# Patient Record
Sex: FEMALE | Race: WHITE | NOT HISPANIC OR LATINO | Employment: FULL TIME | URBAN - METROPOLITAN AREA
[De-identification: names, ages, dates, MRNs, and addresses within clinical notes are randomized per-mention and may not be internally consistent; named-entity substitution may affect disease eponyms.]

---

## 2017-01-12 ENCOUNTER — PATIENT MESSAGE (OUTPATIENT)
Dept: FAMILY MEDICINE | Facility: CLINIC | Age: 42
End: 2017-01-12

## 2017-01-12 ENCOUNTER — OFFICE VISIT (OUTPATIENT)
Dept: FAMILY MEDICINE | Facility: CLINIC | Age: 42
End: 2017-01-12
Payer: COMMERCIAL

## 2017-01-12 VITALS
HEIGHT: 66 IN | SYSTOLIC BLOOD PRESSURE: 108 MMHG | HEART RATE: 96 BPM | TEMPERATURE: 100 F | OXYGEN SATURATION: 97 % | DIASTOLIC BLOOD PRESSURE: 76 MMHG | BODY MASS INDEX: 34.33 KG/M2 | WEIGHT: 213.63 LBS

## 2017-01-12 DIAGNOSIS — J18.9 CAP (COMMUNITY ACQUIRED PNEUMONIA): Primary | ICD-10-CM

## 2017-01-12 PROCEDURE — 1159F MED LIST DOCD IN RCRD: CPT | Mod: S$GLB,,, | Performed by: FAMILY MEDICINE

## 2017-01-12 PROCEDURE — 99214 OFFICE O/P EST MOD 30 MIN: CPT | Mod: S$GLB,,, | Performed by: FAMILY MEDICINE

## 2017-01-12 PROCEDURE — 99999 PR PBB SHADOW E&M-EST. PATIENT-LVL III: CPT | Mod: PBBFAC,,, | Performed by: FAMILY MEDICINE

## 2017-01-12 PROCEDURE — 3074F SYST BP LT 130 MM HG: CPT | Mod: S$GLB,,, | Performed by: FAMILY MEDICINE

## 2017-01-12 PROCEDURE — 3078F DIAST BP <80 MM HG: CPT | Mod: S$GLB,,, | Performed by: FAMILY MEDICINE

## 2017-01-12 RX ORDER — TIZANIDINE 4 MG/1
4 TABLET ORAL 3 TIMES DAILY PRN
Refills: 6 | COMMUNITY
Start: 2016-12-13 | End: 2019-11-27

## 2017-01-12 RX ORDER — AMOXICILLIN AND CLAVULANATE POTASSIUM 875; 125 MG/1; MG/1
1 TABLET, FILM COATED ORAL 2 TIMES DAILY
Qty: 20 TABLET | Refills: 0 | Status: SHIPPED | OUTPATIENT
Start: 2017-01-12 | End: 2017-01-22

## 2017-01-12 NOTE — MR AVS SNAPSHOT
[facility]  [address]  [place] [address]  Phone: [phone]                  Klei Gilbert   2017 9:45 AM   Office Visit    Description:  Female : 1975   Provider:  Andriy Mroan MD   Department:  [facility]           Reason for Visit     Dizziness     Fatigue     Generalized Body Aches           Diagnoses this Visit        Comments    CAP (community acquired pneumonia)    -  Primary            To Do List           Future Appointments        Provider Department Dept Phone    2/3/2017 3:00 PM MD Keegan Duarte [facility] - Internal Medicine [phone]    2017 8:15 AM LAB, [place] [phone]    3/15/2017 3:30 PM MD [facility] Rheumatology [phone]      Goals (5 Years of Data)     None      Follow-Up and Disposition     Return if symptoms worsen or fail to improve.       These Medications        Disp Refills Start End    amoxicillin-clavulanate 875-125mg (AUGMENTIN) 875-125 mg per tablet 20 tablet 0 2017    Take 1 tablet by mouth 2 (two) times daily. - Oral    Pharmacy: [facility] #[id number] - [place] - [address] Ph #: [phone]         [facility] On Call     [facility] On Call Nurse Care Line -  Assistance  Registered nurses in the [facility] provide clinical advisement, health education, appointment booking, and other advisory services.  Call for this free service at [phone].             Medications           Message regarding Medications     Verify the changes and/or additions to your medication regime listed below are the same as discussed with your clinician today.  If any of these changes or additions are incorrect, please notify your healthcare provider.        START taking these NEW medications        Refills    amoxicillin-clavulanate 875-125mg (AUGMENTIN) 875-125 mg per tablet 0    Sig: Take 1 tablet by mouth  "2 (two) times daily.    Class: Normal    Route: Oral      STOP taking these medications     clobetasol (CLOBEX) 0.05 % shampoo Apply topically Once a week. AAA scalp qweek-leave on 15 minutes prior to rinsing.    NUVAIL 16 % NFSo AAA nails qd - remove 1x/week and restart           Verify that the below list of medications is an accurate representation of the medications you are currently taking.  If none reported, the list may be blank. If incorrect, please contact your healthcare provider. Carry this list with you in case of emergency.           Current Medications     BD LUER-NARCISA SYRINGE 3 mL 25 x 1" Syrg     buPROPion (WELLBUTRIN) 100 MG tablet TAKE 1 TABLET (100 MG TOTAL) BY MOUTH 2 (TWO) TIMES DAILY.    cholecalciferol, vitamin D3, 2,000 unit Tab Take 1 tablet by mouth Once a week.    COSENTYX PEN, 2 PENS, 150 mg/mL PnIj Inject 2 pens (300mg) q 4 weeks    ergocalciferol (ERGOCALCIFEROL) 50,000 unit Cap Take 1 capsule (50,000 Units total) by mouth every 14 (fourteen) days. For 8 weeks and then take twice a month    leflunomide (ARAVA) 20 MG Tab Take 1 tablet (20 mg total) by mouth once daily.    levocetirizine (XYZAL) 5 MG tablet TAKE 1 TABLET (5 MG TOTAL) BY MOUTH DAILY AS NEEDED FOR ALLERGIES.    liothyronine (CYTOMEL) 5 MCG Tab TAKE 2 TABLETS (10 MCG TOTAL) BY MOUTH ONCE DAILY.    lisinopril-hydrochlorothiazide (PRINZIDE,ZESTORETIC) 20-25 mg Tab Take 1 tablet by mouth once daily.    methadone (DOLOPHINE) 10 MG tablet Take 1 tablet by mouth Three times a day.    naproxen (NAPROSYN) 500 MG tablet Take 1 tablet (500 mg total) by mouth 3 (three) times daily with meals.    omeprazole (PRILOSEC) 40 MG capsule Take 1 capsule (40 mg total) by mouth once daily.    potassium chloride (MICRO-K) 10 MEQ CpSR Take 1 capsule (10 mEq total) by mouth once daily.    promethazine (PHENERGAN) 25 MG tablet Take 1 tablet by mouth Once daily as needed.    SYNTHROID 125 mcg tablet Take 1 tablet (125 mcg total) by mouth before " "breakfast.    tizanidine (ZANAFLEX) 4 MG tablet Take 4 mg by mouth 3 (three) times daily as needed.    trazodone (DESYREL) 50 MG tablet     amoxicillin-clavulanate 875-125mg (AUGMENTIN) 875-125 mg per tablet Take 1 tablet by mouth 2 (two) times daily.    clindamycin (CLEOCIN T) 1 % external solution     clobetasol (CLOBEX) 0.05 % topical spray Apply topically Twice daily. AAA bie. Disp 125 ml    mupirocin calcium 2% (BACTROBAN) 2 % cream APPLY TO AFFECTED AREA TWICE A DAY INTRANASALLY FOR 1ST WEEK OF THE MONTH    triamcinolone (KENALOG) 0.1 % cream Apply topically Twice daily. Dirs: disp: 1# jar AAA    urea (CARMOL) 40 % Crea AAA foot qd after soak           Clinical Reference Information           Vital Signs - Last Recorded  Most recent update: 1/12/2017  9:54 AM by Delfina Blue MA    BP Pulse Temp Ht Wt SpO2    108/76 (BP Location: Right arm, Patient Position: Sitting, BP Method: Manual) 96 99.5 °F (37.5 °C) (Oral) 5' 6" (1.676 m) 96.9 kg (213 lb 10 oz) 97%    BMI                34.48 kg/m2          Blood Pressure          Most Recent Value    BP  108/76      Allergies as of 1/12/2017     Doxycycline Hcl    Enbrel [Etanercept]      Immunizations Administered on Date of Encounter - 1/12/2017     None      "

## 2017-01-12 NOTE — PROGRESS NOTES
"Routine Office Visit    Patient Name: Keli Gilbert    : 1975  MRN: [id number]    Subjective:  Keli is a 41 y.o. female who presents today for:    1. "feeling bad"  Patient presenting today for feeling bad for several days now.  Patient is on Consentyx now for psoriatic arthritis and states that over this past weekend was with her mother in the hospital.  While there they found out the other person in the room had pneumonia.  Patient has been on biologics for psoriatic arhritis for many years now and states that her immune system is not great.  There has been body aches, fatigue, chills, sweats, but no documented fevers.         Past Medical History  Past Medical History   Diagnosis Date    Abnormal Pap smear of vagina     AR (allergic rhinitis)     Demyelinating disorder     Depression     Fever blister     Fibromyalgia      followed by pain management    Generalized osteoarthritis of multiple sites     History of abnormal Pap smear     Hypertension     Hypothyroidism     Insulin resistance     Mixed anxiety and depressive disorder     Morbid obesity     Myelitis, optic neuritis in      treated with high-dose steroids and resolved; positive MRI and spinal fluid for a mass, but on embrel for psoriatic arthritis - [facility]; MRI normalized off embrel    Obesity     Pre-diabetes      hx diabetes on stereoids /    Psoriasis     Psoriatic arthritis     Vitamin D deficiency disease        Past Surgical History  Past Surgical History   Procedure Laterality Date    Sinus surgery         Family History  Family History   Problem Relation Age of Onset    Psoriasis Father     Cancer Father      throat    Thyroid disease Mother     Heart disease Mother     Hypertension Mother     Hyperlipidemia Mother     Coronary artery disease Mother      s/p CABG    Heart attack Mother     Heart disease Sister      MVP    Diabetes Paternal Uncle     Diabetes " "Maternal Grandfather     Heart disease Maternal Grandfather     Thyroid disease Maternal Grandfather     ADD / ADHD Son     Melanoma Neg Hx     Lupus Neg Hx     Eczema Neg Hx     Acne Neg Hx     Breast cancer Neg Hx     Colon cancer Neg Hx     Ovarian cancer Neg Hx        Social History  Social History     Social History    Marital status: Single     Spouse name: N/A    Number of children: 1    Years of education: N/A     Occupational History    [employer]     Social History Main Topics    Smoking status: Never Smoker    Smokeless tobacco: Not on file    Alcohol use No    Drug use: No      Comment: 7/7/15 one weed brownie    Sexual activity: Yes     Partners: Male     Birth control/ protection: IUD     Other Topics Concern    Are You Pregnant Or Think You May Be? No    Breast-Feeding No     Social History Narrative       Current Medications  Current Outpatient Prescriptions on File Prior to Visit   Medication Sig Dispense Refill    BD LUER-NARCISA SYRINGE 3 mL 25 x 1" Syrg   3    buPROPion (WELLBUTRIN) 100 MG tablet TAKE 1 TABLET (100 MG TOTAL) BY MOUTH 2 (TWO) TIMES DAILY. 180 tablet 0    cholecalciferol, vitamin D3, 2,000 unit Tab Take 1 tablet by mouth Once a week.      COSENTYX PEN, 2 PENS, 150 mg/mL PnIj Inject 2 pens (300mg) q 4 weeks 2 mL 4    ergocalciferol (ERGOCALCIFEROL) 50,000 unit Cap Take 1 capsule (50,000 Units total) by mouth every 14 (fourteen) days. For 8 weeks and then take twice a month 6 capsule 3    leflunomide (ARAVA) 20 MG Tab Take 1 tablet (20 mg total) by mouth once daily. 30 tablet 1    levocetirizine (XYZAL) 5 MG tablet TAKE 1 TABLET (5 MG TOTAL) BY MOUTH DAILY AS NEEDED FOR ALLERGIES. 90 tablet 3    liothyronine (CYTOMEL) 5 MCG Tab TAKE 2 TABLETS (10 MCG TOTAL) BY MOUTH ONCE DAILY. 180 tablet 3    lisinopril-hydrochlorothiazide (PRINZIDE,ZESTORETIC) 20-25 mg Tab Take 1 tablet by mouth once daily. 90 tablet 3    methadone (DOLOPHINE) 10 MG " tablet Take 1 tablet by mouth Three times a day.      naproxen (NAPROSYN) 500 MG tablet Take 1 tablet (500 mg total) by mouth 3 (three) times daily with meals. 270 tablet 2    omeprazole (PRILOSEC) 40 MG capsule Take 1 capsule (40 mg total) by mouth once daily. 90 capsule 3    potassium chloride (MICRO-K) 10 MEQ CpSR Take 1 capsule (10 mEq total) by mouth once daily. 90 capsule 3    promethazine (PHENERGAN) 25 MG tablet Take 1 tablet by mouth Once daily as needed.      SYNTHROID 125 mcg tablet Take 1 tablet (125 mcg total) by mouth before breakfast. 90 tablet 3    trazodone (DESYREL) 50 MG tablet       clindamycin (CLEOCIN T) 1 % external solution       clobetasol (CLOBEX) 0.05 % topical spray Apply topically Twice daily. AAA bie. Disp 125 ml      mupirocin calcium 2% (BACTROBAN) 2 % cream APPLY TO AFFECTED AREA TWICE A DAY INTRANASALLY FOR 1ST WEEK OF THE MONTH 30 g 3    triamcinolone (KENALOG) 0.1 % cream Apply topically Twice daily. Dirs: disp: 1# jar AAA      urea (CARMOL) 40 % Crea AAA foot qd after soak 198.6 g 3    [DISCONTINUED] clobetasol (CLOBEX) 0.05 % shampoo Apply topically Once a week. AAA scalp qweek-leave on 15 minutes prior to rinsing.      [DISCONTINUED] NUVAIL 16 % NFSo AAA nails qd - remove 1x/week and restart 1 Bottle 5     No current facility-administered medications on file prior to visit.        Allergies   Review of patient's allergies indicates:   Allergen Reactions    Doxycycline hcl Nausea And Vomiting    Enbrel [etanercept] Other (See Comments)     Optic neurtits       Review of Systems (Pertinent positives)  Review of Systems   Constitutional: Positive for chills and malaise/fatigue.   HENT: Positive for congestion.    Eyes: Negative.    Respiratory: Positive for cough. Negative for sputum production and wheezing.    Cardiovascular: Negative.    Gastrointestinal: Negative.    Genitourinary: Negative.    Skin: Negative.          Visit Vitals    /76 (BP Location:  "Right arm, Patient Position: Sitting, BP Method: Manual)    Pulse 96    Temp 99.5 °F (37.5 °C) (Oral)    Ht 5' 6" (1.676 m)    Wt 96.9 kg (213 lb 10 oz)    SpO2 97%    BMI 34.48 kg/m2       GENERAL APPEARANCE: in no apparent distress and well developed and well nourished  HEENT: PERRL, EOMI, Sclera clear, anicteric, Oropharynx clear, no lesions, Neck supple with midline trachea  NECK: normal, supple, no adenopathy, thyroid normal in size  RESPIRATORY: appears well, vitals normal, no respiratory distress, acyanotic, normal RR, chest clear, no wheezing, crepitations, rhonchi, normal symmetric air entry  HEART: regular rate and rhythm, S1, S2 normal, no murmur, click, rub or gallop.    ABDOMEN: abdomen is soft without tenderness, no masses, no hernias, no organomegaly, no rebound, no guarding. Suprapubic tenderness absent. No CVA tenderness.  SKIN: no rashes, no wounds, no other lesions  PSYCH: Alert, oriented x 3, thought content appropriate, speech normal, pleasant and cooperative, good eye contact, well groomed    Assessment/Plan:  Keli Gilbert is a 41 y.o. female who presents today for :    Keli was seen today for dizziness, fatigue and generalized body aches.    Diagnoses and all orders for this visit:    CAP (community acquired pneumonia)  -     amoxicillin-clavulanate 875-125mg (AUGMENTIN) 875-125 mg per tablet; Take 1 tablet by mouth 2 (two) times daily.    1.  With low immune system and recent exposure, will treat with abx  2.  Patient to follow up as needed  3.  She is to call should symptoms worsen or go to the ED   4.  Patient to follow up with PCP as scheduled    Andriy Moran MD    "

## 2017-01-17 ENCOUNTER — PATIENT MESSAGE (OUTPATIENT)
Dept: FAMILY MEDICINE | Facility: CLINIC | Age: 42
End: 2017-01-17

## 2017-01-24 ENCOUNTER — OFFICE VISIT (OUTPATIENT)
Dept: FAMILY MEDICINE | Facility: CLINIC | Age: 42
End: 2017-01-24
Payer: COMMERCIAL

## 2017-01-24 VITALS
HEART RATE: 94 BPM | OXYGEN SATURATION: 97 % | DIASTOLIC BLOOD PRESSURE: 80 MMHG | BODY MASS INDEX: 35.08 KG/M2 | TEMPERATURE: 98 F | WEIGHT: 218.25 LBS | HEIGHT: 66 IN | SYSTOLIC BLOOD PRESSURE: 132 MMHG

## 2017-01-24 DIAGNOSIS — L40.9 PSORIASIS: Primary | ICD-10-CM

## 2017-01-24 DIAGNOSIS — F34.1 DYSTHYMIA: ICD-10-CM

## 2017-01-24 PROCEDURE — 3075F SYST BP GE 130 - 139MM HG: CPT | Mod: S$GLB,,, | Performed by: FAMILY MEDICINE

## 2017-01-24 PROCEDURE — 99213 OFFICE O/P EST LOW 20 MIN: CPT | Mod: S$GLB,,, | Performed by: FAMILY MEDICINE

## 2017-01-24 PROCEDURE — 99999 PR PBB SHADOW E&M-EST. PATIENT-LVL III: CPT | Mod: PBBFAC,,, | Performed by: FAMILY MEDICINE

## 2017-01-24 PROCEDURE — 3079F DIAST BP 80-89 MM HG: CPT | Mod: S$GLB,,, | Performed by: FAMILY MEDICINE

## 2017-01-24 PROCEDURE — 1159F MED LIST DOCD IN RCRD: CPT | Mod: S$GLB,,, | Performed by: FAMILY MEDICINE

## 2017-01-24 NOTE — MR AVS SNAPSHOT
"    [facility]  [address] [place] [address]  Phone: [phone]                  Keli Gilbert   2017 3:15 PM   Office Visit    Description:  Female : 1975   Provider:  Andriy Moran MD   Department:  [facility]           Reason for Visit     paper work           Diagnoses this Visit        Comments    Psoriasis    -  Primary     Dysthymia                To Do List           Future Appointments        Provider Department Dept Phone    2017 8:15 AM LAB, [place] [facility] [phone]    3/15/2017 3:30 PM Ellie Richards MD [facility] Rheumatology [phone]      Goals (5 Years of Data)     None      Follow-Up and Disposition     Return if symptoms worsen or fail to improve.      [facility]     [facility] -  Assistance  Registered nurses in the [facility] provide clinical advisement, health education, appointment booking, and other advisory services.  Call for this free service at [phone].             Medications           Message regarding Medications     Verify the changes and/or additions to your medication regime listed below are the same as discussed with your clinician today.  If any of these changes or additions are incorrect, please notify your healthcare provider.             Verify that the below list of medications is an accurate representation of the medications you are currently taking.  If none reported, the list may be blank. If incorrect, please contact your healthcare provider. Carry this list with you in case of emergency.           Current Medications     BD LUER-NARCISA SYRINGE 3 mL 25 x 1" Syrg     buPROPion (WELLBUTRIN) 100 MG tablet TAKE 1 TABLET (100 MG TOTAL) BY MOUTH 2 (TWO) TIMES DAILY.    cholecalciferol, vitamin D3, 2,000 unit Tab Take 1 tablet by mouth Once a week.    COSENTYX PEN, 2 PENS, 150 mg/mL PnIj Inject 2 pens (300mg) q 4 weeks " "   ergocalciferol (ERGOCALCIFEROL) 50,000 unit Cap Take 1 capsule (50,000 Units total) by mouth every 14 (fourteen) days. For 8 weeks and then take twice a month    leflunomide (ARAVA) 20 MG Tab Take 1 tablet (20 mg total) by mouth once daily.    levocetirizine (XYZAL) 5 MG tablet TAKE 1 TABLET (5 MG TOTAL) BY MOUTH DAILY AS NEEDED FOR ALLERGIES.    liothyronine (CYTOMEL) 5 MCG Tab TAKE 2 TABLETS (10 MCG TOTAL) BY MOUTH ONCE DAILY.    lisinopril-hydrochlorothiazide (PRINZIDE,ZESTORETIC) 20-25 mg Tab Take 1 tablet by mouth once daily.    methadone (DOLOPHINE) 10 MG tablet Take 1 tablet by mouth Three times a day.    naproxen (NAPROSYN) 500 MG tablet Take 1 tablet (500 mg total) by mouth 3 (three) times daily with meals.    omeprazole (PRILOSEC) 40 MG capsule Take 1 capsule (40 mg total) by mouth once daily.    potassium chloride (MICRO-K) 10 MEQ CpSR Take 1 capsule (10 mEq total) by mouth once daily.    promethazine (PHENERGAN) 25 MG tablet Take 1 tablet by mouth Once daily as needed.    SYNTHROID 125 mcg tablet Take 1 tablet (125 mcg total) by mouth before breakfast.    trazodone (DESYREL) 50 MG tablet     clindamycin (CLEOCIN T) 1 % external solution     clobetasol (CLOBEX) 0.05 % topical spray Apply topically Twice daily. AAA bie. Disp 125 ml    mupirocin calcium 2% (BACTROBAN) 2 % cream APPLY TO AFFECTED AREA TWICE A DAY INTRANASALLY FOR 1ST WEEK OF THE MONTH    tizanidine (ZANAFLEX) 4 MG tablet Take 4 mg by mouth 3 (three) times daily as needed.    triamcinolone (KENALOG) 0.1 % cream Apply topically Twice daily. Dirs: disp: 1# jar AAA    urea (CARMOL) 40 % Crea AAA foot qd after soak           Clinical Reference Information           Vital Signs - Last Recorded  Most recent update: 1/24/2017  3:09 PM by Delfina Blue MA    BP Pulse Temp Ht Wt SpO2    132/80 (BP Location: Right arm, Patient Position: Sitting, BP Method: Manual) 94 98.1 °F (36.7 °C) (Oral) 5' 6" (1.676 m) 99 kg (218 lb 4.1 oz) 97%    BMI "                35.23 kg/m2          Blood Pressure          Most Recent Value    BP  132/80      Allergies as of 1/24/2017     Doxycycline Hcl    Enbrel [Etanercept]      Immunizations Administered on Date of Encounter - 1/24/2017     None

## 2017-01-24 NOTE — PROGRESS NOTES
Routine Office Visit    Patient Name: Keli Gilbert    : 1975  MRN: [id number]    Subjective:  Keli is a 41 y.o. female who presents today for:    1. Paperwork  Patient recently took a course to get a concealed carry license.  She states that she needs paperwork filled out as she does currently take Wellbutrin.  The patient has no SI/HI.  She states that she has been on the medication for a long time without any side effects.  She takes the medication due to her medical condition of psoriatic arthritis.  She has never been in a mental institution. She denies any history of suicide attempt or run ins with the law resulting from mental illness.  Patient currently works 2 jobs and is doing very well.      Past Medical History  Past Medical History   Diagnosis Date    Abnormal Pap smear of vagina     AR (allergic rhinitis)     Demyelinating disorder     Depression     Fever blister     Fibromyalgia      followed by pain management    Generalized osteoarthritis of multiple sites     History of abnormal Pap smear     Hypertension     Hypothyroidism     Insulin resistance     Mixed anxiety and depressive disorder     Morbid obesity     Myelitis, optic neuritis in      treated with high-dose steroids and resolved; positive MRI and spinal fluid for a mass, but on embrel for psoriatic arthritis - [facility]; [facility] normalized off embrel    Obesity     Pre-diabetes      hx diabetes on stereoids /    Psoriasis     Psoriatic arthritis     Vitamin D deficiency disease        Past Surgical History  Past Surgical History   Procedure Laterality Date    Sinus surgery         Family History  Family History   Problem Relation Age of Onset    Psoriasis Father     Cancer Father      throat    Thyroid disease Mother     Heart disease Mother     Hypertension Mother     Hyperlipidemia Mother     Coronary artery disease Mother      s/p CABG    Heart attack Mother      "Heart disease Sister      MVP    Diabetes Paternal Uncle     Diabetes Maternal Grandfather     Heart disease Maternal Grandfather     Thyroid disease Maternal Grandfather     ADD / ADHD Son     Melanoma Neg Hx     Lupus Neg Hx     Eczema Neg Hx     Acne Neg Hx     Breast cancer Neg Hx     Colon cancer Neg Hx     Ovarian cancer Neg Hx        Social History  Social History     Social History    Marital status: Single     Spouse name: N/A    Number of children: 1    Years of education: N/A     Occupational History    [employer] (Main [employer])     Social History Main Topics    Smoking status: Never Smoker    Smokeless tobacco: Not on file    Alcohol use No    Drug use: No      Comment: 7/7/15 one weed brownie    Sexual activity: Yes     Partners: Male     Birth control/ protection: IUD     Other Topics Concern    Are You Pregnant Or Think You May Be? No    Breast-Feeding No     Social History Narrative       Current Medications  Current Outpatient Prescriptions on File Prior to Visit   Medication Sig Dispense Refill    BD LUER-NARCISA SYRINGE 3 mL 25 x 1" Syrg   3    buPROPion (WELLBUTRIN) 100 MG tablet TAKE 1 TABLET (100 MG TOTAL) BY MOUTH 2 (TWO) TIMES DAILY. 180 tablet 0    cholecalciferol, vitamin D3, 2,000 unit Tab Take 1 tablet by mouth Once a week.      COSENTYX PEN, 2 PENS, 150 mg/mL PnIj Inject 2 pens (300mg) q 4 weeks 2 mL 4    ergocalciferol (ERGOCALCIFEROL) 50,000 unit Cap Take 1 capsule (50,000 Units total) by mouth every 14 (fourteen) days. For 8 weeks and then take twice a month 6 capsule 3    leflunomide (ARAVA) 20 MG Tab Take 1 tablet (20 mg total) by mouth once daily. 30 tablet 1    levocetirizine (XYZAL) 5 MG tablet TAKE 1 TABLET (5 MG TOTAL) BY MOUTH DAILY AS NEEDED FOR ALLERGIES. 90 tablet 3    liothyronine (CYTOMEL) 5 MCG Tab TAKE 2 TABLETS (10 MCG TOTAL) BY MOUTH ONCE DAILY. 180 tablet 3    lisinopril-hydrochlorothiazide (PRINZIDE,ZESTORETIC) 20-25 mg Tab Take 1 " "tablet by mouth once daily. 90 tablet 3    methadone (DOLOPHINE) 10 MG tablet Take 1 tablet by mouth Three times a day.      naproxen (NAPROSYN) 500 MG tablet Take 1 tablet (500 mg total) by mouth 3 (three) times daily with meals. 270 tablet 2    omeprazole (PRILOSEC) 40 MG capsule Take 1 capsule (40 mg total) by mouth once daily. 90 capsule 3    potassium chloride (MICRO-K) 10 MEQ CpSR Take 1 capsule (10 mEq total) by mouth once daily. 90 capsule 3    promethazine (PHENERGAN) 25 MG tablet Take 1 tablet by mouth Once daily as needed.      SYNTHROID 125 mcg tablet Take 1 tablet (125 mcg total) by mouth before breakfast. 90 tablet 3    trazodone (DESYREL) 50 MG tablet       clindamycin (CLEOCIN T) 1 % external solution       clobetasol (CLOBEX) 0.05 % topical spray Apply topically Twice daily. AAA bie. Disp 125 ml      mupirocin calcium 2% (BACTROBAN) 2 % cream APPLY TO AFFECTED AREA TWICE A DAY INTRANASALLY FOR 1ST WEEK OF THE MONTH 30 g 3    tizanidine (ZANAFLEX) 4 MG tablet Take 4 mg by mouth 3 (three) times daily as needed.  6    triamcinolone (KENALOG) 0.1 % cream Apply topically Twice daily. Dirs: disp: 1# jar AAA      urea (CARMOL) 40 % Crea AAA foot qd after soak 198.6 g 3     No current facility-administered medications on file prior to visit.        Allergies   Review of patient's allergies indicates:   Allergen Reactions    Doxycycline hcl Nausea And Vomiting    Enbrel [etanercept] Other (See Comments)     Optic neurtits       Review of Systems (Pertinent positives)  Review of Systems   Constitutional: Negative.    HENT: Negative.    Eyes: Negative.    Cardiovascular: Negative.    Gastrointestinal: Negative.    Genitourinary: Negative.    Musculoskeletal: Positive for joint pain and myalgias.   Skin: Negative.          Visit Vitals    /80 (BP Location: Right arm, Patient Position: Sitting, BP Method: Manual)    Pulse 94    Temp 98.1 °F (36.7 °C) (Oral)    Ht 5' 6" (1.676 m)    Wt " 99 kg (218 lb 4.1 oz)    SpO2 97%    BMI 35.23 kg/m2       GENERAL APPEARANCE: in no apparent distress and well developed and well nourished  HEENT: PERRL, EOMI, Sclera clear, anicteric, Oropharynx clear, no lesions, Neck supple with midline trachea  NECK: normal, supple, no adenopathy, thyroid normal in size  RESPIRATORY: appears well, vitals normal, no respiratory distress, acyanotic, normal RR, chest clear, no wheezing, crepitations, rhonchi, normal symmetric air entry  HEART: regular rate and rhythm, S1, S2 normal, no murmur, click, rub or gallop.    ABDOMEN: abdomen is soft without tenderness, no masses, no hernias, no organomegaly, no rebound, no guarding. Suprapubic tenderness absent. No CVA tenderness.  SKIN: no rashes, no wounds, no other lesions  PSYCH: Alert, oriented x 3, thought content appropriate, speech normal, pleasant and cooperative, good eye contact, well groomed    Assessment/Plan:  Keli Gilbert is a 41 y.o. female who presents today for :    Keli was seen today for paper work.    Diagnoses and all orders for this visit:    Psoriasis    Dysthymia    1.  Patient is doing well with her current medications  2.  She is to continue them all as prescribed  3.  Will need to follow up in 3 months or sooner if needed  4.  She is to call with any concerns  5.  Patient appears is good mental and physical health today  6.  Will fill out papers clearing her for her concealed carry license    Andriy Moran MD

## 2017-01-31 ENCOUNTER — PATIENT MESSAGE (OUTPATIENT)
Dept: DERMATOLOGY | Facility: CLINIC | Age: 42
End: 2017-01-31

## 2017-02-03 ENCOUNTER — PATIENT MESSAGE (OUTPATIENT)
Dept: FAMILY MEDICINE | Facility: CLINIC | Age: 42
End: 2017-02-03

## 2017-02-06 ENCOUNTER — PATIENT MESSAGE (OUTPATIENT)
Dept: DERMATOLOGY | Facility: CLINIC | Age: 42
End: 2017-02-06

## 2017-02-07 ENCOUNTER — TELEPHONE (OUTPATIENT)
Dept: DERMATOLOGY | Facility: CLINIC | Age: 42
End: 2017-02-07

## 2017-02-07 NOTE — TELEPHONE ENCOUNTER
----- Message from Sri Ortiz sent at 2/7/2017  1:18 PM CST -----  Contact: pt   JAM-pt- pt is returning Rebeca's call can you please call pt at [phone].    SHARRI

## 2017-02-13 ENCOUNTER — PATIENT MESSAGE (OUTPATIENT)
Dept: DERMATOLOGY | Facility: CLINIC | Age: 42
End: 2017-02-13

## 2017-02-13 DIAGNOSIS — L40.9 PSORIASIS: Primary | ICD-10-CM

## 2017-02-14 RX ORDER — SECUKINUMAB 150 MG/ML
INJECTION SUBCUTANEOUS
Qty: 6 SYRINGE | Refills: 1 | Status: SHIPPED | OUTPATIENT
Start: 2017-02-14 | End: 2017-03-15 | Stop reason: SDUPTHER

## 2017-02-20 ENCOUNTER — PATIENT MESSAGE (OUTPATIENT)
Dept: RHEUMATOLOGY | Facility: CLINIC | Age: 42
End: 2017-02-20

## 2017-02-20 ENCOUNTER — LAB VISIT (OUTPATIENT)
Dept: LAB | Facility: HOSPITAL | Age: 42
End: 2017-02-20
Attending: INTERNAL MEDICINE
Payer: COMMERCIAL

## 2017-02-20 DIAGNOSIS — L40.50 PSORIATIC ARTHRITIS: ICD-10-CM

## 2017-02-20 DIAGNOSIS — Z79.60 LONG-TERM USE OF IMMUNOSUPPRESSANT MEDICATION: ICD-10-CM

## 2017-02-20 LAB
ALBUMIN SERPL BCP-MCNC: 3.5 G/DL
ALP SERPL-CCNC: 70 U/L
ALT SERPL W/O P-5'-P-CCNC: 14 U/L
ANION GAP SERPL CALC-SCNC: 7 MMOL/L
AST SERPL-CCNC: 16 U/L
BASOPHILS # BLD AUTO: 0.03 K/UL
BASOPHILS NFR BLD: 0.3 %
BILIRUB SERPL-MCNC: 0.3 MG/DL
BUN SERPL-MCNC: 11 MG/DL
CALCIUM SERPL-MCNC: 9.6 MG/DL
CHLORIDE SERPL-SCNC: 104 MMOL/L
CO2 SERPL-SCNC: 28 MMOL/L
CREAT SERPL-MCNC: 0.7 MG/DL
CRP SERPL-MCNC: 8.1 MG/L
DIFFERENTIAL METHOD: ABNORMAL
EOSINOPHIL # BLD AUTO: 0.7 K/UL
EOSINOPHIL NFR BLD: 7.2 %
ERYTHROCYTE [DISTWIDTH] IN BLOOD BY AUTOMATED COUNT: 14.4 %
ERYTHROCYTE [SEDIMENTATION RATE] IN BLOOD BY WESTERGREN METHOD: 34 MM/HR
EST. GFR  (AFRICAN AMERICAN): >60 ML/MIN/1.73 M^2
EST. GFR  (NON AFRICAN AMERICAN): >60 ML/MIN/1.73 M^2
GLUCOSE SERPL-MCNC: 80 MG/DL
HCT VFR BLD AUTO: 35.6 %
HGB BLD-MCNC: 11.4 G/DL
LYMPHOCYTES # BLD AUTO: 1.8 K/UL
LYMPHOCYTES NFR BLD: 20 %
MCH RBC QN AUTO: 27.3 PG
MCHC RBC AUTO-ENTMCNC: 32 %
MCV RBC AUTO: 85 FL
MONOCYTES # BLD AUTO: 0.7 K/UL
MONOCYTES NFR BLD: 8.1 %
NEUTROPHILS # BLD AUTO: 5.8 K/UL
NEUTROPHILS NFR BLD: 64.2 %
PLATELET # BLD AUTO: 334 K/UL
PMV BLD AUTO: 8.7 FL
POTASSIUM SERPL-SCNC: 4.6 MMOL/L
PROT SERPL-MCNC: 7.1 G/DL
RBC # BLD AUTO: 4.18 M/UL
SODIUM SERPL-SCNC: 139 MMOL/L
WBC # BLD AUTO: 9.09 K/UL

## 2017-02-20 PROCEDURE — 80053 COMPREHEN METABOLIC PANEL: CPT

## 2017-02-20 PROCEDURE — 86140 C-REACTIVE PROTEIN: CPT

## 2017-02-20 PROCEDURE — 85025 COMPLETE CBC W/AUTO DIFF WBC: CPT

## 2017-02-20 PROCEDURE — 85651 RBC SED RATE NONAUTOMATED: CPT

## 2017-02-20 PROCEDURE — 36415 COLL VENOUS BLD VENIPUNCTURE: CPT | Mod: PO

## 2017-03-15 ENCOUNTER — OFFICE VISIT (OUTPATIENT)
Dept: RHEUMATOLOGY | Facility: CLINIC | Age: 42
End: 2017-03-15
Payer: COMMERCIAL

## 2017-03-15 ENCOUNTER — PATIENT MESSAGE (OUTPATIENT)
Dept: DERMATOLOGY | Facility: CLINIC | Age: 42
End: 2017-03-15

## 2017-03-15 VITALS
HEIGHT: 68 IN | SYSTOLIC BLOOD PRESSURE: 142 MMHG | WEIGHT: 221.88 LBS | BODY MASS INDEX: 33.63 KG/M2 | HEART RATE: 71 BPM | DIASTOLIC BLOOD PRESSURE: 84 MMHG

## 2017-03-15 DIAGNOSIS — L40.50 PSORIATIC ARTHRITIS: Primary | ICD-10-CM

## 2017-03-15 DIAGNOSIS — L40.9 PSORIASIS: ICD-10-CM

## 2017-03-15 DIAGNOSIS — Z79.899 LONG-TERM USE OF HIGH-RISK MEDICATION: ICD-10-CM

## 2017-03-15 DIAGNOSIS — Z79.60 LONG-TERM USE OF IMMUNOSUPPRESSANT MEDICATION: ICD-10-CM

## 2017-03-15 PROCEDURE — 3077F SYST BP >= 140 MM HG: CPT | Mod: S$GLB,,, | Performed by: INTERNAL MEDICINE

## 2017-03-15 PROCEDURE — 1160F RVW MEDS BY RX/DR IN RCRD: CPT | Mod: S$GLB,,, | Performed by: INTERNAL MEDICINE

## 2017-03-15 PROCEDURE — 99214 OFFICE O/P EST MOD 30 MIN: CPT | Mod: S$GLB,,, | Performed by: INTERNAL MEDICINE

## 2017-03-15 PROCEDURE — 3079F DIAST BP 80-89 MM HG: CPT | Mod: S$GLB,,, | Performed by: INTERNAL MEDICINE

## 2017-03-15 PROCEDURE — 99999 PR PBB SHADOW E&M-EST. PATIENT-LVL IV: CPT | Mod: PBBFAC,,, | Performed by: INTERNAL MEDICINE

## 2017-03-15 RX ORDER — SECUKINUMAB 150 MG/ML
INJECTION SUBCUTANEOUS
Qty: 2 ML | Refills: 4 | Status: SHIPPED | OUTPATIENT
Start: 2017-03-15 | End: 2017-03-21 | Stop reason: SDUPTHER

## 2017-03-15 ASSESSMENT — ROUTINE ASSESSMENT OF PATIENT INDEX DATA (RAPID3)
AM STIFFNESS SCORE: 1, YES
MDHAQ FUNCTION SCORE: .5
WHEN YOU AWAKENED IN THE MORNING OVER THE LAST WEEK, PLEASE INDICATE THE AMOUNT OF TIME IT TAKES UNTIL YOU ARE AS LIMBER AS YOU WILL BE FOR THE DAY: 30 MINUTES
PAIN SCORE: 8.5
FATIGUE SCORE: 8.5
PSYCHOLOGICAL DISTRESS SCORE: 3.3
TOTAL RAPID3 SCORE: 3.89
PATIENT GLOBAL ASSESSMENT SCORE: 1.5

## 2017-03-15 NOTE — MR AVS SNAPSHOT
"    [facility]  [address]  [facility] [address]  Phone: [phone]  Fax: [phone]                  Keli Gilbert   3/15/2017 3:30 PM   Office Visit    Description:  Female : 1975   Provider:  Ellie Richards MD   Department:  [facility]           Reason for Visit     Disease Management           Diagnoses this Visit        Comments    Psoriatic arthritis    -  Primary     Psoriasis         Long-term use of immunosuppressant medication         Long-term use of high-risk medication                To Do List           Goals (5 Years of Data)     None      Follow-Up and Disposition     Return in about 3 months (around 6/15/2017).       These Medications        Disp Refills Start End    COSENTYX PEN, 2 PENS, 150 mg/mL PnIj 2 mL 4 3/15/2017     Inject 2 pens (300mg) q 4 weeks    Pharmacy: [facility] [place] [address] [place] Ph #: [phone]         [facility]     [facility] Care Line -  Assistance  Registered nurses in the [facility] provide clinical advisement, health education, appointment booking, and other advisory services.  Call for this free service at [phone].             Medications           Message regarding Medications     Verify the changes and/or additions to your medication regime listed below are the same as discussed with your clinician today.  If any of these changes or additions are incorrect, please notify your healthcare provider.             Verify that the below list of medications is an accurate representation of the medications you are currently taking.  If none reported, the list may be blank. If incorrect, please contact your healthcare provider. Carry this list with you in case of emergency.           Current Medications     BD LUER-NARCISA SYRINGE 3 mL 25 x 1" Syrg     buPROPion (WELLBUTRIN) 100 MG tablet TAKE 1 TABLET (100 MG TOTAL) BY MOUTH 2 " "(TWO) TIMES DAILY.    cholecalciferol, vitamin D3, 2,000 unit Tab Take 1 tablet by mouth Once a week.    clindamycin (CLEOCIN T) 1 % external solution     clobetasol (CLOBEX) 0.05 % topical spray Apply topically Twice daily. AAA bie. Disp 125 ml    COSENTYX PEN, 2 PENS, 150 mg/mL PnIj Inject 2 pens (300mg) q 4 weeks    ergocalciferol (ERGOCALCIFEROL) 50,000 unit Cap Take 1 capsule (50,000 Units total) by mouth every 14 (fourteen) days. For 8 weeks and then take twice a month    leflunomide (ARAVA) 20 MG Tab Take 1 tablet (20 mg total) by mouth once daily.    levocetirizine (XYZAL) 5 MG tablet TAKE 1 TABLET (5 MG TOTAL) BY MOUTH DAILY AS NEEDED FOR ALLERGIES.    liothyronine (CYTOMEL) 5 MCG Tab TAKE 2 TABLETS (10 MCG TOTAL) BY MOUTH ONCE DAILY.    lisinopril-hydrochlorothiazide (PRINZIDE,ZESTORETIC) 20-25 mg Tab Take 1 tablet by mouth once daily.    methadone (DOLOPHINE) 10 MG tablet Take 1 tablet by mouth Three times a day.    mupirocin calcium 2% (BACTROBAN) 2 % cream APPLY TO AFFECTED AREA TWICE A DAY INTRANASALLY FOR 1ST WEEK OF THE MONTH    naproxen (NAPROSYN) 500 MG tablet Take 1 tablet (500 mg total) by mouth 3 (three) times daily with meals.    omeprazole (PRILOSEC) 40 MG capsule Take 1 capsule (40 mg total) by mouth once daily.    potassium chloride (MICRO-K) 10 MEQ CpSR Take 1 capsule (10 mEq total) by mouth once daily.    promethazine (PHENERGAN) 25 MG tablet Take 1 tablet by mouth Once daily as needed.    SYNTHROID 125 mcg tablet Take 1 tablet (125 mcg total) by mouth before breakfast.    tizanidine (ZANAFLEX) 4 MG tablet Take 4 mg by mouth 3 (three) times daily as needed.    trazodone (DESYREL) 50 MG tablet     triamcinolone (KENALOG) 0.1 % cream Apply topically Twice daily. Dirs: disp: 1# jar AAA    urea (CARMOL) 40 % Crea AAA foot qd after soak           Clinical Reference Information           Your Vitals Were     BP Pulse Height Weight BMI    142/84 71 5' 8" (1.727 m) 100.7 kg (221 lb 14.4 oz) 33.74 " kg/m2      Blood Pressure          Most Recent Value    BP  (!)  142/84      Allergies as of 3/15/2017     Doxycycline Hcl    Enbrel [Etanercept]      Immunizations Administered on Date of Encounter - 3/15/2017     None      Orders Placed During Today's Visit     Recurring Lab Work Interval Expires    C-reactive protein   3/15/2018    CBC auto differential   3/15/2018    Comprehensive metabolic panel   3/15/2018    Sedimentation rate, manual   3/15/2018      Language Assistance Services     ATTENTION: Language assistance services are available, free of charge. Please call [phone].      ATENCIÓN: Si habla español, tiene a key disposición servicios gratuitos de asistencia lingüística. Llame al [phone].     CHÚ Ý: N?u b?n nói Ti?ng Vi?t, có các d?ch v? h? tr? ngôn ng? mi?n phí dành cho b?n. G?i s? [phone].         [facility] complies with applicable Federal civil rights laws and does not discriminate on the basis of race, color, national origin, age, disability, or sex.

## 2017-03-15 NOTE — PROGRESS NOTES
"Subjective:       Patient ID: Keli Gilbert is a 41 y.o. female with psoriatic arthritis on Cosentyx & Leflunomide (& naproxen); demyelinating disorder secondary to Enbrel; chronic pain syndrome on savella & methadone         Chief Complaint: No chief complaint on file.    She returns for follow-up. Last seen on 12/7/16.  Was begun on Cosentyx in November, 2015. She is taking 300 mg/monthly. She is still taking leflunomide 20mg/d  & naproxen 500 mg twice daily as she had a new erosion (L 4th MCP) on imaging. No adverse medication related effects. No diarrhea.  She has seen a marked improvement on Cosentyx & her labs improved.  Currently has no complaints. AM stiffness still x 30 minutes. Her psoriasis is gone.  She is worried however, because her insurance has rejected Cosentyx now as put in by [facility]. They offered Talz instead. Talz is similar, however, Talz is not approved for PsA.  We will go through our pharmacy and try to get Cosentyx for her..        Associated symptoms include fatigue. Pertinent negatives include no fever or headaches.          Current Outpatient Prescriptions   Medication Sig Dispense Refill    BD LUER-NARCISA SYRINGE 3 mL 25 x 1" Syrg   3    buPROPion (WELLBUTRIN) 100 MG tablet TAKE 1 TABLET (100 MG TOTAL) BY MOUTH 2 (TWO) TIMES DAILY. 180 tablet 0    cholecalciferol, vitamin D3, 2,000 unit Tab Take 1 tablet by mouth Once a week.      clindamycin (CLEOCIN T) 1 % external solution       clobetasol (CLOBEX) 0.05 % topical spray Apply topically Twice daily. AAA bie. Disp 125 ml      COSENTYX PEN, 2 PENS, 150 mg/mL PnIj Inject 2 pens (300mg) q 4 weeks 2 mL 4    ergocalciferol (ERGOCALCIFEROL) 50,000 unit Cap Take 1 capsule (50,000 Units total) by mouth every 14 (fourteen) days. For 8 weeks and then take twice a month 6 capsule 3    leflunomide (ARAVA) 20 MG Tab Take 1 tablet (20 mg total) by mouth once daily. 30 tablet 1    levocetirizine (XYZAL) 5 MG tablet TAKE 1 TABLET (5 MG TOTAL) " BY MOUTH DAILY AS NEEDED FOR ALLERGIES. 90 tablet 3    liothyronine (CYTOMEL) 5 MCG Tab TAKE 2 TABLETS (10 MCG TOTAL) BY MOUTH ONCE DAILY. 180 tablet 3    lisinopril-hydrochlorothiazide (PRINZIDE,ZESTORETIC) 20-25 mg Tab Take 1 tablet by mouth once daily. 90 tablet 3    methadone (DOLOPHINE) 10 MG tablet Take 1 tablet by mouth Three times a day.      mupirocin calcium 2% (BACTROBAN) 2 % cream APPLY TO AFFECTED AREA TWICE A DAY INTRANASALLY FOR 1ST WEEK OF THE MONTH 30 g 3    naproxen (NAPROSYN) 500 MG tablet Take 1 tablet (500 mg total) by mouth 3 (three) times daily with meals. 270 tablet 2    omeprazole (PRILOSEC) 40 MG capsule Take 1 capsule (40 mg total) by mouth once daily. 90 capsule 3    potassium chloride (MICRO-K) 10 MEQ CpSR Take 1 capsule (10 mEq total) by mouth once daily. 90 capsule 3    promethazine (PHENERGAN) 25 MG tablet Take 1 tablet by mouth Once daily as needed.      SYNTHROID 125 mcg tablet Take 1 tablet (125 mcg total) by mouth before breakfast. 90 tablet 3    tizanidine (ZANAFLEX) 4 MG tablet Take 4 mg by mouth 3 (three) times daily as needed.  6    trazodone (DESYREL) 50 MG tablet       triamcinolone (KENALOG) 0.1 % cream Apply topically Twice daily. Dirs: disp: 1# jar AAA      urea (CARMOL) 40 % Crea AAA foot qd after soak 198.6 g 3     No current facility-administered medications for this visit.          Review of patient's allergies indicates:   Allergen Reactions    Doxycycline hcl Nausea And Vomiting    Enbrel [etanercept] Other (See Comments)     Optic neurtits     Past Medical History:   Diagnosis Date    Abnormal Pap smear of vagina     AR (allergic rhinitis)     Demyelinating disorder     Depression     Fever blister     Fibromyalgia     followed by pain management    Generalized osteoarthritis of multiple sites     History of abnormal Pap smear     Hypertension     Hypothyroidism     Insulin resistance     Mixed anxiety and depressive disorder     Morbid  obesity     Myelitis, optic neuritis in     treated with high-dose steroids and resolved; positive MRI and spinal fluid for a mass, but on embrel for psoriatic arthritis - she will see a MS specialist at [facility]; MRI normalized off embrel    Obesity     Pre-diabetes     hx diabetes on stereoids /    Psoriasis     Psoriatic arthritis     Vitamin D deficiency disease      Past Surgical History:   Procedure Laterality Date    SINUS SURGERY  1998       Review of Systems   Constitutional: Positive for fatigue. Negative for fever and unexpected weight change.   HENT: Negative for dental problem and mouth sores.         Dry mouth   Eyes: Negative.  Negative for redness, itching and visual disturbance.        Dry eyes   Respiratory: Negative.    Cardiovascular: Negative.  Negative for chest pain, palpitations and leg swelling.   Gastrointestinal: Negative.  Negative for abdominal pain, anal bleeding, blood in stool, constipation, diarrhea and nausea.        Pyrosis   Endocrine: Negative.    Genitourinary: Negative.  Negative for frequency, menstrual problem, pelvic pain and urgency.   Musculoskeletal: Negative.  Negative for arthralgias, back pain, gait problem and neck pain.   Skin: Negative.  Negative for color change and rash.   Allergic/Immunologic: Positive for immunocompromised state.   Neurological: Negative.  Negative for dizziness, seizures, weakness, numbness and headaches.   Hematological: Negative.  Negative for adenopathy. Does not bruise/bleed easily.   Psychiatric/Behavioral: Positive for dysphoric mood and sleep disturbance. Negative for decreased concentration and suicidal ideas. The patient is nervous/anxious.          Family History   Problem Relation Age of Onset    Psoriasis Father     Cancer Father      throat    Thyroid disease Mother     Heart disease Mother     Hypertension Mother     Hyperlipidemia Mother     Coronary artery disease Mother      s/p CABG    Heart attack Mother     Heart  disease Sister      MVP    Diabetes Paternal Uncle     Diabetes Maternal Grandfather     Heart disease Maternal Grandfather     Thyroid disease Maternal Grandfather     ADD / ADHD Son     Melanoma Neg Hx     Lupus Neg Hx     Eczema Neg Hx     Acne Neg Hx     Breast cancer Neg Hx     Colon cancer Neg Hx     Ovarian cancer Neg Hx        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   No alcohol; no cigarettes;   Objective:         There were no vitals taken for this visit.    Physical Exam   Vitals reviewed.  Constitutional: She is oriented to person, place, and time and well-developed, well-nourished, and in no distress. No distress.   HENT:   Head: Normocephalic and atraumatic.   Mouth/Throat: Oropharynx is clear and moist. No oropharyngeal exudate.   No facial rashes  Parotids not enlarged  No oral ulcers   Eyes: Conjunctivae and EOM are normal. Pupils are equal, round, and reactive to light. Right eye exhibits no discharge. Left eye exhibits no discharge. No scleral icterus.   Neck: Neck supple. No JVD present. No tracheal deviation present. No thyromegaly present.   Cardiovascular: Normal rate, regular rhythm, normal heart sounds and intact distal pulses.  Exam reveals no gallop and no friction rub.    No murmur heard.  Pulmonary/Chest: Effort normal and breath sounds normal. No respiratory distress. She has no wheezes. She has no rales. She exhibits no tenderness.   Abdominal: Soft. Bowel sounds are normal. She exhibits no distension and no mass. There is no splenomegaly or hepatomegaly. There is no tenderness. There is no rebound and no guarding.   Lymphadenopathy:     She has no cervical adenopathy.        Right: No inguinal adenopathy present.        Left: No inguinal adenopathy present.   Neurological: She is alert and oriented to person, place, and time. She has normal reflexes. No cranial nerve deficit. Gait normal.   Proximal and distal muscle strength 5/5.   Skin: Skin is warm and dry. No rash noted. She  is not diaphoretic.     Faded lesions elbows.   Psychiatric: Mood, memory, affect and judgment normal.   Drowsy.   Musculoskeletal: Normal range of motion. She exhibits no tenderness.   Cspine slight decrease in lateral flexion & rotation; no tenderness  Tspine FROM no tenderness  Lspine FROM no tenderness.  TMJ: unremarkable  Shoulders: FROM; no synovitis  Elbows: FROM; no synovitis; no tophi or nodules  Wrists: R with mild SHT; no tenderness; mild decrease in flexion/extension bilaterally.   MCPs: mild thickening of 2nd & MCPs bilaterally;  ok;possibly some thickening 4th L MCP; no tenderness anywhere.   PIPs: very mild dactylitis 2nd R ray.  DIPs: FROM; no synovitis  HIPS: FROM  Knees: FROM; no synovitis; no instability;  Ankles: FROM: no synovitis   Toes: ok; no metatarsalgia                      Labs:   2/20/17: ESR 34; CRP 8.1; CBC Hg 11.4; Ht 35.6; CMP ok;   11/19/16: ESR 20; CRP 4.8; CBC Hg 11.9; CMP K+3.3, otherwise ok;   8/27/16: ESR 22; CRP 7; Hg 11.8; Ht 36.9; CMP ok;   5/26/16: ESR 35; CRP 11.2Hg 11.8; Ht 36.9; CMP ok;   5/21/16: Vit d 37  10/8/15: ESR 45; CRP 14.2; Hg 11.2; Ht 35.8; CMP; ok  7/7/15: CBC plt 380; K+ 3.4; Glu 154  7/6/15: ESR 48; CRP 9.6  4/4/15: ESR 41; CRP 19; Hg 11.0; Ht 34.6; plt 377; CMP ok;  1/10/15: ESR 34; CRP 18; CBC ok; ; Alb. 3.6  10/4/14: ESR 43; CRP 18.5; Hg 11.8; Ht 36.5; plt 378; Alb. 3.4;   3/20/14: ESR 41; CRP 14.3; plt 356; CMP ok;  2/15/13: ESR 30; CRP 9.8; CBC ok; Alb. 3.3  11/2/13: ESR 28; CRP 11.6; plt 363; Alb. 3.3;   5/28/13: ESR 45; CRP 8.8; plt 366; CMP ok;       8/27/16: Bilateral feet: personally reviewed. Mild HV; mild OA shell 1st MTP dali; ? Erosion PIP left small toe;   5/26/16: Bilateral hands: personally reviewed: L 4th MCP (new) erosive lesion; R & L 2nd & 3rd metacarpal head cysts; R mild PIP erosion & STS 2nd.    Assessment:     Psoriatic arthritis-- with mild SHT 2 MCPs bilaterally (R >>L) & with sausage digit of R index finger--but still  some hand pain/swelling.   a) History of sausage digits of both toes    PIPs, DIPs, wrists, and knees., now w. only one sausage digit.  b) Psoriasis of the abdomen, elbow, and the shins--resolved.   c) Enbrel stopped 10/21/11 due to optic neuritis.    d) New erosion (4th L metacarpal head) on x-ray & possibly L 5th toe PIP    e) Persisting elevation of ESR and CRP   f) inadequate response to leflunomide and Stelara & apremilast (w. Intolerable nausea)  G) could not tolerate SSZ--nausea.  H) MTX x 2 even SC did not help sxs.  I) improved on Cosentyx (+leflunomide + naproxen)    Chronic pain/fibromyalgia syndrome with fatigue, tender points, withdrawals to palpation in the past, pain all over, responsive to Savella but with some nausea, [place] [facility] [place]    Left optic neuritis with demyelinating lesions secondary to Enbrel--now resolved.     Diabetes mellitus, diet controlled     Degenerative joint disease of the cervical spine, knees, and feet--very mild.     Depression on wellbutrin & savella--stable.    Hypertension.     Hypothyroidism.     Vitamin D deficiency with regular prescription vitamin D supplementation.     Obesity         Plan:   Labs around mid May.  Continue leflunomide 20 mg/day;  Naproxen 500 mg twice daily to tid pc with omeprazole.   Continue Cosentyx 300 mg/4 weeks however, we will order it for PsA and not for Psoriasis.   Called and asked [facility] to handle and they will.   Call for problems.  RTC 3 months

## 2017-03-16 ENCOUNTER — PATIENT MESSAGE (OUTPATIENT)
Dept: DERMATOLOGY | Facility: CLINIC | Age: 42
End: 2017-03-16

## 2017-03-16 ENCOUNTER — TELEPHONE (OUTPATIENT)
Dept: PHARMACY | Facility: CLINIC | Age: 42
End: 2017-03-16

## 2017-03-21 ENCOUNTER — TELEPHONE (OUTPATIENT)
Dept: PHARMACY | Facility: CLINIC | Age: 42
End: 2017-03-21

## 2017-03-21 ENCOUNTER — PATIENT MESSAGE (OUTPATIENT)
Dept: RHEUMATOLOGY | Facility: CLINIC | Age: 42
End: 2017-03-21

## 2017-03-21 DIAGNOSIS — L40.9 PSORIASIS: ICD-10-CM

## 2017-03-21 RX ORDER — SECUKINUMAB 150 MG/ML
INJECTION SUBCUTANEOUS
Qty: 2 ML | Refills: 4 | Status: SHIPPED | OUTPATIENT
Start: 2017-03-21 | End: 2017-04-05 | Stop reason: ALTCHOICE

## 2017-03-21 NOTE — TELEPHONE ENCOUNTER
Patient aware of denial that was submitted from previous provider she said that her new MD will want to appeal descision. i informed her that we are waiting for denial letter and the pharmacist will message MD on how to proceed_ 3-21-17

## 2017-03-22 ENCOUNTER — PATIENT MESSAGE (OUTPATIENT)
Dept: RHEUMATOLOGY | Facility: CLINIC | Age: 42
End: 2017-03-22

## 2017-03-22 NOTE — TELEPHONE ENCOUNTER
PA for Cosentyx was submitted by an outside provider with diagnosis of psoriasis. It should be for psoriatic arthritis. Insurance denied stating that Taltz is preferred - which is NOT indicated for psoriatic arthritis. Patient has tried Humira (TNFi) in the past and developed optic neuritis so all other TNFi should be avoided.     Taina PCA, will f/u with insurance today.

## 2017-04-03 ENCOUNTER — PATIENT MESSAGE (OUTPATIENT)
Dept: FAMILY MEDICINE | Facility: CLINIC | Age: 42
End: 2017-04-03

## 2017-04-03 RX ORDER — LISINOPRIL AND HYDROCHLOROTHIAZIDE 20; 25 MG/1; MG/1
1 TABLET ORAL DAILY
Qty: 90 TABLET | Refills: 3 | Status: SHIPPED | OUTPATIENT
Start: 2017-04-03 | End: 2018-04-30 | Stop reason: SDUPTHER

## 2017-04-05 ENCOUNTER — TELEPHONE (OUTPATIENT)
Dept: PHARMACY | Facility: CLINIC | Age: 42
End: 2017-04-05

## 2017-04-05 ENCOUNTER — PATIENT MESSAGE (OUTPATIENT)
Dept: ADMINISTRATIVE | Facility: OTHER | Age: 42
End: 2017-04-05

## 2017-04-05 DIAGNOSIS — L40.50 PSORIATIC ARTHRITIS: Primary | ICD-10-CM

## 2017-04-05 DIAGNOSIS — L40.9 PSORIASIS: ICD-10-CM

## 2017-04-05 RX ORDER — SECUKINUMAB 150 MG/ML
INJECTION SUBCUTANEOUS
Qty: 2 ML | Refills: 4 | OUTPATIENT
Start: 2017-04-05

## 2017-04-05 NOTE — TELEPHONE ENCOUNTER
DOCUMENTATION ONLY:  Cosentyx Approved 4/5/17  Approval Dates: 4/5/17-10/5/17  $0.00 copay (with copay card)    Sent to [facility]

## 2017-04-06 ENCOUNTER — PATIENT MESSAGE (OUTPATIENT)
Dept: DERMATOLOGY | Facility: CLINIC | Age: 42
End: 2017-04-06

## 2017-04-11 ENCOUNTER — PATIENT MESSAGE (OUTPATIENT)
Dept: FAMILY MEDICINE | Facility: CLINIC | Age: 42
End: 2017-04-11

## 2017-04-13 ENCOUNTER — OFFICE VISIT (OUTPATIENT)
Dept: FAMILY MEDICINE | Facility: CLINIC | Age: 42
End: 2017-04-13
Payer: COMMERCIAL

## 2017-04-13 VITALS
BODY MASS INDEX: 33.08 KG/M2 | HEIGHT: 68 IN | WEIGHT: 218.25 LBS | TEMPERATURE: 98 F | DIASTOLIC BLOOD PRESSURE: 72 MMHG | SYSTOLIC BLOOD PRESSURE: 112 MMHG | HEART RATE: 97 BPM | OXYGEN SATURATION: 97 %

## 2017-04-13 DIAGNOSIS — J32.1 CHRONIC FRONTAL SINUSITIS: Primary | ICD-10-CM

## 2017-04-13 PROCEDURE — 99999 PR PBB SHADOW E&M-EST. PATIENT-LVL III: CPT | Mod: PBBFAC,,, | Performed by: FAMILY MEDICINE

## 2017-04-13 PROCEDURE — 99214 OFFICE O/P EST MOD 30 MIN: CPT | Mod: S$GLB,,, | Performed by: FAMILY MEDICINE

## 2017-04-13 PROCEDURE — 3074F SYST BP LT 130 MM HG: CPT | Mod: S$GLB,,, | Performed by: FAMILY MEDICINE

## 2017-04-13 PROCEDURE — 3078F DIAST BP <80 MM HG: CPT | Mod: S$GLB,,, | Performed by: FAMILY MEDICINE

## 2017-04-13 PROCEDURE — 1160F RVW MEDS BY RX/DR IN RCRD: CPT | Mod: S$GLB,,, | Performed by: FAMILY MEDICINE

## 2017-04-13 RX ORDER — AMOXICILLIN 875 MG/1
875 TABLET, FILM COATED ORAL EVERY 12 HOURS
Qty: 20 TABLET | Refills: 0 | Status: SHIPPED | OUTPATIENT
Start: 2017-04-13 | End: 2017-06-06

## 2017-04-13 RX ORDER — AZELASTINE 1 MG/ML
1 SPRAY, METERED NASAL 2 TIMES DAILY
Qty: 30 ML | Refills: 0 | Status: SHIPPED | OUTPATIENT
Start: 2017-04-13 | End: 2022-01-19

## 2017-04-13 NOTE — PROGRESS NOTES
"Routine Office Visit    Patient Name: Keli Gilbert    : 1975  MRN: [id number]    Subjective:  Keli is a 41 y.o. female who presents today for:    1. Congestion  Patient presenting with 4-5 days of cough, congestion, and sore throat that occurred after she started having the airconditioner on in her office at work.  She states that she thought it would go away on its own, but has not really improved.  She is on immunosuppressive therapy for her psoriatic arthritis.  There has been no documented fevers.  The cough is non-productive and she got concerned when her nasal discharged "turned colors".  There has been no hemoptysis or cough induced emesis    Past Medical History  Past Medical History:   Diagnosis Date    Abnormal Pap smear of vagina     AR (allergic rhinitis)     Demyelinating disorder     Depression     Fever blister     Fibromyalgia     followed by pain management    Generalized osteoarthritis of multiple sites     History of abnormal Pap smear     Hypertension     Hypothyroidism     Insulin resistance     Mixed anxiety and depressive disorder     Morbid obesity     Myelitis, optic neuritis in     treated with high-dose steroids and resolved; positive MRI and spinal fluid for a mass, but on embrel for psoriatic arthritis - [facility]; MRI normalized off embrel    Obesity     Pre-diabetes     hx diabetes on stereoids /    Psoriasis     Psoriatic arthritis     Vitamin D deficiency disease        Past Surgical History  Past Surgical History:   Procedure Laterality Date    SINUS SURGERY         Family History  Family History   Problem Relation Age of Onset    Psoriasis Father     Cancer Father      throat    Thyroid disease Mother     Heart disease Mother     Hypertension Mother     Hyperlipidemia Mother     Coronary artery disease Mother      s/p CABG    Heart attack Mother     Heart disease Sister      MVP    Diabetes Paternal Uncle  "    Diabetes Maternal Grandfather     Heart disease Maternal Grandfather     Thyroid disease Maternal Grandfather     ADD / ADHD Son     Melanoma Neg Hx     Lupus Neg Hx     Eczema Neg Hx     Acne Neg Hx     Breast cancer Neg Hx     Colon cancer Neg Hx     Ovarian cancer Neg Hx        Social History  Social History     Social History    Marital status: Single     Spouse name: N/A    Number of children: 1    Years of education: N/A     Occupational History    [employer]     Social History Main Topics    Smoking status: Never Smoker    Smokeless tobacco: Not on file    Alcohol use No    Drug use: No      Comment: 7/7/15 one weed brownie    Sexual activity: Yes     Partners: Male     Birth control/ protection: IUD     Other Topics Concern    Are You Pregnant Or Think You May Be? No    Breast-Feeding No     Social History Narrative       Current Medications  Current Outpatient Prescriptions on File Prior to Visit   Medication Sig Dispense Refill    buPROPion (WELLBUTRIN) 100 MG tablet TAKE 1 TABLET (100 MG TOTAL) BY MOUTH 2 (TWO) TIMES DAILY. 180 tablet 0    cholecalciferol, vitamin D3, 2,000 unit Tab Take 1 tablet by mouth Once a week.      clindamycin (CLEOCIN T) 1 % external solution       clobetasol (CLOBEX) 0.05 % topical spray Apply topically Twice daily. AAA bie. Disp 125 ml      ergocalciferol (ERGOCALCIFEROL) 50,000 unit Cap Take 1 capsule (50,000 Units total) by mouth every 14 (fourteen) days. For 8 weeks and then take twice a month 6 capsule 3    leflunomide (ARAVA) 20 MG Tab Take 1 tablet (20 mg total) by mouth once daily. 30 tablet 1    levocetirizine (XYZAL) 5 MG tablet TAKE 1 TABLET (5 MG TOTAL) BY MOUTH DAILY AS NEEDED FOR ALLERGIES. 90 tablet 3    liothyronine (CYTOMEL) 5 MCG Tab TAKE 2 TABLETS (10 MCG TOTAL) BY MOUTH ONCE DAILY. 180 tablet 3    lisinopril-hydrochlorothiazide (PRINZIDE,ZESTORETIC) 20-25 mg Tab Take 1 tablet by mouth once daily. 90  "tablet 3    methadone (DOLOPHINE) 10 MG tablet Take 1 tablet by mouth Three times a day.      mupirocin calcium 2% (BACTROBAN) 2 % cream APPLY TO AFFECTED AREA TWICE A DAY INTRANASALLY FOR 1ST WEEK OF THE MONTH 30 g 3    naproxen (NAPROSYN) 500 MG tablet Take 1 tablet (500 mg total) by mouth 3 (three) times daily with meals. 270 tablet 2    omeprazole (PRILOSEC) 40 MG capsule Take 1 capsule (40 mg total) by mouth once daily. 90 capsule 3    potassium chloride (MICRO-K) 10 MEQ CpSR Take 1 capsule (10 mEq total) by mouth once daily. 90 capsule 3    promethazine (PHENERGAN) 25 MG tablet Take 1 tablet by mouth Once daily as needed.      secukinumab (COSENTYX PEN, 2 PENS,) 150 mg/mL PnIj Inject 300 mg into the skin every 28 days. 6 Syringe 2    SYNTHROID 125 mcg tablet Take 1 tablet (125 mcg total) by mouth before breakfast. 90 tablet 3    tizanidine (ZANAFLEX) 4 MG tablet Take 4 mg by mouth 3 (three) times daily as needed.  6    trazodone (DESYREL) 50 MG tablet       triamcinolone (KENALOG) 0.1 % cream Apply topically Twice daily. Dirs: disp: 1# jar AAA      urea (CARMOL) 40 % Crea AAA foot qd after soak 198.6 g 3    [DISCONTINUED] BD LUER-NARCISA SYRINGE 3 mL 25 x 1" Syrg   3     No current facility-administered medications on file prior to visit.        Allergies   Review of patient's allergies indicates:   Allergen Reactions    Doxycycline hcl Nausea And Vomiting    Enbrel [etanercept] Other (See Comments)     Optic neurtits       Review of Systems (Pertinent positives)  Review of Systems   Constitutional: Positive for malaise/fatigue.   HENT: Positive for congestion and sore throat.    Eyes: Negative.    Respiratory: Positive for cough. Negative for hemoptysis, sputum production and wheezing.    Cardiovascular: Negative.    Gastrointestinal: Negative.    Musculoskeletal: Positive for joint pain and myalgias.   Skin: Negative.          /72 (BP Location: Left arm, Patient Position: Sitting, BP Method: " "Manual)  Pulse 97  Temp 98.4 °F (36.9 °C) (Oral)   Ht 5' 8" (1.727 m)  Wt 99 kg (218 lb 4.1 oz)  LMP  (LMP Unknown)  SpO2 97%  BMI 33.19 kg/m2    GENERAL APPEARANCE: in no apparent distress and well developed and well nourished  HEENT: PERRL, EOMI, Sclera clear, anicteric, Oropharynx clear, no lesions, Neck supple with midline trachea  NECK: normal, supple, no adenopathy, thyroid normal in size  RESPIRATORY: appears well, vitals normal, no respiratory distress, acyanotic, normal RR, chest clear, no wheezing, crepitations, rhonchi, normal symmetric air entry  HEART: regular rate and rhythm, S1, S2 normal, no murmur, click, rub or gallop.    ABDOMEN: abdomen is soft without tenderness, no masses, no hernias, no organomegaly, no rebound, no guarding. Suprapubic tenderness absent. No CVA tenderness.  SKIN: no rashes, no wounds, no other lesions  PSYCH: Alert, oriented x 3, thought content appropriate, speech normal, pleasant and cooperative, good eye contact, well groomed    Assessment/Plan:  Keli Gilbert is a 41 y.o. female who presents today for :    Keli was seen today for sinusitis.    Diagnoses and all orders for this visit:    Chronic frontal sinusitis  -     amoxicillin (AMOXIL) 875 MG tablet; Take 1 tablet (875 mg total) by mouth every 12 (twelve) hours.  -     azelastine (ASTELIN) 137 mcg (0.1 %) nasal spray; 1 spray (137 mcg total) by Nasal route 2 (two) times daily.      1.  Will treat with astelin for rhinorrhea and post nasal drip  2.   Amoxil as she on cosentyx  3.  Follow up as needed  4  Patient to call with any concerns    Andriy Moran MD      "

## 2017-04-13 NOTE — MR AVS SNAPSHOT
[facility]  [address] [place] [address]  Phone: [phone]                  Keli Gilbert   2017 9:30 AM   Office Visit    Description:  Female : 1975   Provider:  Andriy Moran MD   Department:  [facility]           Reason for Visit     Sinusitis           Diagnoses this Visit        Comments    Chronic frontal sinusitis    -  Primary            To Do List           Future Appointments        Provider Department Dept Phone    2017 9:00 AM LAB, [place] [phone]    2017 7:15 AM LAB, [place] [facility] [phone]    2017 3:10 PM Marie Fam MD [facility] - Dermatology [phone]    6/15/2017 4:00 PM Ellie Richards MD [facility] - Rheumatology [phone]      Goals (5 Years of Data)     None      Follow-Up and Disposition     Return if symptoms worsen or fail to improve.       These Medications        Disp Refills Start End    amoxicillin (AMOXIL) 875 MG tablet 20 tablet 0 2017     Take 1 tablet (875 mg total) by mouth every 12 (twelve) hours. - Oral    Pharmacy: [facility] #[id number] - [place] - [address] [facility] Ph #: [phone]       azelastine (ASTELIN) 137 mcg (0.1 %) nasal spray 30 mL 0 2017    1 spray (137 mcg total) by Nasal route 2 (two) times daily. - Nasal    Pharmacy: [facility] #[id number] - [place] - [address] [facility] Ph #: [phone]         [facility] - 24 Assistance  Unless otherwise directed by your provider, please contact [facility], our nurse care line that is available for 24/ assistance.     Registered nurses in the [facility] provide: appointment scheduling, clinical advisement, health education, and other advisory services.  Call: [phone] (toll free)               Medications           Message regarding Medications     Verify  "the changes and/or additions to your medication regime listed below are the same as discussed with your clinician today.  If any of these changes or additions are incorrect, please notify your healthcare provider.        START taking these NEW medications        Refills    amoxicillin (AMOXIL) 875 MG tablet 0    Sig: Take 1 tablet (875 mg total) by mouth every 12 (twelve) hours.    Class: Print    Route: Oral    azelastine (ASTELIN) 137 mcg (0.1 %) nasal spray 0    Si spray (137 mcg total) by Nasal route 2 (two) times daily.    Class: Normal    Route: Nasal      STOP taking these medications     BD LUER-NARCISA SYRINGE 3 mL 25 x 1" Syrg            Verify that the below list of medications is an accurate representation of the medications you are currently taking.  If none reported, the list may be blank. If incorrect, please contact your healthcare provider. Carry this list with you in case of emergency.           Current Medications     buPROPion (WELLBUTRIN) 100 MG tablet TAKE 1 TABLET (100 MG TOTAL) BY MOUTH 2 (TWO) TIMES DAILY.    cholecalciferol, vitamin D3, 2,000 unit Tab Take 1 tablet by mouth Once a week.    clindamycin (CLEOCIN T) 1 % external solution     clobetasol (CLOBEX) 0.05 % topical spray Apply topically Twice daily. AAA bie. Disp 125 ml    ergocalciferol (ERGOCALCIFEROL) 50,000 unit Cap Take 1 capsule (50,000 Units total) by mouth every 14 (fourteen) days. For 8 weeks and then take twice a month    leflunomide (ARAVA) 20 MG Tab Take 1 tablet (20 mg total) by mouth once daily.    levocetirizine (XYZAL) 5 MG tablet TAKE 1 TABLET (5 MG TOTAL) BY MOUTH DAILY AS NEEDED FOR ALLERGIES.    liothyronine (CYTOMEL) 5 MCG Tab TAKE 2 TABLETS (10 MCG TOTAL) BY MOUTH ONCE DAILY.    lisinopril-hydrochlorothiazide (PRINZIDE,ZESTORETIC) 20-25 mg Tab Take 1 tablet by mouth once daily.    methadone (DOLOPHINE) 10 MG tablet Take 1 tablet by mouth Three times a day.    mupirocin calcium 2% (BACTROBAN) 2 % cream APPLY TO " "AFFECTED AREA TWICE A DAY INTRANASALLY FOR 1ST WEEK OF THE MONTH    naproxen (NAPROSYN) 500 MG tablet Take 1 tablet (500 mg total) by mouth 3 (three) times daily with meals.    omeprazole (PRILOSEC) 40 MG capsule Take 1 capsule (40 mg total) by mouth once daily.    potassium chloride (MICRO-K) 10 MEQ CpSR Take 1 capsule (10 mEq total) by mouth once daily.    promethazine (PHENERGAN) 25 MG tablet Take 1 tablet by mouth Once daily as needed.    secukinumab (COSENTYX PEN, 2 PENS,) 150 mg/mL PnIj Inject 300 mg into the skin every 28 days.    SYNTHROID 125 mcg tablet Take 1 tablet (125 mcg total) by mouth before breakfast.    tizanidine (ZANAFLEX) 4 MG tablet Take 4 mg by mouth 3 (three) times daily as needed.    trazodone (DESYREL) 50 MG tablet     triamcinolone (KENALOG) 0.1 % cream Apply topically Twice daily. Dirs: disp: 1# jar AAA    urea (CARMOL) 40 % Crea AAA foot qd after soak    amoxicillin (AMOXIL) 875 MG tablet Take 1 tablet (875 mg total) by mouth every 12 (twelve) hours.    azelastine (ASTELIN) 137 mcg (0.1 %) nasal spray 1 spray (137 mcg total) by Nasal route 2 (two) times daily.           Clinical Reference Information           Your Vitals Were     BP Pulse Temp Height Weight Last Period    112/72 (BP Location: Left arm, Patient Position: Sitting, BP Method: Manual) 97 98.4 °F (36.9 °C) (Oral) 5' 8" (1.727 m) 99 kg (218 lb 4.1 oz) (LMP Unknown)    SpO2 BMI             97% 33.19 kg/m2         Blood Pressure          Most Recent Value    BP  112/72      Allergies as of 4/13/2017     Doxycycline Hcl    Enbrel [Etanercept]      Immunizations Administered on Date of Encounter - 4/13/2017     None      Language Assistance Services     ATTENTION: Language assistance services are available, free of charge. Please call [phone].      ATENCIÓN: Si habla shellañol, tiene a key disposición servicios gratuitos de asistencia lingüística. Llame al [phone].     CHÚ Ý: N?u b?n nói Ti?ng Vi?t, có các d?ch v? h? " tr? catracho luu? mi?n phí dành cho b?n. G?i s? [phone].         [facility] complies with applicable Federal civil rights laws and does not discriminate on the basis of race, color, national origin, age, disability, or sex.

## 2017-04-28 ENCOUNTER — PATIENT MESSAGE (OUTPATIENT)
Dept: OBSTETRICS AND GYNECOLOGY | Facility: CLINIC | Age: 42
End: 2017-04-28

## 2017-05-05 ENCOUNTER — PATIENT MESSAGE (OUTPATIENT)
Dept: FAMILY MEDICINE | Facility: CLINIC | Age: 42
End: 2017-05-05

## 2017-05-15 ENCOUNTER — PATIENT MESSAGE (OUTPATIENT)
Dept: FAMILY MEDICINE | Facility: CLINIC | Age: 42
End: 2017-05-15

## 2017-05-15 ENCOUNTER — PATIENT MESSAGE (OUTPATIENT)
Dept: DERMATOLOGY | Facility: CLINIC | Age: 42
End: 2017-05-15

## 2017-05-16 ENCOUNTER — PATIENT MESSAGE (OUTPATIENT)
Dept: FAMILY MEDICINE | Facility: CLINIC | Age: 42
End: 2017-05-16

## 2017-05-16 DIAGNOSIS — K21.9 GASTROESOPHAGEAL REFLUX DISEASE, ESOPHAGITIS PRESENCE NOT SPECIFIED: Primary | ICD-10-CM

## 2017-05-20 ENCOUNTER — LAB VISIT (OUTPATIENT)
Dept: LAB | Facility: HOSPITAL | Age: 42
End: 2017-05-20
Attending: INTERNAL MEDICINE
Payer: COMMERCIAL

## 2017-05-20 DIAGNOSIS — Z79.899 LONG-TERM USE OF HIGH-RISK MEDICATION: ICD-10-CM

## 2017-05-20 DIAGNOSIS — Z79.60 LONG-TERM USE OF IMMUNOSUPPRESSANT MEDICATION: ICD-10-CM

## 2017-05-20 DIAGNOSIS — L40.50 PSORIATIC ARTHRITIS: ICD-10-CM

## 2017-05-20 LAB
ALBUMIN SERPL BCP-MCNC: 3.5 G/DL
ALP SERPL-CCNC: 74 U/L
ALT SERPL W/O P-5'-P-CCNC: 13 U/L
ANION GAP SERPL CALC-SCNC: 10 MMOL/L
AST SERPL-CCNC: 15 U/L
BASOPHILS # BLD AUTO: 0.04 K/UL
BASOPHILS NFR BLD: 0.4 %
BILIRUB SERPL-MCNC: 0.2 MG/DL
BUN SERPL-MCNC: 11 MG/DL
CALCIUM SERPL-MCNC: 9.8 MG/DL
CHLORIDE SERPL-SCNC: 102 MMOL/L
CO2 SERPL-SCNC: 28 MMOL/L
CREAT SERPL-MCNC: 0.7 MG/DL
CRP SERPL-MCNC: 8.1 MG/L
DIFFERENTIAL METHOD: ABNORMAL
EOSINOPHIL # BLD AUTO: 0.6 K/UL
EOSINOPHIL NFR BLD: 6 %
ERYTHROCYTE [DISTWIDTH] IN BLOOD BY AUTOMATED COUNT: 14.1 %
ERYTHROCYTE [SEDIMENTATION RATE] IN BLOOD BY WESTERGREN METHOD: 21 MM/HR
EST. GFR  (AFRICAN AMERICAN): >60 ML/MIN/1.73 M^2
EST. GFR  (NON AFRICAN AMERICAN): >60 ML/MIN/1.73 M^2
GLUCOSE SERPL-MCNC: 102 MG/DL
HCT VFR BLD AUTO: 38.1 %
HGB BLD-MCNC: 11.8 G/DL
LYMPHOCYTES # BLD AUTO: 1.9 K/UL
LYMPHOCYTES NFR BLD: 20.4 %
MCH RBC QN AUTO: 26.7 PG
MCHC RBC AUTO-ENTMCNC: 31 %
MCV RBC AUTO: 86 FL
MONOCYTES # BLD AUTO: 0.8 K/UL
MONOCYTES NFR BLD: 8.9 %
NEUTROPHILS # BLD AUTO: 5.9 K/UL
NEUTROPHILS NFR BLD: 64.1 %
PLATELET # BLD AUTO: 301 K/UL
PMV BLD AUTO: 8.9 FL
POTASSIUM SERPL-SCNC: 3.6 MMOL/L
PROT SERPL-MCNC: 7.1 G/DL
RBC # BLD AUTO: 4.42 M/UL
SODIUM SERPL-SCNC: 140 MMOL/L
WBC # BLD AUTO: 9.17 K/UL

## 2017-05-20 PROCEDURE — 36415 COLL VENOUS BLD VENIPUNCTURE: CPT | Mod: PO

## 2017-05-20 PROCEDURE — 85025 COMPLETE CBC W/AUTO DIFF WBC: CPT

## 2017-05-20 PROCEDURE — 85651 RBC SED RATE NONAUTOMATED: CPT

## 2017-05-20 PROCEDURE — 86140 C-REACTIVE PROTEIN: CPT

## 2017-05-20 PROCEDURE — 80053 COMPREHEN METABOLIC PANEL: CPT

## 2017-05-26 ENCOUNTER — PATIENT MESSAGE (OUTPATIENT)
Dept: OBSTETRICS AND GYNECOLOGY | Facility: CLINIC | Age: 42
End: 2017-05-26

## 2017-05-29 ENCOUNTER — LAB VISIT (OUTPATIENT)
Dept: LAB | Facility: HOSPITAL | Age: 42
End: 2017-05-29
Attending: DERMATOLOGY
Payer: COMMERCIAL

## 2017-05-29 DIAGNOSIS — Z79.899 ENCOUNTER FOR LONG-TERM (CURRENT) USE OF OTHER MEDICATIONS: ICD-10-CM

## 2017-05-29 PROCEDURE — 86480 TB TEST CELL IMMUN MEASURE: CPT

## 2017-05-29 PROCEDURE — 36415 COLL VENOUS BLD VENIPUNCTURE: CPT | Mod: PO

## 2017-05-31 ENCOUNTER — OFFICE VISIT (OUTPATIENT)
Dept: OBSTETRICS AND GYNECOLOGY | Facility: CLINIC | Age: 42
End: 2017-05-31
Payer: COMMERCIAL

## 2017-05-31 VITALS
BODY MASS INDEX: 32.58 KG/M2 | DIASTOLIC BLOOD PRESSURE: 80 MMHG | HEIGHT: 68 IN | SYSTOLIC BLOOD PRESSURE: 122 MMHG | WEIGHT: 214.94 LBS

## 2017-05-31 DIAGNOSIS — Z12.39 BREAST CANCER SCREENING: ICD-10-CM

## 2017-05-31 DIAGNOSIS — Z01.419 VISIT FOR GYNECOLOGIC EXAMINATION: ICD-10-CM

## 2017-05-31 DIAGNOSIS — Z30.433 ENCOUNTER FOR REMOVAL AND REINSERTION OF INTRAUTERINE CONTRACEPTIVE DEVICE (IUD): Primary | ICD-10-CM

## 2017-05-31 DIAGNOSIS — Z97.5 INTRAUTERINE DEVICE: ICD-10-CM

## 2017-05-31 LAB
B-HCG UR QL: NEGATIVE
CTP QC/QA: YES
MITOGEN NIL: >10 IU/ML
NIL: 0.02 IU/ML
TB ANTIGEN NIL: 0 IU/ML
TB ANTIGEN: 0.02 IU/ML
TB GOLD: NEGATIVE

## 2017-05-31 PROCEDURE — 58301 REMOVE INTRAUTERINE DEVICE: CPT | Mod: S$GLB,,, | Performed by: OBSTETRICS & GYNECOLOGY

## 2017-05-31 PROCEDURE — 99396 PREV VISIT EST AGE 40-64: CPT | Mod: 25,S$GLB,, | Performed by: OBSTETRICS & GYNECOLOGY

## 2017-05-31 PROCEDURE — 88300 SURGICAL PATH GROSS: CPT | Mod: 26,,,

## 2017-05-31 PROCEDURE — 99999 PR PBB SHADOW E&M-EST. PATIENT-LVL II: CPT | Mod: PBBFAC,,, | Performed by: OBSTETRICS & GYNECOLOGY

## 2017-05-31 PROCEDURE — 88300 SURGICAL PATH GROSS: CPT

## 2017-05-31 PROCEDURE — 81025 URINE PREGNANCY TEST: CPT | Mod: QW,S$GLB,, | Performed by: OBSTETRICS & GYNECOLOGY

## 2017-05-31 PROCEDURE — 58300 INSERT INTRAUTERINE DEVICE: CPT | Mod: 51,S$GLB,, | Performed by: OBSTETRICS & GYNECOLOGY

## 2017-05-31 RX ORDER — FLUCONAZOLE 150 MG/1
150 TABLET ORAL ONCE
Qty: 1 TABLET | Refills: 1 | Status: SHIPPED | OUTPATIENT
Start: 2017-05-31 | End: 2017-05-31

## 2017-05-31 NOTE — PROGRESS NOTES
HISTORY OF PRESENT ILLNESS:    Keli Gilbert is a 41 y.o. female, , No LMP recorded. Patient is not currently having periods (Reason: Birth Control).,  presents for a routine exam and has no complaints. DUE MIRENA REPLACEMENT TODAY.  PAP DUE 2019 REF MAMMO; SON NOW 23YO  ON ABX FOR SINUS INFECTION - DIFLUCAN PRN.  [place] LAST YEAR    LAST VISIT 2016:  DUE PAP SUBMITTED AND REF MAMMO.  IUD DUE REPLACEMENT .  MOTHER WITH RECENT MI  LAST VISIT 2014:  IUD IN PLACE SINCE 3/2012 CAN FEEL STRING AND MINIMAL MENSES  LAST VISIT 2013:  PAP SUBMITTED AND DISCUSSED 3YR SCREENING. SHORT, LIGHT PERIODS ON MIRENA. PATIENT IS HAPPY WITH THE PATTERN   OPTIC NEURITIS IS BETTER   IUD SURVEILLANCE    Past Medical History:   Diagnosis Date    Abnormal Pap smear of vagina     AR (allergic rhinitis)     Demyelinating disorder     Depression     Fever blister     Fibromyalgia     followed by pain management    Generalized osteoarthritis of multiple sites     History of abnormal Pap smear     Hypertension     Hypothyroidism     Insulin resistance     Mixed anxiety and depressive disorder     Morbid obesity     Myelitis, optic neuritis in     treated with high-dose steroids and resolved; positive MRI and spinal fluid for a mass, but on embrel for psoriatic arthritis - she will see a MS specialist at [facility]; MRI normalized off embrel    Obesity     Pre-diabetes     hx diabetes on stereoids /    Psoriasis     Psoriatic arthritis     Vitamin D deficiency disease        Past Surgical History:   Procedure Laterality Date    SINUS SURGERY         MEDICATIONS AND ALLERGIES:      Current Outpatient Prescriptions:     amoxicillin (AMOXIL) 875 MG tablet, Take 1 tablet (875 mg total) by mouth every 12 (twelve) hours., Disp: 20 tablet, Rfl: 0    azelastine (ASTELIN) 137 mcg (0.1 %) nasal spray, 1 spray (137 mcg total) by Nasal route 2 (two) times daily., Disp: 30 mL, Rfl: 0    buPROPion (WELLBUTRIN) 100 MG  tablet, TAKE 1 TABLET (100 MG TOTAL) BY MOUTH 2 (TWO) TIMES DAILY., Disp: 180 tablet, Rfl: 0    cholecalciferol, vitamin D3, 2,000 unit Tab, Take 1 tablet by mouth Once a week., Disp: , Rfl:     clindamycin (CLEOCIN T) 1 % external solution, , Disp: , Rfl:     clobetasol (CLOBEX) 0.05 % topical spray, Apply topically Twice daily. AAA bie. Disp 125 ml, Disp: , Rfl:     ergocalciferol (ERGOCALCIFEROL) 50,000 unit Cap, Take 1 capsule (50,000 Units total) by mouth every 14 (fourteen) days. For 8 weeks and then take twice a month, Disp: 6 capsule, Rfl: 3    fluconazole (DIFLUCAN) 150 MG Tab, Take 1 tablet (150 mg total) by mouth once. REFILL AND RE-DOSE IF SYMPTOMS RECUR, Disp: 1 tablet, Rfl: 1    leflunomide (ARAVA) 20 MG Tab, Take 1 tablet (20 mg total) by mouth once daily., Disp: 30 tablet, Rfl: 1    levocetirizine (XYZAL) 5 MG tablet, TAKE 1 TABLET (5 MG TOTAL) BY MOUTH DAILY AS NEEDED FOR ALLERGIES., Disp: 90 tablet, Rfl: 3    liothyronine (CYTOMEL) 5 MCG Tab, TAKE 2 TABLETS (10 MCG TOTAL) BY MOUTH ONCE DAILY., Disp: 180 tablet, Rfl: 3    lisinopril-hydrochlorothiazide (PRINZIDE,ZESTORETIC) 20-25 mg Tab, Take 1 tablet by mouth once daily., Disp: 90 tablet, Rfl: 3    methadone (DOLOPHINE) 10 MG tablet, Take 1 tablet by mouth Three times a day., Disp: , Rfl:     mupirocin calcium 2% (BACTROBAN) 2 % cream, APPLY TO AFFECTED AREA TWICE A DAY INTRANASALLY FOR 1ST WEEK OF THE MONTH, Disp: 30 g, Rfl: 3    naproxen (NAPROSYN) 500 MG tablet, Take 1 tablet (500 mg total) by mouth 3 (three) times daily with meals., Disp: 270 tablet, Rfl: 2    potassium chloride (MICRO-K) 10 MEQ CpSR, Take 1 capsule (10 mEq total) by mouth once daily., Disp: 90 capsule, Rfl: 3    promethazine (PHENERGAN) 25 MG tablet, Take 1 tablet by mouth Once daily as needed., Disp: , Rfl:     ranitidine (ZANTAC) 150 MG tablet, Take 1 tablet (150 mg total) by mouth 2 (two) times daily., Disp: 60 tablet, Rfl: 11    secukinumab (COSENTYX PEN, 2  PENS,) 150 mg/mL PnIj, Inject 300 mg into the skin every 28 days., Disp: 6 Syringe, Rfl: 2    SYNTHROID 125 mcg tablet, Take 1 tablet (125 mcg total) by mouth before breakfast., Disp: 90 tablet, Rfl: 3    tizanidine (ZANAFLEX) 4 MG tablet, Take 4 mg by mouth 3 (three) times daily as needed., Disp: , Rfl: 6    trazodone (DESYREL) 50 MG tablet, , Disp: , Rfl:     triamcinolone (KENALOG) 0.1 % cream, Apply topically Twice daily. Dirs: disp: 1# jar AAA, Disp: , Rfl:     urea (CARMOL) 40 % Crea, AAA foot qd after soak, Disp: 198.6 g, Rfl: 3  No current facility-administered medications for this visit.     Review of patient's allergies indicates:   Allergen Reactions    Doxycycline hcl Nausea And Vomiting    Enbrel [etanercept] Other (See Comments)     Optic neurtits       Family History   Problem Relation Age of Onset    Psoriasis Father     Cancer Father      throat    Thyroid disease Mother     Heart disease Mother     Hypertension Mother     Hyperlipidemia Mother     Coronary artery disease Mother      s/p CABG    Heart attack Mother     Heart disease Sister      MVP    Diabetes Paternal Uncle     Diabetes Maternal Grandfather     Heart disease Maternal Grandfather     Thyroid disease Maternal Grandfather     ADD / ADHD Son     Melanoma Neg Hx     Lupus Neg Hx     Eczema Neg Hx     Acne Neg Hx     Breast cancer Neg Hx     Colon cancer Neg Hx     Ovarian cancer Neg Hx        Social History     Social History    Marital status: Single     Spouse name: N/A    Number of children: 1    Years of education: N/A     Occupational History    [employer]ing [employer]     Social History Main Topics    Smoking status: Never Smoker    Smokeless tobacco: Not on file    Alcohol use No    Drug use: No      Comment: 7/7/15 one weed brownie    Sexual activity: Yes     Partners: Male     Birth control/ protection: IUD     Other Topics Concern    Are You Pregnant Or Think You May Be? No     "Breast-Feeding No     Social History Narrative    No narrative on file       COMPREHENSIVE GYN HISTORY:  PAP History: Denies abnormal Paps.  Infection History: Denies STDs. Denies PID.  Benign History: Denies uterine fibroids. Denies ovarian cysts. Denies endometriosis. Denies other conditions.  Cancer History: Denies cervical cancer. Denies uterine cancer or hyperplasia. Denies ovarian cancer. Denies vulvar cancer or pre-cancer. Denies vaginal cancer or pre-cancer. Denies breast cancer. Denies colon cancer.  Sexual Activity History: Reports currently being sexually active  Menstrual History: Monthly, mild-moderate.  Contraception:IUD    ROS:  GENERAL: No weight changes. No swelling. No fatigue. No fever.  CARDIOVASCULAR: No chest pain. No shortness of breath. No leg cramps.   NEUROLOGICAL: No headaches. No vision changes.  BREASTS: No pain. No lumps. No discharge.  ABDOMEN: No pain. No nausea. No vomiting. No diarrhea. No constipation.  REPRODUCTIVE: No abnormal bleeding.   VULVA: No pain. No lesions. No itching.  VAGINA: No relaxation. No itching. No odor. No discharge. No lesions.  URINARY: No incontinence. No nocturia. No frequency. No dysuria.    /80   Ht 5' 8" (1.727 m)   Wt 97.5 kg (214 lb 15.2 oz)   BMI 32.68 kg/m²     PE:  APPEARANCE: Well nourished, well developed, in no acute distress.  AFFECT: WNL, alert and oriented x 3.  SKIN: No acne or hirsutism.  NECK: Neck symmetric, without masses or thyromegaly.  NODES: No inguinal, cervical, axillary or femoral lymph node enlargement.  CHEST: Good respiratory effort.   ABDOMEN: Soft. No tenderness or masses. No hepatosplenomegaly. No hernias.  BREASTS: Symmetrical, no skin changes, visible lesions, palpable masses or nipple discharge bilaterally.  PELVIC: External female genitalia without lesions.  Female hair distribution. Adequate perineal body, Normal urethral meatus. Vagina moist and well rugated without lesions or discharge.  No significant " cystocele or rectocele present. Cervix pink without lesions, discharge or tenderness, STRING AT EXTERNAL OS, GRASPED WITH RING FORCEPS AND REMOVED INTACT WITHOUT DIFFICULTY. Uterus is normal size, regular, mobile and nontender. Adnexa without masses or tenderness.  EXTREMITIES: No edema    PROCEDURES:  IUD PLACEMENT:    Keli Gilbert is a 41 y.o. female , who presents for IUD placement.  No LMP recorded. Patient is not currently having periods (Reason: Birth Control)..  UPT is negative.      Pre IUD Placement Counseling:  Contraceptive options were reviewed with the patient , and she chooses Mirena.  Her history was reviewed, and there are no contraindications to an IUD.  The procedure was explained, including minimal risks of pain, bleeding, uterine perforation, infection associated with insertion, and spontaneous expulsion within the first two weeks.  The benefits of contraception without systemic side effects and amenorrhea or hypomenorrhea were discussed.  The patient's questions were answered, and she agrees to proceed.  Hospital and device consent (if provided by ) were signed.    A speculum was placed within the vagina and the cervix was visualized.  The cervix was cleaned with betadine swabs times three.  The anterior cervical lip was grasped with a single-tooth tenaculum, and the uterus was sounded using sterile technique to a depth of 8cm.  A Mirena IUD was loaded and placed high within the uterine funduswithout dificulty using a sterile technique.  The string was cut 2cm from the external cervical os.  The tenaculum and the speculum was removed.  The patient tolerated the procedure fairly well.    Assessment:  Contraception management with an IUD insertion    Post IUD Insertion counseling:  The patient was counseled to manage post-IUD placement cramping with NSAIDs, Tylenol, or prescription per the medication card.  She was counseled to report excess bleeding, cramping that does  not respond to the above medications, or fever greater than 101.0F.  The patient will maintain pelvic rest for the next week and return for a visit to evaluate her for signs of possible endometritis.  She was instructed to check for IUD presence by feeling for the strings.  She was counseled regarding the need to remove the IUD in 5 years (Mirena) or 10 years (Paragard).  Counseling lasted approximately 15 minutes, and the patient had no questions at the end of her counseling.       DIAGNOSIS:  1. Encounter for removal and reinsertion of intrauterine contraceptive device (IUD)  POCT Urine Pregnancy    Tissue Specimen To Pathology, Obstetrics/Gynecology   2. Visit for gynecologic examination     3. Breast cancer screening  Mammo Digital Screening Bilat with Tomosynthesis CAD   4. Intrauterine device         LABS AND TESTS ORDERED:    MEDICATIONS PRESCRIBED:    COUNSELING:   The patient was counseled today on ACS PAP guidelines, with recommendations for yearly pelvic exams unless their uterus, cervix, and ovaries were removed for benign reasons; in that case, examinations every 3-5 years are recommended.  The patient was also counseled regarding monthly breast self-examination, routine STD screening for at-risk populations, prophylactic immunizations for transmitted infections such as  HPV, Pertussis, or Influenza as appropriate, and yearly mammograms when indicated by ACS guidelines.  She was advised to see her primary care physician for all other health maintenance.    FOLLOW-UP with me annually.

## 2017-06-02 ENCOUNTER — PATIENT MESSAGE (OUTPATIENT)
Dept: FAMILY MEDICINE | Facility: CLINIC | Age: 42
End: 2017-06-02

## 2017-06-02 DIAGNOSIS — K21.9 GASTROESOPHAGEAL REFLUX DISEASE, ESOPHAGITIS PRESENCE NOT SPECIFIED: ICD-10-CM

## 2017-06-06 ENCOUNTER — OFFICE VISIT (OUTPATIENT)
Dept: DERMATOLOGY | Facility: CLINIC | Age: 42
End: 2017-06-06
Payer: COMMERCIAL

## 2017-06-06 DIAGNOSIS — L40.9 PSORIASIS: ICD-10-CM

## 2017-06-06 PROCEDURE — 99999 PR PBB SHADOW E&M-EST. PATIENT-LVL II: CPT | Mod: PBBFAC,,, | Performed by: DERMATOLOGY

## 2017-06-06 PROCEDURE — 99213 OFFICE O/P EST LOW 20 MIN: CPT | Mod: S$GLB,,, | Performed by: DERMATOLOGY

## 2017-06-06 NOTE — PROGRESS NOTES
Subjective:       Patient ID:  Keli Gilbert is a 41 y.o. female who presents for   Chief Complaint   Patient presents with    Psoriasis     follow up     Psoriasis  - Follow-up  Symptom course: stable  Currently using: cosentyx 300mg qmonth (approved via scopelitis)  Affected locations: currently clear  Signs / symptoms: asymptomatic        Review of Systems   Constitutional: Negative for fever, weight loss, fatigue and malaise.   HENT: Negative for sore throat and postnasal drip.    Eyes: Negative for visual change.   Respiratory: Negative for cough and shortness of breath.    Cardiovascular: Negative for palpitations.   Gastrointestinal: Negative for nausea, vomiting and diarrhea.   Musculoskeletal: Positive for joint swelling (fingers - chronic) and arthralgias (hands - sees scopelitis - had xray showing degenerative change to left 4th finger).   Skin: Negative for itching, rash and abscesses.   Neurological: Negative for focal weakness, seizures and numbness.   Allergic/Immunologic: Positive for environmental allergies (takes zyrtec d and nasal spray).        Objective:    Physical Exam   Constitutional: She appears well-developed and well-nourished. She is obese.  No distress.   Neurological: She is alert and oriented to person, place, and time. She is not disoriented.   Psychiatric: She has a normal mood and affect.   Skin:   Areas Examined (abnormalities noted in diagram):   Scalp / Hair Palpated and Inspected  Head / Face Inspection Performed  Neck Inspection Performed  Chest / Axilla Inspection Performed  Abdomen Inspection Performed  Genitals / Buttocks / Groin Inspection Performed  Back Inspection Performed  RUE Inspected  LUE Inspection Performed  RLE Inspected  LLE Inspection Performed  Nails and Digits Inspection Performed                      Diagram Legend     Erythematous scaling macule/papule c/w actinic keratosis       Vascular papule c/w angioma      Pigmented verrucoid papule/plaque  c/w seborrheic keratosis      Yellow umbilicated papule c/w sebaceous hyperplasia      Irregularly shaped tan macule c/w lentigo     1-2 mm smooth white papules consistent with Milia      Movable subcutaneous cyst with punctum c/w epidermal inclusion cyst      Subcutaneous movable cyst c/w pilar cyst      Firm pink to brown papule c/w dermatofibroma      Pedunculated fleshy papule(s) c/w skin tag(s)      Evenly pigmented macule c/w junctional nevus     Mildly variegated pigmented, slightly irregular-bordered macule c/w mildly atypical nevus      Flesh colored to evenly pigmented papule c/w intradermal nevus       Pink pearly papule/plaque c/w basal cell carcinoma      Erythematous hyperkeratotic cursted plaque c/w SCC      Surgical scar with no sign of skin cancer recurrence      Open and closed comedones      Inflammatory papules and pustules      Verrucoid papule consistent consistent with wart     Erythematous eczematous patches and plaques     Dystrophic onycholytic nail with subungual debris c/w onychomycosis     Umbilicated papule    Erythematous-base heme-crusted tan verrucoid plaque consistent with inflamed seborrheic keratosis     Erythematous Silvery Scaling Plaque c/w Psoriasis     See annotation    Lab Results   Component Value Date    WBC 9.17 05/20/2017    HGB 11.8 (L) 05/20/2017    HCT 38.1 05/20/2017    MCV 86 05/20/2017     05/20/2017     Lab Results   Component Value Date    ALT 13 05/20/2017    AST 15 05/20/2017    ALKPHOS 74 05/20/2017    BILITOT 0.2 05/20/2017       Assessment / Plan:        Psoriasis      Psoriasis  Today's Plan:      Cont cosentyx 300mg qmonth (per dr. Richards)    F/u 1 year      Return in about 1 year (around 6/6/2018).

## 2017-06-06 NOTE — LETTER
June 6, 2017      Andriy Moran MD  [address] [place] [address]           [facility]  [address]  [place] [address]  Phone: [phone]  Fax: [phone]          Patient: Keli Gilbert   MR Number: [id number]   YOB: 1975   Date of Visit: 6/6/2017       Dear Dr. Andriy RUSSO Page:    Thank you for referring Keli Gilbert to me for evaluation. Attached you will find relevant portions of my assessment and plan of care.    If you have questions, please do not hesitate to call me. I look forward to following Keli Gilbert along with you.    Sincerely,    Marie Fam MD    Enclosure  CC:  No Recipients    If you would like to receive this communication electronically, please contact [email or web address] or [phone] to request more information on [employer] access.    For providers and/or their staff who would like to refer a patient to [facility], please contact us through our one-stop-shop provider referral line, [facility], at [phone].    If you feel you have received this communication in error or would no longer like to receive these types of communications, please e-mail [email or web address]

## 2017-06-15 ENCOUNTER — OFFICE VISIT (OUTPATIENT)
Dept: RHEUMATOLOGY | Facility: CLINIC | Age: 42
End: 2017-06-15
Payer: COMMERCIAL

## 2017-06-15 VITALS
SYSTOLIC BLOOD PRESSURE: 111 MMHG | BODY MASS INDEX: 33.07 KG/M2 | DIASTOLIC BLOOD PRESSURE: 73 MMHG | HEART RATE: 78 BPM | HEIGHT: 68 IN | WEIGHT: 218.19 LBS

## 2017-06-15 DIAGNOSIS — L40.50 PSORIATIC ARTHRITIS: Primary | ICD-10-CM

## 2017-06-15 DIAGNOSIS — Z79.60 LONG-TERM USE OF IMMUNOSUPPRESSANT MEDICATION: ICD-10-CM

## 2017-06-15 DIAGNOSIS — Z79.1 LONG TERM CURRENT USE OF NON-STEROIDAL ANTI-INFLAMMATORIES (NSAID): ICD-10-CM

## 2017-06-15 DIAGNOSIS — M15.9 GENERALIZED OSTEOARTHRITIS OF MULTIPLE SITES: ICD-10-CM

## 2017-06-15 PROCEDURE — 99214 OFFICE O/P EST MOD 30 MIN: CPT | Mod: S$GLB,,, | Performed by: INTERNAL MEDICINE

## 2017-06-15 PROCEDURE — 99999 PR PBB SHADOW E&M-EST. PATIENT-LVL III: CPT | Mod: PBBFAC,,, | Performed by: INTERNAL MEDICINE

## 2017-06-15 ASSESSMENT — ROUTINE ASSESSMENT OF PATIENT INDEX DATA (RAPID3)
TOTAL RAPID3 SCORE: 4.72
PATIENT GLOBAL ASSESSMENT SCORE: 5.5
MDHAQ FUNCTION SCORE: .5
PSYCHOLOGICAL DISTRESS SCORE: 3.3
PAIN SCORE: 7
WHEN YOU AWAKENED IN THE MORNING OVER THE LAST WEEK, PLEASE INDICATE THE AMOUNT OF TIME IT TAKES UNTIL YOU ARE AS LIMBER AS YOU WILL BE FOR THE DAY: 30 MINUTES
FATIGUE SCORE: 8.5

## 2017-06-15 NOTE — PROGRESS NOTES
Subjective:       Patient ID: Keli Gilbert is a 41 y.o. 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 Last seen on 12/7/16.  Was begun on Cosentyx in November, 2015. She is taking 300 mg/monthly. She is taking naproxen 500 mg twice-3 x daily but ran out of leflunomide >3 weeks ago and has not seen a difference in her pain. She has no joint swelling. However, she still has MCP joint pain with activity. No other joints bother her. (She initially saw a marked improvement on Cosentyx & her labs improved.) She was noted to have a new erosion (L 4th MCP) on imaging from last year.     She is tolerating Cosentyx and naproxen well.  AM stiffness still x 30 minutes. Her psoriasis is still gone.  She is seeing her neurologist, Dr. Merlin Lam [facility] again on August [facility]. Will probably get a repeat MRI of brain to see what happened to the initial MRI abnormalities (several plaques undergoing active demyelination).    .        Associated symptoms include fatigue. Pertinent negatives include no fever or headaches.          Current Outpatient Prescriptions   Medication Sig Dispense Refill    azelastine (ASTELIN) 137 mcg (0.1 %) nasal spray 1 spray (137 mcg total) by Nasal route 2 (two) times daily. 30 mL 0    buPROPion (WELLBUTRIN) 100 MG tablet TAKE 1 TABLET (100 MG TOTAL) BY MOUTH 2 (TWO) TIMES DAILY. 180 tablet 0    cholecalciferol, vitamin D3, 2,000 unit Tab Take 1 tablet by mouth Once a week.      clindamycin (CLEOCIN T) 1 % external solution       clobetasol (CLOBEX) 0.05 % topical spray Apply topically Twice daily. AAA bie. Disp 125 ml      ergocalciferol (ERGOCALCIFEROL) 50,000 unit Cap Take 1 capsule (50,000 Units total) by mouth every 14 (fourteen) days. For 8 weeks and then take twice a month 6 capsule 3    leflunomide  (ARAVA) 20 MG Tab Take 1 tablet (20 mg total) by mouth once daily. 30 tablet 1    levocetirizine (XYZAL) 5 MG tablet TAKE 1 TABLET (5 MG TOTAL) BY MOUTH DAILY AS NEEDED FOR ALLERGIES. 90 tablet 3    liothyronine (CYTOMEL) 5 MCG Tab TAKE 2 TABLETS (10 MCG TOTAL) BY MOUTH ONCE DAILY. 180 tablet 3    lisinopril-hydrochlorothiazide (PRINZIDE,ZESTORETIC) 20-25 mg Tab Take 1 tablet by mouth once daily. 90 tablet 3    methadone (DOLOPHINE) 10 MG tablet Take 1 tablet by mouth Three times a day.      mupirocin calcium 2% (BACTROBAN) 2 % cream APPLY TO AFFECTED AREA TWICE A DAY INTRANASALLY FOR 1ST WEEK OF THE MONTH 30 g 3    naproxen (NAPROSYN) 500 MG tablet Take 1 tablet (500 mg total) by mouth 3 (three) times daily with meals. 270 tablet 2    potassium chloride (MICRO-K) 10 MEQ CpSR Take 1 capsule (10 mEq total) by mouth once daily. 90 capsule 3    promethazine (PHENERGAN) 25 MG tablet Take 1 tablet by mouth Once daily as needed.      ranitidine (ZANTAC) 150 MG tablet Take 1 tablet (150 mg total) by mouth 2 (two) times daily. 180 tablet 3    secukinumab (COSENTYX PEN, 2 PENS,) 150 mg/mL PnIj Inject 300 mg into the skin every 28 days. 6 Syringe 2    SYNTHROID 125 mcg tablet Take 1 tablet (125 mcg total) by mouth before breakfast. 90 tablet 3    tizanidine (ZANAFLEX) 4 MG tablet Take 4 mg by mouth 3 (three) times daily as needed.  6    trazodone (DESYREL) 50 MG tablet       triamcinolone (KENALOG) 0.1 % cream Apply topically Twice daily. Dirs: disp: 1# jar AAA      urea (CARMOL) 40 % Crea AAA foot qd after soak 198.6 g 3     No current facility-administered medications for this visit.          Review of patient's allergies indicates:   Allergen Reactions    Doxycycline hcl Nausea And Vomiting    Enbrel [etanercept] Other (See Comments)     Optic neurtits     Past Medical History:   Diagnosis Date    Abnormal Pap smear of vagina     AR (allergic rhinitis)     Demyelinating disorder     Depression      Fever blister     Fibromyalgia     followed by pain management    Generalized osteoarthritis of multiple sites     History of abnormal Pap smear     Hypertension     Hypothyroidism     Insulin resistance     Mixed anxiety and depressive disorder     Morbid obesity     Myelitis, optic neuritis in     treated with high-dose steroids and resolved; positive MRI and spinal fluid for a mass, but on embrel for psoriatic arthritis - she will see a MS specialist at [facility]; MRI normalized off embrel    Obesity     Pre-diabetes     hx diabetes on stereoids /    Psoriasis     Psoriatic arthritis     Vitamin D deficiency disease      Past Surgical History:   Procedure Laterality Date    SINUS SURGERY  1998       Review of Systems   Constitutional: Positive for fatigue. Negative for fever and unexpected weight change.   HENT: Negative for dental problem and mouth sores.    Eyes: Negative.  Negative for redness, itching and visual disturbance.        Dry eyes   Respiratory: Negative.    Cardiovascular: Negative.  Negative for chest pain, palpitations and leg swelling.   Gastrointestinal: Negative.  Negative for abdominal pain, anal bleeding, blood in stool, constipation, diarrhea and nausea.        Pyrosis   Endocrine: Negative.    Genitourinary: Negative.  Negative for frequency, menstrual problem, pelvic pain and urgency.   Musculoskeletal: Negative.  Negative for arthralgias, back pain, gait problem and neck pain.   Skin: Negative.  Negative for color change and rash.   Allergic/Immunologic: Positive for immunocompromised state.   Neurological: Negative.  Negative for dizziness, seizures, weakness, numbness and headaches.   Hematological: Negative.  Negative for adenopathy. Does not bruise/bleed easily.   Psychiatric/Behavioral: Positive for dysphoric mood and sleep disturbance. Negative for decreased concentration and suicidal ideas. The patient is nervous/anxious.          Family History   Problem Relation Age of  "Onset    Psoriasis Father     Cancer Father      throat    Thyroid disease Mother     Heart disease Mother     Hypertension Mother     Hyperlipidemia Mother     Coronary artery disease Mother      s/p CABG    Heart attack Mother     Heart disease Sister      MVP    Diabetes Paternal Uncle     Diabetes Maternal Grandfather     Heart disease Maternal Grandfather     Thyroid disease Maternal Grandfather     ADD / ADHD Son     Melanoma Neg Hx     Lupus Neg Hx     Eczema Neg Hx     Acne Neg Hx     Breast cancer Neg Hx     Colon cancer Neg Hx     Ovarian cancer Neg Hx        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   No alcohol; no cigarettes;   Objective:         /73   Pulse 78   Ht 5' 8" (1.727 m)   Wt 99 kg (218 lb 3.2 oz)   BMI 33.18 kg/m²     Physical Exam   Vitals reviewed.  Constitutional: She is oriented to person, place, and time and well-developed, well-nourished, and in no distress. No distress.   HENT:   Head: Normocephalic and atraumatic.   Mouth/Throat: Oropharynx is clear and moist. No oropharyngeal exudate.   No facial rashes  Parotids not enlarged  No oral ulcers   Eyes: Conjunctivae and EOM are normal. Pupils are equal, round, and reactive to light. Right eye exhibits no discharge. Left eye exhibits no discharge. No scleral icterus.   Neck: Neck supple. No JVD present. No tracheal deviation present. No thyromegaly present.   Cardiovascular: Normal rate, regular rhythm, normal heart sounds and intact distal pulses.  Exam reveals no gallop and no friction rub.    No murmur heard.  Pulmonary/Chest: Effort normal and breath sounds normal. No respiratory distress. She has no wheezes. She has no rales. She exhibits no tenderness.   Abdominal: Soft. Bowel sounds are normal. She exhibits no distension and no mass. There is no splenomegaly or hepatomegaly. There is no tenderness. There is no rebound and no guarding.   Lymphadenopathy:     She has no cervical adenopathy.        Right: No " inguinal adenopathy present.        Left: No inguinal adenopathy present.   Neurological: She is alert and oriented to person, place, and time. She has normal reflexes. No cranial nerve deficit. Gait normal.   Proximal and distal muscle strength 5/5.   Skin: Skin is warm and dry. No rash noted. She is not diaphoretic.     Faded lesions elbows.   Psychiatric: Mood, memory, affect and judgment normal.   Drowsy.   Musculoskeletal: Normal range of motion. She exhibits no tenderness.   Cspine slight decrease in lateral flexion & rotation; no tenderness  Tspine FROM no tenderness  Lspine FROM no tenderness.  TMJ: unremarkable  Shoulders: FROM; no synovitis  Elbows: FROM; no synovitis; no tophi or nodules  Wrists: no SHT; no tenderness; mild decrease in flexion/extension bilaterally.   MCPs: mild thickening of 2nd & MCPs bilaterally;  ok; p; 4th L MCP non tender or swollen; no tenderness anywhere.   PIPs: very mild dactylitis 2nd R ray gone  DIPs: FROM; no synovitis  HIPS: FROM  Knees: FROM; no synovitis; no instability;  Ankles: FROM: no synovitis   Toes: ok; no metatarsalgia                      Labs:   2/20/17: ESR 34; CRP 8.1; CBC Hg 11.4; Ht 35.6; CMP ok;   11/19/16: ESR 20; CRP 4.8; CBC Hg 11.9; CMP K+3.3, otherwise ok;   8/27/16: ESR 22; CRP 7; Hg 11.8; Ht 36.9; CMP ok;   5/26/16: ESR 35; CRP 11.2Hg 11.8; Ht 36.9; CMP ok;   5/21/16: Vit d 37  10/8/15: ESR 45; CRP 14.2; Hg 11.2; Ht 35.8; CMP; ok  7/7/15: CBC plt 380; K+ 3.4; Glu 154  7/6/15: ESR 48; CRP 9.6  4/4/15: ESR 41; CRP 19; Hg 11.0; Ht 34.6; plt 377; CMP ok;  1/10/15: ESR 34; CRP 18; CBC ok; ; Alb. 3.6  10/4/14: ESR 43; CRP 18.5; Hg 11.8; Ht 36.5; plt 378; Alb. 3.4;   3/20/14: ESR 41; CRP 14.3; plt 356; CMP ok;  2/15/13: ESR 30; CRP 9.8; CBC ok; Alb. 3.3  11/2/13: ESR 28; CRP 11.6; plt 363; Alb. 3.3;   5/28/13: ESR 45; CRP 8.8; plt 366; CMP ok;       8/27/16: Bilateral feet: personally reviewed. Mild HV; mild OA shell 1st MTP dali; ? Erosion PIP left  small toe;   5/26/16: Bilateral hands: personally reviewed: L 4th MCP (new) erosive lesion; R & L 2nd & 3rd metacarpal head cysts; R mild PIP erosion & STS 2nd.    Assessment:     Psoriatic arthritis-- with mild SHT 2 MCPs bilaterally (R >>L) & with sausage digit of R index finger--but still some hand pain/swelling.   a) History of sausage digits of both toes    PIPs, DIPs, wrists, and knees., now w. only one sausage digit.  b) Psoriasis of the abdomen, elbow, and the shins--resolved.   c) Enbrel stopped 10/21/11 due to optic neuritis.    d) New erosion (4th L metacarpal head) on x-ray & possibly L 5th toe PIP    e) Persisting elevation of ESR and CRP   f) inadequate response to leflunomide and Stelara & apremilast (w. Intolerable nausea)  G) could not tolerate SSZ--nausea.  H) MTX x 2 even SC did not help sxs.  I) improved on Cosentyx (+leflunomide + naproxen)   Off leflunomide no difference.    Chronic pain/fibromyalgia syndrome with fatigue, tender points, withdrawals to palpation in the past, pain all over, responsive to Savella but with some nausea (off it now), followed by Dr. Odom at the [facility],    On methadone    Left optic neuritis with demyelinating lesions secondary to Enbrel--now resolved.     Diabetes mellitus, diet controlled     Degenerative joint disease of the cervical spine, knees, and feet--very mild.     Depression on wellbutrin    Off savella    Hypertension.     Hypothyroidism.     Vitamin D deficiency with regular prescription vitamin D supplementation.     Obesity         Plan:   Labs around August.  Bilateral hand x-rays. Follow erosions;  Continue off leflunomide for time being.   Naproxen 500 mg twice daily to tid pc with omeprazole.   Continue Cosentyx 300 mg/4 weeks however,   Asked her to ask Dr. Lam to keep us in the loop re: MRI  Call for problems.  RTC 4 months

## 2017-06-20 ENCOUNTER — PATIENT MESSAGE (OUTPATIENT)
Dept: OBSTETRICS AND GYNECOLOGY | Facility: CLINIC | Age: 42
End: 2017-06-20

## 2017-06-21 ENCOUNTER — PATIENT MESSAGE (OUTPATIENT)
Dept: FAMILY MEDICINE | Facility: CLINIC | Age: 42
End: 2017-06-21

## 2017-06-26 ENCOUNTER — PATIENT MESSAGE (OUTPATIENT)
Dept: RHEUMATOLOGY | Facility: CLINIC | Age: 42
End: 2017-06-26

## 2017-06-26 DIAGNOSIS — K21.9 GASTROESOPHAGEAL REFLUX DISEASE, ESOPHAGITIS PRESENCE NOT SPECIFIED: Primary | ICD-10-CM

## 2017-06-26 DIAGNOSIS — L40.50 PSORIATIC ARTHRITIS: ICD-10-CM

## 2017-06-26 RX ORDER — LEFLUNOMIDE 20 MG/1
20 TABLET ORAL DAILY
Qty: 30 TABLET | Refills: 1
Start: 2017-06-26 | End: 2017-06-29 | Stop reason: SDUPTHER

## 2017-06-27 ENCOUNTER — PATIENT MESSAGE (OUTPATIENT)
Dept: RHEUMATOLOGY | Facility: CLINIC | Age: 42
End: 2017-06-27

## 2017-06-27 RX ORDER — OMEPRAZOLE 40 MG/1
40 CAPSULE, DELAYED RELEASE ORAL DAILY
Qty: 30 CAPSULE | Refills: 11 | Status: SHIPPED | OUTPATIENT
Start: 2017-06-27 | End: 2017-10-19 | Stop reason: SDUPTHER

## 2017-06-28 ENCOUNTER — PATIENT MESSAGE (OUTPATIENT)
Dept: RHEUMATOLOGY | Facility: CLINIC | Age: 42
End: 2017-06-28

## 2017-06-29 ENCOUNTER — PATIENT MESSAGE (OUTPATIENT)
Dept: RHEUMATOLOGY | Facility: CLINIC | Age: 42
End: 2017-06-29

## 2017-06-29 ENCOUNTER — OFFICE VISIT (OUTPATIENT)
Dept: FAMILY MEDICINE | Facility: CLINIC | Age: 42
End: 2017-06-29
Payer: COMMERCIAL

## 2017-06-29 VITALS
HEIGHT: 68 IN | DIASTOLIC BLOOD PRESSURE: 74 MMHG | WEIGHT: 219.56 LBS | HEART RATE: 85 BPM | TEMPERATURE: 98 F | BODY MASS INDEX: 33.28 KG/M2 | OXYGEN SATURATION: 97 % | SYSTOLIC BLOOD PRESSURE: 122 MMHG | RESPIRATION RATE: 16 BRPM

## 2017-06-29 DIAGNOSIS — Z00.00 WELL WOMAN EXAM WITHOUT GYNECOLOGICAL EXAM: Primary | ICD-10-CM

## 2017-06-29 DIAGNOSIS — L40.50 PSORIATIC ARTHRITIS: ICD-10-CM

## 2017-06-29 DIAGNOSIS — E03.9 HYPOTHYROIDISM, UNSPECIFIED TYPE: ICD-10-CM

## 2017-06-29 DIAGNOSIS — E87.6 HYPOKALEMIA: ICD-10-CM

## 2017-06-29 PROCEDURE — 99999 PR PBB SHADOW E&M-EST. PATIENT-LVL III: CPT | Mod: PBBFAC,,, | Performed by: FAMILY MEDICINE

## 2017-06-29 PROCEDURE — 99396 PREV VISIT EST AGE 40-64: CPT | Mod: S$GLB,,, | Performed by: FAMILY MEDICINE

## 2017-06-29 RX ORDER — POTASSIUM CHLORIDE 750 MG/1
10 CAPSULE, EXTENDED RELEASE ORAL DAILY
Qty: 90 CAPSULE | Refills: 3 | Status: SHIPPED | OUTPATIENT
Start: 2017-06-29 | End: 2018-10-18 | Stop reason: SDUPTHER

## 2017-06-29 RX ORDER — LEFLUNOMIDE 20 MG/1
20 TABLET ORAL DAILY
Qty: 30 TABLET | Refills: 1
Start: 2017-06-29 | End: 2017-06-29

## 2017-06-29 RX ORDER — LIOTHYRONINE SODIUM 5 UG/1
TABLET ORAL
Qty: 180 TABLET | Refills: 3 | Status: SHIPPED | OUTPATIENT
Start: 2017-06-29 | End: 2018-09-14 | Stop reason: SDUPTHER

## 2017-06-29 RX ORDER — LEVOTHYROXINE SODIUM 125 UG/1
125 TABLET ORAL
Qty: 90 TABLET | Refills: 3 | Status: SHIPPED | OUTPATIENT
Start: 2017-06-29 | End: 2019-01-10 | Stop reason: SDUPTHER

## 2017-06-29 RX ORDER — LEFLUNOMIDE 20 MG/1
20 TABLET ORAL DAILY
Qty: 90 TABLET | Refills: 3 | Status: SHIPPED | OUTPATIENT
Start: 2017-06-29 | End: 2017-10-19 | Stop reason: SDUPTHER

## 2017-06-29 RX ORDER — ASPIRIN 325 MG
50000 TABLET, DELAYED RELEASE (ENTERIC COATED) ORAL
Qty: 18 CAPSULE | Refills: 1 | Status: SHIPPED | OUTPATIENT
Start: 2017-06-29 | End: 2019-01-10 | Stop reason: SDUPTHER

## 2017-06-29 NOTE — PROGRESS NOTES
"Well Woman VISIT      CHIEF COMPLAINT  Chief Complaint   Patient presents with    Medication Refill       HPI  Keli Concha Gilbert is a 41 y.o. female who presents for physical.     Social Factors  Tobacco use: No  Ready to Quit: No  Alcohol: No  Intimate partner violence screening  "Do you feel safe in your current relationship?" currently single  "Have you ever been in a relationship in which your partner frightened you or hurt you?" No  Living Will/POA: No  Regular Exercise: No    Depression  Over the past two weeks, have you felt down, depressed, or hopeless? No  Over the past two weeks, have you felt little interest or pleasure in doing things? No    Reproductive Health  Followed by OBGYN    CHD, HTN, DM2  CHD Risk Factors: obesity (BMI >= 30 kg/m2)  Women 45 years and older should be screened for dyslipidemia if at increased risk of CHD  Women 20 to 45 years of age should be screened for dyslipidemia if at increased risk of CHD  Asymptomatic adults with sustained blood pressure greater than 135/80 mm Hg (treated or untreated) should be screened for type 2 diabetes mellitus    Estimated body mass index is 33.39 kg/m² as calculated from the following:    Height as of this encounter: 5' 8" (1.727 m).    Weight as of this encounter: 99.6 kg (219 lb 9.3 oz).      Screening  Mammogram needed: UTD per patient  Colonoscopy needed: n/a  Osteoporosis screen needed: n/a     Women 50 to 74 years of age should be screened for breast cancer with mammography biennially.  Women should be screened for cervical cancer with Pap tests beginning at 21 years of age. Low-risk women should receive Pap testing every three years. Co-testing for human papillomavirus is an option beginning at 30 years of age, and can extend the screening interval to five years. Cervical cancer screening should be discontinued at 65 years of age or after total hysterectomy if the woman has a benign gynecologic history  Adults 50 to 75 years of age " "should be screened for colorectal cancer with an FOBT annually, sigmoidoscopy every five years with an FOBT every three years, or colonoscopy every 10 years.  Women 65 years and older should be screened for osteoporosis. Women younger than 65 years should be screened if the risk of fracture is greater than or equal to that of a 65-year-old white woman without additional risk factors.      Immunizations  stated as up to date, no records available    ALLERGIES and MEDS were verified.   PMHx, PSHx, FHx, SOCIALHx were updated as pertinent.    REVIEW OF SYSTEMS  Review of Systems   HENT: Negative for hearing loss.    Eyes: Negative for discharge.   Respiratory: Negative for wheezing.    Cardiovascular: Negative for chest pain and palpitations.   Gastrointestinal: Negative for blood in stool, constipation, diarrhea and vomiting.   Genitourinary: Negative for dysuria and hematuria.   Musculoskeletal: Negative for neck pain.   Skin: Negative for itching and rash.   Neurological: Negative for weakness and headaches.   Endo/Heme/Allergies: Negative for polydipsia.         PHYSICAL EXAM  VITAL SIGNS: /74 (BP Location: Left arm, Patient Position: Sitting, BP Method: Manual)   Pulse 85   Temp 98.2 °F (36.8 °C) (Oral)   Resp 16   Ht 5' 8" (1.727 m)   Wt 99.6 kg (219 lb 9.3 oz)   SpO2 97%   BMI 33.39 kg/m²   GEN: Well developed, Well nourished, No acute distress.  HENT: Normocephalic, Atraumatic, Bilateral external ears normal, Nose normal, Oropharynx moist, No oral exudates.   Eyes: PERRL, EOMI, Conjunctiva normal, No discharge.   Neck: Supple, No tenderness.  Lymphatic: No cervical or supraclavicular lymphadenopathy noted.   Cardiovascular: Normal heart rate, Normal rhythm, No murmurs, No rubs, No gallops.   Thorax & Lungs: Normal breath sounds, No respiratory distress, No wheezing.  Abdomen: Soft, No tenderness, Bowel sounds normal.  Breast: deferred  Genital: deferred   Skin: Warm, Dry, No erythema, No rash. "   Extremities: No edema, No tenderness.       ASSESSMENT/PLAN    Keli was seen today for medication refill.    Diagnoses and all orders for this visit:    Well woman exam without gynecological exam    Psoriatic arthritis  -     leflunomide (ARAVA) 20 MG Tab; Take 1 tablet (20 mg total) by mouth once daily.  -     cholecalciferol, vitamin D3, 50,000 unit capsule; Take 1 capsule (50,000 Units total) by mouth every 14 (fourteen) days.    Hypothyroidism, unspecified type  -     liothyronine (CYTOMEL) 5 MCG Tab; TAKE 2 TABLETS (10 MCG TOTAL) BY MOUTH ONCE DAILY.  -     SYNTHROID 125 mcg tablet; Take 1 tablet (125 mcg total) by mouth before breakfast.    Hypokalemia  -     potassium chloride (MICRO-K) 10 MEQ CpSR; Take 1 capsule (10 mEq total) by mouth once daily.       Labs are up to date  Psoriatic arthritis managed by rheum  Hypothyroidism and hypokalemia managed by endocrine    FOLLOW UP: 1 year or sooner if needed      Andriy Moran MD

## 2017-07-20 DIAGNOSIS — Z79.899 ENCOUNTER FOR LONG-TERM (CURRENT) USE OF HIGH-RISK MEDICATION: Primary | ICD-10-CM

## 2017-07-31 ENCOUNTER — PATIENT MESSAGE (OUTPATIENT)
Dept: RHEUMATOLOGY | Facility: CLINIC | Age: 42
End: 2017-07-31

## 2017-08-07 ENCOUNTER — HOSPITAL ENCOUNTER (OUTPATIENT)
Dept: RADIOLOGY | Facility: HOSPITAL | Age: 42
Discharge: HOME | End: 2017-08-07
Attending: OBSTETRICS & GYNECOLOGY
Payer: COMMERCIAL

## 2017-08-07 ENCOUNTER — PATIENT MESSAGE (OUTPATIENT)
Dept: FAMILY MEDICINE | Facility: CLINIC | Age: 42
End: 2017-08-07

## 2017-08-07 VITALS — BODY MASS INDEX: 33.34 KG/M2 | WEIGHT: 220 LBS | HEIGHT: 68 IN

## 2017-08-07 DIAGNOSIS — Z12.39 BREAST CANCER SCREENING: ICD-10-CM

## 2017-08-07 PROCEDURE — 77067 SCR MAMMO BI INCL CAD: CPT | Mod: TC

## 2017-08-07 PROCEDURE — 77063 BREAST TOMOSYNTHESIS BI: CPT | Mod: 26,,, | Performed by: RADIOLOGY

## 2017-08-07 PROCEDURE — 77067 SCR MAMMO BI INCL CAD: CPT | Mod: 26,,, | Performed by: RADIOLOGY

## 2017-08-22 ENCOUNTER — PATIENT MESSAGE (OUTPATIENT)
Dept: FAMILY MEDICINE | Facility: CLINIC | Age: 42
End: 2017-08-22

## 2017-08-22 ENCOUNTER — PATIENT MESSAGE (OUTPATIENT)
Dept: RHEUMATOLOGY | Facility: CLINIC | Age: 42
End: 2017-08-22

## 2017-08-22 DIAGNOSIS — E03.9 HYPOTHYROIDISM, UNSPECIFIED TYPE: Primary | ICD-10-CM

## 2017-08-23 ENCOUNTER — PATIENT MESSAGE (OUTPATIENT)
Dept: FAMILY MEDICINE | Facility: CLINIC | Age: 42
End: 2017-08-23

## 2017-09-02 ENCOUNTER — PATIENT MESSAGE (OUTPATIENT)
Dept: RHEUMATOLOGY | Facility: CLINIC | Age: 42
End: 2017-09-02

## 2017-09-02 ENCOUNTER — PATIENT MESSAGE (OUTPATIENT)
Dept: FAMILY MEDICINE | Facility: CLINIC | Age: 42
End: 2017-09-02

## 2017-09-02 ENCOUNTER — HOSPITAL ENCOUNTER (OUTPATIENT)
Dept: RADIOLOGY | Facility: HOSPITAL | Age: 42
Discharge: HOME | End: 2017-09-02
Attending: INTERNAL MEDICINE
Payer: COMMERCIAL

## 2017-09-02 DIAGNOSIS — L40.50 PSORIATIC ARTHRITIS: ICD-10-CM

## 2017-09-02 PROCEDURE — 73130 X-RAY EXAM OF HAND: CPT | Mod: 50,TC,PO

## 2017-09-02 PROCEDURE — 73130 X-RAY EXAM OF HAND: CPT | Mod: 26,50,, | Performed by: RADIOLOGY

## 2017-09-18 ENCOUNTER — PATIENT MESSAGE (OUTPATIENT)
Dept: FAMILY MEDICINE | Facility: CLINIC | Age: 42
End: 2017-09-18

## 2017-09-20 NOTE — TELEPHONE ENCOUNTER
Please schedule patient to have labs I ordered done this Saturday and notify her of the time.    Thank you,  Dr. Moran

## 2017-09-23 ENCOUNTER — LAB VISIT (OUTPATIENT)
Dept: LAB | Facility: HOSPITAL | Age: 42
End: 2017-09-23
Attending: FAMILY MEDICINE
Payer: COMMERCIAL

## 2017-09-23 DIAGNOSIS — J30.9 ALLERGIC RHINITIS: ICD-10-CM

## 2017-09-23 DIAGNOSIS — E03.9 HYPOTHYROIDISM, UNSPECIFIED TYPE: ICD-10-CM

## 2017-09-23 LAB
T3FREE SERPL-MCNC: 2.7 PG/ML
T4 FREE SERPL-MCNC: 0.91 NG/DL
TSH SERPL DL<=0.005 MIU/L-ACNC: 2.65 UIU/ML

## 2017-09-23 PROCEDURE — 36415 COLL VENOUS BLD VENIPUNCTURE: CPT | Mod: PO

## 2017-09-23 PROCEDURE — 84443 ASSAY THYROID STIM HORMONE: CPT

## 2017-09-23 PROCEDURE — 84439 ASSAY OF FREE THYROXINE: CPT

## 2017-09-23 PROCEDURE — 84481 FREE ASSAY (FT-3): CPT

## 2017-09-26 RX ORDER — LEVOCETIRIZINE DIHYDROCHLORIDE 5 MG/1
TABLET, FILM COATED ORAL
Qty: 90 TABLET | Refills: 3 | OUTPATIENT
Start: 2017-09-26

## 2017-10-02 ENCOUNTER — PATIENT MESSAGE (OUTPATIENT)
Dept: FAMILY MEDICINE | Facility: CLINIC | Age: 42
End: 2017-10-02

## 2017-10-03 RX ORDER — BUPROPION HYDROCHLORIDE 100 MG/1
100 TABLET ORAL 2 TIMES DAILY
Qty: 180 TABLET | Refills: 0 | Status: SHIPPED | OUTPATIENT
Start: 2017-10-03 | End: 2018-01-17 | Stop reason: SDUPTHER

## 2017-10-19 ENCOUNTER — LAB VISIT (OUTPATIENT)
Dept: LAB | Facility: HOSPITAL | Age: 42
End: 2017-10-19
Attending: INTERNAL MEDICINE
Payer: COMMERCIAL

## 2017-10-19 ENCOUNTER — PATIENT MESSAGE (OUTPATIENT)
Dept: RHEUMATOLOGY | Facility: CLINIC | Age: 42
End: 2017-10-19

## 2017-10-19 ENCOUNTER — OFFICE VISIT (OUTPATIENT)
Dept: RHEUMATOLOGY | Facility: CLINIC | Age: 42
End: 2017-10-19
Payer: COMMERCIAL

## 2017-10-19 VITALS
BODY MASS INDEX: 34.45 KG/M2 | WEIGHT: 227.31 LBS | HEIGHT: 68 IN | HEART RATE: 92 BPM | SYSTOLIC BLOOD PRESSURE: 116 MMHG | DIASTOLIC BLOOD PRESSURE: 76 MMHG

## 2017-10-19 DIAGNOSIS — Z79.60 LONG-TERM USE OF IMMUNOSUPPRESSANT MEDICATION: ICD-10-CM

## 2017-10-19 DIAGNOSIS — Z79.1 LONG TERM (CURRENT) USE OF NON-STEROIDAL ANTI-INFLAMMATORIES (NSAID): ICD-10-CM

## 2017-10-19 DIAGNOSIS — Z55.9 EDUCATIONAL CIRCUMSTANCE: ICD-10-CM

## 2017-10-19 DIAGNOSIS — K21.9 GASTROESOPHAGEAL REFLUX DISEASE, ESOPHAGITIS PRESENCE NOT SPECIFIED: ICD-10-CM

## 2017-10-19 DIAGNOSIS — L40.50 PSORIATIC ARTHRITIS: Primary | ICD-10-CM

## 2017-10-19 DIAGNOSIS — L40.50 PSORIATIC ARTHRITIS: ICD-10-CM

## 2017-10-19 LAB
ALBUMIN SERPL BCP-MCNC: 3.6 G/DL
ALP SERPL-CCNC: 76 U/L
ALT SERPL W/O P-5'-P-CCNC: 15 U/L
ANION GAP SERPL CALC-SCNC: 10 MMOL/L
AST SERPL-CCNC: 19 U/L
BASOPHILS # BLD AUTO: 0.08 K/UL
BASOPHILS NFR BLD: 1 %
BILIRUB SERPL-MCNC: 0.2 MG/DL
BUN SERPL-MCNC: 10 MG/DL
CALCIUM SERPL-MCNC: 9.5 MG/DL
CHLORIDE SERPL-SCNC: 102 MMOL/L
CO2 SERPL-SCNC: 28 MMOL/L
CREAT SERPL-MCNC: 0.8 MG/DL
CRP SERPL-MCNC: 12.9 MG/L
DIFFERENTIAL METHOD: ABNORMAL
EOSINOPHIL # BLD AUTO: 0.7 K/UL
EOSINOPHIL NFR BLD: 8.2 %
ERYTHROCYTE [DISTWIDTH] IN BLOOD BY AUTOMATED COUNT: 13.7 %
ERYTHROCYTE [SEDIMENTATION RATE] IN BLOOD BY WESTERGREN METHOD: 20 MM/HR
EST. GFR  (AFRICAN AMERICAN): >60 ML/MIN/1.73 M^2
EST. GFR  (NON AFRICAN AMERICAN): >60 ML/MIN/1.73 M^2
GLUCOSE SERPL-MCNC: 95 MG/DL
HCT VFR BLD AUTO: 36.7 %
HGB BLD-MCNC: 11.6 G/DL
LYMPHOCYTES # BLD AUTO: 2 K/UL
LYMPHOCYTES NFR BLD: 25.6 %
MCH RBC QN AUTO: 26.6 PG
MCHC RBC AUTO-ENTMCNC: 31.6 G/DL
MCV RBC AUTO: 84 FL
MONOCYTES # BLD AUTO: 0.6 K/UL
MONOCYTES NFR BLD: 7.7 %
NEUTROPHILS # BLD AUTO: 4.6 K/UL
NEUTROPHILS NFR BLD: 57.2 %
NRBC BLD-RTO: 0 /100 WBC
PLATELET # BLD AUTO: 348 K/UL
PMV BLD AUTO: 9.1 FL
POTASSIUM SERPL-SCNC: 3.9 MMOL/L
PROT SERPL-MCNC: 7.6 G/DL
RBC # BLD AUTO: 4.36 M/UL
SODIUM SERPL-SCNC: 140 MMOL/L
WBC # BLD AUTO: 7.97 K/UL

## 2017-10-19 PROCEDURE — 80053 COMPREHEN METABOLIC PANEL: CPT

## 2017-10-19 PROCEDURE — 85651 RBC SED RATE NONAUTOMATED: CPT

## 2017-10-19 PROCEDURE — 86140 C-REACTIVE PROTEIN: CPT

## 2017-10-19 PROCEDURE — 99215 OFFICE O/P EST HI 40 MIN: CPT | Mod: S$GLB,,, | Performed by: INTERNAL MEDICINE

## 2017-10-19 PROCEDURE — 86706 HEP B SURFACE ANTIBODY: CPT

## 2017-10-19 PROCEDURE — 86803 HEPATITIS C AB TEST: CPT

## 2017-10-19 PROCEDURE — 85025 COMPLETE CBC W/AUTO DIFF WBC: CPT

## 2017-10-19 PROCEDURE — 86704 HEP B CORE ANTIBODY TOTAL: CPT

## 2017-10-19 PROCEDURE — 36415 COLL VENOUS BLD VENIPUNCTURE: CPT

## 2017-10-19 PROCEDURE — 87340 HEPATITIS B SURFACE AG IA: CPT

## 2017-10-19 PROCEDURE — 99999 PR PBB SHADOW E&M-EST. PATIENT-LVL III: CPT | Mod: PBBFAC,,, | Performed by: INTERNAL MEDICINE

## 2017-10-19 RX ORDER — GABAPENTIN 300 MG/1
300 CAPSULE ORAL 3 TIMES DAILY
COMMUNITY

## 2017-10-19 RX ORDER — NAPROXEN 500 MG/1
500 TABLET ORAL
Qty: 270 TABLET | Refills: 3 | Status: SHIPPED | OUTPATIENT
Start: 2017-10-19 | End: 2019-01-07 | Stop reason: SDUPTHER

## 2017-10-19 RX ORDER — OMEPRAZOLE 40 MG/1
40 CAPSULE, DELAYED RELEASE ORAL DAILY
Qty: 90 CAPSULE | Refills: 3 | Status: SHIPPED | OUTPATIENT
Start: 2017-10-19 | End: 2018-03-14

## 2017-10-19 RX ORDER — LEFLUNOMIDE 20 MG/1
20 TABLET ORAL DAILY
Qty: 90 TABLET | Refills: 3 | Status: SHIPPED | OUTPATIENT
Start: 2017-10-19 | End: 2019-01-07 | Stop reason: SDUPTHER

## 2017-10-19 SDOH — SOCIAL DETERMINANTS OF HEALTH (SDOH): PROBLEMS RELATED TO EDUCATION AND LITERACY, UNSPECIFIED: Z55.9

## 2017-10-19 ASSESSMENT — ROUTINE ASSESSMENT OF PATIENT INDEX DATA (RAPID3)
FATIGUE SCORE: 9
WHEN YOU AWAKENED IN THE MORNING OVER THE LAST WEEK, PLEASE INDICATE THE AMOUNT OF TIME IT TAKES UNTIL YOU ARE AS LIMBER AS YOU WILL BE FOR THE DAY: 45 MINUTES
PAIN SCORE: 9
AM STIFFNESS SCORE: 1, YES
MDHAQ FUNCTION SCORE: .6
PSYCHOLOGICAL DISTRESS SCORE: 3.3
PATIENT GLOBAL ASSESSMENT SCORE: 5.5
TOTAL RAPID3 SCORE: 5.5

## 2017-10-19 NOTE — PROGRESS NOTES
Subjective:       Patient ID: Keli Gilbert is a 41 y.o. 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 Last seen on 6/15/17.  Was begun on Cosentyx in November, 2015. She is taking 300 mg/monthly. She is taking naproxen 500 mg twice-3 x daily & leflunomide 20 mg/d but is having pain and swelling in her hands, especially MCPs. The regimen does not seem to be holding her joint wise, however, skin rashes have resolved. She has been prescribed gabapentin and has used a topical that belonged to a friend of hers and feels it gives her temporary relief. She would like a prescription. No other joints bother her, but she was noted to have a new erosion (L 4th MCP) on imaging last year. Her recent hand x-rays show no deterioration.  AM stiffness 45 minutes.  She missed seeing her neurologist, Dr. Merlin Lam at [facility] and still probably needs to get a repeat MRI of brain to see what happened to the initial MRI abnormalities (several plaques undergoing active demyelination). She will re-schedule.     .        Associated symptoms include fatigue. Pertinent negatives include no fever or headaches.            Current Outpatient Prescriptions   Medication Sig Dispense Refill    azelastine (ASTELIN) 137 mcg (0.1 %) nasal spray 1 spray (137 mcg total) by Nasal route 2 (two) times daily. 30 mL 0    buPROPion (WELLBUTRIN) 100 MG tablet Take 1 tablet (100 mg total) by mouth 2 (two) times daily. 180 tablet 0    cholecalciferol, vitamin D3, 2,000 unit Tab Take 1 tablet by mouth Once a week.      cholecalciferol, vitamin D3, 50,000 unit capsule Take 1 capsule (50,000 Units total) by mouth every 14 (fourteen) days. 18 capsule 1    clindamycin (CLEOCIN T) 1 % external solution       clobetasol (CLOBEX) 0.05 % topical spray Apply topically Twice  daily. AAA bie. Disp 125 ml      gabapentin (NEURONTIN) 300 MG capsule Take 300 mg by mouth once daily.      leflunomide (ARAVA) 20 MG Tab Take 1 tablet (20 mg total) by mouth once daily. 90 tablet 3    levocetirizine (XYZAL) 5 MG tablet TAKE 1 TABLET (5 MG TOTAL) BY MOUTH DAILY AS NEEDED FOR ALLERGIES. 90 tablet 3    liothyronine (CYTOMEL) 5 MCG Tab TAKE 2 TABLETS (10 MCG TOTAL) BY MOUTH ONCE DAILY. 180 tablet 3    lisinopril-hydrochlorothiazide (PRINZIDE,ZESTORETIC) 20-25 mg Tab Take 1 tablet by mouth once daily. 90 tablet 3    methadone (DOLOPHINE) 10 MG tablet Take 1 tablet by mouth Three times a day.      mupirocin calcium 2% (BACTROBAN) 2 % cream APPLY TO AFFECTED AREA TWICE A DAY INTRANASALLY FOR 1ST WEEK OF THE MONTH 30 g 3    naproxen (NAPROSYN) 500 MG tablet Take 1 tablet (500 mg total) by mouth 3 (three) times daily with meals. 270 tablet 2    omeprazole (PRILOSEC) 40 MG capsule Take 1 capsule (40 mg total) by mouth once daily. 30 capsule 11    potassium chloride (MICRO-K) 10 MEQ CpSR Take 1 capsule (10 mEq total) by mouth once daily. 90 capsule 3    promethazine (PHENERGAN) 25 MG tablet Take 1 tablet by mouth Once daily as needed.      ranitidine (ZANTAC) 150 MG tablet Take 1 tablet (150 mg total) by mouth 2 (two) times daily. 180 tablet 3    secukinumab (COSENTYX PEN, 2 PENS,) 150 mg/mL PnIj Inject 300 mg into the skin every 28 days. 6 Syringe 2    SYNTHROID 125 mcg tablet Take 1 tablet (125 mcg total) by mouth before breakfast. 90 tablet 3    tizanidine (ZANAFLEX) 4 MG tablet Take 4 mg by mouth 3 (three) times daily as needed.  6    trazodone (DESYREL) 50 MG tablet       triamcinolone (KENALOG) 0.1 % cream Apply topically Twice daily. Dirs: disp: 1# jar AAA      urea (CARMOL) 40 % Crea AAA foot qd after soak 198.6 g 3     No current facility-administered medications for this visit.          Review of patient's allergies indicates:   Allergen Reactions    Doxycycline hcl Nausea And  Vomiting    Enbrel [etanercept] Other (See Comments)     Optic neurtits     Past Medical History:   Diagnosis Date    Abnormal Pap smear of vagina     AR (allergic rhinitis)     Demyelinating disorder     Depression     Fever blister     Fibromyalgia     followed by pain management    Generalized osteoarthritis of multiple sites     History of abnormal Pap smear     Hypertension     Hypothyroidism     Insulin resistance     Mixed anxiety and depressive disorder     Morbid obesity     Myelitis, optic neuritis in     treated with high-dose steroids and resolved; positive MRI and spinal fluid for a mass, but on embrel for psoriatic arthritis - she will see a MS specialist at [facility]; MRI normalized off embrel    Obesity     Pre-diabetes     hx diabetes on stereoids /    Psoriasis     Psoriatic arthritis     Vitamin D deficiency disease      Past Surgical History:   Procedure Laterality Date    SINUS SURGERY  1998       Review of Systems   Constitutional: Positive for fatigue. Negative for fever and unexpected weight change.   HENT: Negative for dental problem and mouth sores.    Eyes: Negative.  Negative for redness, itching and visual disturbance.        Dry eyes   Respiratory: Negative.    Cardiovascular: Negative.  Negative for chest pain, palpitations and leg swelling.   Gastrointestinal: Negative.  Negative for abdominal pain, anal bleeding, blood in stool, constipation, diarrhea and nausea.        Pyrosis   Endocrine: Negative.    Genitourinary: Negative.  Negative for frequency, menstrual problem, pelvic pain and urgency.   Musculoskeletal: Negative.  Negative for arthralgias, back pain, gait problem and neck pain.   Skin: Negative.  Negative for color change and rash.   Allergic/Immunologic: Positive for immunocompromised state.   Neurological: Negative.  Negative for dizziness, seizures, weakness, numbness and headaches.   Hematological: Negative.  Negative for adenopathy. Does not  "bruise/bleed easily.   Psychiatric/Behavioral: Positive for dysphoric mood and sleep disturbance. Negative for decreased concentration and suicidal ideas. The patient is nervous/anxious.          Family History   Problem Relation Age of Onset    Psoriasis Father     Cancer Father      throat    Thyroid disease Mother     Heart disease Mother     Hypertension Mother     Hyperlipidemia Mother     Coronary artery disease Mother      s/p CABG    Heart attack Mother     Heart disease Sister      MVP    Diabetes Paternal Uncle     Diabetes Maternal Grandfather     Heart disease Maternal Grandfather     Thyroid disease Maternal Grandfather     ADD / ADHD Son     Melanoma Neg Hx     Lupus Neg Hx     Eczema Neg Hx     Acne Neg Hx     Breast cancer Neg Hx     Colon cancer Neg Hx     Ovarian cancer Neg Hx        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   No alcohol; no cigarettes;   Objective:       /76   Pulse 92   Ht 5' 8" (1.727 m)   Wt 103.1 kg (227 lb 4.8 oz)   BMI 34.56 kg/m²       Physical Exam   Vitals reviewed.  Constitutional: She is oriented to person, place, and time and well-developed, well-nourished, and in no distress. No distress.   HENT:   Head: Normocephalic and atraumatic.   Mouth/Throat: Oropharynx is clear and moist. No oropharyngeal exudate.   No facial rashes  Parotids not enlarged  No oral ulcers   Eyes: Conjunctivae and EOM are normal. Pupils are equal, round, and reactive to light. Right eye exhibits no discharge. Left eye exhibits no discharge. No scleral icterus.   Neck: Neck supple. No JVD present. No tracheal deviation present. No thyromegaly present.   Cardiovascular: Normal rate, regular rhythm, normal heart sounds and intact distal pulses.  Exam reveals no gallop and no friction rub.    No murmur heard.  Pulmonary/Chest: Effort normal and breath sounds normal. No respiratory distress. She has no wheezes. She has no rales. She exhibits no tenderness.   Abdominal: Soft. " Bowel sounds are normal. She exhibits no distension and no mass. There is no splenomegaly or hepatomegaly. There is no tenderness. There is no rebound and no guarding.   Lymphadenopathy:     She has no cervical adenopathy.        Right: No inguinal adenopathy present.        Left: No inguinal adenopathy present.   Neurological: She is alert and oriented to person, place, and time. She has normal reflexes. No cranial nerve deficit. Gait normal.   Proximal and distal muscle strength 5/5.   Skin: Skin is warm and dry. No rash noted. She is not diaphoretic.     Psychiatric: Mood, memory, affect and judgment normal.   Drowsy.   Musculoskeletal: Normal range of motion. She exhibits no tenderness.   Cspine slight decrease in lateral flexion & rotation; no tenderness  Tspine FROM no tenderness  Lspine FROM no tenderness.  TMJ: unremarkable  Shoulders: FROM; no synovitis  Elbows: FROM; no synovitis; no tophi or nodules  Wrists: no SHT; no tenderness; mild decrease in flexion/extension bilaterally.   MCPs: mild thickening of 2nd & MCPs bilaterally;  ok; p; 4th L MCP mildly tender; not swollen;   PIPs: very mild dactylitis 2nd R ray gone  DIPs: FROM; no synovitis  HIPS: FROM  Knees: FROM; no synovitis; no instability;  Ankles: FROM: no synovitis   Toes: ok; no metatarsalgia                      Labs:   9/2/17: ESR 16; CRp 5.9; Hg 11.6; Ht 36.6; CMP ok;   2/20/17: ESR 34; CRP 8.1; CBC Hg 11.4; Ht 35.6; CMP ok;   11/19/16: ESR 20; CRP 4.8; CBC Hg 11.9; CMP K+3.3, otherwise ok;   8/27/16: ESR 22; CRP 7; Hg 11.8; Ht 36.9; CMP ok;   5/26/16: ESR 35; CRP 11.2Hg 11.8; Ht 36.9; CMP ok;   5/21/16: Vit d 37  10/8/15: ESR 45; CRP 14.2; Hg 11.2; Ht 35.8; CMP; ok  7/7/15: CBC plt 380; K+ 3.4; Glu 154  7/6/15: ESR 48; CRP 9.6  4/4/15: ESR 41; CRP 19; Hg 11.0; Ht 34.6; plt 377; CMP ok;  1/10/15: ESR 34; CRP 18; CBC ok; ; Alb. 3.6  10/4/14: ESR 43; CRP 18.5; Hg 11.8; Ht 36.5; plt 378; Alb. 3.4;   3/20/14: ESR 41; CRP 14.3; plt 356;  CMP ok;  2/15/13: ESR 30; CRP 9.8; CBC ok; Alb. 3.3  11/2/13: ESR 28; CRP 11.6; plt 363; Alb. 3.3;   5/28/13: ESR 45; CRP 8.8; plt 366; CMP ok;     6/15/17: Bilateral hands: personally reviewed: mild osteoarthritis w stable periarticular erosion at the left fourth MCP joint; no change since last year.   8/27/16: Bilateral feet: personally reviewed. Mild HV; mild OA shell 1st MTP dali; ? Erosion PIP left small toe;   5/26/16: Bilateral hands: personally reviewed: L 4th MCP (new) erosive lesion; R & L 2nd & 3rd metacarpal head cysts; R mild PIP erosion & STS 2nd.    Assessment:     Psoriatic arthritis-- with mild SHT 2 MCPs bilaterally (R >>L) & with sausage digit of R index finger--but still some hand pain/swelling.   a) History of sausage digits of both toes    PIPs, DIPs, wrists, and knees., now w. only one sausage digit.  b) Psoriasis of the abdomen, elbow, and the shins--resolved.   c) Enbrel stopped 10/21/11 due to optic neuritis.    d) New erosion (4th L metacarpal head) on x-ray & possibly L 5th toe PIP    e) Persisting elevation of ESR and CRP   f) inadequate response to leflunomide and Stelara & apremilast (w. Intolerable nausea)  G) could not tolerate SSZ--nausea.  H) MTX x 2 even SC did not help sxs.  I) improved on Cosentyx (+leflunomide + naproxen) initially but hand joints still hurt despite normalization of ESR & CRP       Chronic pain/fibromyalgia syndrome with fatigue, tender points, withdrawals to palpation in the past, pain all over, responsive to Savella but with some nausea (off it now), followed [facility] Dr. Odom [facility],    On methadone    Left optic neuritis with demyelinating lesions secondary to Enbrel--now resolved.     Diabetes mellitus, diet controlled     Degenerative joint disease of the cervical spine, knees, and feet--very mild.     Depression on wellbutrin    Off savella    Hypertension.     Hypothyroidism.     Vitamin D deficiency with regular prescription vitamin D  supplementation.     Obesity         Plan:   Ok for topical pump: lidocaine2% + prilocaine 2% + lamotrigine 2.5% + meloxicam 0.09%  Apply up  To 3.2 grams (2 pumps) to hands up to 5 x/day. Rx written and given to patient.   Continue leflunomide 20 mg/d for time being.  Continue Cosentyx 300 mg/month for time being.  Discussed changing rx. Orencia 125 mg/wk is approved now for PsA.  Side effects and toxicities of Orencia reviewed.  Multiple sclerosis has been associated with Orencia but also is being studied for rx.  We discussed this with patient.   We will obtain more information before starting. Called reps for more info.   Do not start Orencia until 1 month off Cosentyx & we elucidate whether there is a true association with MS.   Ok for Naproxen 500 mg twice daily to tid pc preferably with PPI  Rx for omeprazole 40 mg/d sent.   Ok to continue gabapentin: contact us with dose prescribed.  Labs today including hepatitis B&C labs.  Labs in 3 months.   Call for problems.  RTC 3 months.

## 2017-10-20 ENCOUNTER — PATIENT MESSAGE (OUTPATIENT)
Dept: RHEUMATOLOGY | Facility: CLINIC | Age: 42
End: 2017-10-20

## 2017-10-20 ENCOUNTER — TELEPHONE (OUTPATIENT)
Dept: PHARMACY | Facility: CLINIC | Age: 42
End: 2017-10-20

## 2017-10-20 LAB
HBV CORE AB SERPL QL IA: NEGATIVE
HBV SURFACE AB SER-ACNC: NEGATIVE M[IU]/ML
HBV SURFACE AG SERPL QL IA: NEGATIVE
HCV AB SERPL QL IA: NEGATIVE

## 2017-10-20 NOTE — TELEPHONE ENCOUNTER
Informed patient [facility] received a prescription for Orencia and it will require a prior authorization with their insurance company. We will update patient of status as more information is received.

## 2017-11-01 ENCOUNTER — PATIENT MESSAGE (OUTPATIENT)
Dept: RHEUMATOLOGY | Facility: CLINIC | Age: 42
End: 2017-11-01

## 2017-11-01 ENCOUNTER — PATIENT MESSAGE (OUTPATIENT)
Dept: ADMINISTRATIVE | Facility: OTHER | Age: 42
End: 2017-11-01

## 2017-11-01 NOTE — TELEPHONE ENCOUNTER
DOCUMENTATION ONLY:  Prior authorization for Orencia approved from 11/01/2017 to 11/01/2018    Case Id: [id number]    Co-pay: OON    Patient Assistance IS required  Must fill at [facility].  MARTHA

## 2017-11-01 NOTE — TELEPHONE ENCOUNTER
FOR DOCUMENTATION ONLY:  Financial Assistance for Orencia approved  Source: Copay Card  BIN: [id number]  PCN: [employer]  ID: [id number]  Group: [id number]  $5.00 copay    Must use [facility]  Ph: [phone]  Fx: [phone]

## 2017-11-02 ENCOUNTER — PATIENT MESSAGE (OUTPATIENT)
Dept: RHEUMATOLOGY | Facility: CLINIC | Age: 42
End: 2017-11-02

## 2017-11-02 ENCOUNTER — TELEPHONE (OUTPATIENT)
Dept: RHEUMATOLOGY | Facility: CLINIC | Age: 42
End: 2017-11-02

## 2017-11-02 NOTE — TELEPHONE ENCOUNTER
----- Message from Giselle Nance sent at 11/2/2017 11:25 AM CDT -----  Contact: [email or web address]  Patient has questions about her new medication.

## 2017-11-06 ENCOUNTER — PATIENT MESSAGE (OUTPATIENT)
Dept: RHEUMATOLOGY | Facility: CLINIC | Age: 42
End: 2017-11-06

## 2017-11-06 DIAGNOSIS — L40.50 PSORIATIC ARTHRITIS: Primary | ICD-10-CM

## 2017-11-10 ENCOUNTER — OFFICE VISIT (OUTPATIENT)
Dept: FAMILY MEDICINE | Facility: CLINIC | Age: 42
End: 2017-11-10
Payer: COMMERCIAL

## 2017-11-10 VITALS
SYSTOLIC BLOOD PRESSURE: 136 MMHG | RESPIRATION RATE: 12 BRPM | OXYGEN SATURATION: 95 % | TEMPERATURE: 98 F | HEIGHT: 68 IN | WEIGHT: 228.81 LBS | DIASTOLIC BLOOD PRESSURE: 82 MMHG | BODY MASS INDEX: 34.68 KG/M2 | HEART RATE: 97 BPM

## 2017-11-10 DIAGNOSIS — J41.0 SIMPLE CHRONIC BRONCHITIS: Primary | ICD-10-CM

## 2017-11-10 PROCEDURE — 99999 PR PBB SHADOW E&M-EST. PATIENT-LVL III: CPT | Mod: PBBFAC,,, | Performed by: FAMILY MEDICINE

## 2017-11-10 PROCEDURE — 99214 OFFICE O/P EST MOD 30 MIN: CPT | Mod: S$GLB,,, | Performed by: FAMILY MEDICINE

## 2017-11-10 RX ORDER — AMOXICILLIN AND CLAVULANATE POTASSIUM 875; 125 MG/1; MG/1
1 TABLET, FILM COATED ORAL 2 TIMES DAILY
Qty: 20 TABLET | Refills: 0 | Status: SHIPPED | OUTPATIENT
Start: 2017-11-10 | End: 2017-11-20

## 2017-11-10 NOTE — PROGRESS NOTES
Routine Office Visit    Patient Name: Keli Gilbert    : 1975  MRN: [id number]    Subjective:  Keli is a 41 y.o. female who presents today for:    1. Congestion  Patient presenting today with congestion and sinus  Pain for several days.  She is currently on medication for psoriatic arthritis.  Medication just changed by rheum and will hopefully improve.  She has been having a productive cough and wheezing. No shortness of breath. She has also had subjective fever and has had chills.  No sweats.      Past Medical History  Past Medical History:   Diagnosis Date    Abnormal Pap smear of vagina     AR (allergic rhinitis)     Demyelinating disorder     Depression     Fever blister     Fibromyalgia     followed by pain management    Generalized osteoarthritis of multiple sites     History of abnormal Pap smear     Hypertension     Hypothyroidism     Insulin resistance     Mixed anxiety and depressive disorder     Morbid obesity     Myelitis, optic neuritis in     treated with high-dose steroids and resolved; positive MRI and spinal fluid for a mass, but on embrel for psoriatic arthritis - she will see a MS specialist at [facility]; MRI normalized off embrel    Obesity     Pre-diabetes     hx diabetes on stereoids /    Psoriasis     Psoriatic arthritis     Vitamin D deficiency disease        Past Surgical History  Past Surgical History:   Procedure Laterality Date    SINUS SURGERY         Family History  Family History   Problem Relation Age of Onset    Psoriasis Father     Cancer Father      throat    Thyroid disease Mother     Heart disease Mother     Hypertension Mother     Hyperlipidemia Mother     Coronary artery disease Mother      s/p CABG    Heart attack Mother     Heart disease Sister      MVP    Diabetes Paternal Uncle     Diabetes Maternal Grandfather     Heart disease Maternal Grandfather     Thyroid disease Maternal Grandfather     ADD / ADHD Son     Melanoma Neg  Hx     Lupus Neg Hx     Eczema Neg Hx     Acne Neg Hx     Breast cancer Neg Hx     Colon cancer Neg Hx     Ovarian cancer Neg Hx        Social History  Social History     Social History    Marital status: Single     Spouse name: N/A    Number of children: 1    Years of education: N/A     Occupational History    [employer] ([employer])     Social History Main Topics    Smoking status: Never Smoker    Smokeless tobacco: Never Used    Alcohol use No    Drug use: No      Comment: 7/7/15 one weed brownie    Sexual activity: Yes     Partners: Male     Birth control/ protection: IUD     Other Topics Concern    Are You Pregnant Or Think You May Be? No    Breast-Feeding No     Social History Narrative    No narrative on file       Current Medications  Current Outpatient Prescriptions on File Prior to Visit   Medication Sig Dispense Refill    azelastine (ASTELIN) 137 mcg (0.1 %) nasal spray 1 spray (137 mcg total) by Nasal route 2 (two) times daily. 30 mL 0    buPROPion (WELLBUTRIN) 100 MG tablet Take 1 tablet (100 mg total) by mouth 2 (two) times daily. 180 tablet 0    cholecalciferol, vitamin D3, 2,000 unit Tab Take 1 tablet by mouth Once a week.      cholecalciferol, vitamin D3, 50,000 unit capsule Take 1 capsule (50,000 Units total) by mouth every 14 (fourteen) days. 18 capsule 1    clindamycin (CLEOCIN T) 1 % external solution       clobetasol (CLOBEX) 0.05 % topical spray Apply topically Twice daily. AAA bie. Disp 125 ml      gabapentin (NEURONTIN) 300 MG capsule Take 300 mg by mouth once daily.      leflunomide (ARAVA) 20 MG Tab Take 1 tablet (20 mg total) by mouth once daily. 90 tablet 3    levocetirizine (XYZAL) 5 MG tablet TAKE 1 TABLET (5 MG TOTAL) BY MOUTH DAILY AS NEEDED FOR ALLERGIES. 90 tablet 3    liothyronine (CYTOMEL) 5 MCG Tab TAKE 2 TABLETS (10 MCG TOTAL) BY MOUTH ONCE DAILY. 180 tablet 3    lisinopril-hydrochlorothiazide (PRINZIDE,ZESTORETIC) 20-25 mg Tab Take 1  tablet by mouth once daily. 90 tablet 3    methadone (DOLOPHINE) 10 MG tablet Take 1 tablet by mouth Three times a day.      mupirocin calcium 2% (BACTROBAN) 2 % cream APPLY TO AFFECTED AREA TWICE A DAY INTRANASALLY FOR 1ST WEEK OF THE MONTH 30 g 3    naproxen (NAPROSYN) 500 MG tablet Take 1 tablet (500 mg total) by mouth 3 (three) times daily with meals. 270 tablet 3    omeprazole (PRILOSEC) 40 MG capsule Take 1 capsule (40 mg total) by mouth once daily. 90 capsule 3    potassium chloride (MICRO-K) 10 MEQ CpSR Take 1 capsule (10 mEq total) by mouth once daily. 90 capsule 3    promethazine (PHENERGAN) 25 MG tablet Take 1 tablet by mouth Once daily as needed.      SYNTHROID 125 mcg tablet Take 1 tablet (125 mcg total) by mouth before breakfast. 90 tablet 3    tizanidine (ZANAFLEX) 4 MG tablet Take 4 mg by mouth 3 (three) times daily as needed.  6    trazodone (DESYREL) 50 MG tablet       triamcinolone (KENALOG) 0.1 % cream Apply topically Twice daily. Dirs: disp: 1# jar AAA      urea (CARMOL) 40 % Crea AAA foot qd after soak 198.6 g 3    abatacept 125 mg/mL AtIn Inject 125 mg into the skin every 7 days. 12 Syringe 2    secukinumab (COSENTYX PEN, 2 PENS,) 150 mg/mL PnIj Inject 300 mg into the skin every 28 days. 6 Syringe 2     No current facility-administered medications on file prior to visit.        Allergies   Review of patient's allergies indicates:   Allergen Reactions    Doxycycline hcl Nausea And Vomiting    Enbrel [etanercept] Other (See Comments)     Optic neurtits       Review of Systems (Pertinent positives)  Review of Systems   Constitutional: Positive for chills and fever. Negative for weight loss.   HENT: Positive for congestion and sore throat. Negative for ear pain.    Respiratory: Positive for cough and sputum production. Negative for hemoptysis, shortness of breath and wheezing.    Cardiovascular: Negative for chest pain.   Gastrointestinal: Negative for heartburn.   Musculoskeletal:  "Negative for myalgias.   Skin: Negative for rash.   Neurological: Positive for headaches.   Endo/Heme/Allergies: Positive for environmental allergies.         /82 (BP Location: Right arm, Patient Position: Sitting, BP Method: Large (Manual))   Pulse 97   Temp 98.2 °F (36.8 °C) (Oral)   Resp 12   Ht 5' 8" (1.727 m)   Wt 103.8 kg (228 lb 13.4 oz)   SpO2 95%   BMI 34.79 kg/m²     GENERAL APPEARANCE: in no apparent distress and well developed and well nourished  HEENT: PERRL, EOMI, Sclera clear, anicteric, Oropharynx clear, no lesions, Neck supple with midline trachea  NECK: normal, supple, no adenopathy, thyroid normal in size  RESPIRATORY: appears well, vitals normal, no respiratory distress, acyanotic, normal RR, chest clear, no wheezing, crepitations, rhonchi, normal symmetric air entry  HEART: regular rate and rhythm, S1, S2 normal, no murmur, click, rub or gallop.    ABDOMEN: abdomen is soft without tenderness, no masses, no hernias, no organomegaly, no rebound, no guarding. Suprapubic tenderness absent. No CVA tenderness.  SKIN: no rashes, no wounds, no other lesions  PSYCH: Alert, oriented x 3, thought content appropriate, speech normal, pleasant and cooperative, good eye contact, well groomed    Assessment/Plan:  Keli Gilbert is a 41 y.o. female who presents today for :    Keli was seen today for cough.    Diagnoses and all orders for this visit:    Simple chronic bronchitis  -     amoxicillin-clavulanate 875-125mg (AUGMENTIN) 875-125 mg per tablet; Take 1 tablet by mouth 2 (two) times daily.      1.  With patient on immuno suppressant therapy, will treat with abx out of abundance of caution  2.  She is to call should symptoms worsen  3.  Follow up as needed  4.  Continue all medications as prescribed    Andriy Moran MD      "

## 2017-11-16 ENCOUNTER — PATIENT MESSAGE (OUTPATIENT)
Dept: FAMILY MEDICINE | Facility: CLINIC | Age: 42
End: 2017-11-16

## 2017-11-16 DIAGNOSIS — J41.0 SIMPLE CHRONIC BRONCHITIS: Primary | ICD-10-CM

## 2017-11-17 RX ORDER — PROMETHAZINE HYDROCHLORIDE AND DEXTROMETHORPHAN HYDROBROMIDE 6.25; 15 MG/5ML; MG/5ML
5 SYRUP ORAL 4 TIMES DAILY PRN
Qty: 240 ML | Refills: 0 | Status: SHIPPED | OUTPATIENT
Start: 2017-11-17 | End: 2017-11-24

## 2017-11-27 ENCOUNTER — PATIENT MESSAGE (OUTPATIENT)
Dept: RHEUMATOLOGY | Facility: CLINIC | Age: 42
End: 2017-11-27

## 2018-01-17 ENCOUNTER — OFFICE VISIT (OUTPATIENT)
Dept: FAMILY MEDICINE | Facility: CLINIC | Age: 43
End: 2018-01-17
Payer: COMMERCIAL

## 2018-01-17 VITALS
WEIGHT: 220.69 LBS | BODY MASS INDEX: 35.47 KG/M2 | HEART RATE: 92 BPM | SYSTOLIC BLOOD PRESSURE: 126 MMHG | TEMPERATURE: 99 F | DIASTOLIC BLOOD PRESSURE: 84 MMHG | HEIGHT: 66 IN | OXYGEN SATURATION: 97 % | RESPIRATION RATE: 17 BRPM

## 2018-01-17 DIAGNOSIS — F34.1 DYSTHYMIA: ICD-10-CM

## 2018-01-17 DIAGNOSIS — J45.909 EXTRINSIC ASTHMA, UNSPECIFIED ASTHMA SEVERITY, UNSPECIFIED WHETHER COMPLICATED, UNSPECIFIED WHETHER PERSISTENT: Primary | ICD-10-CM

## 2018-01-17 PROCEDURE — 99999 PR PBB SHADOW E&M-EST. PATIENT-LVL III: CPT | Mod: PBBFAC,,, | Performed by: FAMILY MEDICINE

## 2018-01-17 PROCEDURE — 99214 OFFICE O/P EST MOD 30 MIN: CPT | Mod: S$GLB,,, | Performed by: FAMILY MEDICINE

## 2018-01-17 RX ORDER — BUPROPION HYDROCHLORIDE 100 MG/1
100 TABLET ORAL 2 TIMES DAILY
Qty: 180 TABLET | Refills: 0 | Status: SHIPPED | OUTPATIENT
Start: 2018-01-17 | End: 2018-04-30 | Stop reason: SDUPTHER

## 2018-01-17 RX ORDER — MONTELUKAST SODIUM 10 MG/1
10 TABLET ORAL NIGHTLY
Qty: 30 TABLET | Refills: 0 | Status: SHIPPED | OUTPATIENT
Start: 2018-01-17 | End: 2018-02-16

## 2018-01-17 RX ORDER — ALBUTEROL SULFATE 90 UG/1
2 AEROSOL, METERED RESPIRATORY (INHALATION) EVERY 6 HOURS PRN
Qty: 18 G | Refills: 0 | Status: SHIPPED | OUTPATIENT
Start: 2018-01-17 | End: 2018-05-29 | Stop reason: SDUPTHER

## 2018-01-17 NOTE — PROGRESS NOTES
Routine Office Visit    Patient Name: Keli Gilbert    : 1975  MRN: [id number]    Subjective:  Keli is a 42 y.o. female who presents today for:    1. Seasonal allergies  Patient presenting today with allergies and wheezing.  She states that she gets this every time the weather changes.  She states that she borrowed her friends nebulizer and used it with albuterol. This relieved her wheezing and shortness of breath.  She did a second treatment today and states that it works very well for her.  No cyanosis or tripoding.  No retractions.       Past Medical History  Past Medical History:   Diagnosis Date    Abnormal Pap smear of vagina     AR (allergic rhinitis)     Demyelinating disorder     Depression     Fever blister     Fibromyalgia     followed by pain management    Generalized osteoarthritis of multiple sites     History of abnormal Pap smear     Hypertension     Hypothyroidism     Insulin resistance     Mixed anxiety and depressive disorder     Morbid obesity     Myelitis, optic neuritis in     treated with high-dose steroids and resolved; positive MRI and spinal fluid for a mass, but on embrel for psoriatic arthritis - [facility]; MRI normalized off embrel    Obesity     Pre-diabetes     hx diabetes on stereoids /    Psoriasis     Psoriatic arthritis     Vitamin D deficiency disease        Past Surgical History  Past Surgical History:   Procedure Laterality Date    SINUS SURGERY         Family History  Family History   Problem Relation Age of Onset    Psoriasis Father     Cancer Father      throat    Thyroid disease Mother     Heart disease Mother     Hypertension Mother     Hyperlipidemia Mother     Coronary artery disease Mother      s/p CABG    Heart attack Mother     Heart disease Sister      MVP    Diabetes Paternal Uncle     Diabetes Maternal Grandfather     Heart disease Maternal Grandfather     Thyroid disease Maternal  Grandfather     ADD / ADHD Son     Melanoma Neg Hx     Lupus Neg Hx     Eczema Neg Hx     Acne Neg Hx     Breast cancer Neg Hx     Colon cancer Neg Hx     Ovarian cancer Neg Hx        Social History  Social History     Social History    Marital status: Single     Spouse name: N/A    Number of children: 1    Years of education: N/A     Occupational History    [employer] ([employer]     Social History Main Topics    Smoking status: Never Smoker    Smokeless tobacco: Never Used    Alcohol use No    Drug use: No      Comment: 7/7/15 one weed brownie    Sexual activity: Yes     Partners: Male     Birth control/ protection: IUD     Other Topics Concern    Are You Pregnant Or Think You May Be? No    Breast-Feeding No     Social History Narrative    No narrative on file       Current Medications  Current Outpatient Prescriptions on File Prior to Visit   Medication Sig Dispense Refill    abatacept 125 mg/mL AtIn Inject 125 mg into the skin every 7 days. 12 Syringe 2    azelastine (ASTELIN) 137 mcg (0.1 %) nasal spray 1 spray (137 mcg total) by Nasal route 2 (two) times daily. 30 mL 0    cholecalciferol, vitamin D3, 2,000 unit Tab Take 1 tablet by mouth Once a week.      cholecalciferol, vitamin D3, 50,000 unit capsule Take 1 capsule (50,000 Units total) by mouth every 14 (fourteen) days. 18 capsule 1    clindamycin (CLEOCIN T) 1 % external solution       clobetasol (CLOBEX) 0.05 % topical spray Apply topically Twice daily. AAA bie. Disp 125 ml      gabapentin (NEURONTIN) 300 MG capsule Take 300 mg by mouth once daily.      leflunomide (ARAVA) 20 MG Tab Take 1 tablet (20 mg total) by mouth once daily. 90 tablet 3    levocetirizine (XYZAL) 5 MG tablet TAKE 1 TABLET (5 MG TOTAL) BY MOUTH DAILY AS NEEDED FOR ALLERGIES. 90 tablet 3    liothyronine (CYTOMEL) 5 MCG Tab TAKE 2 TABLETS (10 MCG TOTAL) BY MOUTH ONCE DAILY. 180 tablet 3    lisinopril-hydrochlorothiazide (PRINZIDE,ZESTORETIC)  20-25 mg Tab Take 1 tablet by mouth once daily. 90 tablet 3    methadone (DOLOPHINE) 10 MG tablet Take 1 tablet by mouth Three times a day.      mupirocin calcium 2% (BACTROBAN) 2 % cream APPLY TO AFFECTED AREA TWICE A DAY INTRANASALLY FOR 1ST WEEK OF THE MONTH 30 g 3    naproxen (NAPROSYN) 500 MG tablet Take 1 tablet (500 mg total) by mouth 3 (three) times daily with meals. 270 tablet 3    omeprazole (PRILOSEC) 40 MG capsule Take 1 capsule (40 mg total) by mouth once daily. 90 capsule 3    potassium chloride (MICRO-K) 10 MEQ CpSR Take 1 capsule (10 mEq total) by mouth once daily. 90 capsule 3    promethazine (PHENERGAN) 25 MG tablet Take 1 tablet by mouth Once daily as needed.      SYNTHROID 125 mcg tablet Take 1 tablet (125 mcg total) by mouth before breakfast. 90 tablet 3    tizanidine (ZANAFLEX) 4 MG tablet Take 4 mg by mouth 3 (three) times daily as needed.  6    trazodone (DESYREL) 50 MG tablet       triamcinolone (KENALOG) 0.1 % cream Apply topically Twice daily. Dirs: disp: 1# jar AAA      urea (CARMOL) 40 % Crea AAA foot qd after soak 198.6 g 3    [DISCONTINUED] buPROPion (WELLBUTRIN) 100 MG tablet Take 1 tablet (100 mg total) by mouth 2 (two) times daily. 180 tablet 0    [DISCONTINUED] secukinumab (COSENTYX PEN, 2 PENS,) 150 mg/mL PnIj Inject 300 mg into the skin every 28 days. 6 Syringe 2     No current facility-administered medications on file prior to visit.        Allergies   Review of patient's allergies indicates:   Allergen Reactions    Doxycycline hcl Nausea And Vomiting    Enbrel [etanercept] Other (See Comments)     Optic neurtits       Review of Systems (Pertinent positives)  Review of Systems   Constitutional: Negative.    HENT: Positive for congestion.    Eyes: Negative.    Respiratory: Positive for shortness of breath and wheezing.    Cardiovascular: Negative.    Gastrointestinal: Negative.    Skin: Negative.          /84 (BP Location: Left arm, Patient Position: Sitting,  "BP Method: Medium (Manual))   Pulse 92   Temp 98.5 °F (36.9 °C) (Oral)   Resp 17   Ht 5' 6" (1.676 m)   Wt 100.1 kg (220 lb 10.9 oz)   SpO2 97%   BMI 35.62 kg/m²     GENERAL APPEARANCE: in no apparent distress and well developed and well nourished  HEENT: PERRL, EOMI, Sclera clear, anicteric, Oropharynx clear, no lesions, Neck supple with midline trachea  NECK: normal, supple, no adenopathy, thyroid normal in size  RESPIRATORY: appears well, vitals normal, no respiratory distress, acyanotic, normal RR, diffuse wheezing throughout.  HEART: regular rate and rhythm, S1, S2 normal, no murmur, click, rub or gallop.    ABDOMEN: abdomen is soft without tenderness, no masses, no hernias, no organomegaly, no rebound, no guarding. Suprapubic tenderness absent. No CVA tenderness.  SKIN: no rashes, no wounds, no other lesions  PSYCH: Alert, oriented x 3, thought content appropriate, speech normal, pleasant and cooperative, good eye contact, well groomed    Assessment/Plan:  Keli Gilbert is a 42 y.o. female who presents today for :    Keli was seen today for cough.    Diagnoses and all orders for this visit:    Extrinsic asthma, unspecified asthma severity, unspecified whether complicated, unspecified whether persistent  -     albuterol 90 mcg/actuation inhaler; Inhale 2 puffs into the lungs every 6 (six) hours as needed for Wheezing. Rescue  -     montelukast (SINGULAIR) 10 mg tablet; Take 1 tablet (10 mg total) by mouth every evening.    Dysthymia  -     buPROPion (WELLBUTRIN) 100 MG tablet; Take 1 tablet (100 mg total) by mouth 2 (two) times daily.      1.  Medications to be taken as prescribed  2.  Follow up as needed  3.  She is to call with any concerns  4.  No steroids due to previous bad reactions  5. She is to keep inhaler with her at all times     Andriy Moran MD    "

## 2018-01-25 ENCOUNTER — PATIENT MESSAGE (OUTPATIENT)
Dept: FAMILY MEDICINE | Facility: CLINIC | Age: 43
End: 2018-01-25

## 2018-01-25 DIAGNOSIS — K12.0 APHTHOUS ULCER: Primary | ICD-10-CM

## 2018-02-07 ENCOUNTER — PATIENT MESSAGE (OUTPATIENT)
Dept: RHEUMATOLOGY | Facility: CLINIC | Age: 43
End: 2018-02-07

## 2018-02-10 ENCOUNTER — LAB VISIT (OUTPATIENT)
Dept: LAB | Facility: HOSPITAL | Age: 43
End: 2018-02-10
Attending: INTERNAL MEDICINE
Payer: COMMERCIAL

## 2018-02-10 DIAGNOSIS — L40.50 PSORIATIC ARTHRITIS: ICD-10-CM

## 2018-02-10 DIAGNOSIS — Z79.1 LONG TERM (CURRENT) USE OF NON-STEROIDAL ANTI-INFLAMMATORIES (NSAID): ICD-10-CM

## 2018-02-10 LAB
ALBUMIN SERPL BCP-MCNC: 3.5 G/DL
ALP SERPL-CCNC: 74 U/L
ALT SERPL W/O P-5'-P-CCNC: 13 U/L
ANION GAP SERPL CALC-SCNC: 7 MMOL/L
AST SERPL-CCNC: 16 U/L
BASOPHILS # BLD AUTO: 0.08 K/UL
BASOPHILS NFR BLD: 0.7 %
BILIRUB SERPL-MCNC: 0.2 MG/DL
BUN SERPL-MCNC: 12 MG/DL
CALCIUM SERPL-MCNC: 9.6 MG/DL
CHLORIDE SERPL-SCNC: 103 MMOL/L
CO2 SERPL-SCNC: 29 MMOL/L
CREAT SERPL-MCNC: 0.7 MG/DL
CRP SERPL-MCNC: 10.5 MG/L
DIFFERENTIAL METHOD: ABNORMAL
EOSINOPHIL # BLD AUTO: 0.6 K/UL
EOSINOPHIL NFR BLD: 5.1 %
ERYTHROCYTE [DISTWIDTH] IN BLOOD BY AUTOMATED COUNT: 14.7 %
ERYTHROCYTE [SEDIMENTATION RATE] IN BLOOD BY WESTERGREN METHOD: 30 MM/HR
EST. GFR  (AFRICAN AMERICAN): >60 ML/MIN/1.73 M^2
EST. GFR  (NON AFRICAN AMERICAN): >60 ML/MIN/1.73 M^2
GLUCOSE SERPL-MCNC: 88 MG/DL
HCT VFR BLD AUTO: 37.3 %
HGB BLD-MCNC: 11.3 G/DL
IMM GRANULOCYTES # BLD AUTO: 0.03 K/UL
IMM GRANULOCYTES NFR BLD AUTO: 0.3 %
LYMPHOCYTES # BLD AUTO: 2.6 K/UL
LYMPHOCYTES NFR BLD: 22.3 %
MCH RBC QN AUTO: 26.5 PG
MCHC RBC AUTO-ENTMCNC: 30.3 G/DL
MCV RBC AUTO: 87 FL
MONOCYTES # BLD AUTO: 1 K/UL
MONOCYTES NFR BLD: 8.4 %
NEUTROPHILS # BLD AUTO: 7.3 K/UL
NEUTROPHILS NFR BLD: 63.2 %
NRBC BLD-RTO: 0 /100 WBC
PLATELET # BLD AUTO: 366 K/UL
PMV BLD AUTO: 9 FL
POTASSIUM SERPL-SCNC: 3.8 MMOL/L
PROT SERPL-MCNC: 7.6 G/DL
RBC # BLD AUTO: 4.27 M/UL
SODIUM SERPL-SCNC: 139 MMOL/L
WBC # BLD AUTO: 11.51 K/UL

## 2018-02-10 PROCEDURE — 85651 RBC SED RATE NONAUTOMATED: CPT

## 2018-02-10 PROCEDURE — 85025 COMPLETE CBC W/AUTO DIFF WBC: CPT

## 2018-02-10 PROCEDURE — 86140 C-REACTIVE PROTEIN: CPT

## 2018-02-10 PROCEDURE — 36415 COLL VENOUS BLD VENIPUNCTURE: CPT | Mod: PO

## 2018-02-10 PROCEDURE — 80053 COMPREHEN METABOLIC PANEL: CPT

## 2018-02-14 NOTE — PROGRESS NOTES
Subjective:       Patient ID: Keli Gilbert is a 42 y.o. female with psoriatic arthritis on Orenicia & Leflunomide (& naproxen); demyelinating disorder secondary to Enbrel; incomplete effect on chronic pain syndrome on savella & methadone         Chief Complaint: No chief complaint on file.    She returns for follow-up. Last seen on 10/19/17.    Orencia was begun in November. She is taking naproxen 500 mg twice-3 x daily & leflunomide 20 mg/d and feels that Orencia is more effective for pain/swelling in her hands, especially MCPs than Cosentyx.  She still has mild dactylitis 2nd R digit. Labs have not improved. No other joints bother her, but she was noted to have a new erosion (L 4th MCP) on imaging last year. Her recent hand x-rays show no deterioration.  AM stiffness 30. Psoriasis is gone.     Had viral syndrome 2 weeks ago, possibly the flu but she did not stop Orencia. Doing better but has allergies & asthma & gets oral ulcers intermittently which appear to respond to magic mouth wash administered with cotton swab (as she cannot tolerate steroids).  .        Associated symptoms include fatigue. Pertinent negatives include no fever or headaches.            Current Outpatient Prescriptions   Medication Sig Dispense Refill    (Magic mouthwash) 1:1:1 Benadryl 12.5mg/5ml liq, aluminum & magnesium hydroxide-simehticone (Maalox), lidocaine viscous 2% Swish and spit 5 mLs every 4 (four) hours as needed. for mouth sores 300 mL 0    abatacept 125 mg/mL AtIn Inject 125 mg into the skin every 7 days. 12 Syringe 2    albuterol 90 mcg/actuation inhaler Inhale 2 puffs into the lungs every 6 (six) hours as needed for Wheezing. Rescue 18 g 0    azelastine (ASTELIN) 137 mcg (0.1 %) nasal spray 1 spray (137 mcg total) by Nasal route 2 (two) times daily. 30 mL 0    buPROPion (WELLBUTRIN) 100 MG tablet Take 1 tablet (100 mg total) by mouth 2 (two) times daily. 180 tablet 0    cholecalciferol, vitamin D3, 2,000 unit Tab  Take 1 tablet by mouth Once a week.      cholecalciferol, vitamin D3, 50,000 unit capsule Take 1 capsule (50,000 Units total) by mouth every 14 (fourteen) days. 18 capsule 1    clindamycin (CLEOCIN T) 1 % external solution       clobetasol (CLOBEX) 0.05 % topical spray Apply topically Twice daily. AAA bie. Disp 125 ml      gabapentin (NEURONTIN) 300 MG capsule Take 300 mg by mouth once daily.      leflunomide (ARAVA) 20 MG Tab Take 1 tablet (20 mg total) by mouth once daily. 90 tablet 3    levocetirizine (XYZAL) 5 MG tablet TAKE 1 TABLET (5 MG TOTAL) BY MOUTH DAILY AS NEEDED FOR ALLERGIES. 90 tablet 3    liothyronine (CYTOMEL) 5 MCG Tab TAKE 2 TABLETS (10 MCG TOTAL) BY MOUTH ONCE DAILY. 180 tablet 3    lisinopril-hydrochlorothiazide (PRINZIDE,ZESTORETIC) 20-25 mg Tab Take 1 tablet by mouth once daily. 90 tablet 3    methadone (DOLOPHINE) 10 MG tablet Take 1 tablet by mouth Three times a day.      montelukast (SINGULAIR) 10 mg tablet Take 1 tablet (10 mg total) by mouth every evening. 30 tablet 0    mupirocin calcium 2% (BACTROBAN) 2 % cream APPLY TO AFFECTED AREA TWICE A DAY INTRANASALLY FOR 1ST WEEK OF THE MONTH 30 g 3    naproxen (NAPROSYN) 500 MG tablet Take 1 tablet (500 mg total) by mouth 3 (three) times daily with meals. 270 tablet 3    omeprazole (PRILOSEC) 40 MG capsule Take 1 capsule (40 mg total) by mouth once daily. 90 capsule 3    potassium chloride (MICRO-K) 10 MEQ CpSR Take 1 capsule (10 mEq total) by mouth once daily. 90 capsule 3    promethazine (PHENERGAN) 25 MG tablet Take 1 tablet by mouth Once daily as needed.      SYNTHROID 125 mcg tablet Take 1 tablet (125 mcg total) by mouth before breakfast. 90 tablet 3    tizanidine (ZANAFLEX) 4 MG tablet Take 4 mg by mouth 3 (three) times daily as needed.  6    trazodone (DESYREL) 50 MG tablet       triamcinolone (KENALOG) 0.1 % cream Apply topically Twice daily. Dirs: disp: 1# jar AAA      urea (CARMOL) 40 % Crea AAA foot qd after soak  198.6 g 3     No current facility-administered medications for this visit.        Review of patient's allergies indicates:   Allergen Reactions    Doxycycline hcl Nausea And Vomiting    Enbrel [etanercept] Other (See Comments)     Optic neurtits     Past Medical History:   Diagnosis Date    Abnormal Pap smear of vagina     AR (allergic rhinitis)     Demyelinating disorder     Depression     Fever blister     Fibromyalgia     followed by pain management    Generalized osteoarthritis of multiple sites     History of abnormal Pap smear     Hypertension     Hypothyroidism     Insulin resistance     Mixed anxiety and depressive disorder     Morbid obesity     Myelitis, optic neuritis in     treated with high-dose steroids and resolved; positive MRI and spinal fluid for a mass, but on embrel for psoriatic arthritis - [facility]; MRI normalized off embrel    Obesity     Pre-diabetes     hx diabetes on stereoids /    Psoriasis     Psoriatic arthritis     Vitamin D deficiency disease      Past Surgical History:   Procedure Laterality Date    SINUS SURGERY  1998       Review of Systems   Constitutional: Positive for fatigue. Negative for fever and unexpected weight change.   HENT: Positive for mouth sores. Negative for dental problem.    Eyes: Negative.  Negative for redness, itching and visual disturbance.        Dry eyes   Respiratory: Negative.    Cardiovascular: Negative.  Negative for chest pain, palpitations and leg swelling.   Gastrointestinal: Negative.  Negative for abdominal pain, anal bleeding, blood in stool, constipation, diarrhea and nausea.        Heartburn.   Endocrine: Negative.    Genitourinary: Negative.  Negative for frequency, menstrual problem, pelvic pain and urgency.   Musculoskeletal: Negative.  Negative for arthralgias, back pain, gait problem and neck pain.   Skin: Negative.  Negative for color change and rash.   Allergic/Immunologic: Positive for  "immunocompromised state.   Neurological: Negative.  Negative for dizziness, seizures, weakness, numbness and headaches.   Hematological: Negative.  Negative for adenopathy. Does not bruise/bleed easily.   Psychiatric/Behavioral: Positive for dysphoric mood and sleep disturbance. Negative for decreased concentration and suicidal ideas. The patient is nervous/anxious.          Family History   Problem Relation Age of Onset    Psoriasis Father     Cancer Father      throat    Thyroid disease Mother     Heart disease Mother     Hypertension Mother     Hyperlipidemia Mother     Coronary artery disease Mother      s/p CABG    Heart attack Mother     Heart disease Sister      MVP    Diabetes Paternal Uncle     Diabetes Maternal Grandfather     Heart disease Maternal Grandfather     Thyroid disease Maternal Grandfather     ADD / ADHD Son     Melanoma Neg Hx     Lupus Neg Hx     Eczema Neg Hx     Acne Neg Hx     Breast cancer Neg Hx     Colon cancer Neg Hx     Ovarian cancer Neg Hx        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   No alcohol; no cigarettes;   Objective:       /77   Pulse 75   Ht 5' 8" (1.727 m)   Wt 101.4 kg (223 lb 8 oz)   BMI 33.98 kg/m²     Physical Exam   Vitals reviewed.  Constitutional: She is oriented to person, place, and time and well-developed, well-nourished, and in no distress. No distress.   HENT:   Head: Normocephalic and atraumatic.   Mouth/Throat: Oropharynx is clear and moist. No oropharyngeal exudate.   No facial rashes  Parotids not enlarged  No oral ulcers   Eyes: Conjunctivae and EOM are normal. Pupils are equal, round, and reactive to light. Right eye exhibits no discharge. Left eye exhibits no discharge. No scleral icterus.   Neck: Neck supple. No JVD present. No tracheal deviation present. No thyromegaly present.   Cardiovascular: Normal rate, regular rhythm, normal heart sounds and intact distal pulses.  Exam reveals no gallop and no friction rub.    No murmur " heard.  Pulmonary/Chest: Effort normal and breath sounds normal. No respiratory distress. She has no wheezes. She has no rales. She exhibits no tenderness.   Abdominal: Soft. Bowel sounds are normal. She exhibits no distension and no mass. There is no splenomegaly or hepatomegaly. There is no tenderness. There is no rebound and no guarding.   Lymphadenopathy:     She has no cervical adenopathy.        Right: No inguinal adenopathy present.        Left: No inguinal adenopathy present.   Neurological: She is alert and oriented to person, place, and time. She has normal reflexes. No cranial nerve deficit. Gait normal.   Proximal and distal muscle strength 5/5.   Skin: Skin is warm and dry. No rash noted. She is not diaphoretic.     Psychiatric: Mood, memory, affect and judgment normal.   Drowsy.   Musculoskeletal: Normal range of motion. She exhibits no tenderness.   Cspine slight decrease in lateral flexion & rotation; no tenderness  Tspine FROM no tenderness  Lspine FROM no tenderness.  TMJ: unremarkable  Shoulders: FROM; no synovitis  Elbows: FROM; no synovitis; no tophi or nodules  Wrists: no SHT; no tenderness; mild decrease in flexion/extension bilaterally.   MCPs: mild thickening of 2nd & MCPs bilaterally;  ok; p; 4th L MCP mildly tender; not swollen;   PIPs: very mild dactylitis 2nd R ray gone  DIPs: FROM; no synovitis  HIPS: FROM  Knees: FROM; no synovitis; no instability;  Ankles: FROM: no synovitis   Toes: ok; no metatarsalgia                      Labs:   2/10/18: ESR 30; CRP 10.5; Hg 11.3; plt 366; CMP ok;   10/19/17: HBV neg; HCV neg;   9/2/17: ESR 16; CRp 5.9; Hg 11.6; Ht 36.6; CMP ok;   5/29/17: QG neg  2/20/17: ESR 34; CRP 8.1; CBC Hg 11.4; Ht 35.6; CMP ok;   11/19/16: ESR 20; CRP 4.8; CBC Hg 11.9; CMP K+3.3, otherwise ok;   8/27/16: ESR 22; CRP 7; Hg 11.8; Ht 36.9; CMP ok;   5/26/16: ESR 35; CRP 11.2Hg 11.8; Ht 36.9; CMP ok;   5/21/16: Vit d 37  10/8/15: ESR 45; CRP 14.2; Hg 11.2; Ht 35.8; CMP;  ok  7/7/15: CBC plt 380; K+ 3.4; Glu 154  7/6/15: ESR 48; CRP 9.6  4/4/15: ESR 41; CRP 19; Hg 11.0; Ht 34.6; plt 377; CMP ok;  1/10/15: ESR 34; CRP 18; CBC ok; ; Alb. 3.6  10/4/14: ESR 43; CRP 18.5; Hg 11.8; Ht 36.5; plt 378; Alb. 3.4;   3/20/14: ESR 41; CRP 14.3; plt 356; CMP ok;  2/15/13: ESR 30; CRP 9.8; CBC ok; Alb. 3.3  11/2/13: ESR 28; CRP 11.6; plt 363; Alb. 3.3;   5/28/13: ESR 45; CRP 8.8; plt 366; CMP ok;     6/15/17: Bilateral hands: personally reviewed: mild osteoarthritis w stable periarticular erosion at the left fourth MCP joint; no change since last year.   8/27/16: Bilateral feet: personally reviewed. Mild HV; mild OA shell 1st MTP dali; ? Erosion PIP left small toe;   5/26/16: Bilateral hands: personally reviewed: L 4th MCP (new) erosive lesion; R & L 2nd & 3rd metacarpal head cysts; R mild PIP erosion & STS 2nd.    Assessment:     Psoriatic arthritis-- with mild SHT 2 MCPs bilaterally (R >>L) & with sausage digit of R index finger--but still some hand pain/swelling.   a) History of sausage digits of both toes    PIPs, DIPs, wrists, and knees., now w. only one sausage digit.  b) Psoriasis of the abdomen, elbow, and the shins--resolved.   c) Enbrel stopped 10/21/11 due to optic neuritis.    d) New erosion (4th L metacarpal head) on x-ray & possibly L 5th toe PIP    e) Persisting elevation of ESR and CRP   f) inadequate response to leflunomide and Stelara & apremilast (w. Intolerable nausea)  G) could not tolerate SSZ--nausea.  H) MTX x 2 even SC did not help sxs.  I) improved on Cosentyx (+leflunomide + naproxen) initially incompletely-- hand joints still hurt despite normalization of ESR & CRP  J) Orencia begun 11/17 better but incomplete response until now.       Chronic pain/fibromyalgia syndrome with fatigue, tender points, withdrawals to palpation in the past, pain all over, responsive to Savella but with some nausea (off it now), followed by Dr. [facility] at the [facility],    On  methadone    Left optic neuritis with demyelinating lesions secondary to Enbrel--now resolved.     Diabetes mellitus, diet controlled     Degenerative joint disease of the cervical spine, knees, and feet--very mild.     Depression on wellbutrin    Off savella    Hypertension.     Hypothyroidism.     Vitamin D deficiency with regular prescription vitamin D supplementation.     Obesity         Plan:   Continue Orencia 125 mg/wk  Continue leflunomide 20 mg/d  Ok for Naproxen 500 mg twice daily to tid pc preferably with PPI  Discussed where to go next if Orencia does not improve her appreciably.  Discussed xeljanz and annie.  Info provided on xeljanz to review if needed to switch.  Labs in 3 months.   Call for problems.  RTC 3 months.

## 2018-02-15 ENCOUNTER — OFFICE VISIT (OUTPATIENT)
Dept: RHEUMATOLOGY | Facility: CLINIC | Age: 43
End: 2018-02-15
Payer: COMMERCIAL

## 2018-02-15 VITALS
HEIGHT: 68 IN | HEART RATE: 75 BPM | DIASTOLIC BLOOD PRESSURE: 77 MMHG | BODY MASS INDEX: 33.87 KG/M2 | WEIGHT: 223.5 LBS | SYSTOLIC BLOOD PRESSURE: 125 MMHG

## 2018-02-15 DIAGNOSIS — Z79.899 LONG-TERM USE OF HIGH-RISK MEDICATION: ICD-10-CM

## 2018-02-15 DIAGNOSIS — Z79.60 LONG-TERM USE OF IMMUNOSUPPRESSANT MEDICATION: ICD-10-CM

## 2018-02-15 DIAGNOSIS — Z79.1 LONG TERM (CURRENT) USE OF NON-STEROIDAL ANTI-INFLAMMATORIES (NSAID): ICD-10-CM

## 2018-02-15 DIAGNOSIS — L40.50 PSORIATIC ARTHRITIS: Primary | ICD-10-CM

## 2018-02-15 PROCEDURE — 3008F BODY MASS INDEX DOCD: CPT | Mod: S$GLB,,, | Performed by: INTERNAL MEDICINE

## 2018-02-15 PROCEDURE — 99214 OFFICE O/P EST MOD 30 MIN: CPT | Mod: S$GLB,,, | Performed by: INTERNAL MEDICINE

## 2018-02-15 PROCEDURE — 99999 PR PBB SHADOW E&M-EST. PATIENT-LVL V: CPT | Mod: PBBFAC,,, | Performed by: INTERNAL MEDICINE

## 2018-02-15 ASSESSMENT — ROUTINE ASSESSMENT OF PATIENT INDEX DATA (RAPID3)
MDHAQ FUNCTION SCORE: .6
PAIN SCORE: 9
PSYCHOLOGICAL DISTRESS SCORE: 3.3
TOTAL RAPID3 SCORE: 5.67
FATIGUE SCORE: 9
PATIENT GLOBAL ASSESSMENT SCORE: 6
WHEN YOU AWAKENED IN THE MORNING OVER THE LAST WEEK, PLEASE INDICATE THE AMOUNT OF TIME IT TAKES UNTIL YOU ARE AS LIMBER AS YOU WILL BE FOR THE DAY: 30 MINUTES
AM STIFFNESS SCORE: 1, YES

## 2018-03-13 ENCOUNTER — PATIENT MESSAGE (OUTPATIENT)
Dept: FAMILY MEDICINE | Facility: CLINIC | Age: 43
End: 2018-03-13

## 2018-03-14 ENCOUNTER — OFFICE VISIT (OUTPATIENT)
Dept: FAMILY MEDICINE | Facility: CLINIC | Age: 43
End: 2018-03-14
Payer: COMMERCIAL

## 2018-03-14 VITALS
HEART RATE: 93 BPM | TEMPERATURE: 98 F | WEIGHT: 224.88 LBS | RESPIRATION RATE: 12 BRPM | HEIGHT: 68 IN | SYSTOLIC BLOOD PRESSURE: 110 MMHG | DIASTOLIC BLOOD PRESSURE: 70 MMHG | OXYGEN SATURATION: 96 % | BODY MASS INDEX: 34.08 KG/M2

## 2018-03-14 DIAGNOSIS — K12.0 APHTHOUS ULCER OF MOUTH: Primary | ICD-10-CM

## 2018-03-14 PROCEDURE — 99214 OFFICE O/P EST MOD 30 MIN: CPT | Mod: S$GLB,,, | Performed by: FAMILY MEDICINE

## 2018-03-14 PROCEDURE — 3078F DIAST BP <80 MM HG: CPT | Mod: CPTII,S$GLB,, | Performed by: FAMILY MEDICINE

## 2018-03-14 PROCEDURE — 99999 PR PBB SHADOW E&M-EST. PATIENT-LVL III: CPT | Mod: PBBFAC,,, | Performed by: FAMILY MEDICINE

## 2018-03-14 PROCEDURE — 3074F SYST BP LT 130 MM HG: CPT | Mod: CPTII,S$GLB,, | Performed by: FAMILY MEDICINE

## 2018-03-14 RX ORDER — LIDOCAINE HYDROCHLORIDE 20 MG/ML
SOLUTION ORAL; TOPICAL
COMMUNITY
Start: 2018-01-27 | End: 2018-03-14

## 2018-03-14 RX ORDER — TRIAMCINOLONE ACETONIDE 1 MG/G
PASTE DENTAL
COMMUNITY
Start: 2018-01-17 | End: 2019-11-27

## 2018-03-21 ENCOUNTER — PATIENT MESSAGE (OUTPATIENT)
Dept: RHEUMATOLOGY | Facility: CLINIC | Age: 43
End: 2018-03-21

## 2018-03-24 NOTE — PROGRESS NOTES
Subjective:       Patient ID: Keli Gilbert is a 42 y.o. female with psoriatic arthritis on Orenicia & Leflunomide (& naproxen); demyelinating disorder secondary to Enbrel; incomplete effect on chronic pain syndrome on savella & methadone         Chief Complaint: No chief complaint on file.    She returns urgently (last seen on 2/15/18) as her hands have been very painful and stiff and somewhat swollen and now has also had several small patches of psoriasis appear on her legs. She actually is aching all over and last labs were with an elevated ESR and CRP.  She has been on Orencia since 11/17. She is also taking naproxen 500 mg twice-3 x daily & leflunomide 20 mg/d.  AM stiffness x 30 minutes. Still getting oral ulcers which she's been getting intermittently x years. .        Associated symptoms include fatigue. Pertinent negatives include no fever or headaches.          Current Outpatient Prescriptions   Medication Sig Dispense Refill    (Magic mouthwash) 1:1 Benadryl 12.5mg/5ml liq, mylanta, lidocaine viscous 2%, nystatin 100,000u, prednisolone 15mg/5mL, distilled water Swish and swallow 10 mLs every 4 (four) hours as needed. 480 mL 0    (Magic mouthwash) 1:1:1 Benadryl 12.5mg/5ml liq, aluminum & magnesium hydroxide-simehticone (Maalox), lidocaine viscous 2% Swish and spit 5 mLs every 4 (four) hours as needed. for mouth sores 300 mL 0    abatacept 125 mg/mL AtIn Inject 125 mg into the skin every 7 days. 12 Syringe 2    albuterol 90 mcg/actuation inhaler Inhale 2 puffs into the lungs every 6 (six) hours as needed for Wheezing. Rescue 18 g 0    azelastine (ASTELIN) 137 mcg (0.1 %) nasal spray 1 spray (137 mcg total) by Nasal route 2 (two) times daily. 30 mL 0    buPROPion (WELLBUTRIN) 100 MG tablet Take 1 tablet (100 mg total) by mouth 2 (two) times daily. 180 tablet 0    cholecalciferol, vitamin D3, 2,000 unit Tab Take 1 tablet by mouth Once a week.      cholecalciferol, vitamin D3, 50,000 unit  capsule Take 1 capsule (50,000 Units total) by mouth every 14 (fourteen) days. 18 capsule 1    clindamycin (CLEOCIN T) 1 % external solution       clobetasol (CLOBEX) 0.05 % topical spray Apply topically Twice daily. AAA bie. Disp 125 ml      gabapentin (NEURONTIN) 300 MG capsule Take 300 mg by mouth once daily.      leflunomide (ARAVA) 20 MG Tab Take 1 tablet (20 mg total) by mouth once daily. 90 tablet 3    levocetirizine (XYZAL) 5 MG tablet TAKE 1 TABLET (5 MG TOTAL) BY MOUTH DAILY AS NEEDED FOR ALLERGIES. 90 tablet 3    liothyronine (CYTOMEL) 5 MCG Tab TAKE 2 TABLETS (10 MCG TOTAL) BY MOUTH ONCE DAILY. 180 tablet 3    lisinopril-hydrochlorothiazide (PRINZIDE,ZESTORETIC) 20-25 mg Tab Take 1 tablet by mouth once daily. 90 tablet 3    methadone (DOLOPHINE) 10 MG tablet Take 1 tablet by mouth Three times a day.      mupirocin calcium 2% (BACTROBAN) 2 % cream APPLY TO AFFECTED AREA TWICE A DAY INTRANASALLY FOR 1ST WEEK OF THE MONTH 30 g 3    naproxen (NAPROSYN) 500 MG tablet Take 1 tablet (500 mg total) by mouth 3 (three) times daily with meals. 270 tablet 3    potassium chloride (MICRO-K) 10 MEQ CpSR Take 1 capsule (10 mEq total) by mouth once daily. 90 capsule 3    promethazine (PHENERGAN) 25 MG tablet Take 1 tablet by mouth Once daily as needed.      ranitidine (ZANTAC) 150 MG tablet       SYNTHROID 125 mcg tablet Take 1 tablet (125 mcg total) by mouth before breakfast. 90 tablet 3    tizanidine (ZANAFLEX) 4 MG tablet Take 4 mg by mouth 3 (three) times daily as needed.  6    trazodone (DESYREL) 50 MG tablet       triamcinolone (KENALOG) 0.1 % cream Apply topically Twice daily. Dirs: disp: 1# jar AAA      triamcinolone acetonide 0.1% (KENALOG) 0.1 % paste       urea (CARMOL) 40 % Crea AAA foot qd after soak 198.6 g 3     No current facility-administered medications for this visit.          Review of patient's allergies indicates:   Allergen Reactions    Doxycycline hcl Nausea And Vomiting     Enbrel [etanercept] Other (See Comments)     Optic neurtits     Past Medical History:   Diagnosis Date    Abnormal Pap smear of vagina     AR (allergic rhinitis)     Demyelinating disorder     Depression     Fever blister     Fibromyalgia     followed by pain management    Generalized osteoarthritis of multiple sites     History of abnormal Pap smear     Hypertension     Hypothyroidism     Insulin resistance     Mixed anxiety and depressive disorder     Morbid obesity     Myelitis, optic neuritis in     treated with high-dose steroids and resolved; positive MRI and spinal fluid for a mass, but on embrel for psoriatic arthritis - she will see a MS specialist at [facility]; MRI normalized off embrel    Obesity     Pre-diabetes     hx diabetes on stereoids /    Psoriasis     Psoriatic arthritis     Vitamin D deficiency disease      Past Surgical History:   Procedure Laterality Date    SINUS SURGERY  1998       Review of Systems   Constitutional: Positive for fatigue. Negative for fever and unexpected weight change.   HENT: Positive for mouth sores. Negative for dental problem.    Eyes: Negative.  Negative for redness, itching and visual disturbance.        Dry eyes   Respiratory: Negative.    Cardiovascular: Negative.  Negative for chest pain, palpitations and leg swelling.   Gastrointestinal: Negative.  Negative for abdominal pain, anal bleeding, blood in stool, constipation, diarrhea and nausea.        Heartburn.   Endocrine: Negative.    Genitourinary: Negative.  Negative for frequency, menstrual problem, pelvic pain and urgency.   Musculoskeletal: Negative.  Negative for arthralgias, back pain, gait problem and neck pain.   Skin: Negative.  Negative for color change and rash.   Allergic/Immunologic: Positive for immunocompromised state.   Neurological: Negative.  Negative for dizziness, seizures, weakness, numbness and headaches.   Hematological: Negative.  Negative for adenopathy. Does not  "bruise/bleed easily.   Psychiatric/Behavioral: Positive for dysphoric mood and sleep disturbance. Negative for decreased concentration and suicidal ideas. The patient is nervous/anxious.          Family History   Problem Relation Age of Onset    Psoriasis Father     Cancer Father      throat    Thyroid disease Mother     Heart disease Mother     Hypertension Mother     Hyperlipidemia Mother     Coronary artery disease Mother      s/p CABG    Heart attack Mother     Heart disease Sister      MVP    Diabetes Paternal Uncle     Diabetes Maternal Grandfather     Heart disease Maternal Grandfather     Thyroid disease Maternal Grandfather     ADD / ADHD Son     Melanoma Neg Hx     Lupus Neg Hx     Eczema Neg Hx     Acne Neg Hx     Breast cancer Neg Hx     Colon cancer Neg Hx     Ovarian cancer Neg Hx        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   No alcohol; no cigarettes;   Objective:   /72   Pulse 79   Ht 5' 6" (1.676 m)   Wt 100.2 kg (221 lb)   BMI 35.67 kg/m²     Physical Exam   Vitals reviewed.  Constitutional: She is oriented to person, place, and time and well-developed, well-nourished, and in no distress. No distress.   HENT:   Head: Normocephalic and atraumatic.   Mouth/Throat: Oropharynx is clear and moist. No oropharyngeal exudate.   No facial rashes  Parotids not enlarged  No oral ulcers   Eyes: Conjunctivae and EOM are normal. Pupils are equal, round, and reactive to light. Right eye exhibits no discharge. Left eye exhibits no discharge. No scleral icterus.   Neck: Neck supple. No JVD present. No tracheal deviation present. No thyromegaly present.   Cardiovascular: Normal rate, regular rhythm, normal heart sounds and intact distal pulses.  Exam reveals no gallop and no friction rub.    No murmur heard.  Pulmonary/Chest: Effort normal and breath sounds normal. No respiratory distress. She has no wheezes. She has no rales. She exhibits no tenderness.   Abdominal: Soft. Bowel sounds " are normal. She exhibits no distension and no mass. There is no splenomegaly or hepatomegaly. There is no tenderness. There is no rebound and no guarding.   Lymphadenopathy:     She has no cervical adenopathy.        Right: No inguinal adenopathy present.        Left: No inguinal adenopathy present.   Neurological: She is alert and oriented to person, place, and time. She has normal reflexes. No cranial nerve deficit. Gait normal.   Proximal and distal muscle strength 5/5.   Skin: Skin is warm and dry. Rash noted. She is not diaphoretic.     Psoriatic patch RLE above ankle.   Psychiatric: Mood, memory, affect and judgment normal.   Drowsy.   Musculoskeletal: Normal range of motion. She exhibits no tenderness.   Cspine slight decrease in lateral flexion & rotation; no tenderness  Tspine FROM no tenderness  Lspine FROM no tenderness.  TMJ: unremarkable  Shoulders: FROM; no synovitis  Elbows: FROM; no synovitis; no tophi or nodules  Wrists: no SHT; no tenderness; mild decrease in flexion/extension bilaterally.   MCPs: + metatarsalgia; mild thickening of 2nd & MCPs bilaterally; 4th L MCP mild SHT;   PIPs: very mild dactylitis 2nd ray.  DIPs: FROM; no synovitis  HIPS: FROM  Knees: FROM; no synovitis; no instability;  Ankles: FROM: no synovitis   Toes: ok; no metatarsalgia                      Labs:   2/10/18: ESR 30; CRP 10.5; Hg 11.3; plt 366; CMP ok;   10/19/17: HBV neg; HCV neg;   9/2/17: ESR 16; CRp 5.9; Hg 11.6; Ht 36.6; CMP ok;   5/29/17: QG neg  2/20/17: ESR 34; CRP 8.1; CBC Hg 11.4; Ht 35.6; CMP ok;   11/19/16: ESR 20; CRP 4.8; CBC Hg 11.9; CMP K+3.3, otherwise ok;   8/27/16: ESR 22; CRP 7; Hg 11.8; Ht 36.9; CMP ok;   5/26/16: ESR 35; CRP 11.2Hg 11.8; Ht 36.9; CMP ok;   5/21/16: Vit d 37  10/8/15: ESR 45; CRP 14.2; Hg 11.2; Ht 35.8; CMP; ok  7/7/15: CBC plt 380; K+ 3.4; Glu 154  7/6/15: ESR 48; CRP 9.6  4/4/15: ESR 41; CRP 19; Hg 11.0; Ht 34.6; plt 377; CMP ok;  1/10/15: ESR 34; CRP 18; CBC ok; ; Alb.  3.6  10/4/14: ESR 43; CRP 18.5; Hg 11.8; Ht 36.5; plt 378; Alb. 3.4;   3/20/14: ESR 41; CRP 14.3; plt 356; CMP ok;  2/15/13: ESR 30; CRP 9.8; CBC ok; Alb. 3.3  11/2/13: ESR 28; CRP 11.6; plt 363; Alb. 3.3;   5/28/13: ESR 45; CRP 8.8; plt 366; CMP ok;     6/15/17: Bilateral hands: personally reviewed: mild osteoarthritis w stable periarticular erosion at the left fourth MCP joint; no change since last year.   8/27/16: Bilateral feet: personally reviewed. Mild HV; mild OA shell 1st MTP dali; ? Erosion PIP left small toe;   5/26/16: Bilateral hands: personally reviewed: L 4th MCP (new) erosive lesion; R & L 2nd & 3rd metacarpal head cysts; R mild PIP erosion & STS 2nd.    Assessment:     Psoriatic arthritis-- with mild SHT 2 MCPs bilaterally (R >>L) & with sausage digit of R index finger--but still some hand pain/swelling.   a) History of sausage digits of both toes    PIPs, DIPs, wrists, and knees., now w. only one sausage digit.  b) Psoriasis of the abdomen, elbow, and the shins--resolved.   c) Enbrel stopped 10/21/11 due to optic neuritis.    d) New erosion (4th L metacarpal head) on x-ray & possibly L 5th toe PIP    e) Persisting elevation of ESR and CRP   f) inadequate response to leflunomide and Stelara & apremilast (w. Intolerable nausea)  G) could not tolerate SSZ--nausea.  H) MTX x 2 even SC did not help sxs.  I) improved on Cosentyx (+leflunomide + naproxen) initially incompletely-- hand joints still hurt despite normalization of ESR & CRP  J) Orencia begun 11/17 with incomplete response      Chronic pain/fibromyalgia syndrome with fatigue, tender points, withdrawals to palpation in the past, pain all over, responsive to Savella but with some nausea (off it now), [place] [facility] [place]    [place]one    Left optic neuritis with demyelinating lesions secondary to Enbrel--now resolved.    Repeat MRI ok    Diabetes mellitus, diet controlled     Degenerative joint disease of the cervical spine,  knees, and feet--very mild.     Depression on wellbutrin    Off savella    Hypertension.     Hypothyroidism.     Vitamin D deficiency with regular prescription vitamin D supplementation.     Obesity         Plan:   Reviewed options and decided on Talz.  Side effects and toxicities reviewed. Handout provided.  She will require 2 injections @ 80 mg x 1 then 80 mg q 4 weeks.  Continue leflunomide 20 mg/d  Ok for Naproxen 500 mg twice daily to tid pc preferably with PPI  Labs in May and keep apt in May.  Call for problems.

## 2018-03-26 ENCOUNTER — OFFICE VISIT (OUTPATIENT)
Dept: RHEUMATOLOGY | Facility: CLINIC | Age: 43
End: 2018-03-26
Payer: COMMERCIAL

## 2018-03-26 VITALS
SYSTOLIC BLOOD PRESSURE: 112 MMHG | BODY MASS INDEX: 35.52 KG/M2 | DIASTOLIC BLOOD PRESSURE: 72 MMHG | HEIGHT: 66 IN | HEART RATE: 79 BPM | WEIGHT: 221 LBS

## 2018-03-26 DIAGNOSIS — L40.50 PSORIATIC ARTHRITIS: Primary | ICD-10-CM

## 2018-03-26 DIAGNOSIS — Z55.9 EDUCATIONAL CIRCUMSTANCE: ICD-10-CM

## 2018-03-26 DIAGNOSIS — Z79.60 LONG-TERM USE OF IMMUNOSUPPRESSANT MEDICATION: ICD-10-CM

## 2018-03-26 DIAGNOSIS — Z79.1 LONG TERM (CURRENT) USE OF NON-STEROIDAL ANTI-INFLAMMATORIES (NSAID): ICD-10-CM

## 2018-03-26 PROCEDURE — 3078F DIAST BP <80 MM HG: CPT | Mod: CPTII,S$GLB,, | Performed by: INTERNAL MEDICINE

## 2018-03-26 PROCEDURE — 3074F SYST BP LT 130 MM HG: CPT | Mod: CPTII,S$GLB,, | Performed by: INTERNAL MEDICINE

## 2018-03-26 PROCEDURE — 99213 OFFICE O/P EST LOW 20 MIN: CPT | Mod: S$GLB,,, | Performed by: INTERNAL MEDICINE

## 2018-03-26 PROCEDURE — 99999 PR PBB SHADOW E&M-EST. PATIENT-LVL III: CPT | Mod: PBBFAC,,, | Performed by: INTERNAL MEDICINE

## 2018-03-26 SDOH — SOCIAL DETERMINANTS OF HEALTH (SDOH): PROBLEMS RELATED TO EDUCATION AND LITERACY, UNSPECIFIED: Z55.9

## 2018-03-26 ASSESSMENT — ROUTINE ASSESSMENT OF PATIENT INDEX DATA (RAPID3)
PAIN SCORE: 7
PSYCHOLOGICAL DISTRESS SCORE: 3.3
TOTAL RAPID3 SCORE: 4.72
MDHAQ FUNCTION SCORE: .5
FATIGUE SCORE: 8.5
AM STIFFNESS SCORE: 1, YES
PATIENT GLOBAL ASSESSMENT SCORE: 5.5
WHEN YOU AWAKENED IN THE MORNING OVER THE LAST WEEK, PLEASE INDICATE THE AMOUNT OF TIME IT TAKES UNTIL YOU ARE AS LIMBER AS YOU WILL BE FOR THE DAY: 30 MINS

## 2018-03-27 ENCOUNTER — TELEPHONE (OUTPATIENT)
Dept: PHARMACY | Facility: HOSPITAL | Age: 43
End: 2018-03-27

## 2018-03-27 DIAGNOSIS — L40.50 PSORIATIC ARTHRITIS: Primary | ICD-10-CM

## 2018-03-27 NOTE — TELEPHONE ENCOUNTER
Informed patient [facility] received a prescription for Taltz and it will require a prior authorization with their insurance company. We will update patient of status as more information is received.

## 2018-04-02 ENCOUNTER — PATIENT MESSAGE (OUTPATIENT)
Dept: RHEUMATOLOGY | Facility: CLINIC | Age: 43
End: 2018-04-02

## 2018-04-04 NOTE — TELEPHONE ENCOUNTER
Good afternoon,    Just wanted to keep you updated on Ms. Dickey's case. The prior authorization submitted for Taltz has been denied. The reason for the denial is the patient must first try and fail Humira and Enbrel. We included in the prior authorization her past experience on Enbrel leading to all TNFs to be avoided.     At this time I'm calling to attempt a peer to peer and am also working on an appeal to submit, if it is unsuccessful. We will continue to keep you updated.    Thanks,  Nataliia Ayala, PharmD  [facility]- Clinical Pharmacist  [phone]

## 2018-04-09 ENCOUNTER — PATIENT MESSAGE (OUTPATIENT)
Dept: RHEUMATOLOGY | Facility: CLINIC | Age: 43
End: 2018-04-09

## 2018-04-10 NOTE — TELEPHONE ENCOUNTER
Plan did not allow for peer to peer reviews, per rep.   Appeal submitted as urgent to [phone].

## 2018-04-10 NOTE — TELEPHONE ENCOUNTER
DOCUMENTATION ONLY:  Appeal for Taltz approved from 3/6/18 to 4/6/20    Co-pay: Patient locked into [facility]    Co-pay card info:  BIN: [id number]  PCN: [id number]  GRP:[id number]  ID #: [id number]

## 2018-04-13 ENCOUNTER — PATIENT MESSAGE (OUTPATIENT)
Dept: RHEUMATOLOGY | Facility: CLINIC | Age: 43
End: 2018-04-13

## 2018-04-13 DIAGNOSIS — L40.50 PSORIATIC ARTHRITIS: Primary | ICD-10-CM

## 2018-04-16 ENCOUNTER — PATIENT MESSAGE (OUTPATIENT)
Dept: RHEUMATOLOGY | Facility: CLINIC | Age: 43
End: 2018-04-16

## 2018-04-19 NOTE — TELEPHONE ENCOUNTER
Confirmed with patient that the loading dose did go through at [facility]. She will be receiving the first order on 4/20/18. Stressed to call us back should she run into any issues getting her refills, she acknowledged.       4/17/18 Note:   Loading Approval ID: [id number]. 4/17/18 - 7/17/18. Maint Rx approved till 4/2020. Patient is having trouble obtaining loading Rx. Kortney called and confirmed coverage on [employer] loading an maint. I called [facility] and spoke to Giulia. Confirmed both Rx's on file correct device, qty and day supply . Still getting rejection of PA for loading. She stated the system may be lagged and it's just going to take a while to process. Advised as soon as it pays they will reach out to patient to schedule shipment. PA submitted correctly originally as new start, but insurance only approved maint. Advised patient of.

## 2018-04-22 ENCOUNTER — PATIENT MESSAGE (OUTPATIENT)
Dept: RHEUMATOLOGY | Facility: CLINIC | Age: 43
End: 2018-04-22

## 2018-04-26 ENCOUNTER — PATIENT MESSAGE (OUTPATIENT)
Dept: FAMILY MEDICINE | Facility: CLINIC | Age: 43
End: 2018-04-26

## 2018-04-30 DIAGNOSIS — F34.1 DYSTHYMIA: ICD-10-CM

## 2018-05-01 RX ORDER — LISINOPRIL AND HYDROCHLOROTHIAZIDE 20; 25 MG/1; MG/1
1 TABLET ORAL DAILY
Qty: 90 TABLET | Refills: 3 | Status: SHIPPED | OUTPATIENT
Start: 2018-05-01 | End: 2019-05-08 | Stop reason: SDUPTHER

## 2018-05-01 RX ORDER — BUPROPION HYDROCHLORIDE 100 MG/1
100 TABLET ORAL 2 TIMES DAILY
Qty: 180 TABLET | Refills: 0 | Status: SHIPPED | OUTPATIENT
Start: 2018-05-01 | End: 2018-09-06 | Stop reason: SDUPTHER

## 2018-05-14 ENCOUNTER — LAB VISIT (OUTPATIENT)
Dept: LAB | Facility: HOSPITAL | Age: 43
End: 2018-05-14
Attending: INTERNAL MEDICINE
Payer: COMMERCIAL

## 2018-05-14 DIAGNOSIS — L40.50 PSORIATIC ARTHRITIS: ICD-10-CM

## 2018-05-14 LAB
ALBUMIN SERPL BCP-MCNC: 3.5 G/DL
ALP SERPL-CCNC: 64 U/L
ALT SERPL W/O P-5'-P-CCNC: 15 U/L
ANION GAP SERPL CALC-SCNC: 10 MMOL/L
AST SERPL-CCNC: 17 U/L
BASOPHILS # BLD AUTO: 0.07 K/UL
BASOPHILS NFR BLD: 0.9 %
BILIRUB SERPL-MCNC: 0.2 MG/DL
BUN SERPL-MCNC: 12 MG/DL
CALCIUM SERPL-MCNC: 9.4 MG/DL
CHLORIDE SERPL-SCNC: 103 MMOL/L
CO2 SERPL-SCNC: 27 MMOL/L
CREAT SERPL-MCNC: 0.8 MG/DL
CRP SERPL-MCNC: 13.4 MG/L
DIFFERENTIAL METHOD: ABNORMAL
EOSINOPHIL # BLD AUTO: 1 K/UL
EOSINOPHIL NFR BLD: 12.4 %
ERYTHROCYTE [DISTWIDTH] IN BLOOD BY AUTOMATED COUNT: 14.3 %
ERYTHROCYTE [SEDIMENTATION RATE] IN BLOOD BY WESTERGREN METHOD: 13 MM/HR
EST. GFR  (AFRICAN AMERICAN): >60 ML/MIN/1.73 M^2
EST. GFR  (NON AFRICAN AMERICAN): >60 ML/MIN/1.73 M^2
GLUCOSE SERPL-MCNC: 104 MG/DL
HCT VFR BLD AUTO: 37.3 %
HGB BLD-MCNC: 11.3 G/DL
IMM GRANULOCYTES # BLD AUTO: 0.02 K/UL
IMM GRANULOCYTES NFR BLD AUTO: 0.3 %
LYMPHOCYTES # BLD AUTO: 2 K/UL
LYMPHOCYTES NFR BLD: 25.8 %
MCH RBC QN AUTO: 26.5 PG
MCHC RBC AUTO-ENTMCNC: 30.3 G/DL
MCV RBC AUTO: 88 FL
MONOCYTES # BLD AUTO: 0.6 K/UL
MONOCYTES NFR BLD: 7.4 %
NEUTROPHILS # BLD AUTO: 4.1 K/UL
NEUTROPHILS NFR BLD: 53.2 %
NRBC BLD-RTO: 0 /100 WBC
PLATELET # BLD AUTO: 319 K/UL
PMV BLD AUTO: 9.1 FL
POTASSIUM SERPL-SCNC: 3.7 MMOL/L
PROT SERPL-MCNC: 6.8 G/DL
RBC # BLD AUTO: 4.26 M/UL
SODIUM SERPL-SCNC: 140 MMOL/L
WBC # BLD AUTO: 7.66 K/UL

## 2018-05-14 PROCEDURE — 86140 C-REACTIVE PROTEIN: CPT

## 2018-05-14 PROCEDURE — 80053 COMPREHEN METABOLIC PANEL: CPT

## 2018-05-14 PROCEDURE — 85025 COMPLETE CBC W/AUTO DIFF WBC: CPT

## 2018-05-14 PROCEDURE — 85651 RBC SED RATE NONAUTOMATED: CPT

## 2018-05-14 PROCEDURE — 36415 COLL VENOUS BLD VENIPUNCTURE: CPT | Mod: PO

## 2018-05-16 NOTE — PROGRESS NOTES
Subjective:       Patient ID: Keli Concha Gilbert is a 42 y.o. female with psoriatic arthritis on Taltz & Leflunomide (& naproxen); demyelinating disorder secondary to Enbrel; incomplete effect on chronic pain syndrome on savella & methadone         Chief Complaint: Disease management    Ms. Gilbert is a 41 yo woman with history of PsA now on Talz, here for follow-up. She is still on leflunomide 20 mg/d & naproxen 500 mg bid.. She was last seen in [facility] on 3/26/18. At that visit, she was having very painful stiff hands and was having an appearance of patches of psoriasis on her legs. She began 160 mg of Taltz on 4/20/18 and felt almost an immediate response. She is now scheduled to take 80 mg in a few days and is still improved. Hands are not stiff and painful. They are not swollen and there is no remaining psoriasis. She is not aching. AM stiffness x 30 minutes.   ESR has normalized, but CRP is slightly more elevated.             Associated symptoms include fatigue. Pertinent negatives include no fever or headaches.          Current Outpatient Prescriptions on File Prior to Visit   Medication Sig Dispense Refill    (Magic mouthwash) 1:1 Benadryl 12.5mg/5ml liq, mylanta, lidocaine viscous 2%, nystatin 100,000u, prednisolone 15mg/5mL, distilled water Swish and swallow 10 mLs every 4 (four) hours as needed. 480 mL 0    (Magic mouthwash) 1:1:1 Benadryl 12.5mg/5ml liq, aluminum & magnesium hydroxide-simehticone (Maalox), lidocaine viscous 2% Swish and spit 5 mLs every 4 (four) hours as needed. for mouth sores 300 mL 0    albuterol 90 mcg/actuation inhaler Inhale 2 puffs into the lungs every 6 (six) hours as needed for Wheezing. Rescue 18 g 0    buPROPion (WELLBUTRIN) 100 MG tablet TAKE 1 TABLET (100 MG TOTAL) BY MOUTH 2 (TWO) TIMES DAILY. 180 tablet 0    cholecalciferol, vitamin D3, 2,000 unit Tab Take 1 tablet by mouth Once a week.      cholecalciferol, vitamin D3, 50,000 unit capsule Take 1 capsule (50,000  Units total) by mouth every 14 (fourteen) days. 18 capsule 1    clindamycin (CLEOCIN T) 1 % external solution       clobetasol (CLOBEX) 0.05 % topical spray Apply topically Twice daily. AAA bie. Disp 125 ml      gabapentin (NEURONTIN) 300 MG capsule Take 300 mg by mouth once daily.      ixekizumab (TALTZ AUTOINJECTOR, 3 PACK,) 80 mg/mL AtIn Inject 80 mg into the skin every 28 days. 3 Syringe 2    leflunomide (ARAVA) 20 MG Tab Take 1 tablet (20 mg total) by mouth once daily. 90 tablet 3    levocetirizine (XYZAL) 5 MG tablet TAKE 1 TABLET (5 MG TOTAL) BY MOUTH DAILY AS NEEDED FOR ALLERGIES. 90 tablet 3    liothyronine (CYTOMEL) 5 MCG Tab TAKE 2 TABLETS (10 MCG TOTAL) BY MOUTH ONCE DAILY. 180 tablet 3    lisinopril-hydrochlorothiazide (PRINZIDE,ZESTORETIC) 20-25 mg Tab TAKE 1 TABLET BY MOUTH ONCE DAILY. 90 tablet 3    methadone (DOLOPHINE) 10 MG tablet Take 1 tablet by mouth Three times a day.      mupirocin calcium 2% (BACTROBAN) 2 % cream APPLY TO AFFECTED AREA TWICE A DAY INTRANASALLY FOR 1ST WEEK OF THE MONTH 30 g 3    naproxen (NAPROSYN) 500 MG tablet Take 1 tablet (500 mg total) by mouth 3 (three) times daily with meals. 270 tablet 3    potassium chloride (MICRO-K) 10 MEQ CpSR Take 1 capsule (10 mEq total) by mouth once daily. 90 capsule 3    promethazine (PHENERGAN) 25 MG tablet Take 1 tablet by mouth Once daily as needed.      ranitidine (ZANTAC) 150 MG tablet       SYNTHROID 125 mcg tablet Take 1 tablet (125 mcg total) by mouth before breakfast. 90 tablet 3    tizanidine (ZANAFLEX) 4 MG tablet Take 4 mg by mouth 3 (three) times daily as needed.  6    trazodone (DESYREL) 50 MG tablet       triamcinolone (KENALOG) 0.1 % cream Apply topically Twice daily. Dirs: disp: 1# jar AAA      triamcinolone acetonide 0.1% (KENALOG) 0.1 % paste       urea (CARMOL) 40 % Crea AAA foot qd after soak 198.6 g 3    azelastine (ASTELIN) 137 mcg (0.1 %) nasal spray 1 spray (137 mcg total) by Nasal route 2 (two)  times daily. 30 mL 0     No current facility-administered medications on file prior to visit.            Review of patient's allergies indicates:   Allergen Reactions    Doxycycline hcl Nausea And Vomiting    Enbrel [etanercept] Other (See Comments)     Optic neurtits     Past Medical History:   Diagnosis Date    Abnormal Pap smear of vagina     AR (allergic rhinitis)     Demyelinating disorder     Depression     Fever blister     Fibromyalgia     followed by pain management    Generalized osteoarthritis of multiple sites     History of abnormal Pap smear     Hypertension     Hypothyroidism     Insulin resistance     Mixed anxiety and depressive disorder     Morbid obesity     Myelitis, optic neuritis in     treated with high-dose steroids and resolved; positive MRI and spinal fluid for a mass, but on embrel for psoriatic arthritis - [facility]; MRI normalized off embrel    Obesity     Pre-diabetes     hx diabetes on stereoids /    Psoriasis     Psoriatic arthritis     Vitamin D deficiency disease      Past Surgical History:   Procedure Laterality Date    SINUS SURGERY  1998       Review of Systems   Constitutional: Positive for fatigue. Negative for fever and unexpected weight change.   HENT: Positive for mouth sores. Negative for dental problem.         Dry mouth   Eyes: Negative.  Negative for redness, itching and visual disturbance.        Dry eyes   Respiratory: Negative.    Cardiovascular: Negative.  Negative for chest pain, palpitations and leg swelling.   Gastrointestinal: Negative.  Negative for abdominal pain, anal bleeding, blood in stool, constipation, diarrhea and nausea.        Heartburn.   Endocrine: Negative.    Genitourinary: Negative.  Negative for frequency, menstrual problem, pelvic pain and urgency.   Musculoskeletal: Negative.  Negative for arthralgias, back pain, gait problem and neck pain.   Skin: Negative.  Negative for color change and rash.  "  Allergic/Immunologic: Positive for immunocompromised state.   Neurological: Negative.  Negative for dizziness, seizures, weakness, numbness and headaches.   Hematological: Negative.  Negative for adenopathy. Does not bruise/bleed easily.   Psychiatric/Behavioral: Positive for dysphoric mood and sleep disturbance. Negative for decreased concentration and suicidal ideas. The patient is nervous/anxious.          Family History   Problem Relation Age of Onset    Psoriasis Father     Cancer Father         throat    Thyroid disease Mother     Heart disease Mother     Hypertension Mother     Hyperlipidemia Mother     Coronary artery disease Mother         s/p CABG    Heart attack Mother     Heart disease Sister         MVP    Diabetes Paternal Uncle     Diabetes Maternal Grandfather     Heart disease Maternal Grandfather     Thyroid disease Maternal Grandfather     ADD / ADHD Son     Melanoma Neg Hx     Lupus Neg Hx     Eczema Neg Hx     Acne Neg Hx     Breast cancer Neg Hx     Colon cancer Neg Hx     Ovarian cancer Neg Hx        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   No alcohol; no cigarettes;   Objective:   /85   Pulse 85   Resp 18   Ht 5' 6" (1.676 m)   Wt 102.9 kg (226 lb 12.8 oz)   BMI 36.61 kg/m²     Physical Exam   Vitals reviewed.  Constitutional: She is oriented to person, place, and time and well-developed, well-nourished, and in no distress. No distress.   HENT:   Head: Normocephalic and atraumatic.   Mouth/Throat: Oropharynx is clear and moist. No oropharyngeal exudate.   No facial rashes  Parotids not enlarged  No oral ulcers   Eyes: Conjunctivae and EOM are normal. Pupils are equal, round, and reactive to light. Right eye exhibits no discharge. Left eye exhibits no discharge. No scleral icterus.   Neck: Neck supple. No JVD present. No tracheal deviation present. No thyromegaly present.   Cardiovascular: Normal rate, regular rhythm, normal heart sounds and intact distal pulses.  " Exam reveals no gallop and no friction rub.    No murmur heard.  Pulmonary/Chest: Effort normal and breath sounds normal. No respiratory distress. She has no wheezes. She has no rales. She exhibits no tenderness.   Abdominal: Soft. Bowel sounds are normal. She exhibits no distension and no mass. There is no splenomegaly or hepatomegaly. There is no tenderness. There is no rebound and no guarding.   Lymphadenopathy:     She has no cervical adenopathy.        Right: No inguinal adenopathy present.        Left: No inguinal adenopathy present.   Neurological: She is alert and oriented to person, place, and time. She has normal reflexes. No cranial nerve deficit. Gait normal.   Proximal and distal muscle strength 5/5.   Skin: Skin is warm and dry. She is not diaphoretic.     Psoriatic patch RLE above ankle practically gone.   Psychiatric: Mood, memory, affect and judgment normal.   Drowsy.   Musculoskeletal: Normal range of motion. She exhibits no tenderness.   Cspine slight decrease in lateral flexion & rotation; no tenderness  Tspine FROM no tenderness  Lspine FROM no tenderness.  TMJ: unremarkable  Shoulders: FROM; no synovitis  Elbows: FROM; no synovitis; no tophi or nodules  Wrists: no SHT; no tenderness; good ROM  MCPs: no metatarsalgia; mild thickening of 2nd & MCPs bilaterally; 4th L MCP mild SHT;   PIPs: no dactylitis 2nd ray.  DIPs: FROM; no synovitis  HIPS: FROM  Knees: FROM; no synovitis; no instability;  Ankles: FROM: no synovitis   Toes: ok; no metatarsalgia                      Labs:   5/14/18: ESR 13; CRP 13.4;  Hg 11.3; low indices; eos 12.4%; CMP ok;   2/10/18: ESR 30; CRP 10.5; Hg 11.3; plt 366; CMP ok;   10/19/17: HBV neg; HCV neg;   9/2/17: ESR 16; CRp 5.9; Hg 11.6; Ht 36.6; CMP ok;   5/29/17: QG neg  2/20/17: ESR 34; CRP 8.1; CBC Hg 11.4; Ht 35.6; CMP ok;   11/19/16: ESR 20; CRP 4.8; CBC Hg 11.9; CMP K+3.3, otherwise ok;   8/27/16: ESR 22; CRP 7; Hg 11.8; Ht 36.9; CMP ok;   5/26/16: ESR 35; CRP  11.2Hg 11.8; Ht 36.9; CMP ok;   5/21/16: Vit d 37  10/8/15: ESR 45; CRP 14.2; Hg 11.2; Ht 35.8; CMP; ok  7/7/15: CBC plt 380; K+ 3.4; Glu 154  7/6/15: ESR 48; CRP 9.6  4/4/15: ESR 41; CRP 19; Hg 11.0; Ht 34.6; plt 377; CMP ok;  1/10/15: ESR 34; CRP 18; CBC ok; ; Alb. 3.6  10/4/14: ESR 43; CRP 18.5; Hg 11.8; Ht 36.5; plt 378; Alb. 3.4;   3/20/14: ESR 41; CRP 14.3; plt 356; CMP ok;  2/15/13: ESR 30; CRP 9.8; CBC ok; Alb. 3.3  11/2/13: ESR 28; CRP 11.6; plt 363; Alb. 3.3;   5/28/13: ESR 45; CRP 8.8; plt 366; CMP ok;     6/15/17: Bilateral hands: personally reviewed: mild osteoarthritis w stable periarticular erosion at the left fourth MCP joint; no change since last year.   8/27/16: Bilateral feet: personally reviewed. Mild HV; mild OA shell 1st MTP dali; ? Erosion PIP left small toe;   5/26/16: Bilateral hands: personally reviewed: L 4th MCP (new) erosive lesion; R & L 2nd & 3rd metacarpal head cysts; R mild PIP erosion & STS 2nd.    Assessment:     Psoriatic arthritis-- with mild SHT 2 MCPs bilaterally (R >>L) & with sausage digit of R index finger--but still some hand pain/swelling.    a) History of sausage digits of both toes      PIPs, DIPs, wrists, and knees., now w. only one sausage digit.   b) Psoriasis of the abdomen, elbow, and the shins--resolved.    c) Enbrel stopped 10/21/11 due to optic neuritis.     d) New erosion (4th L metacarpal head) on x-ray & possibly L 5th toe PIP     e) Persisting elevation of ESR and CRP    f) inadequate response to leflunomide and Stelara & apremilast (w. Intolerable nausea)   G) could not tolerate SSZ--nausea.   H) MTX x 2 even SC did not help sxs.   I) improved on Cosentyx (+leflunomide + naproxen) initially incompletely-- hand joints still hurt despite normalization of ESR & CRP   J) Orencia begun 11/17 with incomplete response   K)Taltz begun 4/20/18    Chronic pain/fibromyalgia syndrome with fatigue, tender points, withdrawals to palpation in the past, pain all over,  responsive to Savella but with some nausea (off it now), followed by Dr. Odom at the [facility] [place], [place],    On methadone    Left optic neuritis with demyelinating lesions secondary to Enbrel--now resolved.    Repeat MRI ok    Diabetes mellitus, diet controlled     Degenerative joint disease of the cervical spine, knees, and feet--very mild.     Depression on wellbutrin    Off savella    Hypertension.     Hypothyroidism.     Vitamin D deficiency with regular prescription vitamin D supplementation.     Obesity         Plan:   Continue Taltz 80 mg q 4 weeks.  Continue Leflunomide 20 mg/d.  Continue naproxen 500 mg bid pc  Labs in 3 months & q 3 months  RTC 3-4 months.

## 2018-05-17 ENCOUNTER — OFFICE VISIT (OUTPATIENT)
Dept: RHEUMATOLOGY | Facility: CLINIC | Age: 43
End: 2018-05-17
Payer: COMMERCIAL

## 2018-05-17 VITALS
SYSTOLIC BLOOD PRESSURE: 137 MMHG | DIASTOLIC BLOOD PRESSURE: 85 MMHG | RESPIRATION RATE: 18 BRPM | WEIGHT: 226.81 LBS | BODY MASS INDEX: 36.45 KG/M2 | HEART RATE: 85 BPM | HEIGHT: 66 IN

## 2018-05-17 DIAGNOSIS — Z79.1 LONG TERM (CURRENT) USE OF NON-STEROIDAL ANTI-INFLAMMATORIES (NSAID): ICD-10-CM

## 2018-05-17 DIAGNOSIS — Z79.899 LONG-TERM USE OF HIGH-RISK MEDICATION: ICD-10-CM

## 2018-05-17 DIAGNOSIS — Z79.60 LONG-TERM USE OF IMMUNOSUPPRESSANT MEDICATION: ICD-10-CM

## 2018-05-17 DIAGNOSIS — L40.50 PSORIATIC ARTHRITIS: Primary | ICD-10-CM

## 2018-05-17 PROCEDURE — 99999 PR PBB SHADOW E&M-EST. PATIENT-LVL III: CPT | Mod: PBBFAC,,, | Performed by: INTERNAL MEDICINE

## 2018-05-17 PROCEDURE — 3079F DIAST BP 80-89 MM HG: CPT | Mod: CPTII,S$GLB,, | Performed by: INTERNAL MEDICINE

## 2018-05-17 PROCEDURE — 99214 OFFICE O/P EST MOD 30 MIN: CPT | Mod: S$GLB,,, | Performed by: INTERNAL MEDICINE

## 2018-05-17 PROCEDURE — 3008F BODY MASS INDEX DOCD: CPT | Mod: CPTII,S$GLB,, | Performed by: INTERNAL MEDICINE

## 2018-05-17 PROCEDURE — 3075F SYST BP GE 130 - 139MM HG: CPT | Mod: CPTII,S$GLB,, | Performed by: INTERNAL MEDICINE

## 2018-05-17 ASSESSMENT — ROUTINE ASSESSMENT OF PATIENT INDEX DATA (RAPID3)
PAIN SCORE: 4.5
PATIENT GLOBAL ASSESSMENT SCORE: 3.5
AM STIFFNESS SCORE: 1, YES
FATIGUE SCORE: 5.5
MDHAQ FUNCTION SCORE: .8
WHEN YOU AWAKENED IN THE MORNING OVER THE LAST WEEK, PLEASE INDICATE THE AMOUNT OF TIME IT TAKES UNTIL YOU ARE AS LIMBER AS YOU WILL BE FOR THE DAY: 30 MINS
PSYCHOLOGICAL DISTRESS SCORE: 3.3
TOTAL RAPID3 SCORE: 3.55

## 2018-05-29 DIAGNOSIS — J45.909 EXTRINSIC ASTHMA, UNSPECIFIED ASTHMA SEVERITY, UNSPECIFIED WHETHER COMPLICATED, UNSPECIFIED WHETHER PERSISTENT: ICD-10-CM

## 2018-05-30 RX ORDER — ALBUTEROL SULFATE 90 UG/1
2 AEROSOL, METERED RESPIRATORY (INHALATION) EVERY 6 HOURS PRN
Qty: 8.5 INHALER | Refills: 0 | Status: SHIPPED | OUTPATIENT
Start: 2018-05-30 | End: 2020-03-20

## 2018-06-06 ENCOUNTER — OFFICE VISIT (OUTPATIENT)
Dept: DERMATOLOGY | Facility: CLINIC | Age: 43
End: 2018-06-06
Payer: COMMERCIAL

## 2018-06-06 DIAGNOSIS — L40.9 PSORIASIS: Primary | ICD-10-CM

## 2018-06-06 DIAGNOSIS — Z79.899 ENCOUNTER FOR LONG-TERM (CURRENT) USE OF HIGH-RISK MEDICATION: ICD-10-CM

## 2018-06-06 DIAGNOSIS — D48.5 NEOPLASM OF UNCERTAIN BEHAVIOR OF SKIN: ICD-10-CM

## 2018-06-06 PROCEDURE — 11100 PR BIOPSY OF SKIN LESION: CPT | Mod: S$GLB,,, | Performed by: DERMATOLOGY

## 2018-06-06 PROCEDURE — 99999 PR PBB SHADOW E&M-EST. PATIENT-LVL II: CPT | Mod: PBBFAC,,, | Performed by: DERMATOLOGY

## 2018-06-06 PROCEDURE — 99213 OFFICE O/P EST LOW 20 MIN: CPT | Mod: 25,S$GLB,, | Performed by: DERMATOLOGY

## 2018-06-06 PROCEDURE — 88305 TISSUE EXAM BY PATHOLOGIST: CPT | Performed by: PATHOLOGY

## 2018-06-06 NOTE — PROGRESS NOTES
Subjective:       Patient ID:  Keli Gilbert is a 42 y.o. female who presents for   Chief Complaint   Patient presents with    Psoriasis     stopped cosentyx in 11/2017 TALTZ-80mg every 4 weeks, clear no recent flares no new area not itchy      Patient complains of lesion(s)  Location: left index finger  Duration: 1 year intermittent  Symptoms: tender  Relieving factors/Previous treatments: wart pads - no help        Psoriasis  - Follow-up  Symptom course: stable (skin clear)  Currently using: taltz q month started 4/2018.  Affected locations: currently clear  Signs / symptoms: asymptomatic        Review of Systems   Constitutional: Negative for fever, weight loss, fatigue and malaise.   HENT: Negative for sore throat and postnasal drip.    Eyes: Negative for visual change.   Respiratory: Negative for cough and shortness of breath.    Cardiovascular: Negative for palpitations.   Gastrointestinal: Negative for nausea, vomiting and diarrhea.   Musculoskeletal: Positive for joint swelling (fingers - chronic  mild). Negative for arthralgias (hands - sees scopelitis - had xray showing degenerative change to left 4th finger).   Skin: Negative for itching, rash and abscesses.   Neurological: Negative for focal weakness, seizures and numbness.   Hematologic/Lymphatic: Does not bruise/bleed easily.   Allergic/Immunologic: Positive for environmental allergies (takes zyrtec d and nasal spray).        Objective:    Physical Exam   Constitutional: She appears well-developed and well-nourished. No distress.   Neurological: She is alert and oriented to person, place, and time. She is not disoriented.   Psychiatric: She has a normal mood and affect.   Skin:   Areas Examined (abnormalities noted in diagram):   Scalp / Hair Palpated and Inspected  Head / Face Inspection Performed  Neck Inspection Performed  Chest / Axilla Inspection Performed  Abdomen Inspection Performed  Genitals / Buttocks / Groin Inspection  Performed  Back Inspection Performed  RUE Inspected  LUE Inspection Performed  RLE Inspected  LLE Inspection Performed  Nails and Digits Inspection Performed             Diagram Legend     Erythematous scaling macule/papule c/w actinic keratosis       Vascular papule c/w angioma      Pigmented verrucoid papule/plaque c/w seborrheic keratosis      Yellow umbilicated papule c/w sebaceous hyperplasia      Irregularly shaped tan macule c/w lentigo     1-2 mm smooth white papules consistent with Milia      Movable subcutaneous cyst with punctum c/w epidermal inclusion cyst      Subcutaneous movable cyst c/w pilar cyst      Firm pink to brown papule c/w dermatofibroma      Pedunculated fleshy papule(s) c/w skin tag(s)      Evenly pigmented macule c/w junctional nevus     Mildly variegated pigmented, slightly irregular-bordered macule c/w mildly atypical nevus      Flesh colored to evenly pigmented papule c/w intradermal nevus       Pink pearly papule/plaque c/w basal cell carcinoma      Erythematous hyperkeratotic cursted plaque c/w SCC      Surgical scar with no sign of skin cancer recurrence      Open and closed comedones      Inflammatory papules and pustules      Verrucoid papule consistent consistent with wart     Erythematous eczematous patches and plaques     Dystrophic onycholytic nail with subungual debris c/w onychomycosis     Umbilicated papule    Erythematous-base heme-crusted tan verrucoid plaque consistent with inflamed seborrheic keratosis     Erythematous Silvery Scaling Plaque c/w Psoriasis     See annotation          Assessment / Plan:      Pathology Orders:     Normal Orders This Visit    Tissue Specimen To Pathology, Dermatology     Questions:    Directional Terms:  Other(comment)    Clinical information:  r/o vv vs acral fibrokeratoma    Specific Site:  left index finger        Psoriasis    Encounter for long-term (current) use of high-risk medication  -     Quantiferon Gold TB; Future    Neoplasm of  uncertain behavior of skin  -     Tissue Specimen To Pathology, Dermatology    Shave biopsy procedure note:    Shave biopsy performed after verbal consent including risk of infection, scar, recurrence, need for additional treatment of site. Area prepped with alcohol, anesthetized with approximately 1.0cc of 1% lidocaine (no epi) Lesional tissue shaved with razor blade. Hemostasis achieved with application of aluminum chloride followed by hyfrecation. No complications. Dressing applied. Wound care explained.          Psoriasis  Today's Plan:      Cont taltz q month (at PsA) dose -- per dr. ozuna    Needs quant gold      Follow-up in about 1 year (around 6/6/2019).

## 2018-06-06 NOTE — PATIENT INSTRUCTIONS
Shave Biopsy Wound Care    Your doctor has performed a shave biopsy today.  A band aid and vaseline ointment has been placed over the site.  This should remain in place for 24 hours.  It is recommended that you keep the area dry for the first 24 hours.  After 24 hours, you may remove the band aid and wash the area with warm soap and water and apply Vaseline jelly.  Many patients prefer to use Neosporin or Bacitracin ointment.  This is acceptable; however, know that you can develop an allergy to this medication even if you have used it safely for years.  It is important to keep the area moist.  Letting it dry out and get air slows healing time, and will worsen the scar.  Band aid is optional after first 24 hours.      If you notice increasing redness, tenderness, pain, or yellow drainage at the biopsy site, please notify your doctor.  These are signs of an infection.    If your biopsy site is bleeding, apply firm pressure for 15 minutes straight.  Repeat for another 15 minutes, if it is still bleeding.   If the surgical site continues to bleed, then please contact your doctor.      [facility] [place] [address]/ [phone] FAX/ [email or web address]

## 2018-06-19 ENCOUNTER — PATIENT MESSAGE (OUTPATIENT)
Dept: DERMATOLOGY | Facility: CLINIC | Age: 43
End: 2018-06-19

## 2018-06-25 ENCOUNTER — LAB VISIT (OUTPATIENT)
Dept: LAB | Facility: HOSPITAL | Age: 43
End: 2018-06-25
Attending: DERMATOLOGY
Payer: COMMERCIAL

## 2018-06-25 DIAGNOSIS — Z79.899 ENCOUNTER FOR LONG-TERM (CURRENT) USE OF HIGH-RISK MEDICATION: ICD-10-CM

## 2018-06-25 PROCEDURE — 86480 TB TEST CELL IMMUN MEASURE: CPT

## 2018-06-25 PROCEDURE — 36415 COLL VENOUS BLD VENIPUNCTURE: CPT | Mod: PO

## 2018-06-27 LAB
MITOGEN NIL: 8.24 IU/ML
NIL: 0.03 IU/ML
TB ANTIGEN NIL: 0.01 IU/ML
TB ANTIGEN: 0.04 IU/ML
TB GOLD: NEGATIVE

## 2018-08-01 ENCOUNTER — PATIENT MESSAGE (OUTPATIENT)
Dept: RHEUMATOLOGY | Facility: CLINIC | Age: 43
End: 2018-08-01

## 2018-08-06 ENCOUNTER — PATIENT MESSAGE (OUTPATIENT)
Dept: DERMATOLOGY | Facility: CLINIC | Age: 43
End: 2018-08-06

## 2018-08-06 RX ORDER — SULFAMETHOXAZOLE AND TRIMETHOPRIM 800; 160 MG/1; MG/1
1 TABLET ORAL 2 TIMES DAILY
Qty: 20 TABLET | Refills: 0 | Status: SHIPPED | OUTPATIENT
Start: 2018-08-06 | End: 2018-08-16

## 2018-08-06 RX ORDER — SULFAMETHOXAZOLE AND TRIMETHOPRIM 800; 160 MG/1; MG/1
1 TABLET ORAL 2 TIMES DAILY
Qty: 20 TABLET | Refills: 0 | Status: SHIPPED | OUTPATIENT
Start: 2018-08-06 | End: 2018-08-06 | Stop reason: SDUPTHER

## 2018-08-13 ENCOUNTER — PATIENT MESSAGE (OUTPATIENT)
Dept: RHEUMATOLOGY | Facility: CLINIC | Age: 43
End: 2018-08-13

## 2018-08-22 ENCOUNTER — LAB VISIT (OUTPATIENT)
Dept: LAB | Facility: HOSPITAL | Age: 43
End: 2018-08-22
Attending: INTERNAL MEDICINE
Payer: COMMERCIAL

## 2018-08-22 DIAGNOSIS — Z79.60 LONG-TERM USE OF IMMUNOSUPPRESSANT MEDICATION: ICD-10-CM

## 2018-08-22 DIAGNOSIS — Z79.899 LONG-TERM USE OF HIGH-RISK MEDICATION: ICD-10-CM

## 2018-08-22 DIAGNOSIS — Z79.1 LONG TERM (CURRENT) USE OF NON-STEROIDAL ANTI-INFLAMMATORIES (NSAID): ICD-10-CM

## 2018-08-22 LAB
ALBUMIN SERPL BCP-MCNC: 3.5 G/DL
ALP SERPL-CCNC: 66 U/L
ALT SERPL W/O P-5'-P-CCNC: 12 U/L
ANION GAP SERPL CALC-SCNC: 6 MMOL/L
AST SERPL-CCNC: 16 U/L
BASOPHILS # BLD AUTO: 0.06 K/UL
BASOPHILS NFR BLD: 0.6 %
BILIRUB SERPL-MCNC: 0.2 MG/DL
BUN SERPL-MCNC: 13 MG/DL
CALCIUM SERPL-MCNC: 9.5 MG/DL
CHLORIDE SERPL-SCNC: 102 MMOL/L
CO2 SERPL-SCNC: 30 MMOL/L
CREAT SERPL-MCNC: 0.7 MG/DL
CRP SERPL-MCNC: 10.9 MG/L
DIFFERENTIAL METHOD: ABNORMAL
EOSINOPHIL # BLD AUTO: 0.8 K/UL
EOSINOPHIL NFR BLD: 8.4 %
ERYTHROCYTE [DISTWIDTH] IN BLOOD BY AUTOMATED COUNT: 15 %
ERYTHROCYTE [SEDIMENTATION RATE] IN BLOOD BY WESTERGREN METHOD: 10 MM/HR
EST. GFR  (AFRICAN AMERICAN): >60 ML/MIN/1.73 M^2
EST. GFR  (NON AFRICAN AMERICAN): >60 ML/MIN/1.73 M^2
GLUCOSE SERPL-MCNC: 96 MG/DL
HCT VFR BLD AUTO: 35.7 %
HGB BLD-MCNC: 10.7 G/DL
IMM GRANULOCYTES # BLD AUTO: 0.04 K/UL
IMM GRANULOCYTES NFR BLD AUTO: 0.4 %
LYMPHOCYTES # BLD AUTO: 2 K/UL
LYMPHOCYTES NFR BLD: 20.6 %
MCH RBC QN AUTO: 26.6 PG
MCHC RBC AUTO-ENTMCNC: 30 G/DL
MCV RBC AUTO: 89 FL
MONOCYTES # BLD AUTO: 0.8 K/UL
MONOCYTES NFR BLD: 7.9 %
NEUTROPHILS # BLD AUTO: 6.1 K/UL
NEUTROPHILS NFR BLD: 62.1 %
NRBC BLD-RTO: 0 /100 WBC
PLATELET # BLD AUTO: 319 K/UL
PMV BLD AUTO: 9 FL
POTASSIUM SERPL-SCNC: 4.1 MMOL/L
PROT SERPL-MCNC: 6.8 G/DL
RBC # BLD AUTO: 4.03 M/UL
SODIUM SERPL-SCNC: 138 MMOL/L
WBC # BLD AUTO: 9.89 K/UL

## 2018-08-22 PROCEDURE — 85025 COMPLETE CBC W/AUTO DIFF WBC: CPT

## 2018-08-22 PROCEDURE — 36415 COLL VENOUS BLD VENIPUNCTURE: CPT | Mod: PO

## 2018-08-22 PROCEDURE — 80053 COMPREHEN METABOLIC PANEL: CPT

## 2018-08-22 PROCEDURE — 85652 RBC SED RATE AUTOMATED: CPT

## 2018-08-22 PROCEDURE — 86140 C-REACTIVE PROTEIN: CPT

## 2018-08-23 ENCOUNTER — PATIENT MESSAGE (OUTPATIENT)
Dept: RHEUMATOLOGY | Facility: CLINIC | Age: 43
End: 2018-08-23

## 2018-08-23 ENCOUNTER — TELEPHONE (OUTPATIENT)
Dept: RHEUMATOLOGY | Facility: CLINIC | Age: 43
End: 2018-08-23

## 2018-08-23 DIAGNOSIS — L40.50 PSORIATIC ARTHRITIS: ICD-10-CM

## 2018-08-24 ENCOUNTER — TELEPHONE (OUTPATIENT)
Dept: RHEUMATOLOGY | Facility: CLINIC | Age: 43
End: 2018-08-24

## 2018-08-24 NOTE — TELEPHONE ENCOUNTER
----- Message from Isabel Begum RN sent at 8/23/2018  2:48 PM CDT -----  Please send new prescription for taltz to [facility]

## 2018-08-24 NOTE — TELEPHONE ENCOUNTER
"----- Message from Giovana Lawler sent at 8/24/2018 10:37 AM CDT -----  ..Hayden Richards and staff,    Patients insurance requires the patient to fill through [facility].   Patient has been informed.    Please send prescription to [facility].      To complete this in EPIC  The original order MUST be discontinued and re-typed as a new prescription with the updated pharmacy listed.     I have added [facility] to the patients preferred pharmacies.       Uncheck the box that says "send to [facility]" AND Check box with [facility]    #please do not  click reorder -- as it will continue to route the rx to [facility] even if the pharmacy is changed.     Please opt the patient out of [facility] when the BPA is fired.     Please inform us if there is anything further we can do to assist.     Thank you,  Giovana Lawler         "

## 2018-08-30 ENCOUNTER — PATIENT MESSAGE (OUTPATIENT)
Dept: RHEUMATOLOGY | Facility: CLINIC | Age: 43
End: 2018-08-30

## 2018-08-30 ENCOUNTER — TELEPHONE (OUTPATIENT)
Dept: PHARMACY | Facility: CLINIC | Age: 43
End: 2018-08-30

## 2018-09-05 ENCOUNTER — PATIENT MESSAGE (OUTPATIENT)
Dept: RHEUMATOLOGY | Facility: CLINIC | Age: 43
End: 2018-09-05

## 2018-09-05 NOTE — TELEPHONE ENCOUNTER
DOCUMENTATION ONLY:  Prior authorization for Taltz approved from 9/5/18 to 9/5/19    Case Id: [id number]    Co-pay: Patient locked into [facility]    Patient Assistance is not required.  Co-pay card on file good until 4/2019.

## 2018-09-06 ENCOUNTER — PATIENT MESSAGE (OUTPATIENT)
Dept: FAMILY MEDICINE | Facility: CLINIC | Age: 43
End: 2018-09-06

## 2018-09-06 ENCOUNTER — PATIENT MESSAGE (OUTPATIENT)
Dept: PHARMACY | Facility: CLINIC | Age: 43
End: 2018-09-06

## 2018-09-06 ENCOUNTER — TELEPHONE (OUTPATIENT)
Dept: RHEUMATOLOGY | Facility: CLINIC | Age: 43
End: 2018-09-06

## 2018-09-06 DIAGNOSIS — L40.50 PSORIATIC ARTHRITIS: Primary | ICD-10-CM

## 2018-09-06 DIAGNOSIS — F34.1 DYSTHYMIA: ICD-10-CM

## 2018-09-06 RX ORDER — BUPROPION HYDROCHLORIDE 100 MG/1
100 TABLET ORAL 2 TIMES DAILY
Qty: 180 TABLET | Refills: 0 | Status: SHIPPED | OUTPATIENT
Start: 2018-09-06 | End: 2018-12-05 | Stop reason: SDUPTHER

## 2018-09-06 NOTE — TELEPHONE ENCOUNTER
----- Message from Kortney Whalen sent at 9/5/2018  2:55 PM CDT -----  Regarding: Taltz  Contact: [phone]  FYI:  Lois prior authorization has been approved through the patient's new insurance until 9/5/19. Patient's insurance requires the patient to fill through [facility]. Please send prescription to [facility], which has been added to the patients EPIC profile.  Please send ASAP, as the patient is due for dose next week.  Patient has been notified and provided with the necessary info to call and schedule a delivery.    To complete this in EPIC, the original order MUST be discontinued and re-typed as a new prescription with the updated pharmacy listed. Clicking reorder will continue to route the rx to [facility] even if the pharmacy is changed. Please opt the patient out of [facility] when the BPA is fired.

## 2018-09-14 ENCOUNTER — PATIENT MESSAGE (OUTPATIENT)
Dept: FAMILY MEDICINE | Facility: CLINIC | Age: 43
End: 2018-09-14

## 2018-09-14 DIAGNOSIS — E03.9 HYPOTHYROIDISM, UNSPECIFIED TYPE: ICD-10-CM

## 2018-09-14 RX ORDER — LIOTHYRONINE SODIUM 5 UG/1
TABLET ORAL
Qty: 90 TABLET | Refills: 0 | Status: SHIPPED | OUTPATIENT
Start: 2018-09-14 | End: 2018-09-17

## 2018-09-15 ENCOUNTER — PATIENT MESSAGE (OUTPATIENT)
Dept: RHEUMATOLOGY | Facility: CLINIC | Age: 43
End: 2018-09-15

## 2018-09-17 ENCOUNTER — PATIENT MESSAGE (OUTPATIENT)
Dept: FAMILY MEDICINE | Facility: CLINIC | Age: 43
End: 2018-09-17

## 2018-09-17 ENCOUNTER — TELEPHONE (OUTPATIENT)
Dept: FAMILY MEDICINE | Facility: CLINIC | Age: 43
End: 2018-09-17

## 2018-09-17 DIAGNOSIS — E03.9 HYPOTHYROIDISM, UNSPECIFIED TYPE: ICD-10-CM

## 2018-09-17 RX ORDER — LIOTHYRONINE SODIUM 5 UG/1
TABLET ORAL
Qty: 180 TABLET | Refills: 0 | Status: SHIPPED | OUTPATIENT
Start: 2018-09-17 | End: 2018-11-27 | Stop reason: SDUPTHER

## 2018-09-24 ENCOUNTER — TELEPHONE (OUTPATIENT)
Dept: RHEUMATOLOGY | Facility: CLINIC | Age: 43
End: 2018-09-24

## 2018-09-24 NOTE — TELEPHONE ENCOUNTER
Called to inform patient of the need to reschedule 10/4/18 appointment, due to a change in the provider's schedule.  Patient verbalizes understanding.  Appointment rescheduled to 10/17/18.

## 2018-10-03 ENCOUNTER — PATIENT MESSAGE (OUTPATIENT)
Dept: FAMILY MEDICINE | Facility: CLINIC | Age: 43
End: 2018-10-03

## 2018-10-07 ENCOUNTER — PATIENT MESSAGE (OUTPATIENT)
Dept: RHEUMATOLOGY | Facility: CLINIC | Age: 43
End: 2018-10-07

## 2018-10-15 NOTE — PROGRESS NOTES
Subjective:       Patient ID: Keli Concha Gilbert is a 42 y.o. female with psoriatic arthritis on Taltz & Leflunomide (& naproxen); demyelinating disorder secondary to Enbrel; incomplete effect on chronic pain syndrome on savella & methadone         Chief Complaint: Disease management    Ms. Gilbert is a 41 yo woman with history of PsA now on Talz, here for follow-up. She was last seen on 5/17/18. She started Talz in April 2018 & is still on leflunomide 20 mg/d & naproxen 500 mg bid.to tid. She was doing very well joint and skin wise until about a week ago when she developed swelling in her R 3rd PIP and base of her L thumb. Her mother had a CVA and she wonders whether the stress of her illness precipitated the joint swelling. She still has AM stiffness x 30 minutes.  Last ESR 10 and CRP is 10.9 on 8/22/18.    She still c/o insomnia, anxiety and depression.             Associated symptoms include fatigue. Pertinent negatives include no fever or headaches.          Current Outpatient Medications on File Prior to Visit   Medication Sig Dispense Refill    (Magic mouthwash) 1:1 Benadryl 12.5mg/5ml liq, mylanta, lidocaine viscous 2%, nystatin 100,000u, prednisolone 15mg/5mL, distilled water Swish and swallow 10 mLs every 4 (four) hours as needed. 480 mL 0    (Magic mouthwash) 1:1:1 Benadryl 12.5mg/5ml liq, aluminum & magnesium hydroxide-simehticone (Maalox), lidocaine viscous 2% Swish and spit 5 mLs every 4 (four) hours as needed. for mouth sores 300 mL 0    azelastine (ASTELIN) 137 mcg (0.1 %) nasal spray 1 spray (137 mcg total) by Nasal route 2 (two) times daily. 30 mL 0    buPROPion (WELLBUTRIN) 100 MG tablet Take 1 tablet (100 mg total) by mouth 2 (two) times daily. 180 tablet 0    cholecalciferol, vitamin D3, 2,000 unit Tab Take 1 tablet by mouth Once a week.      cholecalciferol, vitamin D3, 50,000 unit capsule Take 1 capsule (50,000 Units total) by mouth every 14 (fourteen) days. 18 capsule 1     clindamycin (CLEOCIN T) 1 % external solution       clobetasol (CLOBEX) 0.05 % topical spray Apply topically Twice daily. AAA bie. Disp 125 ml      gabapentin (NEURONTIN) 300 MG capsule Take 300 mg by mouth once daily.      ixekizumab (TALTZ AUTOINJECTOR) 80 mg/mL AtIn Inject 80 mg into the skin every 14 (fourteen) days. 8 Syringe 0    leflunomide (ARAVA) 20 MG Tab Take 1 tablet (20 mg total) by mouth once daily. 90 tablet 3    levocetirizine (XYZAL) 5 MG tablet TAKE 1 TABLET (5 MG TOTAL) BY MOUTH DAILY AS NEEDED FOR ALLERGIES. 90 tablet 3    liothyronine (CYTOMEL) 5 MCG Tab TAKE 2 TABLETS (10 MCG TOTAL) BY MOUTH ONCE DAILY. 180 tablet 0    lisinopril-hydrochlorothiazide (PRINZIDE,ZESTORETIC) 20-25 mg Tab TAKE 1 TABLET BY MOUTH ONCE DAILY. 90 tablet 3    methadone (DOLOPHINE) 10 MG tablet Take 1 tablet by mouth Three times a day.      mupirocin calcium 2% (BACTROBAN) 2 % cream APPLY TO AFFECTED AREA TWICE A DAY INTRANASALLY FOR 1ST WEEK OF THE MONTH 30 g 3    naproxen (NAPROSYN) 500 MG tablet Take 1 tablet (500 mg total) by mouth 3 (three) times daily with meals. 270 tablet 3    potassium chloride (MICRO-K) 10 MEQ CpSR Take 1 capsule (10 mEq total) by mouth once daily. 90 capsule 3    PROAIR HFA 90 mcg/actuation inhaler INHALE 2 PUFFS INTO THE LUNGS EVERY 6 (SIX) HOURS AS NEEDED FOR WHEEZING. RESCUE 8.5 Inhaler 0    promethazine (PHENERGAN) 25 MG tablet Take 1 tablet by mouth Once daily as needed.      ranitidine (ZANTAC) 150 MG tablet       SYNTHROID 125 mcg tablet Take 1 tablet (125 mcg total) by mouth before breakfast. 90 tablet 3    tizanidine (ZANAFLEX) 4 MG tablet Take 4 mg by mouth 3 (three) times daily as needed.  6    trazodone (DESYREL) 50 MG tablet       triamcinolone (KENALOG) 0.1 % cream Apply topically Twice daily. Dirs: disp: 1# jar AAA      triamcinolone acetonide 0.1% (KENALOG) 0.1 % paste       urea (CARMOL) 40 % Crea AAA foot qd after soak 198.6 g 3     No current  facility-administered medications on file prior to visit.            Review of patient's allergies indicates:   Allergen Reactions    Doxycycline hcl Nausea And Vomiting    Enbrel [etanercept] Other (See Comments)     Optic neurtits     Past Medical History:   Diagnosis Date    Abnormal Pap smear of vagina     AR (allergic rhinitis)     Demyelinating disorder     Depression     Fever blister     Fibromyalgia     followed by pain management    Generalized osteoarthritis of multiple sites     History of abnormal Pap smear     Hypertension     Hypothyroidism     Insulin resistance     Mixed anxiety and depressive disorder     Morbid obesity     Myelitis, optic neuritis in     treated with high-dose steroids and resolved; positive MRI and spinal fluid for a mass, but on embrel for psoriatic arthritis - she will see a MS specialist at [facility]; MRI normalized off embrel    Obesity     Pre-diabetes     hx diabetes on stereoids /    Psoriasis     Psoriatic arthritis     Vitamin D deficiency disease      Past Surgical History:   Procedure Laterality Date    SINUS SURGERY  1998       Review of Systems   Constitutional: Positive for fatigue. Negative for fever and unexpected weight change.   HENT: Negative.  Negative for dental problem and mouth sores.    Eyes: Negative.  Negative for redness, itching and visual disturbance.        Dry eyes   Respiratory: Negative.    Cardiovascular: Negative.  Negative for chest pain, palpitations and leg swelling.   Gastrointestinal: Negative.  Negative for abdominal pain, anal bleeding, blood in stool, constipation, diarrhea and nausea.        Heartburn.   Endocrine: Negative.    Genitourinary: Negative.  Negative for frequency, menstrual problem, pelvic pain and urgency.   Musculoskeletal: Negative.  Negative for arthralgias, back pain, gait problem and neck pain.   Skin: Negative.  Negative for color change and rash.   Allergic/Immunologic: Positive for immunocompromised  "state.   Neurological: Negative.  Negative for dizziness, seizures, weakness, numbness and headaches.   Hematological: Negative.  Negative for adenopathy. Does not bruise/bleed easily.   Psychiatric/Behavioral: Positive for dysphoric mood and sleep disturbance. Negative for decreased concentration and suicidal ideas. The patient is nervous/anxious.          Family History   Problem Relation Age of Onset    Psoriasis Father     Cancer Father         throat    Thyroid disease Mother     Heart disease Mother     Hypertension Mother     Hyperlipidemia Mother     Coronary artery disease Mother         s/p CABG    Heart attack Mother     Heart disease Sister         MVP    Diabetes Paternal Uncle     Diabetes Maternal Grandfather     Heart disease Maternal Grandfather     Thyroid disease Maternal Grandfather     ADD / ADHD Son     Melanoma Neg Hx     Lupus Neg Hx     Eczema Neg Hx     Acne Neg Hx     Breast cancer Neg Hx     Colon cancer Neg Hx     Ovarian cancer Neg Hx        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   Mom had a CVA & is getting PT in Rehab.  No alcohol; no cigarettes;   Objective:   /86   Pulse 79   Ht 5' 6" (1.676 m)   Wt 101 kg (222 lb 11.2 oz)   BMI 35.94 kg/m²     Physical Exam   Vitals reviewed.  Constitutional: She is oriented to person, place, and time and well-developed, well-nourished, and in no distress. No distress.   HENT:   Head: Normocephalic and atraumatic.   Mouth/Throat: Oropharynx is clear and moist. No oropharyngeal exudate.   No facial rashes  Parotids not enlarged  No oral ulcers   Eyes: Conjunctivae and EOM are normal. Pupils are equal, round, and reactive to light. Right eye exhibits no discharge. Left eye exhibits no discharge. No scleral icterus.   Neck: Neck supple. No JVD present. No tracheal deviation present. No thyromegaly present.   Cardiovascular: Normal rate, regular rhythm, normal heart sounds and intact distal pulses.  Exam reveals no gallop and " no friction rub.    No murmur heard.  Pulmonary/Chest: Effort normal and breath sounds normal. No respiratory distress. She has no wheezes. She has no rales. She exhibits no tenderness.   Abdominal: Soft. Bowel sounds are normal. She exhibits no distension and no mass. There is no splenomegaly or hepatomegaly. There is no tenderness. There is no rebound and no guarding.   Lymphadenopathy:     She has no cervical adenopathy.        Right: No inguinal adenopathy present.        Left: No inguinal adenopathy present.   Neurological: She is alert and oriented to person, place, and time. She has normal reflexes. No cranial nerve deficit. Gait normal.   Proximal and distal muscle strength 5/5.   Skin: Skin is warm and dry. She is not diaphoretic.     No psoriasis.   Psychiatric: Mood, memory, affect and judgment normal.   Drowsy.   Musculoskeletal: Normal range of motion. She exhibits no tenderness.   Cspine slight decrease in lateral flexion & rotation; no tenderness  Tspine FROM no tenderness  Lspine FROM no tenderness.  TMJ: unremarkable  Shoulders: FROM; no synovitis  Elbows: FROM; no synovitis; no tophi or nodules  Wrists: no SHT; no tenderness; good ROM  MCPs: L 1st C-MCP tender with mild SHT;  no metatarsalgia;  PIPs: no dactylitis 2nd ray; R 3rd PIP SHT & mildly erythematous; mildly tender.  DIPs: FROM; no synovitis  HIPS: FROM  Knees: FROM; no synovitis; no instability;  Ankles: FROM: no synovitis   Toes: ok; no metatarsalgia                       R hand    L thumb    Labs:   8/22/18: ESR 10; CRP 10.9; CBC Hg 10.7; Ht 35.7; CMP ok  5/14/18: ESR 13; CRP 13.4;  Hg 11.3; low indices; eos 12.4%; CMP ok;   2/10/18: ESR 30; CRP 10.5; Hg 11.3; plt 366; CMP ok;   10/19/17: HBV neg; HCV neg;   9/2/17: ESR 16; CRp 5.9; Hg 11.6; Ht 36.6; CMP ok;   5/29/17: QG neg  2/20/17: ESR 34; CRP 8.1; CBC Hg 11.4; Ht 35.6; CMP ok;   11/19/16: ESR 20; CRP 4.8; CBC Hg 11.9; CMP K+3.3, otherwise ok;   8/27/16: ESR 22; CRP 7; Hg 11.8; Ht  36.9; CMP ok;   5/26/16: ESR 35; CRP 11.2Hg 11.8; Ht 36.9; CMP ok;   5/21/16: Vit d 37  10/8/15: ESR 45; CRP 14.2; Hg 11.2; Ht 35.8; CMP; ok  7/7/15: CBC plt 380; K+ 3.4; Glu 154  7/6/15: ESR 48; CRP 9.6  4/4/15: ESR 41; CRP 19; Hg 11.0; Ht 34.6; plt 377; CMP ok;  1/10/15: ESR 34; CRP 18; CBC ok; ; Alb. 3.6  10/4/14: ESR 43; CRP 18.5; Hg 11.8; Ht 36.5; plt 378; Alb. 3.4;   3/20/14: ESR 41; CRP 14.3; plt 356; CMP ok;  2/15/13: ESR 30; CRP 9.8; CBC ok; Alb. 3.3  11/2/13: ESR 28; CRP 11.6; plt 363; Alb. 3.3;   5/28/13: ESR 45; CRP 8.8; plt 366; CMP ok;     6/15/17: Bilateral hands: personally reviewed: mild osteoarthritis w stable periarticular erosion at the left fourth MCP joint; no change since last year.   8/27/16: Bilateral feet: personally reviewed. Mild HV; mild OA shell 1st MTP dali; ? Erosion PIP left small toe;   5/26/16: Bilateral hands: personally reviewed: L 4th MCP (new) erosive lesion; R & L 2nd & 3rd metacarpal head cysts; R mild PIP erosion & STS 2nd.    Assessment:     Psoriatic arthritis-- with mild SHT 2 MCPs bilaterally (R >>L) & with sausage digit of R index finger--but still some hand pain/swelling.    a) History of sausage digits of both toes      PIPs, DIPs, wrists, and knees., now w. only one sausage digit.   b) Psoriasis of the abdomen, elbow, and the shins--resolved.    c) Enbrel stopped 10/21/11 due to optic neuritis.     d) New erosion (4th L metacarpal head) on x-ray & possibly L 5th toe PIP     e) Persisting elevation of ESR and CRP    f) inadequate response to leflunomide and Stelara & apremilast (w. Intolerable nausea)   G) could not tolerate SSZ--nausea.   H) MTX x 2 even SC did not help sxs.   I) improved on Cosentyx (+leflunomide + naproxen) initially incompletely-- hand joints still hurt despite normalization of   ESR & CRP   J) Orencia begun 11/17 with incomplete response   K)Taltz begun 4/20/18    SHT 3rd R PIP & base of L thumb    Chronic pain/fibromyalgia syndrome with  fatigue, tender points, withdrawals to palpation in the past, pain all over, responsive to Savella but with some nausea (off it now), followed by Dr. Odom at the [facility] [place],    On methadone    Left optic neuritis with demyelinating lesions secondary to Enbrel--now resolved.    Repeat MRI ok    Diabetes mellitus, diet controlled     Degenerative joint disease of the cervical spine, knees, and feet--very mild.     Depression on wellbutrin    Off savella    Hypertension.     Hypothyroidism.     Vitamin D deficiency with regular prescription vitamin D supplementation.     Obesity         Plan:   Continue Taltz 80 mg q 4 weeks.  Continue Leflunomide 20 mg/d.  Continue naproxen 500 mg bid p to tid pc at the most.  Labs in November.  Bilateral hand imaging in November.  If swelling persist may switch naproxen to indomethacin.   RTC 4 months.

## 2018-10-17 ENCOUNTER — OFFICE VISIT (OUTPATIENT)
Dept: RHEUMATOLOGY | Facility: CLINIC | Age: 43
End: 2018-10-17
Payer: COMMERCIAL

## 2018-10-17 VITALS
WEIGHT: 222.69 LBS | BODY MASS INDEX: 35.79 KG/M2 | SYSTOLIC BLOOD PRESSURE: 133 MMHG | HEART RATE: 79 BPM | HEIGHT: 66 IN | DIASTOLIC BLOOD PRESSURE: 86 MMHG

## 2018-10-17 DIAGNOSIS — M15.9 GENERALIZED OSTEOARTHRITIS OF MULTIPLE SITES: ICD-10-CM

## 2018-10-17 DIAGNOSIS — Z79.60 LONG-TERM USE OF IMMUNOSUPPRESSANT MEDICATION: ICD-10-CM

## 2018-10-17 DIAGNOSIS — L40.50 PSORIATIC ARTHRITIS: Primary | ICD-10-CM

## 2018-10-17 DIAGNOSIS — Z79.1 LONG TERM (CURRENT) USE OF NON-STEROIDAL ANTI-INFLAMMATORIES (NSAID): ICD-10-CM

## 2018-10-17 DIAGNOSIS — Z79.899 LONG-TERM USE OF HIGH-RISK MEDICATION: ICD-10-CM

## 2018-10-17 PROCEDURE — 3075F SYST BP GE 130 - 139MM HG: CPT | Mod: CPTII,S$GLB,, | Performed by: INTERNAL MEDICINE

## 2018-10-17 PROCEDURE — 3079F DIAST BP 80-89 MM HG: CPT | Mod: CPTII,S$GLB,, | Performed by: INTERNAL MEDICINE

## 2018-10-17 PROCEDURE — 99999 PR PBB SHADOW E&M-EST. PATIENT-LVL III: CPT | Mod: PBBFAC,,, | Performed by: INTERNAL MEDICINE

## 2018-10-17 PROCEDURE — 99214 OFFICE O/P EST MOD 30 MIN: CPT | Mod: S$GLB,,, | Performed by: INTERNAL MEDICINE

## 2018-10-17 PROCEDURE — 3008F BODY MASS INDEX DOCD: CPT | Mod: CPTII,S$GLB,, | Performed by: INTERNAL MEDICINE

## 2018-10-17 ASSESSMENT — ROUTINE ASSESSMENT OF PATIENT INDEX DATA (RAPID3)
PSYCHOLOGICAL DISTRESS SCORE: 3.3
AM STIFFNESS SCORE: 1, YES
FATIGUE SCORE: 8.5
PATIENT GLOBAL ASSESSMENT SCORE: 6
WHEN YOU AWAKENED IN THE MORNING OVER THE LAST WEEK, PLEASE INDICATE THE AMOUNT OF TIME IT TAKES UNTIL YOU ARE AS LIMBER AS YOU WILL BE FOR THE DAY: 30 MINS
MDHAQ FUNCTION SCORE: .5
TOTAL RAPID3 SCORE: 4.89
PAIN SCORE: 7

## 2018-10-18 ENCOUNTER — PATIENT MESSAGE (OUTPATIENT)
Dept: FAMILY MEDICINE | Facility: CLINIC | Age: 43
End: 2018-10-18

## 2018-10-18 DIAGNOSIS — E87.6 HYPOKALEMIA: ICD-10-CM

## 2018-10-18 RX ORDER — POTASSIUM CHLORIDE 750 MG/1
10 CAPSULE, EXTENDED RELEASE ORAL DAILY
Qty: 90 CAPSULE | Refills: 3 | Status: SHIPPED | OUTPATIENT
Start: 2018-10-18 | End: 2019-09-04

## 2018-11-08 ENCOUNTER — PATIENT MESSAGE (OUTPATIENT)
Dept: RHEUMATOLOGY | Facility: CLINIC | Age: 43
End: 2018-11-08

## 2018-11-17 ENCOUNTER — LAB VISIT (OUTPATIENT)
Dept: LAB | Facility: HOSPITAL | Age: 43
End: 2018-11-17
Attending: INTERNAL MEDICINE
Payer: COMMERCIAL

## 2018-11-17 DIAGNOSIS — Z79.60 LONG-TERM USE OF IMMUNOSUPPRESSANT MEDICATION: ICD-10-CM

## 2018-11-17 DIAGNOSIS — Z79.899 LONG-TERM USE OF HIGH-RISK MEDICATION: ICD-10-CM

## 2018-11-17 DIAGNOSIS — Z79.1 LONG TERM (CURRENT) USE OF NON-STEROIDAL ANTI-INFLAMMATORIES (NSAID): ICD-10-CM

## 2018-11-17 LAB
ALBUMIN SERPL BCP-MCNC: 3.8 G/DL
ALP SERPL-CCNC: 59 U/L
ALT SERPL W/O P-5'-P-CCNC: 14 U/L
ANION GAP SERPL CALC-SCNC: 6 MMOL/L
AST SERPL-CCNC: 18 U/L
BASOPHILS # BLD AUTO: 0.05 K/UL
BASOPHILS NFR BLD: 0.6 %
BILIRUB SERPL-MCNC: 0.3 MG/DL
BUN SERPL-MCNC: 11 MG/DL
CALCIUM SERPL-MCNC: 10 MG/DL
CHLORIDE SERPL-SCNC: 102 MMOL/L
CO2 SERPL-SCNC: 31 MMOL/L
CREAT SERPL-MCNC: 0.7 MG/DL
CRP SERPL-MCNC: 3.9 MG/L
DIFFERENTIAL METHOD: ABNORMAL
EOSINOPHIL # BLD AUTO: 0.4 K/UL
EOSINOPHIL NFR BLD: 4.8 %
ERYTHROCYTE [DISTWIDTH] IN BLOOD BY AUTOMATED COUNT: 14.3 %
ERYTHROCYTE [SEDIMENTATION RATE] IN BLOOD BY WESTERGREN METHOD: 8 MM/HR
EST. GFR  (AFRICAN AMERICAN): >60 ML/MIN/1.73 M^2
EST. GFR  (NON AFRICAN AMERICAN): >60 ML/MIN/1.73 M^2
GLUCOSE SERPL-MCNC: 100 MG/DL
HCT VFR BLD AUTO: 39.3 %
HGB BLD-MCNC: 11.9 G/DL
IMM GRANULOCYTES # BLD AUTO: 0.03 K/UL
IMM GRANULOCYTES NFR BLD AUTO: 0.4 %
LYMPHOCYTES # BLD AUTO: 1.8 K/UL
LYMPHOCYTES NFR BLD: 21.5 %
MCH RBC QN AUTO: 26 PG
MCHC RBC AUTO-ENTMCNC: 30.3 G/DL
MCV RBC AUTO: 86 FL
MONOCYTES # BLD AUTO: 0.8 K/UL
MONOCYTES NFR BLD: 8.8 %
NEUTROPHILS # BLD AUTO: 5.4 K/UL
NEUTROPHILS NFR BLD: 63.9 %
NRBC BLD-RTO: 0 /100 WBC
PLATELET # BLD AUTO: 340 K/UL
PMV BLD AUTO: 9 FL
POTASSIUM SERPL-SCNC: 3.6 MMOL/L
PROT SERPL-MCNC: 7.4 G/DL
RBC # BLD AUTO: 4.58 M/UL
SODIUM SERPL-SCNC: 139 MMOL/L
WBC # BLD AUTO: 8.5 K/UL

## 2018-11-17 PROCEDURE — 36415 COLL VENOUS BLD VENIPUNCTURE: CPT | Mod: PO

## 2018-11-17 PROCEDURE — 86140 C-REACTIVE PROTEIN: CPT

## 2018-11-17 PROCEDURE — 80053 COMPREHEN METABOLIC PANEL: CPT

## 2018-11-17 PROCEDURE — 85652 RBC SED RATE AUTOMATED: CPT

## 2018-11-17 PROCEDURE — 85025 COMPLETE CBC W/AUTO DIFF WBC: CPT

## 2018-11-26 DIAGNOSIS — L40.50 PSORIATIC ARTHRITIS: ICD-10-CM

## 2018-11-27 ENCOUNTER — PATIENT MESSAGE (OUTPATIENT)
Dept: FAMILY MEDICINE | Facility: CLINIC | Age: 43
End: 2018-11-27

## 2018-11-27 DIAGNOSIS — E03.9 HYPOTHYROIDISM, UNSPECIFIED TYPE: ICD-10-CM

## 2018-11-27 RX ORDER — IXEKIZUMAB 80 MG/ML
INJECTION, SOLUTION SUBCUTANEOUS
Qty: 3 SYRINGE | Refills: 1 | Status: SHIPPED | OUTPATIENT
Start: 2018-11-27 | End: 2019-03-07 | Stop reason: SDUPTHER

## 2018-11-27 RX ORDER — LIOTHYRONINE SODIUM 5 UG/1
TABLET ORAL
Qty: 180 TABLET | Refills: 0 | Status: SHIPPED | OUTPATIENT
Start: 2018-11-27 | End: 2019-05-08 | Stop reason: SDUPTHER

## 2018-11-30 ENCOUNTER — PATIENT MESSAGE (OUTPATIENT)
Dept: RHEUMATOLOGY | Facility: CLINIC | Age: 43
End: 2018-11-30

## 2018-12-01 ENCOUNTER — HOSPITAL ENCOUNTER (OUTPATIENT)
Dept: RADIOLOGY | Facility: HOSPITAL | Age: 43
Discharge: HOME | End: 2018-12-01
Attending: INTERNAL MEDICINE
Payer: COMMERCIAL

## 2018-12-01 DIAGNOSIS — L40.50 PSORIATIC ARTHRITIS: ICD-10-CM

## 2018-12-01 PROCEDURE — 73130 X-RAY EXAM OF HAND: CPT | Mod: 26,59,LT, | Performed by: RADIOLOGY

## 2018-12-01 PROCEDURE — 73130 X-RAY EXAM OF HAND: CPT | Mod: 26,RT,, | Performed by: RADIOLOGY

## 2018-12-01 PROCEDURE — 73130 X-RAY EXAM OF HAND: CPT | Mod: 50,TC,FY,PO

## 2018-12-05 ENCOUNTER — OFFICE VISIT (OUTPATIENT)
Dept: FAMILY MEDICINE | Facility: CLINIC | Age: 43
End: 2018-12-05
Payer: COMMERCIAL

## 2018-12-05 VITALS
HEART RATE: 91 BPM | OXYGEN SATURATION: 95 % | WEIGHT: 218.5 LBS | RESPIRATION RATE: 12 BRPM | DIASTOLIC BLOOD PRESSURE: 82 MMHG | BODY MASS INDEX: 35.12 KG/M2 | SYSTOLIC BLOOD PRESSURE: 130 MMHG | HEIGHT: 66 IN | TEMPERATURE: 98 F

## 2018-12-05 DIAGNOSIS — F34.1 DYSTHYMIA: ICD-10-CM

## 2018-12-05 DIAGNOSIS — K21.9 GASTROESOPHAGEAL REFLUX DISEASE, ESOPHAGITIS PRESENCE NOT SPECIFIED: ICD-10-CM

## 2018-12-05 DIAGNOSIS — Z00.00 WELL WOMAN EXAM WITHOUT GYNECOLOGICAL EXAM: Primary | ICD-10-CM

## 2018-12-05 PROCEDURE — 3079F DIAST BP 80-89 MM HG: CPT | Mod: CPTII,S$GLB,, | Performed by: FAMILY MEDICINE

## 2018-12-05 PROCEDURE — 99999 PR PBB SHADOW E&M-EST. PATIENT-LVL III: CPT | Mod: PBBFAC,,, | Performed by: FAMILY MEDICINE

## 2018-12-05 PROCEDURE — 3075F SYST BP GE 130 - 139MM HG: CPT | Mod: CPTII,S$GLB,, | Performed by: FAMILY MEDICINE

## 2018-12-05 PROCEDURE — 99396 PREV VISIT EST AGE 40-64: CPT | Mod: S$GLB,,, | Performed by: FAMILY MEDICINE

## 2018-12-05 RX ORDER — BUPROPION HYDROCHLORIDE 100 MG/1
100 TABLET ORAL 2 TIMES DAILY
Qty: 180 TABLET | Refills: 0 | Status: SHIPPED | OUTPATIENT
Start: 2018-12-05 | End: 2019-03-08 | Stop reason: SDUPTHER

## 2018-12-05 NOTE — PROGRESS NOTES
"Well Woman VISIT      CHIEF COMPLAINT  Chief Complaint   Patient presents with    Annual Exam       HPI  Keli Gilbert is a 42 y.o. female who presents for physical.     Social Factors  Tobacco use: No  Ready to Quit: No  Alcohol: Yes rarely  Intimate partner violence screening  "Do you feel safe in your current relationship?" single  "Have you ever been in a relationship in which your partner frightened you or hurt you?" No  Living Will/POA: No  Regular Exercise: No    Depression  Over the past two weeks, have you felt down, depressed, or hopeless? No  Over the past two weeks, have you felt little interest or pleasure in doing things? No    Reproductive Health  Followed by OBGYN    CHD, HTN, DM2  CHD Risk Factors: obesity (BMI >= 30 kg/m2)  Women 45 years and older should be screened for dyslipidemia if at increased risk of CHD  Women 20 to 45 years of age should be screened for dyslipidemia if at increased risk of CHD  Asymptomatic adults with sustained blood pressure greater than 135/80 mm Hg (treated or untreated) should be screened for type 2 diabetes mellitus    Estimated body mass index is 35.26 kg/m² as calculated from the following:    Height as of this encounter: 5' 6" (1.676 m).    Weight as of this encounter: 99.1 kg (218 lb 7.6 oz).      Screening  Mammogram needed: utd  Colonoscopy needed: n/a  Osteoporosis screen needed: n/a     Women 50 to 74 years of age should be screened for breast cancer with mammography biennially.  Women should be screened for cervical cancer with Pap tests beginning at 21 years of age. Low-risk women should receive Pap testing every three years. Co-testing for human papillomavirus is an option beginning at 30 years of age, and can extend the screening interval to five years. Cervical cancer screening should be discontinued at 65 years of age or after total hysterectomy if the woman has a benign gynecologic history  Adults 50 to 75 years of age should be screened " "for colorectal cancer with an FOBT annually, sigmoidoscopy every five years with an FOBT every three years, or colonoscopy every 10 years.  Women 65 years and older should be screened for osteoporosis. Women younger than 65 years should be screened if the risk of fracture is greater than or equal to that of a 65-year-old white woman without additional risk factors.      Immunizations  delayed    ALLERGIES and MEDS were verified.   PMHx, PSHx, FHx, SOCIALHx were updated as pertinent.    REVIEW OF SYSTEMS  Review of Systems   Constitutional: Negative.    Eyes: Negative.    Respiratory: Negative.    Cardiovascular: Negative.    Gastrointestinal: Negative.    Genitourinary: Negative.    Musculoskeletal: Positive for joint pain.   Skin: Negative.    Neurological: Negative.          PHYSICAL EXAM  VITAL SIGNS: /82 (BP Location: Left arm, Patient Position: Sitting, BP Method: Medium (Manual))   Pulse 91   Temp 98.2 °F (36.8 °C) (Oral)   Resp 12   Ht 5' 6" (1.676 m)   Wt 99.1 kg (218 lb 7.6 oz)   SpO2 95%   BMI 35.26 kg/m²   GEN: Well developed, Well nourished, No acute distress.  HENT: Normocephalic, Atraumatic, Bilateral external ears normal, Nose normal, Oropharynx moist, No oral exudates.   Eyes: PERRL, EOMI, Conjunctiva normal, No discharge.   Neck: Supple, No tenderness.  Lymphatic: No cervical or supraclavicular lymphadenopathy noted.   Cardiovascular: Normal heart rate, Normal rhythm, No murmurs, No rubs, No gallops.   Thorax & Lungs: Normal breath sounds, No respiratory distress, No wheezing.  Abdomen: Soft, No tenderness, Bowel sounds normal.  Breast: deferred  Genital: deferred   Skin: Warm, Dry, No erythema, No rash.   Extremities: No edema, No tenderness.       ASSESSMENT/PLAN    Keli was seen today for annual exam.    Diagnoses and all orders for this visit:    Well woman exam without gynecological exam  -     TSH; Future  -     T3; Future  -     T4; Future  -     Lipid panel; Future  -     CBC " auto differential; Future  -     Comprehensive metabolic panel; Future    Dysthymia  -     buPROPion (WELLBUTRIN) 100 MG tablet; Take 1 tablet (100 mg total) by mouth 2 (two) times daily.    Gastroesophageal reflux disease, esophagitis presence not specified  -     ranitidine (ZANTAC) 150 MG tablet; Take 1 tablet (150 mg total) by mouth 2 (two) times daily.         FOLLOW UP: 1 year or sooner if needed      Andriy Moran MD

## 2018-12-10 ENCOUNTER — PATIENT MESSAGE (OUTPATIENT)
Dept: FAMILY MEDICINE | Facility: CLINIC | Age: 43
End: 2018-12-10

## 2018-12-10 DIAGNOSIS — J32.1 CHRONIC FRONTAL SINUSITIS: Primary | ICD-10-CM

## 2018-12-10 RX ORDER — AMOXICILLIN AND CLAVULANATE POTASSIUM 875; 125 MG/1; MG/1
1 TABLET, FILM COATED ORAL 2 TIMES DAILY
Qty: 20 TABLET | Refills: 0 | Status: SHIPPED | OUTPATIENT
Start: 2018-12-10 | End: 2018-12-20

## 2018-12-12 ENCOUNTER — OFFICE VISIT (OUTPATIENT)
Dept: OBSTETRICS AND GYNECOLOGY | Facility: CLINIC | Age: 43
End: 2018-12-12
Payer: COMMERCIAL

## 2018-12-12 VITALS
WEIGHT: 218.25 LBS | HEIGHT: 66 IN | DIASTOLIC BLOOD PRESSURE: 82 MMHG | BODY MASS INDEX: 35.08 KG/M2 | SYSTOLIC BLOOD PRESSURE: 138 MMHG

## 2018-12-12 DIAGNOSIS — Z30.431 ENCOUNTER FOR ROUTINE CHECKING OF INTRAUTERINE CONTRACEPTIVE DEVICE (IUD): ICD-10-CM

## 2018-12-12 DIAGNOSIS — Z12.39 BREAST CANCER SCREENING: ICD-10-CM

## 2018-12-12 DIAGNOSIS — Z01.419 VISIT FOR GYNECOLOGIC EXAMINATION: Primary | ICD-10-CM

## 2018-12-12 PROCEDURE — 3075F SYST BP GE 130 - 139MM HG: CPT | Mod: CPTII,S$GLB,, | Performed by: OBSTETRICS & GYNECOLOGY

## 2018-12-12 PROCEDURE — 99999 PR PBB SHADOW E&M-EST. PATIENT-LVL III: CPT | Mod: PBBFAC,,, | Performed by: OBSTETRICS & GYNECOLOGY

## 2018-12-12 PROCEDURE — 3079F DIAST BP 80-89 MM HG: CPT | Mod: CPTII,S$GLB,, | Performed by: OBSTETRICS & GYNECOLOGY

## 2018-12-12 PROCEDURE — 99396 PREV VISIT EST AGE 40-64: CPT | Mod: S$GLB,,, | Performed by: OBSTETRICS & GYNECOLOGY

## 2018-12-12 NOTE — PROGRESS NOTES
HISTORY OF PRESENT ILLNESS:    Keli Gilbert is a 42 y.o. female, , No LMP recorded. Patient has had an implant.,  presents for a routine exam and has no complaints. MAMMO ORDERED AND PAP .  NO MENSES WITH IUD AND NO C/O.  PLANNING [place] NEXT WEEK    Last visit 2017:  DUE MIRENA REPLACEMENT TODAY.  PAP DUE 2019 REF MAMMO; SON NOW 21YO  ON ABX FOR SINUS INFECTION - DIFLUCAN PRN.  [place] LAST YEAR     LAST VISIT 2016:  DUE PAP SUBMITTED AND REF MAMMO.  IUD DUE REPLACEMENT 2017.  MOTHER WITH RECENT MI  LAST VISIT 2014:  IUD IN PLACE SINCE 3/2012 CAN FEEL STRING AND MINIMAL MENSES  LAST VISIT 2013:  PAP SUBMITTED AND DISCUSSED 3YR SCREENING. SHORT, LIGHT PERIODS ON MIRENA. PATIENT IS HAPPY WITH THE PATTERN   OPTIC NEURITIS IS BETTER   IUD SURVEILLANCE    Past Medical History:   Diagnosis Date    Abnormal Pap smear of vagina     AR (allergic rhinitis)     Demyelinating disorder     Depression     Fever blister     Fibromyalgia     followed by pain management    Generalized osteoarthritis of multiple sites     History of abnormal Pap smear     Hypertension     Hypothyroidism     Insulin resistance     Mixed anxiety and depressive disorder     Morbid obesity     Myelitis, optic neuritis in     treated with high-dose steroids and resolved; positive MRI and spinal fluid for a mass, but on embrel for psoriatic arthritis - she will see a MS specialist at [facility]; MRI normalized off embrel    Obesity     Pre-diabetes     hx diabetes on stereoids /    Psoriasis     Psoriatic arthritis     Vitamin D deficiency disease        Past Surgical History:   Procedure Laterality Date    SINUS SURGERY         MEDICATIONS AND ALLERGIES:      Current Outpatient Medications:     (Magic mouthwash) 1:1:1 Benadryl 12.5mg/5ml liq, aluminum & magnesium hydroxide-simehticone (Maalox), lidocaine viscous 2%, Swish and spit 5 mLs every 4 (four) hours as needed. for mouth sores, Disp: 300 mL, Rfl:  0    amoxicillin-clavulanate 875-125mg (AUGMENTIN) 875-125 mg per tablet, Take 1 tablet by mouth 2 (two) times daily. for 10 days, Disp: 20 tablet, Rfl: 0    buPROPion (WELLBUTRIN) 100 MG tablet, Take 1 tablet (100 mg total) by mouth 2 (two) times daily., Disp: 180 tablet, Rfl: 0    cholecalciferol, vitamin D3, 2,000 unit Tab, Take 1 tablet by mouth Once a week., Disp: , Rfl:     cholecalciferol, vitamin D3, 50,000 unit capsule, Take 1 capsule (50,000 Units total) by mouth every 14 (fourteen) days., Disp: 18 capsule, Rfl: 1    clindamycin (CLEOCIN T) 1 % external solution, , Disp: , Rfl:     clobetasol (CLOBEX) 0.05 % topical spray, Apply topically Twice daily. AAA bie. Disp 125 ml, Disp: , Rfl:     gabapentin (NEURONTIN) 300 MG capsule, Take 300 mg by mouth once daily., Disp: , Rfl:     leflunomide (ARAVA) 20 MG Tab, Take 1 tablet (20 mg total) by mouth once daily., Disp: 90 tablet, Rfl: 3    levocetirizine (XYZAL) 5 MG tablet, TAKE 1 TABLET (5 MG TOTAL) BY MOUTH DAILY AS NEEDED FOR ALLERGIES., Disp: 90 tablet, Rfl: 3    liothyronine (CYTOMEL) 5 MCG Tab, TAKE 2 TABLETS (10 MCG TOTAL) BY MOUTH ONCE DAILY., Disp: 180 tablet, Rfl: 0    lisinopril-hydrochlorothiazide (PRINZIDE,ZESTORETIC) 20-25 mg Tab, TAKE 1 TABLET BY MOUTH ONCE DAILY., Disp: 90 tablet, Rfl: 3    methadone (DOLOPHINE) 10 MG tablet, Take 1 tablet by mouth Three times a day., Disp: , Rfl:     mupirocin calcium 2% (BACTROBAN) 2 % cream, APPLY TO AFFECTED AREA TWICE A DAY INTRANASALLY FOR 1ST WEEK OF THE MONTH, Disp: 30 g, Rfl: 3    naproxen (NAPROSYN) 500 MG tablet, Take 1 tablet (500 mg total) by mouth 3 (three) times daily with meals., Disp: 270 tablet, Rfl: 3    potassium chloride (MICRO-K) 10 MEQ CpSR, Take 1 capsule (10 mEq total) by mouth once daily., Disp: 90 capsule, Rfl: 3    PROAIR HFA 90 mcg/actuation inhaler, INHALE 2 PUFFS INTO THE LUNGS EVERY 6 (SIX) HOURS AS NEEDED FOR WHEEZING. RESCUE, Disp: 8.5 Inhaler, Rfl: 0     promethazine (PHENERGAN) 25 MG tablet, Take 1 tablet by mouth Once daily as needed., Disp: , Rfl:     ranitidine (ZANTAC) 150 MG tablet, Take 1 tablet (150 mg total) by mouth 2 (two) times daily., Disp: 180 tablet, Rfl: 2    SYNTHROID 125 mcg tablet, Take 1 tablet (125 mcg total) by mouth before breakfast., Disp: 90 tablet, Rfl: 3    TALTZ AUTOINJECTOR 80 mg/mL AtIn, INJECT 80 MG UNDER THE SKIN (SUBCUTANEOUS INJECTION) EVERY 4 WEEKS, Disp: 3 Syringe, Rfl: 1    tizanidine (ZANAFLEX) 4 MG tablet, Take 4 mg by mouth 3 (three) times daily as needed., Disp: , Rfl: 6    trazodone (DESYREL) 50 MG tablet, , Disp: , Rfl:     triamcinolone (KENALOG) 0.1 % cream, Apply topically Twice daily. Dirs: disp: 1# jar AAA, Disp: , Rfl:     triamcinolone acetonide 0.1% (KENALOG) 0.1 % paste, , Disp: , Rfl:     urea (CARMOL) 40 % Crea, AAA foot qd after soak, Disp: 198.6 g, Rfl: 3    azelastine (ASTELIN) 137 mcg (0.1 %) nasal spray, 1 spray (137 mcg total) by Nasal route 2 (two) times daily., Disp: 30 mL, Rfl: 0    Review of patient's allergies indicates:   Allergen Reactions    Doxycycline hcl Nausea And Vomiting    Enbrel [etanercept] Other (See Comments)     Optic neurtits       Family History   Problem Relation Age of Onset    Psoriasis Father     Cancer Father         throat    Thyroid disease Mother     Heart disease Mother     Hypertension Mother     Hyperlipidemia Mother     Coronary artery disease Mother         s/p CABG    Heart attack Mother     Heart disease Sister         MVP    Diabetes Paternal Uncle     Diabetes Maternal Grandfather     Heart disease Maternal Grandfather     Thyroid disease Maternal Grandfather     ADD / ADHD Son     Melanoma Neg Hx     Lupus Neg Hx     Eczema Neg Hx     Acne Neg Hx     Breast cancer Neg Hx     Colon cancer Neg Hx     Ovarian cancer Neg Hx        Social History     Socioeconomic History    Marital status: Single     Spouse name: Not on file    Number of  "children: 1    Years of education: Not on file    Highest education level: Not on file   Social Needs    Financial resource strain: Not on file    Food insecurity - worry: Not on file    Food insecurity - inability: Not on file    Transportation needs - medical: Not on file    Transportation needs - non-medical: Not on file   Occupational History    Occupation: GERS     Employer: [employer] (MAIN OFFICE)   Tobacco Use    Smoking status: Never Smoker    Smokeless tobacco: Never Used   Substance and Sexual Activity    Alcohol use: No    Drug use: No     Comment: 7/7/15 one weed brownie    Sexual activity: Yes     Partners: Male     Birth control/protection: IUD   Other Topics Concern    Are you pregnant or think you may be? No    Breast-feeding No   Social History Narrative    Not on file       COMPREHENSIVE GYN HISTORY:  PAP History: Denies abnormal Paps.  Infection History: Denies STDs. Denies PID.  Benign History: Denies uterine fibroids. Denies ovarian cysts. Denies endometriosis. Denies other conditions.  Cancer History: Denies cervical cancer. Denies uterine cancer or hyperplasia. Denies ovarian cancer. Denies vulvar cancer or pre-cancer. Denies vaginal cancer or pre-cancer. Denies breast cancer. Denies colon cancer.  Sexual Activity History: Reports currently being sexually active  Menstrual History: Monthly, mild-moderate.  Contraception:IUD    ROS:  GENERAL: No weight changes. No swelling. No fatigue. No fever.  CARDIOVASCULAR: No chest pain. No shortness of breath. No leg cramps.   NEUROLOGICAL: No headaches. No vision changes.  BREASTS: No pain. No lumps. No discharge.  ABDOMEN: No pain. No nausea. No vomiting. No diarrhea. No constipation.  REPRODUCTIVE: No abnormal bleeding.   VULVA: No pain. No lesions. No itching.  VAGINA: No relaxation. No itching. No odor. No discharge. No lesions.  URINARY: No incontinence. No nocturia. No frequency. No dysuria.    /82   Ht 5' 6" (1.676 m)  "  Wt 99 kg (218 lb 4.1 oz)   BMI 35.23 kg/m²     PE:  APPEARANCE: Well nourished, well developed, in no acute distress.  AFFECT: WNL, alert and oriented x 3.  SKIN: No acne or hirsutism.  NECK: Neck symmetric, without masses or thyromegaly.  NODES: No inguinal, cervical, axillary or femoral lymph node enlargement.  CHEST: Good respiratory effort.   ABDOMEN: Soft. No tenderness or masses. No hepatosplenomegaly. No hernias.  BREASTS: Symmetrical, no skin changes, visible lesions, palpable masses or nipple discharge bilaterally.  PELVIC: External female genitalia without lesions.  Female hair distribution. Adequate perineal body, Normal urethral meatus. Vagina moist and well rugated without lesions or discharge.  No significant cystocele or rectocele present. Cervix pink without lesions, discharge or tenderness, STRING NOTED AT EXTERNAL OS. Uterus is normal size, regular, mobile and nontender. Adnexa without masses or tenderness.  EXTREMITIES: No edema    PROCEDURES:      DIAGNOSIS:  1. Visit for gynecologic examination     2. Encounter for routine checking of intrauterine contraceptive device (IUD)     3. Breast cancer screening  Mammo Digital Screening Bilat w/ Philippe       LABS AND TESTS ORDERED:    MEDICATIONS PRESCRIBED:    COUNSELING:   The patient was counseled today on ACS PAP guidelines, with recommendations for yearly pelvic exams unless their uterus, cervix, and ovaries were removed for benign reasons; in that case, examinations every 3-5 years are recommended.  The patient was also counseled regarding monthly breast self-examination, routine STD screening for at-risk populations, prophylactic immunizations for transmitted infections such as  HPV, Pertussis, or Influenza as appropriate, and yearly mammograms when indicated by ACS guidelines.  She was advised to see her primary care physician for all other health maintenance.    FOLLOW-UP with me annually.

## 2018-12-30 ENCOUNTER — PATIENT MESSAGE (OUTPATIENT)
Dept: FAMILY MEDICINE | Facility: CLINIC | Age: 43
End: 2018-12-30

## 2018-12-31 ENCOUNTER — PATIENT MESSAGE (OUTPATIENT)
Dept: FAMILY MEDICINE | Facility: CLINIC | Age: 43
End: 2018-12-31

## 2019-01-02 ENCOUNTER — PATIENT MESSAGE (OUTPATIENT)
Dept: FAMILY MEDICINE | Facility: CLINIC | Age: 44
End: 2019-01-02

## 2019-01-07 ENCOUNTER — PATIENT MESSAGE (OUTPATIENT)
Dept: OBSTETRICS AND GYNECOLOGY | Facility: CLINIC | Age: 44
End: 2019-01-07

## 2019-01-07 ENCOUNTER — TELEPHONE (OUTPATIENT)
Dept: OBSTETRICS AND GYNECOLOGY | Facility: CLINIC | Age: 44
End: 2019-01-07

## 2019-01-07 DIAGNOSIS — Z79.1 LONG TERM (CURRENT) USE OF NON-STEROIDAL ANTI-INFLAMMATORIES (NSAID): ICD-10-CM

## 2019-01-07 DIAGNOSIS — L40.50 PSORIATIC ARTHRITIS: ICD-10-CM

## 2019-01-07 DIAGNOSIS — Z12.31 VISIT FOR SCREENING MAMMOGRAM: Primary | ICD-10-CM

## 2019-01-07 RX ORDER — LEFLUNOMIDE 20 MG/1
20 TABLET ORAL DAILY
Qty: 90 TABLET | Refills: 3 | Status: SHIPPED | OUTPATIENT
Start: 2019-01-07 | End: 2020-02-17 | Stop reason: SDUPTHER

## 2019-01-07 RX ORDER — NAPROXEN 500 MG/1
500 TABLET ORAL
Qty: 270 TABLET | Refills: 3 | Status: SHIPPED | OUTPATIENT
Start: 2019-01-07 | End: 2019-11-21 | Stop reason: ALTCHOICE

## 2019-01-10 DIAGNOSIS — E03.9 HYPOTHYROIDISM, UNSPECIFIED TYPE: ICD-10-CM

## 2019-01-10 DIAGNOSIS — L40.50 PSORIATIC ARTHRITIS: ICD-10-CM

## 2019-01-10 RX ORDER — ASPIRIN 325 MG
50000 TABLET, DELAYED RELEASE (ENTERIC COATED) ORAL
Qty: 18 CAPSULE | Refills: 1 | Status: SHIPPED | OUTPATIENT
Start: 2019-01-10 | End: 2020-04-01 | Stop reason: SDUPTHER

## 2019-01-10 RX ORDER — LEVOTHYROXINE SODIUM 125 UG/1
125 TABLET ORAL
Qty: 90 TABLET | Refills: 3 | Status: SHIPPED | OUTPATIENT
Start: 2019-01-10 | End: 2020-01-07

## 2019-02-06 ENCOUNTER — PATIENT MESSAGE (OUTPATIENT)
Dept: FAMILY MEDICINE | Facility: CLINIC | Age: 44
End: 2019-02-06

## 2019-02-11 ENCOUNTER — PATIENT MESSAGE (OUTPATIENT)
Dept: RHEUMATOLOGY | Facility: CLINIC | Age: 44
End: 2019-02-11

## 2019-02-17 NOTE — PROGRESS NOTES
Subjective:       Patient ID: Keli Concha Gilbert is a 43 y.o. female with psoriatic arthritis on Taltz & Leflunomide (& naproxen); demyelinating disorder secondary to Enbrel; incomplete effect on chronic pain syndrome on savella & methadone     Chief Complaint: Disease management    Ms. Gilbert is a 42 yo woman with history of PsA now on Talz, here for follow-up. She was last seen on 10/17/18. She is still having hand pain and swelling. Her R 3rd PIP is especially painful and swollen. The base of her L thumb remains painful and swollen.  She started Talz in April 2018 & is still on leflunomide 20 mg/d & naproxen 500 mg bid.to tid.  She still has AM stiffness x 30 minutes.  Last ESR 8 and CRP is 3.9 on 11/17/18. He skin rashes are almost gone.     She still reports insomnia, anxiety and depression.             Associated symptoms include fatigue. Pertinent negatives include no fever or headaches.          Current Outpatient Medications   Medication Sig Dispense Refill    (Magic mouthwash) 1:1:1 Benadryl 12.5mg/5ml liq, aluminum & magnesium hydroxide-simehticone (Maalox), lidocaine viscous 2% Swish and spit 5 mLs every 4 (four) hours as needed. for mouth sores 300 mL 0    buPROPion (WELLBUTRIN) 100 MG tablet Take 1 tablet (100 mg total) by mouth 2 (two) times daily. 180 tablet 0    cholecalciferol, vitamin D3, 50,000 unit capsule Take 1 capsule (50,000 Units total) by mouth every 14 (fourteen) days. 18 capsule 1    clindamycin (CLEOCIN T) 1 % external solution       clobetasol (CLOBEX) 0.05 % topical spray Apply topically Twice daily. AAA bie. Disp 125 ml      gabapentin (NEURONTIN) 300 MG capsule Take 300 mg by mouth once daily.      leflunomide (ARAVA) 20 MG Tab Take 1 tablet (20 mg total) by mouth once daily. 90 tablet 3    levocetirizine (XYZAL) 5 MG tablet TAKE 1 TABLET (5 MG TOTAL) BY MOUTH DAILY AS NEEDED FOR ALLERGIES. 90 tablet 3    liothyronine (CYTOMEL) 5 MCG Tab TAKE 2 TABLETS (10 MCG TOTAL) BY  MOUTH ONCE DAILY. 180 tablet 0    lisinopril-hydrochlorothiazide (PRINZIDE,ZESTORETIC) 20-25 mg Tab TAKE 1 TABLET BY MOUTH ONCE DAILY. 90 tablet 3    methadone (DOLOPHINE) 10 MG tablet Take 1 tablet by mouth Three times a day.      mupirocin calcium 2% (BACTROBAN) 2 % cream APPLY TO AFFECTED AREA TWICE A DAY INTRANASALLY FOR 1ST WEEK OF THE MONTH 30 g 3    naproxen (NAPROSYN) 500 MG tablet Take 1 tablet (500 mg total) by mouth 3 (three) times daily with meals. 270 tablet 3    potassium chloride (MICRO-K) 10 MEQ CpSR Take 1 capsule (10 mEq total) by mouth once daily. 90 capsule 3    PROAIR HFA 90 mcg/actuation inhaler INHALE 2 PUFFS INTO THE LUNGS EVERY 6 (SIX) HOURS AS NEEDED FOR WHEEZING. RESCUE 8.5 Inhaler 0    promethazine (PHENERGAN) 25 MG tablet Take 1 tablet by mouth Once daily as needed.      ranitidine (ZANTAC) 150 MG tablet Take 1 tablet (150 mg total) by mouth 2 (two) times daily. 180 tablet 2    SYNTHROID 125 mcg tablet Take 1 tablet (125 mcg total) by mouth before breakfast. 90 tablet 3    TALTZ AUTOINJECTOR 80 mg/mL AtIn INJECT 80 MG UNDER THE SKIN (SUBCUTANEOUS INJECTION) EVERY 4 WEEKS 3 Syringe 1    tizanidine (ZANAFLEX) 4 MG tablet Take 4 mg by mouth 3 (three) times daily as needed.  6    trazodone (DESYREL) 50 MG tablet       triamcinolone (KENALOG) 0.1 % cream Apply topically Twice daily. Dirs: disp: 1# jar AAA      triamcinolone acetonide 0.1% (KENALOG) 0.1 % paste       urea (CARMOL) 40 % Crea AAA foot qd after soak 198.6 g 3    azelastine (ASTELIN) 137 mcg (0.1 %) nasal spray 1 spray (137 mcg total) by Nasal route 2 (two) times daily. 30 mL 0    cholecalciferol, vitamin D3, 2,000 unit Tab Take 1 tablet by mouth Once a week.       No current facility-administered medications for this visit.      Review of patient's allergies indicates:   Allergen Reactions    Doxycycline hcl Nausea And Vomiting    Enbrel [etanercept] Other (See Comments)     Optic neurtits     Past Medical  History:   Diagnosis Date    Abnormal Pap smear of vagina     AR (allergic rhinitis)     Demyelinating disorder     Depression     Fever blister     Fibromyalgia     followed by pain management    Generalized osteoarthritis of multiple sites     History of abnormal Pap smear     Hypertension     Hypothyroidism     Insulin resistance     Mixed anxiety and depressive disorder     Morbid obesity     Myelitis, optic neuritis in     treated with high-dose steroids and resolved; positive MRI and spinal fluid for a mass, but on embrel for psoriatic arthritis - she will see a MS specialist at [facility]; MRI normalized off embrel    Obesity     Pre-diabetes     hx diabetes on stereoids /    Psoriasis     Psoriatic arthritis     Vitamin D deficiency disease      Past Surgical History:   Procedure Laterality Date    SINUS SURGERY  1998       Review of Systems   Constitutional: Positive for fatigue. Negative for fever and unexpected weight change.   HENT: Negative.  Negative for dental problem and mouth sores.    Eyes: Negative.  Negative for redness, itching and visual disturbance.        Dry eyes   Respiratory: Negative.    Cardiovascular: Negative.  Negative for chest pain, palpitations and leg swelling.   Gastrointestinal: Negative.  Negative for abdominal pain, anal bleeding, blood in stool, constipation, diarrhea and nausea.        Heartburn.   Endocrine: Negative.    Genitourinary: Negative.  Negative for frequency, menstrual problem, pelvic pain and urgency.   Musculoskeletal: Negative.  Negative for arthralgias, back pain, gait problem and neck pain.   Skin: Negative.  Negative for color change and rash.   Allergic/Immunologic: Positive for immunocompromised state.   Neurological: Negative.  Negative for dizziness, seizures, weakness, numbness and headaches.   Hematological: Negative.  Negative for adenopathy. Does not bruise/bleed easily.   Psychiatric/Behavioral: Positive for dysphoric mood and sleep  "disturbance. Negative for decreased concentration and suicidal ideas. The patient is nervous/anxious.          Family History   Problem Relation Age of Onset    Psoriasis Father     Cancer Father         throat    Thyroid disease Mother     Heart disease Mother     Hypertension Mother     Hyperlipidemia Mother     Coronary artery disease Mother         s/p CABG    Heart attack Mother     Heart disease Sister         MVP    Diabetes Paternal Uncle     Diabetes Maternal Grandfather     Heart disease Maternal Grandfather     Thyroid disease Maternal Grandfather     ADD / ADHD Son     Melanoma Neg Hx     Lupus Neg Hx     Eczema Neg Hx     Acne Neg Hx     Breast cancer Neg Hx     Colon cancer Neg Hx     Ovarian cancer Neg Hx        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   Mom had a CVA & is home but not really doing too well. She has cognitive issues.   No alcohol; no cigarettes;   Objective:   /77   Pulse 89   Ht 5' 6" (1.676 m)   Wt 102 kg (224 lb 13.9 oz)   BMI 36.29 kg/m²     Physical Exam   Vitals reviewed.  Constitutional: She is oriented to person, place, and time and well-developed, well-nourished, and in no distress. No distress.   HENT:   Head: Normocephalic and atraumatic.   Mouth/Throat: Oropharynx is clear and moist. No oropharyngeal exudate.   No facial rashes  Parotids not enlarged  No oral ulcers   Eyes: Conjunctivae and EOM are normal. Pupils are equal, round, and reactive to light. Right eye exhibits no discharge. Left eye exhibits no discharge. No scleral icterus.   Neck: Neck supple. No JVD present. No tracheal deviation present. No thyromegaly present.   Cardiovascular: Normal rate, regular rhythm, normal heart sounds and intact distal pulses.  Exam reveals no gallop and no friction rub.    No murmur heard.  Pulmonary/Chest: Effort normal and breath sounds normal. No respiratory distress. She has no wheezes. She has no rales. She exhibits no tenderness.   Abdominal: Soft. " Bowel sounds are normal. She exhibits no distension and no mass. There is no splenomegaly or hepatomegaly. There is no tenderness. There is no rebound and no guarding.   Lymphadenopathy:     She has no cervical adenopathy.        Right: No inguinal adenopathy present.        Left: No inguinal adenopathy present.   Neurological: She is alert and oriented to person, place, and time. She has normal reflexes. No cranial nerve deficit. Gait normal.   Proximal and distal muscle strength 5/5.   Skin: Skin is warm and dry. She is not diaphoretic.     No psoriasis.   Psychiatric: Mood, memory, affect and judgment normal.   Drowsy.   Musculoskeletal: Normal range of motion. She exhibits no tenderness.   Cspine slight decrease in lateral flexion & rotation; no tenderness  Tspine FROM no tenderness  Lspine FROM no tenderness.  TMJ: unremarkable  Shoulders: FROM; no synovitis  Elbows: FROM; no synovitis; no tophi or nodules  Wrists: no SHT; no tenderness; good ROM  MCPs: L 1st C-MCP tender with mild SHT;1st MCP mild SHT;  no metatarsalgia;  PIPs: minimal dactylitis 2nd ray R; R 3rd PIP SHT & mildly erythematous; mildly tender; cannot fully extend this joint.   DIPs: FROM; no synovitis  HIPS: FROM  Knees: FROM; no synovitis; no instability;  Ankles: FROM: no synovitis   Toes: ok; no metatarsalgia                           R hand        Labs:   11/17/18: ESR 8; CRP 3.9; Hg 11.9; low indices; CMP ok;   8/22/18: ESR 10; CRP 10.9; CBC Hg 10.7; Ht 35.7; CMP ok  5/14/18: ESR 13; CRP 13.4;  Hg 11.3; low indices; eos 12.4%; CMP ok;   2/10/18: ESR 30; CRP 10.5; Hg 11.3; plt 366; CMP ok;   10/19/17: HBV neg; HCV neg;   9/2/17: ESR 16; CRp 5.9; Hg 11.6; Ht 36.6; CMP ok;   5/29/17: QG neg  2/20/17: ESR 34; CRP 8.1; CBC Hg 11.4; Ht 35.6; CMP ok;   11/19/16: ESR 20; CRP 4.8; CBC Hg 11.9; CMP K+3.3, otherwise ok;   8/27/16: ESR 22; CRP 7; Hg 11.8; Ht 36.9; CMP ok;   5/26/16: ESR 35; CRP 11.2Hg 11.8; Ht 36.9; CMP ok;   5/21/16: Vit d  37  10/8/15: ESR 45; CRP 14.2; Hg 11.2; Ht 35.8; CMP; ok  7/7/15: CBC plt 380; K+ 3.4; Glu 154  7/6/15: ESR 48; CRP 9.6  4/4/15: ESR 41; CRP 19; Hg 11.0; Ht 34.6; plt 377; CMP ok;  1/10/15: ESR 34; CRP 18; CBC ok; ; Alb. 3.6  10/4/14: ESR 43; CRP 18.5; Hg 11.8; Ht 36.5; plt 378; Alb. 3.4;   3/20/14: ESR 41; CRP 14.3; plt 356; CMP ok;  2/15/13: ESR 30; CRP 9.8; CBC ok; Alb. 3.3  11/2/13: ESR 28; CRP 11.6; plt 363; Alb. 3.3;   5/28/13: ESR 45; CRP 8.8; plt 366; CMP ok;     12/1/18: Bilateral hands: personally viewed: Mild degenerative changes noted at the interphalangeal joints.  Mild-to-moderate degenerative change noted at the bilateral 1st CMC joints.  Mild degenerative changes also present at both radiocarpal joints, triscaphe joints and right distal radioulnar joint.  Minimal degenerative changes also present at the MCP joints bilaterally and greatest at the 1st MCP joint on the left.  6/15/17: Bilateral hands: personally reviewed: mild osteoarthritis w stable periarticular erosion at the left fourth MCP joint; no change since last year.   8/27/16: Bilateral feet: personally reviewed. Mild HV; mild OA shell 1st MTP dali; ? Erosion PIP left small toe;   5/26/16: Bilateral hands: personally reviewed: L 4th MCP (new) erosive lesion; R & L 2nd & 3rd metacarpal head cysts; R mild PIP erosion & STS 2nd.    Assessment:     Psoriatic arthritis-- with mild SHT 2 MCPs bilaterally (R >>L) & with sausage digit of R index finger--currently w SHT & flexion deformity of R 3rd PIP.    a) History of sausage digits of both toes      PIPs, DIPs, wrists, and knees., now w. only one sausage digit.   b) Psoriasis of the abdomen, elbow, and the shins--resolved.    c) Enbrel stopped 10/21/11 due to optic neuritis.     d) New erosion (4th L metacarpal head) on x-ray & possibly L 5th toe PIP     e) Persisting elevation of ESR and CRP    f) inadequate response to leflunomide and Stelara & apremilast (w. Intolerable nausea)   G) could  not tolerate SSZ--nausea.   H) MTX x 2 even SC did not help sxs.   I) improved on Cosentyx (+leflunomide + naproxen) initially incompletely-- hand joints still hurt despite normalization of   ESR & CRP   J) Orencia begun 11/17 with incomplete response   K)Taltz begun 4/20/18    SHT 3rd R PIP & base of L thumb    Chronic pain/fibromyalgia syndrome with fatigue, tender points, withdrawals to palpation in the past, pain all over, responsive to Savella but with some nausea (off it now), [place] [place] [facility] [place]    On methadone    Left optic neuritis with demyelinating lesions secondary to Enbrel--now resolved.    Repeat MRI ok    Diabetes mellitus, diet controlled     Degenerative joint disease of the cervical spine, knees, and feet--very mild.     Depression on wellbutrin    Off savella    Hypertension.     Hypothyroidism.     Vitamin D deficiency with regular prescription vitamin D supplementation.     Obesity         Plan:   Discusses xeljanz as option.  Side effects and toxicities reviewed. Handout provided.  HZ stressed.  TakeTaltz 80 mg today or tomorrow & if labs ok take xeljanz in 1 month.   Continue Leflunomide 20 mg/d.  Continue naproxen 500 mg bid p to tid pc at the most.  Labs today and in 3 months.   Shingrix first dose today if possible--if available; if not get from PCP and get second dose 2-6 months later.  RTC 4 months.

## 2019-02-19 ENCOUNTER — OFFICE VISIT (OUTPATIENT)
Dept: RHEUMATOLOGY | Facility: CLINIC | Age: 44
End: 2019-02-19
Payer: COMMERCIAL

## 2019-02-19 ENCOUNTER — HOSPITAL ENCOUNTER (OUTPATIENT)
Dept: RADIOLOGY | Facility: HOSPITAL | Age: 44
Discharge: HOME | End: 2019-02-19
Attending: OBSTETRICS & GYNECOLOGY
Payer: COMMERCIAL

## 2019-02-19 VITALS
HEART RATE: 89 BPM | HEIGHT: 66 IN | BODY MASS INDEX: 36.14 KG/M2 | DIASTOLIC BLOOD PRESSURE: 77 MMHG | WEIGHT: 224.88 LBS | SYSTOLIC BLOOD PRESSURE: 120 MMHG

## 2019-02-19 DIAGNOSIS — L40.50 PSORIATIC ARTHRITIS: Primary | ICD-10-CM

## 2019-02-19 DIAGNOSIS — Z55.9 EDUCATIONAL CIRCUMSTANCE: ICD-10-CM

## 2019-02-19 DIAGNOSIS — Z12.31 VISIT FOR SCREENING MAMMOGRAM: ICD-10-CM

## 2019-02-19 DIAGNOSIS — Z79.899 LONG-TERM USE OF HIGH-RISK MEDICATION: ICD-10-CM

## 2019-02-19 DIAGNOSIS — Z79.899 LONG TERM CURRENT USE OF IMMUNOSUPPRESSIVE DRUG: ICD-10-CM

## 2019-02-19 DIAGNOSIS — M15.9 GENERALIZED OSTEOARTHRITIS OF MULTIPLE SITES: ICD-10-CM

## 2019-02-19 PROCEDURE — 3078F PR MOST RECENT DIASTOLIC BLOOD PRESSURE < 80 MM HG: ICD-10-PCS | Mod: CPTII,S$GLB,, | Performed by: INTERNAL MEDICINE

## 2019-02-19 PROCEDURE — 3074F SYST BP LT 130 MM HG: CPT | Mod: CPTII,S$GLB,, | Performed by: INTERNAL MEDICINE

## 2019-02-19 PROCEDURE — 99214 PR OFFICE/OUTPT VISIT, EST, LEVL IV, 30-39 MIN: ICD-10-PCS | Mod: S$GLB,,, | Performed by: INTERNAL MEDICINE

## 2019-02-19 PROCEDURE — 3078F DIAST BP <80 MM HG: CPT | Mod: CPTII,S$GLB,, | Performed by: INTERNAL MEDICINE

## 2019-02-19 PROCEDURE — 99999 PR PBB SHADOW E&M-EST. PATIENT-LVL V: CPT | Mod: PBBFAC,,, | Performed by: INTERNAL MEDICINE

## 2019-02-19 PROCEDURE — 77063 MAMMO DIGITAL SCREENING BILAT WITH TOMOSYNTHESIS_CAD: ICD-10-PCS | Mod: 26,,, | Performed by: RADIOLOGY

## 2019-02-19 PROCEDURE — 99214 OFFICE O/P EST MOD 30 MIN: CPT | Mod: S$GLB,,, | Performed by: INTERNAL MEDICINE

## 2019-02-19 PROCEDURE — 3008F BODY MASS INDEX DOCD: CPT | Mod: CPTII,S$GLB,, | Performed by: INTERNAL MEDICINE

## 2019-02-19 PROCEDURE — 77063 BREAST TOMOSYNTHESIS BI: CPT | Mod: 26,,, | Performed by: RADIOLOGY

## 2019-02-19 PROCEDURE — 77067 MAMMO DIGITAL SCREENING BILAT WITH TOMOSYNTHESIS_CAD: ICD-10-PCS | Mod: 26,,, | Performed by: RADIOLOGY

## 2019-02-19 PROCEDURE — 3074F PR MOST RECENT SYSTOLIC BLOOD PRESSURE < 130 MM HG: ICD-10-PCS | Mod: CPTII,S$GLB,, | Performed by: INTERNAL MEDICINE

## 2019-02-19 PROCEDURE — 77067 SCR MAMMO BI INCL CAD: CPT | Mod: 26,,, | Performed by: RADIOLOGY

## 2019-02-19 PROCEDURE — 77067 SCR MAMMO BI INCL CAD: CPT | Mod: TC

## 2019-02-19 PROCEDURE — 99999 PR PBB SHADOW E&M-EST. PATIENT-LVL V: ICD-10-PCS | Mod: PBBFAC,,, | Performed by: INTERNAL MEDICINE

## 2019-02-19 PROCEDURE — 3008F PR BODY MASS INDEX (BMI) DOCUMENTED: ICD-10-PCS | Mod: CPTII,S$GLB,, | Performed by: INTERNAL MEDICINE

## 2019-02-19 SDOH — SOCIAL DETERMINANTS OF HEALTH (SDOH): PROBLEMS RELATED TO EDUCATION AND LITERACY, UNSPECIFIED: Z55.9

## 2019-02-19 ASSESSMENT — ROUTINE ASSESSMENT OF PATIENT INDEX DATA (RAPID3)
WHEN YOU AWAKENED IN THE MORNING OVER THE LAST WEEK, PLEASE INDICATE THE AMOUNT OF TIME IT TAKES UNTIL YOU ARE AS LIMBER AS YOU WILL BE FOR THE DAY: 30 MINUTES
MDHAQ FUNCTION SCORE: .7
AM STIFFNESS SCORE: 1, YES
PSYCHOLOGICAL DISTRESS SCORE: 3.3
PATIENT GLOBAL ASSESSMENT SCORE: 7
TOTAL RAPID3 SCORE: 5.78
FATIGUE SCORE: 8.5
PAIN SCORE: 8

## 2019-02-19 NOTE — PATIENT INSTRUCTIONS
Exercise aerobically daily. Low impact.     Read on xeljanz and let us know if you have any questions.    We sent the rx to [facility]: [phone]  [email or web address]    Take your last Taltz today or tomorrow and nothing x 1 month, then if labs are ok can start xeljanz XR once a day.     Get your Shingrix injection. If not downstairs then see if your PCP can give it to you.

## 2019-02-20 ENCOUNTER — PATIENT MESSAGE (OUTPATIENT)
Dept: FAMILY MEDICINE | Facility: CLINIC | Age: 44
End: 2019-02-20

## 2019-02-20 DIAGNOSIS — L40.50 PSORIATIC ARTHRITIS: ICD-10-CM

## 2019-02-20 DIAGNOSIS — G36.0: ICD-10-CM

## 2019-02-20 DIAGNOSIS — G37.9 DEMYELINATING DISORDER: Primary | ICD-10-CM

## 2019-02-21 ENCOUNTER — TELEPHONE (OUTPATIENT)
Dept: PHARMACY | Facility: CLINIC | Age: 44
End: 2019-02-21

## 2019-03-01 ENCOUNTER — PATIENT MESSAGE (OUTPATIENT)
Dept: FAMILY MEDICINE | Facility: CLINIC | Age: 44
End: 2019-03-01

## 2019-03-01 ENCOUNTER — PATIENT MESSAGE (OUTPATIENT)
Dept: RHEUMATOLOGY | Facility: CLINIC | Age: 44
End: 2019-03-01

## 2019-03-07 ENCOUNTER — PATIENT MESSAGE (OUTPATIENT)
Dept: FAMILY MEDICINE | Facility: CLINIC | Age: 44
End: 2019-03-07

## 2019-03-07 ENCOUNTER — TELEPHONE (OUTPATIENT)
Dept: RHEUMATOLOGY | Facility: CLINIC | Age: 44
End: 2019-03-07

## 2019-03-07 DIAGNOSIS — L40.50 PSORIATIC ARTHRITIS: ICD-10-CM

## 2019-03-08 DIAGNOSIS — F34.1 DYSTHYMIA: ICD-10-CM

## 2019-03-08 RX ORDER — BUPROPION HYDROCHLORIDE 100 MG/1
TABLET ORAL
Qty: 180 TABLET | Refills: 0 | Status: SHIPPED | OUTPATIENT
Start: 2019-03-08 | End: 2019-07-02 | Stop reason: SDUPTHER

## 2019-03-11 ENCOUNTER — TELEPHONE (OUTPATIENT)
Dept: PHARMACY | Facility: CLINIC | Age: 44
End: 2019-03-11

## 2019-03-11 ENCOUNTER — PATIENT MESSAGE (OUTPATIENT)
Dept: RHEUMATOLOGY | Facility: CLINIC | Age: 44
End: 2019-03-11

## 2019-03-11 NOTE — TELEPHONE ENCOUNTER
DOCUMENTATION ONLY:  Xeljanz prior authorization approved x 1 year.   Dates: 3/5/19 through 3/5/20  Case ID: [id number]  Co pay: unknown    Patient must use [facility].   [phone] (Phone)  [phone] (Fax)

## 2019-03-15 ENCOUNTER — TELEPHONE (OUTPATIENT)
Dept: RHEUMATOLOGY | Facility: CLINIC | Age: 44
End: 2019-03-15

## 2019-03-15 DIAGNOSIS — L40.50 PSORIATIC ARTHRITIS: Primary | ICD-10-CM

## 2019-03-15 NOTE — TELEPHONE ENCOUNTER
FOR DOCUMENTATION ONLY:  Financial Assistance for Xeljanz approved  Source: Copay Card  BIN: [id number]  ID: [id number]  Group: [id number]    Must use [facility]  Ph: [phone]  Fx: [phone]

## 2019-03-15 NOTE — TELEPHONE ENCOUNTER
----- Message from Belkis Huitron sent at 3/15/2019  5:08 PM CDT -----  FYI:  Xeljanz prior authorization has been approved through 3/5/20. Patient's insurance requires the patient to fill through [facility]. Please send prescription to [facility], which has been added to the patient's EPIC profile. Patient has been notified and provided with the necessary info to call and schedule a delivery.    To complete this in EPIC, the original order MUST be discontinued and re-typed as a new prescription with the updated pharmacy listed. Clicking reorder will continue to route the rx to [facility] even if the pharmacy is changed. Please opt the patient out of [facility] when the BPA is fired.    Thank you,  Belkis Huitron  Patient Care Advocate  [facility]  Ph: [phone]

## 2019-03-19 ENCOUNTER — PATIENT MESSAGE (OUTPATIENT)
Dept: RHEUMATOLOGY | Facility: CLINIC | Age: 44
End: 2019-03-19

## 2019-04-08 ENCOUNTER — PATIENT MESSAGE (OUTPATIENT)
Dept: RHEUMATOLOGY | Facility: CLINIC | Age: 44
End: 2019-04-08

## 2019-04-22 ENCOUNTER — PATIENT MESSAGE (OUTPATIENT)
Dept: FAMILY MEDICINE | Facility: CLINIC | Age: 44
End: 2019-04-22

## 2019-04-22 ENCOUNTER — PATIENT MESSAGE (OUTPATIENT)
Dept: RHEUMATOLOGY | Facility: CLINIC | Age: 44
End: 2019-04-22

## 2019-05-08 DIAGNOSIS — E03.9 HYPOTHYROIDISM, UNSPECIFIED TYPE: ICD-10-CM

## 2019-05-08 RX ORDER — LIOTHYRONINE SODIUM 5 UG/1
TABLET ORAL
Qty: 180 TABLET | Refills: 0 | Status: SHIPPED | OUTPATIENT
Start: 2019-05-08 | End: 2019-05-10 | Stop reason: SDUPTHER

## 2019-05-08 RX ORDER — LISINOPRIL AND HYDROCHLOROTHIAZIDE 20; 25 MG/1; MG/1
1 TABLET ORAL DAILY
Qty: 90 TABLET | Refills: 3 | Status: SHIPPED | OUTPATIENT
Start: 2019-05-08 | End: 2019-05-10 | Stop reason: SDUPTHER

## 2019-05-10 DIAGNOSIS — E03.9 HYPOTHYROIDISM, UNSPECIFIED TYPE: ICD-10-CM

## 2019-05-10 RX ORDER — LIOTHYRONINE SODIUM 5 UG/1
TABLET ORAL
Qty: 180 TABLET | Refills: 0 | Status: SHIPPED | OUTPATIENT
Start: 2019-05-10 | End: 2019-10-17 | Stop reason: SDUPTHER

## 2019-05-10 RX ORDER — LISINOPRIL AND HYDROCHLOROTHIAZIDE 20; 25 MG/1; MG/1
1 TABLET ORAL DAILY
Qty: 90 TABLET | Refills: 3 | Status: SHIPPED | OUTPATIENT
Start: 2019-05-10 | End: 2019-06-21

## 2019-05-11 DIAGNOSIS — K12.0 APHTHOUS ULCER: ICD-10-CM

## 2019-06-07 ENCOUNTER — PATIENT MESSAGE (OUTPATIENT)
Dept: RHEUMATOLOGY | Facility: CLINIC | Age: 44
End: 2019-06-07

## 2019-06-15 NOTE — PROGRESS NOTES
Subjective:       Patient ID: Keli Concha Gilbert is a 43 y.o. female with psoriatic arthritis on Taltz & Leflunomide (& naproxen); demyelinating disorder secondary to Enbrel; incomplete effect on chronic pain syndrome on savella & methadone     Chief Complaint: Disease management    Ms. Gilbert is a 42 yo woman with history of PsA last seen on 2/19/19. At that time she was on Taltz (& lefllunomide 20 & naproxen) but she had hand pain & swelling and she was switched to xeljanz which she began in March. Initially had a good response to the pain & swelling but now is back to where she was before we switched her. She still has AM stiffness x 30 - 35 minutes. Her R 3rd PIP is especially painful and swollen and the base of her L thumb still is painful and swollen and imaging has a suspicious lesion for erosion here.  She is still taking naproxen.     Has gained 12 lbs since last visit and she feels swollen all over.       She still reports insomnia, anxiety and depression & is under much stress taking care of her mother who had a stroke. .             Associated symptoms include fatigue. Pertinent negatives include no fever or headaches.          Current Outpatient Medications   Medication Sig Dispense Refill    (Magic mouthwash) 1:1:1 Benadryl 12.5mg/5ml liq, aluminum & magnesium hydroxide-simehticone (Maalox), lidocaine viscous 2% Swish and spit 5 mLs every 4 (four) hours as needed. for mouth sores 300 mL 0    buPROPion (WELLBUTRIN) 100 MG tablet TAKE 1 TABLET(100 MG) BY MOUTH TWICE DAILY 180 tablet 0    cholecalciferol, vitamin D3, 2,000 unit Tab Take 1 tablet by mouth Once a week.      cholecalciferol, vitamin D3, 50,000 unit capsule Take 1 capsule (50,000 Units total) by mouth every 14 (fourteen) days. 18 capsule 1    clindamycin (CLEOCIN T) 1 % external solution       clobetasol (CLOBEX) 0.05 % topical spray Apply topically Twice daily. AAA bie. Disp 125 ml      gabapentin (NEURONTIN) 300 MG capsule Take  300 mg by mouth once daily.      leflunomide (ARAVA) 20 MG Tab Take 1 tablet (20 mg total) by mouth once daily. 90 tablet 3    levocetirizine (XYZAL) 5 MG tablet TAKE 1 TABLET (5 MG TOTAL) BY MOUTH DAILY AS NEEDED FOR ALLERGIES. 90 tablet 3    liothyronine (CYTOMEL) 5 MCG Tab TAKE 2 TABLETS (10 MCG TOTAL) BY MOUTH ONCE DAILY. 180 tablet 0    lisinopril-hydrochlorothiazide (PRINZIDE,ZESTORETIC) 20-25 mg Tab Take 1 tablet by mouth once daily. 90 tablet 3    methadone (DOLOPHINE) 10 MG tablet Take 1 tablet by mouth Three times a day.      mupirocin calcium 2% (BACTROBAN) 2 % cream APPLY TO AFFECTED AREA TWICE A DAY INTRANASALLY FOR 1ST WEEK OF THE MONTH 30 g 3    naproxen (NAPROSYN) 500 MG tablet Take 1 tablet (500 mg total) by mouth 3 (three) times daily with meals. 270 tablet 3    potassium chloride (MICRO-K) 10 MEQ CpSR Take 1 capsule (10 mEq total) by mouth once daily. 90 capsule 3    promethazine (PHENERGAN) 25 MG tablet Take 1 tablet by mouth Once daily as needed.      ranitidine (ZANTAC) 150 MG tablet Take 1 tablet (150 mg total) by mouth 2 (two) times daily. 180 tablet 2    SYNTHROID 125 mcg tablet Take 1 tablet (125 mcg total) by mouth before breakfast. 90 tablet 3    tizanidine (ZANAFLEX) 4 MG tablet Take 4 mg by mouth 3 (three) times daily as needed.  6    tofacitinib (XELJANZ XR) 11 mg Tb24 Take 11 mg by mouth once daily. 90 tablet 3    trazodone (DESYREL) 50 MG tablet       triamcinolone (KENALOG) 0.1 % cream Apply topically Twice daily. Dirs: disp: 1# jar AAA      triamcinolone acetonide 0.1% (KENALOG) 0.1 % paste       urea (CARMOL) 40 % Crea AAA foot qd after soak 198.6 g 3    azelastine (ASTELIN) 137 mcg (0.1 %) nasal spray 1 spray (137 mcg total) by Nasal route 2 (two) times daily. 30 mL 0    PROAIR HFA 90 mcg/actuation inhaler INHALE 2 PUFFS INTO THE LUNGS EVERY 6 (SIX) HOURS AS NEEDED FOR WHEEZING. RESCUE 8.5 Inhaler 0     No current facility-administered medications for this  visit.          Review of patient's allergies indicates:   Allergen Reactions    Doxycycline hcl Nausea And Vomiting    Enbrel [etanercept] Other (See Comments)     Optic neurtits     Past Medical History:   Diagnosis Date    Abnormal Pap smear of vagina     AR (allergic rhinitis)     Demyelinating disorder     Depression     Fever blister     Fibromyalgia     followed by pain management    Generalized osteoarthritis of multiple sites     History of abnormal Pap smear     Hypertension     Hypothyroidism     Insulin resistance     Mixed anxiety and depressive disorder     Morbid obesity     Myelitis, optic neuritis in     treated with high-dose steroids and resolved; positive MRI and spinal fluid for a mass, but on embrel for psoriatic arthritis - she will see a MS specialist at [facility]; MRI normalized off embrel    Obesity     Pre-diabetes     hx diabetes on stereoids /    Psoriasis     Psoriatic arthritis     Vitamin D deficiency disease      Past Surgical History:   Procedure Laterality Date    SINUS SURGERY  1998       Review of Systems   Constitutional: Positive for fatigue. Negative for fever and unexpected weight change.   HENT: Negative.  Negative for dental problem and mouth sores.    Eyes: Negative.  Negative for redness, itching and visual disturbance.        Dry eyes   Respiratory: Negative.    Cardiovascular: Negative.  Negative for chest pain, palpitations and leg swelling.   Gastrointestinal: Negative.  Negative for abdominal pain, anal bleeding, blood in stool, constipation, diarrhea and nausea.        Heartburn.   Endocrine: Negative.    Genitourinary: Negative.  Negative for frequency, menstrual problem, pelvic pain and urgency.   Musculoskeletal: Negative.  Negative for arthralgias, back pain, gait problem and neck pain.   Skin: Negative.  Negative for color change and rash.   Allergic/Immunologic: Positive for immunocompromised state.   Neurological: Negative.  Negative for  "dizziness, seizures, weakness, numbness and headaches.   Hematological: Negative.  Negative for adenopathy. Does not bruise/bleed easily.   Psychiatric/Behavioral: Positive for dysphoric mood and sleep disturbance. Negative for decreased concentration and suicidal ideas. The patient is nervous/anxious.          Family History   Problem Relation Age of Onset    Psoriasis Father     Cancer Father         throat    Thyroid disease Mother     Heart disease Mother     Hypertension Mother     Hyperlipidemia Mother     Coronary artery disease Mother         s/p CABG    Heart attack Mother     Heart disease Sister         MVP    Diabetes Paternal Uncle     Diabetes Maternal Grandfather     Heart disease Maternal Grandfather     Thyroid disease Maternal Grandfather     ADD / ADHD Son     Melanoma Neg Hx     Lupus Neg Hx     Eczema Neg Hx     Acne Neg Hx     Breast cancer Neg Hx     Colon cancer Neg Hx     Ovarian cancer Neg Hx        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   Mom had a CVA & is home but not really doing too well. She has cognitive issues.   No alcohol; no cigarettes;   Objective:   BP (!) 140/86   Pulse 88   Ht 5' 6" (1.676 m)   Wt 107.4 kg (236 lb 12.4 oz)   BMI 38.22 kg/m²   Was 224 lb 13.9 oz on 2/19/19.  Physical Exam   Vitals reviewed.  Constitutional: She is oriented to person, place, and time and well-developed, well-nourished, and in no distress. No distress.   HENT:   Head: Normocephalic and atraumatic.   Mouth/Throat: Oropharynx is clear and moist. No oropharyngeal exudate.   No facial rashes  Parotids not enlarged  No oral ulcers  Face oliva   Eyes: Conjunctivae and EOM are normal. Pupils are equal, round, and reactive to light. Right eye exhibits no discharge. Left eye exhibits no discharge. No scleral icterus.   Neck: Neck supple. No JVD present. No tracheal deviation present. No thyromegaly present.   Cardiovascular: Normal rate, regular rhythm, normal heart sounds and intact " distal pulses.  Exam reveals no gallop and no friction rub.    No murmur heard.  Pulmonary/Chest: Effort normal and breath sounds normal. No respiratory distress. She has no wheezes. She has no rales. She exhibits no tenderness.   Abdominal: Soft. Bowel sounds are normal. She exhibits no distension and no mass. There is no splenomegaly or hepatomegaly. There is no tenderness. There is no rebound and no guarding.   Lymphadenopathy:     She has no cervical adenopathy.   Neurological: She is alert and oriented to person, place, and time. She has normal reflexes. No cranial nerve deficit. Gait normal.   Proximal and distal muscle strength 5/5.   Skin: Skin is warm and dry. She is not diaphoretic.     No psoriasis.   Psychiatric: Mood, memory, affect and judgment normal.   Drowsy.   Musculoskeletal: Normal range of motion. She exhibits edema (2+ pretibial & pedal edema). She exhibits no tenderness.   Cspine slight decrease in lateral flexion & rotation; no tenderness  Tspine FROM no tenderness  Lspine FROM no tenderness.  TMJ: unremarkable  Shoulders: FROM; no synovitis  Elbows: FROM; no synovitis; no tophi or nodules  Wrists: no SHT; no tenderness; good ROM  MCPs: L 1st C-MCP tender with mild SHT;1st MCP mild SHT;  no metatarsalgia;  PIPs: minimal dactylitis 2nd ray R; R 3rd PIP SHT & mildly erythematous; mildly tender; cannot fully extend this joint.   DIPs: FROM; no synovitis  HIPS: FROM  Knees: FROM; no synovitis; no instability;  Ankles: FROM: no synovitis   Toes: ok; no metatarsalgia                               Labs:     2/19/19: ESR 13; CRP 5.8; CBC plts 362; CMP ok; HCV/HBV/QG neg;  11/17/18: ESR 8; CRP 3.9; Hg 11.9; low indices; CMP ok;   8/22/18: ESR 10; CRP 10.9; CBC Hg 10.7; Ht 35.7; CMP ok  5/14/18: ESR 13; CRP 13.4;  Hg 11.3; low indices; eos 12.4%; CMP ok;   2/10/18: ESR 30; CRP 10.5; Hg 11.3; plt 366; CMP ok;   10/19/17: HBV neg; HCV neg;   9/2/17: ESR 16; CRp 5.9; Hg 11.6; Ht 36.6; CMP ok;   5/29/17:  QG neg  2/20/17: ESR 34; CRP 8.1; CBC Hg 11.4; Ht 35.6; CMP ok;   11/19/16: ESR 20; CRP 4.8; CBC Hg 11.9; CMP K+3.3, otherwise ok;   8/27/16: ESR 22; CRP 7; Hg 11.8; Ht 36.9; CMP ok;   5/26/16: ESR 35; CRP 11.2Hg 11.8; Ht 36.9; CMP ok;   5/21/16: Vit d 37  10/8/15: ESR 45; CRP 14.2; Hg 11.2; Ht 35.8; CMP; ok  7/7/15: CBC plt 380; K+ 3.4; Glu 154  7/6/15: ESR 48; CRP 9.6  4/4/15: ESR 41; CRP 19; Hg 11.0; Ht 34.6; plt 377; CMP ok;  1/10/15: ESR 34; CRP 18; CBC ok; ; Alb. 3.6  10/4/14: ESR 43; CRP 18.5; Hg 11.8; Ht 36.5; plt 378; Alb. 3.4;   3/20/14: ESR 41; CRP 14.3; plt 356; CMP ok;  2/15/13: ESR 30; CRP 9.8; CBC ok; Alb. 3.3  11/2/13: ESR 28; CRP 11.6; plt 363; Alb. 3.3;   5/28/13: ESR 45; CRP 8.8; plt 366; CMP ok;     12/1/18: Bilateral hands: personally viewed: Mild degenerative changes noted at the interphalangeal joints.  Mild-to-moderate degenerative change noted at the bilateral 1st CMC joints.  Mild degenerative changes also present at both radiocarpal joints, triscaphe joints and right distal radioulnar joint.  Minimal degenerative changes also present at the MCP joints bilaterally and greatest at the 1st MCP joint on the left.  6/15/17: Bilateral hands: personally reviewed: mild osteoarthritis w stable periarticular erosion at the left fourth MCP joint; no change since last year.   8/27/16: Bilateral feet: personally reviewed. Mild HV; mild OA shell 1st MTP dali; ? Erosion PIP left small toe;   5/26/16: Bilateral hands: personally reviewed: L 4th MCP (new) erosive lesion; R & L 2nd & 3rd metacarpal head cysts; R mild PIP erosion & STS 2nd.    Assessment:     Psoriatic arthritis-- with mild SHT 2 MCPs bilaterally (R >>L) & with sausage digit of R index finger--currently w SHT & flexion deformity of R 3rd PIP.    a) History of sausage digits of both toes      PIPs, DIPs, wrists, and knees., now w. only one sausage digit.   b) Psoriasis of the abdomen, elbow, and the shins--resolved.    c) Enbrel stopped  10/21/11 due to optic neuritis.     d) New erosion (4th L metacarpal head) on x-ray & possibly L 5th toe PIP     e) Persisting elevation of ESR and CRP    f) inadequate response to leflunomide and Stelara & apremilast (w. Intolerable nausea)   G) could not tolerate SSZ--nausea.   H) MTX x 2 even SC did not help sxs.   I) improved on Cosentyx (+leflunomide + naproxen) initially incompletely-- hand joints still hurt despite normalization of  ESR & CRP   J) Orencia begun 11/17 with incomplete response   K)Taltz begun 4/20/18-2/19   L) Tofac 3/19 - present    Chronic SHT 3rd R PIP & base of L thumb with questionable erosive lesion.    Weight gain & edema   Gabapentin?   Naproxen?    Chronic pain/fibromyalgia syndrome with fatigue, tender points, withdrawals to palpation in the past, pain all over, responsive to Savella but with some nausea (off it now), followed by Dr. Odom at the [facility],    On methadone    Left optic neuritis with demyelinating lesions secondary to Enbrel--now resolved.    Repeat MRI ok    Diabetes mellitus, diet controlled     Degenerative joint disease of the cervical spine, knees, and feet--very mild.     Depression on wellbutrin    Off savella    Hypertension.     Hypothyroidism.     Vitamin D deficiency with regular prescription vitamin D supplementation.     Obesity         Plan:   Discussed what options available as we've been through all the approved meds for PsA  Patient states that perhaps Cosentyx was better and maybe we could go back to it.   Rx: Cosentyx 300 mg q wk x 5 wks followed by 300 mg q 4 wks thereafter.   Side effects and toxicities reviewed again.  She had pre-DMARD labs that are still good.  We will get labs today & in 3 months as she is on leflunomide 20 mg/d which she is to continue.  Continue Leflunomide 20 mg/d.  Discussed edema: naproxen and/or gabapentin may be contributing/causing.  Patient will ease off on both and curtail salt.     RTC 4 months.

## 2019-06-18 ENCOUNTER — LAB VISIT (OUTPATIENT)
Dept: LAB | Facility: HOSPITAL | Age: 44
End: 2019-06-18
Attending: INTERNAL MEDICINE
Payer: COMMERCIAL

## 2019-06-18 ENCOUNTER — OFFICE VISIT (OUTPATIENT)
Dept: RHEUMATOLOGY | Facility: CLINIC | Age: 44
End: 2019-06-18
Payer: COMMERCIAL

## 2019-06-18 ENCOUNTER — TELEPHONE (OUTPATIENT)
Dept: PHARMACY | Facility: CLINIC | Age: 44
End: 2019-06-18

## 2019-06-18 VITALS
WEIGHT: 236.75 LBS | SYSTOLIC BLOOD PRESSURE: 140 MMHG | BODY MASS INDEX: 38.05 KG/M2 | HEART RATE: 88 BPM | DIASTOLIC BLOOD PRESSURE: 86 MMHG | HEIGHT: 66 IN

## 2019-06-18 DIAGNOSIS — M15.9 GENERALIZED OSTEOARTHRITIS OF MULTIPLE SITES: ICD-10-CM

## 2019-06-18 DIAGNOSIS — Z79.899 LONG-TERM USE OF HIGH-RISK MEDICATION: ICD-10-CM

## 2019-06-18 DIAGNOSIS — R60.0 PEDAL EDEMA: ICD-10-CM

## 2019-06-18 DIAGNOSIS — L40.50 PSORIATIC ARTHRITIS: ICD-10-CM

## 2019-06-18 DIAGNOSIS — Z79.899 LONG TERM CURRENT USE OF IMMUNOSUPPRESSIVE DRUG: ICD-10-CM

## 2019-06-18 DIAGNOSIS — L40.50 PSORIATIC ARTHRITIS: Primary | ICD-10-CM

## 2019-06-18 LAB
ALBUMIN SERPL BCP-MCNC: 3.9 G/DL (ref 3.5–5.2)
ALP SERPL-CCNC: 58 U/L (ref 55–135)
ALT SERPL W/O P-5'-P-CCNC: 15 U/L (ref 10–44)
ANION GAP SERPL CALC-SCNC: 10 MMOL/L (ref 8–16)
AST SERPL-CCNC: 16 U/L (ref 10–40)
BASOPHILS # BLD AUTO: 0.03 K/UL (ref 0–0.2)
BASOPHILS NFR BLD: 0.3 % (ref 0–1.9)
BILIRUB SERPL-MCNC: 0.2 MG/DL (ref 0.1–1)
BUN SERPL-MCNC: 10 MG/DL (ref 6–20)
CALCIUM SERPL-MCNC: 9.5 MG/DL (ref 8.7–10.5)
CHLORIDE SERPL-SCNC: 101 MMOL/L (ref 95–110)
CO2 SERPL-SCNC: 26 MMOL/L (ref 23–29)
CREAT SERPL-MCNC: 0.7 MG/DL (ref 0.5–1.4)
CRP SERPL-MCNC: 7.5 MG/L (ref 0–8.2)
DIFFERENTIAL METHOD: ABNORMAL
EOSINOPHIL # BLD AUTO: 0.5 K/UL (ref 0–0.5)
EOSINOPHIL NFR BLD: 5.8 % (ref 0–8)
ERYTHROCYTE [DISTWIDTH] IN BLOOD BY AUTOMATED COUNT: 14.2 % (ref 11.5–14.5)
ERYTHROCYTE [SEDIMENTATION RATE] IN BLOOD BY WESTERGREN METHOD: 33 MM/HR (ref 0–36)
EST. GFR  (AFRICAN AMERICAN): >60 ML/MIN/1.73 M^2
EST. GFR  (NON AFRICAN AMERICAN): >60 ML/MIN/1.73 M^2
GLUCOSE SERPL-MCNC: 79 MG/DL (ref 70–110)
HCT VFR BLD AUTO: 36.2 % (ref 37–48.5)
HGB BLD-MCNC: 11.5 G/DL (ref 12–16)
LYMPHOCYTES # BLD AUTO: 2.1 K/UL (ref 1–4.8)
LYMPHOCYTES NFR BLD: 22.5 % (ref 18–48)
MCH RBC QN AUTO: 28.3 PG (ref 27–31)
MCHC RBC AUTO-ENTMCNC: 31.8 G/DL (ref 32–36)
MCV RBC AUTO: 89 FL (ref 82–98)
MONOCYTES # BLD AUTO: 0.8 K/UL (ref 0.3–1)
MONOCYTES NFR BLD: 8.7 % (ref 4–15)
NEUTROPHILS # BLD AUTO: 5.8 K/UL (ref 1.8–7.7)
NEUTROPHILS NFR BLD: 62.3 % (ref 38–73)
PLATELET # BLD AUTO: 330 K/UL (ref 150–350)
PMV BLD AUTO: 8.4 FL (ref 9.2–12.9)
POTASSIUM SERPL-SCNC: 3.6 MMOL/L (ref 3.5–5.1)
PROT SERPL-MCNC: 7.4 G/DL (ref 6–8.4)
RBC # BLD AUTO: 4.06 M/UL (ref 4–5.4)
SODIUM SERPL-SCNC: 137 MMOL/L (ref 136–145)
WBC # BLD AUTO: 9.27 K/UL (ref 3.9–12.7)

## 2019-06-18 PROCEDURE — 85025 COMPLETE CBC W/AUTO DIFF WBC: CPT

## 2019-06-18 PROCEDURE — 80053 COMPREHEN METABOLIC PANEL: CPT

## 2019-06-18 PROCEDURE — 86140 C-REACTIVE PROTEIN: CPT

## 2019-06-18 PROCEDURE — 85652 RBC SED RATE AUTOMATED: CPT

## 2019-06-18 PROCEDURE — 99999 PR PBB SHADOW E&M-EST. PATIENT-LVL III: CPT | Mod: PBBFAC,,, | Performed by: INTERNAL MEDICINE

## 2019-06-18 PROCEDURE — 3079F DIAST BP 80-89 MM HG: CPT | Mod: CPTII,S$GLB,, | Performed by: INTERNAL MEDICINE

## 2019-06-18 PROCEDURE — 99214 PR OFFICE/OUTPT VISIT, EST, LEVL IV, 30-39 MIN: ICD-10-PCS | Mod: S$GLB,,, | Performed by: INTERNAL MEDICINE

## 2019-06-18 PROCEDURE — 99999 PR PBB SHADOW E&M-EST. PATIENT-LVL III: ICD-10-PCS | Mod: PBBFAC,,, | Performed by: INTERNAL MEDICINE

## 2019-06-18 PROCEDURE — 3079F PR MOST RECENT DIASTOLIC BLOOD PRESSURE 80-89 MM HG: ICD-10-PCS | Mod: CPTII,S$GLB,, | Performed by: INTERNAL MEDICINE

## 2019-06-18 PROCEDURE — 36415 COLL VENOUS BLD VENIPUNCTURE: CPT

## 2019-06-18 PROCEDURE — 3077F PR MOST RECENT SYSTOLIC BLOOD PRESSURE >= 140 MM HG: ICD-10-PCS | Mod: CPTII,S$GLB,, | Performed by: INTERNAL MEDICINE

## 2019-06-18 PROCEDURE — 3008F BODY MASS INDEX DOCD: CPT | Mod: CPTII,S$GLB,, | Performed by: INTERNAL MEDICINE

## 2019-06-18 PROCEDURE — 3077F SYST BP >= 140 MM HG: CPT | Mod: CPTII,S$GLB,, | Performed by: INTERNAL MEDICINE

## 2019-06-18 PROCEDURE — 99214 OFFICE O/P EST MOD 30 MIN: CPT | Mod: S$GLB,,, | Performed by: INTERNAL MEDICINE

## 2019-06-18 PROCEDURE — 3008F PR BODY MASS INDEX (BMI) DOCUMENTED: ICD-10-PCS | Mod: CPTII,S$GLB,, | Performed by: INTERNAL MEDICINE

## 2019-06-18 ASSESSMENT — ROUTINE ASSESSMENT OF PATIENT INDEX DATA (RAPID3)
PAIN SCORE: 8
TOTAL RAPID3 SCORE: 4.94
PATIENT GLOBAL ASSESSMENT SCORE: 5.5
PSYCHOLOGICAL DISTRESS SCORE: 3.3
AM STIFFNESS SCORE: 1, YES
WHEN YOU AWAKENED IN THE MORNING OVER THE LAST WEEK, PLEASE INDICATE THE AMOUNT OF TIME IT TAKES UNTIL YOU ARE AS LIMBER AS YOU WILL BE FOR THE DAY: 30 MINUTES
FATIGUE SCORE: 8
MDHAQ FUNCTION SCORE: .4

## 2019-06-18 NOTE — TELEPHONE ENCOUNTER
No answer/VM to inform patient that [facility] received prescription for Cosentyx and prior authorization is required.  [facility] will be back in touch once insurance determination is received.

## 2019-06-18 NOTE — PATIENT INSTRUCTIONS
Stop xeljanz.  Take 2 injections of Cosentyx every week x 5 weeks.  Then after that take 2 injections of Cosentyx every 4 weeks.   Continue Leflunomide 20 mg/d with labs every 3 months.

## 2019-06-19 ENCOUNTER — PATIENT MESSAGE (OUTPATIENT)
Dept: RHEUMATOLOGY | Facility: CLINIC | Age: 44
End: 2019-06-19

## 2019-06-19 ENCOUNTER — PATIENT MESSAGE (OUTPATIENT)
Dept: FAMILY MEDICINE | Facility: CLINIC | Age: 44
End: 2019-06-19

## 2019-06-21 ENCOUNTER — OFFICE VISIT (OUTPATIENT)
Dept: FAMILY MEDICINE | Facility: CLINIC | Age: 44
End: 2019-06-21
Payer: COMMERCIAL

## 2019-06-21 VITALS
OXYGEN SATURATION: 95 % | HEART RATE: 88 BPM | SYSTOLIC BLOOD PRESSURE: 121 MMHG | TEMPERATURE: 98 F | BODY MASS INDEX: 36.67 KG/M2 | DIASTOLIC BLOOD PRESSURE: 83 MMHG | RESPIRATION RATE: 17 BRPM | WEIGHT: 228.19 LBS | HEIGHT: 66 IN

## 2019-06-21 DIAGNOSIS — I10 BENIGN ESSENTIAL HTN: Primary | ICD-10-CM

## 2019-06-21 PROCEDURE — 3079F PR MOST RECENT DIASTOLIC BLOOD PRESSURE 80-89 MM HG: ICD-10-PCS | Mod: CPTII,S$GLB,, | Performed by: FAMILY MEDICINE

## 2019-06-21 PROCEDURE — 3008F BODY MASS INDEX DOCD: CPT | Mod: CPTII,S$GLB,, | Performed by: FAMILY MEDICINE

## 2019-06-21 PROCEDURE — 3074F PR MOST RECENT SYSTOLIC BLOOD PRESSURE < 130 MM HG: ICD-10-PCS | Mod: CPTII,S$GLB,, | Performed by: FAMILY MEDICINE

## 2019-06-21 PROCEDURE — 3074F SYST BP LT 130 MM HG: CPT | Mod: CPTII,S$GLB,, | Performed by: FAMILY MEDICINE

## 2019-06-21 PROCEDURE — 3008F PR BODY MASS INDEX (BMI) DOCUMENTED: ICD-10-PCS | Mod: CPTII,S$GLB,, | Performed by: FAMILY MEDICINE

## 2019-06-21 PROCEDURE — 3079F DIAST BP 80-89 MM HG: CPT | Mod: CPTII,S$GLB,, | Performed by: FAMILY MEDICINE

## 2019-06-21 PROCEDURE — 99999 PR PBB SHADOW E&M-EST. PATIENT-LVL III: ICD-10-PCS | Mod: PBBFAC,,, | Performed by: FAMILY MEDICINE

## 2019-06-21 PROCEDURE — 99214 OFFICE O/P EST MOD 30 MIN: CPT | Mod: S$GLB,,, | Performed by: FAMILY MEDICINE

## 2019-06-21 PROCEDURE — 99214 PR OFFICE/OUTPT VISIT, EST, LEVL IV, 30-39 MIN: ICD-10-PCS | Mod: S$GLB,,, | Performed by: FAMILY MEDICINE

## 2019-06-21 PROCEDURE — 99999 PR PBB SHADOW E&M-EST. PATIENT-LVL III: CPT | Mod: PBBFAC,,, | Performed by: FAMILY MEDICINE

## 2019-06-21 RX ORDER — CHLORTHALIDONE 25 MG/1
25 TABLET ORAL DAILY
Qty: 30 TABLET | Refills: 0 | Status: SHIPPED | OUTPATIENT
Start: 2019-06-21 | End: 2019-07-15 | Stop reason: SDUPTHER

## 2019-06-21 NOTE — TELEPHONE ENCOUNTER
PA submitted via [facility] MS portal.  Chart notes faxed to insurance company @ 12:51pm.

## 2019-06-21 NOTE — PROGRESS NOTES
Routine Office Visit    Patient Name: Keli Gilbert    : 1975  MRN: [id number]    Subjective:  Keli is a 43 y.o. female who presents today for:    1. Edema  Patient presenting today with retaining fluid x 3 months.  She states that she thinks it is related to Xeljanz and that has not been stopped.  She has significant swelling in both legs.  She has not changed her diet or level of activity, but continues to gain weight.  She states that she feels her weight gain is from the fluid.  She states that when she presses on her lower leg it stays pressed in.  There is no shortness of breath, chest pain, or discoloration of her lower extremities.      Past Medical History  Past Medical History:   Diagnosis Date    Abnormal Pap smear of vagina     AR (allergic rhinitis)     Demyelinating disorder     Depression     Fever blister     Fibromyalgia     followed by pain management    Generalized osteoarthritis of multiple sites     History of abnormal Pap smear     Hypertension     Hypothyroidism     Insulin resistance     Mixed anxiety and depressive disorder     Morbid obesity     Myelitis, optic neuritis in     treated with high-dose steroids and resolved; positive MRI and spinal fluid for a mass, but on embrel for psoriatic arthritis - [facility]; [facility] normalized off embrel    Obesity     Pre-diabetes     hx diabetes on stereoids /    Psoriasis     Psoriatic arthritis     Vitamin D deficiency disease        Past Surgical History  Past Surgical History:   Procedure Laterality Date    SINUS SURGERY         Family History  Family History   Problem Relation Age of Onset    Psoriasis Father     Cancer Father         throat    Thyroid disease Mother     Heart disease Mother     Hypertension Mother     Hyperlipidemia Mother     Coronary artery disease Mother         s/p CABG    Heart attack Mother     Heart disease Sister         MVP    Diabetes Paternal  Uncle     Diabetes Maternal Grandfather     Heart disease Maternal Grandfather     Thyroid disease Maternal Grandfather     ADD / ADHD Son     Melanoma Neg Hx     Lupus Neg Hx     Eczema Neg Hx     Acne Neg Hx     Breast cancer Neg Hx     Colon cancer Neg Hx     Ovarian cancer Neg Hx        Social History  Social History     Socioeconomic History    Marital status: Single     Spouse name: Not on file    Number of children: 1    Years of education: Not on file    Highest education level: Not on file   Occupational History    Occupation: Banking     Employer: [employer] (MAIN OFFICE)   Social Needs    Financial resource strain: Not on file    Food insecurity:     Worry: Not on file     Inability: Not on file    Transportation needs:     Medical: Not on file     Non-medical: Not on file   Tobacco Use    Smoking status: Never Smoker    Smokeless tobacco: Never Used   Substance and Sexual Activity    Alcohol use: No    Drug use: No     Comment: 7/7/15 one weed brownie    Sexual activity: Yes     Partners: Male     Birth control/protection: IUD   Lifestyle    Physical activity:     Days per week: Not on file     Minutes per session: Not on file    Stress: Not on file   Relationships    Social connections:     Talks on phone: Not on file     Gets together: Not on file     Attends Scientology service: Not on file     Active member of club or organization: Not on file     Attends meetings of clubs or organizations: Not on file     Relationship status: Not on file   Other Topics Concern    Are you pregnant or think you may be? No    Breast-feeding No   Social History Narrative    Not on file       Current Medications  Current Outpatient Medications on File Prior to Visit   Medication Sig Dispense Refill    buPROPion (WELLBUTRIN) 100 MG tablet TAKE 1 TABLET(100 MG) BY MOUTH TWICE DAILY 180 tablet 0    clindamycin (CLEOCIN T) 1 % external solution       clobetasol (CLOBEX) 0.05 % topical spray  Apply topically Twice daily. AAA bie. Disp 125 ml      gabapentin (NEURONTIN) 300 MG capsule Take 300 mg by mouth once daily.      leflunomide (ARAVA) 20 MG Tab Take 1 tablet (20 mg total) by mouth once daily. 90 tablet 3    liothyronine (CYTOMEL) 5 MCG Tab TAKE 2 TABLETS (10 MCG TOTAL) BY MOUTH ONCE DAILY. 180 tablet 0    methadone (DOLOPHINE) 10 MG tablet Take 1 tablet by mouth Three times a day.      mupirocin calcium 2% (BACTROBAN) 2 % cream APPLY TO AFFECTED AREA TWICE A DAY INTRANASALLY FOR 1ST WEEK OF THE MONTH 30 g 3    naproxen (NAPROSYN) 500 MG tablet Take 1 tablet (500 mg total) by mouth 3 (three) times daily with meals. 270 tablet 3    potassium chloride (MICRO-K) 10 MEQ CpSR Take 1 capsule (10 mEq total) by mouth once daily. 90 capsule 3    PROAIR HFA 90 mcg/actuation inhaler INHALE 2 PUFFS INTO THE LUNGS EVERY 6 (SIX) HOURS AS NEEDED FOR WHEEZING. RESCUE 8.5 Inhaler 0    promethazine (PHENERGAN) 25 MG tablet Take 1 tablet by mouth Once daily as needed.      ranitidine (ZANTAC) 150 MG tablet Take 1 tablet (150 mg total) by mouth 2 (two) times daily. 180 tablet 2    secukinumab (COSENTYX PEN) 150 mg/mL PnIj Inject 300 mg into the skin every 28 days. 6 mL 3    SYNTHROID 125 mcg tablet Take 1 tablet (125 mcg total) by mouth before breakfast. 90 tablet 3    tizanidine (ZANAFLEX) 4 MG tablet Take 4 mg by mouth 3 (three) times daily as needed.  6    trazodone (DESYREL) 50 MG tablet       triamcinolone (KENALOG) 0.1 % cream Apply topically Twice daily. Dirs: disp: 1# jar AAA      triamcinolone acetonide 0.1% (KENALOG) 0.1 % paste       urea (CARMOL) 40 % Crea AAA foot qd after soak 198.6 g 3    [DISCONTINUED] lisinopril-hydrochlorothiazide (PRINZIDE,ZESTORETIC) 20-25 mg Tab Take 1 tablet by mouth once daily. 90 tablet 3    (Magic mouthwash) 1:1:1 Benadryl 12.5mg/5ml liq, aluminum & magnesium hydroxide-simehticone (Maalox), lidocaine viscous 2% Swish and spit 5 mLs every 4 (four) hours as  "needed. for mouth sores 300 mL 0    azelastine (ASTELIN) 137 mcg (0.1 %) nasal spray 1 spray (137 mcg total) by Nasal route 2 (two) times daily. 30 mL 0    cholecalciferol, vitamin D3, 2,000 unit Tab Take 1 tablet by mouth Once a week.      cholecalciferol, vitamin D3, 50,000 unit capsule Take 1 capsule (50,000 Units total) by mouth every 14 (fourteen) days. 18 capsule 1    levocetirizine (XYZAL) 5 MG tablet TAKE 1 TABLET (5 MG TOTAL) BY MOUTH DAILY AS NEEDED FOR ALLERGIES. 90 tablet 3    [DISCONTINUED] secukinumab (COSENTYX PEN, 2 PENS,) 150 mg/mL PnIj Inject 300 mg into the skin every 7 days. 10 mL 0    [DISCONTINUED] tofacitinib (XELJANZ XR) 11 mg Tb24 Take 11 mg by mouth once daily. 90 tablet 3     No current facility-administered medications on file prior to visit.        Allergies   Review of patient's allergies indicates:   Allergen Reactions    Doxycycline hcl Nausea And Vomiting    Enbrel [etanercept] Other (See Comments)     Optic neurtits       Review of Systems (Pertinent positives)  Review of Systems   Constitutional: Negative.    Eyes: Negative.    Respiratory: Negative.    Cardiovascular: Positive for leg swelling.   Gastrointestinal: Negative.    Skin: Negative.    Neurological: Negative.          /83 (BP Location: Left arm, Patient Position: Sitting, BP Method: Large (Automatic))   Pulse 88   Temp 97.9 °F (36.6 °C) (Oral)   Resp 17   Ht 5' 5.98" (1.676 m)   Wt 103.5 kg (228 lb 2.8 oz)   SpO2 95%   BMI 36.85 kg/m²     GENERAL APPEARANCE: in no apparent distress and well developed and well nourished  HEENT: PERRLA, EOMI, Sclera clear, anicteric, Oropharynx clear, no lesions, Neck supple with midline trachea  NECK: normal, supple, no adenopathy, thyroid normal in size  RESPIRATORY: appears well, vitals normal, no respiratory distress, acyanotic, normal RR, chest clear, no wheezing, crepitations, rhonchi, normal symmetric air entry  HEART: regular rate and rhythm, S1, S2 normal, no " murmur, click, rub or gallop.    ABDOMEN: abdomen is soft without tenderness, no masses, no hernias, no organomegaly, no rebound, no guarding. Suprapubic tenderness absent. No CVA tenderness.  Extremities: warm/well perfused.  No abnormal hair patterns.  No clubbing, cyanosis; 2+ edema in bilateral lower extremities  SKIN: mild psoriatic patch to left elbow  PSYCH: Alert, oriented x 3, thought content appropriate, speech normal, pleasant and cooperative, good eye contact, well groomed    Assessment/Plan:  Keli Gilbert is a 43 y.o. female who presents today for :    Keli was seen today for swelling.    Diagnoses and all orders for this visit:    Benign essential HTN  -     chlorthalidone (HYGROTEN) 25 MG Tab; Take 1 tablet (25 mg total) by mouth once daily.      1.  Stop Zestoretic and start Chlortalidone  2.  She is to notify me of any adverse reactions to Chlorthalidone  3.  Follow up 3 months or sooner if needed  4.  She is to call/email me with any concerns      Andriy Moran MD

## 2019-06-25 ENCOUNTER — PATIENT MESSAGE (OUTPATIENT)
Dept: RHEUMATOLOGY | Facility: CLINIC | Age: 44
End: 2019-06-25

## 2019-06-25 ENCOUNTER — TELEPHONE (OUTPATIENT)
Dept: RHEUMATOLOGY | Facility: CLINIC | Age: 44
End: 2019-06-25

## 2019-06-25 DIAGNOSIS — L40.50 PSORIATIC ARTHRITIS: Primary | ICD-10-CM

## 2019-06-25 NOTE — TELEPHONE ENCOUNTER
----- Message from Belkis Huitron sent at 6/25/2019 12:34 PM CDT -----  FYI:  Cosentyx prior authorization has been approved through 6/25/20. Patient's insurance requires the patient to fill through [facility]. Please send prescription to [facility], which has been added to the patient's EPIC profile. Patient has been notified and provided with the necessary info to call and schedule a delivery.    To complete this in EPIC, the original order MUST be discontinued and re-typed as a new prescription with the updated pharmacy listed. Clicking reorder will continue to route the rx to [facility] even if the pharmacy is changed. Please opt the patient out of [facility] when the BPA is fired.    Thank you,  Belkis Huitron  Patient Care Advocate  [facility]  Ph: [phone]

## 2019-06-25 NOTE — TELEPHONE ENCOUNTER
DOCUMENTATION ONLY:  Prior authorization for Cosentyx approved from 6/25/19 to 7/25/19 (loading dose) and from 7/26/19 to 6/25/20 (maintenance dose)    Case Id: [id number]    Co-pay: Unknown, patient locked into [facility].    Patient Assistance is required.

## 2019-06-25 NOTE — TELEPHONE ENCOUNTER
2 rx sent: one for induction & one for maintenance.   Sent to [facility] in [place].

## 2019-06-28 ENCOUNTER — PATIENT MESSAGE (OUTPATIENT)
Dept: FAMILY MEDICINE | Facility: CLINIC | Age: 44
End: 2019-06-28

## 2019-06-29 ENCOUNTER — PATIENT MESSAGE (OUTPATIENT)
Dept: FAMILY MEDICINE | Facility: CLINIC | Age: 44
End: 2019-06-29

## 2019-07-01 ENCOUNTER — TELEPHONE (OUTPATIENT)
Dept: FAMILY MEDICINE | Facility: CLINIC | Age: 44
End: 2019-07-01

## 2019-07-01 DIAGNOSIS — I10 BENIGN ESSENTIAL HTN: ICD-10-CM

## 2019-07-01 DIAGNOSIS — I10 BENIGN ESSENTIAL HTN: Primary | ICD-10-CM

## 2019-07-01 RX ORDER — LOSARTAN POTASSIUM 50 MG/1
50 TABLET ORAL DAILY
Qty: 90 TABLET | Refills: 3 | Status: SHIPPED | OUTPATIENT
Start: 2019-07-01 | End: 2020-06-24

## 2019-07-01 RX ORDER — LOSARTAN POTASSIUM 50 MG/1
50 TABLET ORAL DAILY
Qty: 90 TABLET | Refills: 3 | Status: CANCELLED | OUTPATIENT
Start: 2019-07-01 | End: 2020-06-30

## 2019-07-01 NOTE — TELEPHONE ENCOUNTER
Spoke with pt she stated she sent in emails in regards to her blood pressure readings over the weekend. Pt is requesting a change in her bp medication.

## 2019-07-01 NOTE — TELEPHONE ENCOUNTER
----- Message from Ninfa Baptiste sent at 7/1/2019  8:12 AM CDT -----  Contact: Self   Type: Patient Call Back    Who called: Self     What is the request in detail:patient stated she need to speak with the doctor about changing her BP medication again  because her BP was high over the weekend     Can the clinic reply by [facility]? No     Would the patient rather a call back or a response via [facility]? Call     Best call back number:[phone]    [facility] [address] [place] [address] [address] AT [facility] OF [facility] & [facility] [phone] (Phone)  [phone] (Fax)

## 2019-07-01 NOTE — TELEPHONE ENCOUNTER
----- Message from Becky Melchor sent at 7/1/2019  4:40 PM CDT -----  Contact: Patient  Type: Patient Call Back    Who called: Patient    What is the request in detail: Pt is requesting that her prescription for losartan (COZAAR) 50 MG tablet be sent again. Pt states that the pharmacy did not received it.    Can the clinic reply by [facility]? No    Would the patient rather a call back or a response via [facility]? Call back    Best call back number:[phone]    Additional Information: [facility] [address] [place] [address] [address] AT [facility] OF [facility] & [address] [phone] (Phone)  [phone] (Fax)

## 2019-07-02 ENCOUNTER — PATIENT MESSAGE (OUTPATIENT)
Dept: FAMILY MEDICINE | Facility: CLINIC | Age: 44
End: 2019-07-02

## 2019-07-02 DIAGNOSIS — F34.1 DYSTHYMIA: ICD-10-CM

## 2019-07-02 RX ORDER — BUPROPION HYDROCHLORIDE 100 MG/1
TABLET ORAL
Qty: 180 TABLET | Refills: 0 | Status: SHIPPED | OUTPATIENT
Start: 2019-07-02 | End: 2019-10-07 | Stop reason: SDUPTHER

## 2019-07-02 NOTE — TELEPHONE ENCOUNTER
Refill request for Losartan(Cozaar) 50MG tablet has been called in verbally to the pharmacy ([facility] [id number] [facility] [place]) Pharmacy verified that they did not receive the rx that was sent on yesterday.

## 2019-07-11 ENCOUNTER — TELEPHONE (OUTPATIENT)
Dept: RHEUMATOLOGY | Facility: CLINIC | Age: 44
End: 2019-07-11

## 2019-07-11 NOTE — TELEPHONE ENCOUNTER
Called to inform patient of the need to reschedule 10/17/19 appointment, due to a change in the provider's schedule.  Patient verbalizes understanding.  Appointment rescheduled to 10/24/19.

## 2019-07-15 ENCOUNTER — PATIENT MESSAGE (OUTPATIENT)
Dept: FAMILY MEDICINE | Facility: CLINIC | Age: 44
End: 2019-07-15

## 2019-07-15 DIAGNOSIS — I10 BENIGN ESSENTIAL HTN: ICD-10-CM

## 2019-07-15 RX ORDER — CHLORTHALIDONE 25 MG/1
25 TABLET ORAL DAILY
Qty: 90 TABLET | Refills: 0 | Status: SHIPPED | OUTPATIENT
Start: 2019-07-15 | End: 2019-08-23

## 2019-07-16 ENCOUNTER — PATIENT MESSAGE (OUTPATIENT)
Dept: FAMILY MEDICINE | Facility: CLINIC | Age: 44
End: 2019-07-16

## 2019-07-16 DIAGNOSIS — I10 HYPERTENSION, UNSPECIFIED TYPE: Primary | ICD-10-CM

## 2019-07-17 DIAGNOSIS — L40.50 PSORIATIC ARTHRITIS: ICD-10-CM

## 2019-07-18 RX ORDER — SECUKINUMAB 150 MG/ML
INJECTION SUBCUTANEOUS
Qty: 10 ML | Refills: 0 | Status: SHIPPED | OUTPATIENT
Start: 2019-07-18 | End: 2020-04-02

## 2019-07-25 ENCOUNTER — PATIENT MESSAGE (OUTPATIENT)
Dept: FAMILY MEDICINE | Facility: CLINIC | Age: 44
End: 2019-07-25

## 2019-07-26 ENCOUNTER — PATIENT MESSAGE (OUTPATIENT)
Dept: FAMILY MEDICINE | Facility: CLINIC | Age: 44
End: 2019-07-26

## 2019-07-26 DIAGNOSIS — E03.9 HYPOTHYROIDISM, UNSPECIFIED TYPE: Primary | ICD-10-CM

## 2019-07-26 DIAGNOSIS — L40.50 PSORIATIC ARTHRITIS: ICD-10-CM

## 2019-07-27 ENCOUNTER — LAB VISIT (OUTPATIENT)
Dept: LAB | Facility: HOSPITAL | Age: 44
End: 2019-07-27
Attending: FAMILY MEDICINE
Payer: COMMERCIAL

## 2019-07-27 DIAGNOSIS — L40.50 PSORIATIC ARTHRITIS: ICD-10-CM

## 2019-07-27 DIAGNOSIS — I10 HYPERTENSION, UNSPECIFIED TYPE: ICD-10-CM

## 2019-07-27 DIAGNOSIS — E03.9 HYPOTHYROIDISM, UNSPECIFIED TYPE: ICD-10-CM

## 2019-07-27 LAB
ALBUMIN SERPL BCP-MCNC: 3.6 G/DL (ref 3.5–5.2)
ALP SERPL-CCNC: 66 U/L (ref 55–135)
ALT SERPL W/O P-5'-P-CCNC: 18 U/L (ref 10–44)
ANION GAP SERPL CALC-SCNC: 9 MMOL/L (ref 8–16)
ANION GAP SERPL CALC-SCNC: 9 MMOL/L (ref 8–16)
AST SERPL-CCNC: 21 U/L (ref 10–40)
BASOPHILS # BLD AUTO: 0.06 K/UL (ref 0–0.2)
BASOPHILS NFR BLD: 0.6 % (ref 0–1.9)
BILIRUB SERPL-MCNC: 0.2 MG/DL (ref 0.1–1)
BUN SERPL-MCNC: 11 MG/DL (ref 6–20)
BUN SERPL-MCNC: 11 MG/DL (ref 6–20)
CALCIUM SERPL-MCNC: 9.8 MG/DL (ref 8.7–10.5)
CALCIUM SERPL-MCNC: 9.8 MG/DL (ref 8.7–10.5)
CHLORIDE SERPL-SCNC: 99 MMOL/L (ref 95–110)
CHLORIDE SERPL-SCNC: 99 MMOL/L (ref 95–110)
CO2 SERPL-SCNC: 30 MMOL/L (ref 23–29)
CO2 SERPL-SCNC: 30 MMOL/L (ref 23–29)
CREAT SERPL-MCNC: 0.8 MG/DL (ref 0.5–1.4)
CREAT SERPL-MCNC: 0.8 MG/DL (ref 0.5–1.4)
CRP SERPL-MCNC: 7.2 MG/L (ref 0–8.2)
DIFFERENTIAL METHOD: ABNORMAL
EOSINOPHIL # BLD AUTO: 0.5 K/UL (ref 0–0.5)
EOSINOPHIL NFR BLD: 5 % (ref 0–8)
ERYTHROCYTE [DISTWIDTH] IN BLOOD BY AUTOMATED COUNT: 14.1 % (ref 11.5–14.5)
ERYTHROCYTE [SEDIMENTATION RATE] IN BLOOD BY WESTERGREN METHOD: 17 MM/HR (ref 0–36)
EST. GFR  (AFRICAN AMERICAN): >60 ML/MIN/1.73 M^2
EST. GFR  (AFRICAN AMERICAN): >60 ML/MIN/1.73 M^2
EST. GFR  (NON AFRICAN AMERICAN): >60 ML/MIN/1.73 M^2
EST. GFR  (NON AFRICAN AMERICAN): >60 ML/MIN/1.73 M^2
GLUCOSE SERPL-MCNC: 118 MG/DL (ref 70–110)
GLUCOSE SERPL-MCNC: 118 MG/DL (ref 70–110)
HCT VFR BLD AUTO: 40.3 % (ref 37–48.5)
HGB BLD-MCNC: 12 G/DL (ref 12–16)
IMM GRANULOCYTES # BLD AUTO: 0.05 K/UL (ref 0–0.04)
IMM GRANULOCYTES NFR BLD AUTO: 0.5 % (ref 0–0.5)
LYMPHOCYTES # BLD AUTO: 1.6 K/UL (ref 1–4.8)
LYMPHOCYTES NFR BLD: 17.3 % (ref 18–48)
MCH RBC QN AUTO: 28 PG (ref 27–31)
MCHC RBC AUTO-ENTMCNC: 29.8 G/DL (ref 32–36)
MCV RBC AUTO: 94 FL (ref 82–98)
MONOCYTES # BLD AUTO: 0.8 K/UL (ref 0.3–1)
MONOCYTES NFR BLD: 9.1 % (ref 4–15)
NEUTROPHILS # BLD AUTO: 6.3 K/UL (ref 1.8–7.7)
NEUTROPHILS NFR BLD: 67.5 % (ref 38–73)
NRBC BLD-RTO: 0 /100 WBC
PLATELET # BLD AUTO: 311 K/UL (ref 150–350)
PMV BLD AUTO: 9.2 FL (ref 9.2–12.9)
POTASSIUM SERPL-SCNC: 3.5 MMOL/L (ref 3.5–5.1)
POTASSIUM SERPL-SCNC: 3.5 MMOL/L (ref 3.5–5.1)
PROT SERPL-MCNC: 7.4 G/DL (ref 6–8.4)
RBC # BLD AUTO: 4.29 M/UL (ref 4–5.4)
SODIUM SERPL-SCNC: 138 MMOL/L (ref 136–145)
SODIUM SERPL-SCNC: 138 MMOL/L (ref 136–145)
T4 FREE SERPL-MCNC: 0.95 NG/DL (ref 0.71–1.51)
TSH SERPL DL<=0.005 MIU/L-ACNC: 2.67 UIU/ML (ref 0.4–4)
WBC # BLD AUTO: 9.27 K/UL (ref 3.9–12.7)

## 2019-07-27 PROCEDURE — 80053 COMPREHEN METABOLIC PANEL: CPT

## 2019-07-27 PROCEDURE — 85652 RBC SED RATE AUTOMATED: CPT

## 2019-07-27 PROCEDURE — 85025 COMPLETE CBC W/AUTO DIFF WBC: CPT

## 2019-07-27 PROCEDURE — 86140 C-REACTIVE PROTEIN: CPT

## 2019-07-27 PROCEDURE — 84439 ASSAY OF FREE THYROXINE: CPT

## 2019-07-27 PROCEDURE — 84443 ASSAY THYROID STIM HORMONE: CPT

## 2019-07-27 PROCEDURE — 36415 COLL VENOUS BLD VENIPUNCTURE: CPT | Mod: PO

## 2019-08-05 ENCOUNTER — PATIENT MESSAGE (OUTPATIENT)
Dept: FAMILY MEDICINE | Facility: CLINIC | Age: 44
End: 2019-08-05
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
## 2019-08-05 RX ORDER — AMOXICILLIN AND CLAVULANATE POTASSIUM 875; 125 MG/1; MG/1
1 TABLET, FILM COATED ORAL 2 TIMES DAILY
Qty: 20 TABLET | Refills: 0 | Status: SHIPPED | OUTPATIENT
Start: 2019-08-05 | End: 2019-08-15

## 2019-08-17 ENCOUNTER — OFFICE VISIT (OUTPATIENT)
Dept: FAMILY MEDICINE | Facility: CLINIC | Age: 44
End: 2019-08-17
Payer: COMMERCIAL

## 2019-08-17 VITALS
HEIGHT: 66 IN | RESPIRATION RATE: 16 BRPM | BODY MASS INDEX: 37.52 KG/M2 | WEIGHT: 233.44 LBS | HEART RATE: 97 BPM | OXYGEN SATURATION: 98 % | DIASTOLIC BLOOD PRESSURE: 78 MMHG | SYSTOLIC BLOOD PRESSURE: 112 MMHG | TEMPERATURE: 98 F

## 2019-08-17 DIAGNOSIS — R60.0 BILATERAL LEG EDEMA: Primary | ICD-10-CM

## 2019-08-17 DIAGNOSIS — I10 BENIGN ESSENTIAL HTN: ICD-10-CM

## 2019-08-17 PROCEDURE — 3078F DIAST BP <80 MM HG: CPT | Mod: CPTII,S$GLB,, | Performed by: FAMILY MEDICINE

## 2019-08-17 PROCEDURE — 3008F BODY MASS INDEX DOCD: CPT | Mod: CPTII,S$GLB,, | Performed by: FAMILY MEDICINE

## 2019-08-17 PROCEDURE — 3078F PR MOST RECENT DIASTOLIC BLOOD PRESSURE < 80 MM HG: ICD-10-PCS | Mod: CPTII,S$GLB,, | Performed by: FAMILY MEDICINE

## 2019-08-17 PROCEDURE — 3074F PR MOST RECENT SYSTOLIC BLOOD PRESSURE < 130 MM HG: ICD-10-PCS | Mod: CPTII,S$GLB,, | Performed by: FAMILY MEDICINE

## 2019-08-17 PROCEDURE — 99214 PR OFFICE/OUTPT VISIT, EST, LEVL IV, 30-39 MIN: ICD-10-PCS | Mod: S$GLB,,, | Performed by: FAMILY MEDICINE

## 2019-08-17 PROCEDURE — 3074F SYST BP LT 130 MM HG: CPT | Mod: CPTII,S$GLB,, | Performed by: FAMILY MEDICINE

## 2019-08-17 PROCEDURE — 3008F PR BODY MASS INDEX (BMI) DOCUMENTED: ICD-10-PCS | Mod: CPTII,S$GLB,, | Performed by: FAMILY MEDICINE

## 2019-08-17 PROCEDURE — 99999 PR PBB SHADOW E&M-EST. PATIENT-LVL III: ICD-10-PCS | Mod: PBBFAC,,, | Performed by: FAMILY MEDICINE

## 2019-08-17 PROCEDURE — 99214 OFFICE O/P EST MOD 30 MIN: CPT | Mod: S$GLB,,, | Performed by: FAMILY MEDICINE

## 2019-08-17 PROCEDURE — 99999 PR PBB SHADOW E&M-EST. PATIENT-LVL III: CPT | Mod: PBBFAC,,, | Performed by: FAMILY MEDICINE

## 2019-08-17 NOTE — PROGRESS NOTES
Routine Office Visit    Patient Name: Keli Gilbert    : 1975  MRN: [id number]    Subjective:  Keli is a 43 y.o. female who presents today for:    1. Leg swelling  Patient presenting today for continued bilateral lower extremity swelling.  It began when she started her Consentyx for psoriatic arthritis.  She states that she wasn't sure if it was that or another medication she was taking.  She states that she initially lost a lot of fluid when she started Chlorthalidone, but then it came back.  She has not changed her diet any.  There has been no rashes or ulcers to the legs.  She denies any pain at this time.  Her blood pressure is better controlled and she denies any chest pain, shortness of breath, weakness, or slurred speech    Past Medical History  Past Medical History:   Diagnosis Date    Abnormal Pap smear of vagina     AR (allergic rhinitis)     Demyelinating disorder     Depression     Fever blister     Fibromyalgia     followed by pain management    Generalized osteoarthritis of multiple sites     History of abnormal Pap smear     Hypertension     Hypothyroidism     Insulin resistance     Mixed anxiety and depressive disorder     Morbid obesity     Myelitis, optic neuritis in     treated with high-dose steroids and resolved; positive MRI and spinal fluid for a mass, but on embrel for psoriatic arthritis - [facility]; MRI normalized off embrel    Obesity     Pre-diabetes     hx diabetes on stereoids /    Psoriasis     Psoriatic arthritis     Vitamin D deficiency disease        Past Surgical History  Past Surgical History:   Procedure Laterality Date    SINUS SURGERY         Family History  Family History   Problem Relation Age of Onset    Psoriasis Father     Cancer Father         throat    Thyroid disease Mother     Heart disease Mother     Hypertension Mother     Hyperlipidemia Mother     Coronary artery disease Mother         s/p CABG     Heart attack Mother     Heart disease Sister         MVP    Diabetes Paternal Uncle     Diabetes Maternal Grandfather     Heart disease Maternal Grandfather     Thyroid disease Maternal Grandfather     ADD / ADHD Son     Melanoma Neg Hx     Lupus Neg Hx     Eczema Neg Hx     Acne Neg Hx     Breast cancer Neg Hx     Colon cancer Neg Hx     Ovarian cancer Neg Hx        Social History  Social History     Socioeconomic History    Marital status: Single     Spouse name: Not on file    Number of children: 1    Years of education: Not on file    Highest education level: Not on file   Occupational History    Occupation: Bio[employer]     Employer: [employer] (MAIN OFFICE)   Social Needs    Financial resource strain: Not on file    Food insecurity:     Worry: Not on file     Inability: Not on file    Transportation needs:     Medical: Not on file     Non-medical: Not on file   Tobacco Use    Smoking status: Never Smoker    Smokeless tobacco: Never Used   Substance and Sexual Activity    Alcohol use: No    Drug use: No     Comment: 7/7/15 one weed brownie    Sexual activity: Yes     Partners: Male     Birth control/protection: IUD   Lifestyle    Physical activity:     Days per week: Not on file     Minutes per session: Not on file    Stress: Not on file   Relationships    Social connections:     Talks on phone: Not on file     Gets together: Not on file     Attends Anabaptist service: Not on file     Active member of club or organization: Not on file     Attends meetings of clubs or organizations: Not on file     Relationship status: Not on file   Other Topics Concern    Are you pregnant or think you may be? No    Breast-feeding No   Social History Narrative    Not on file       Current Medications  Current Outpatient Medications on File Prior to Visit   Medication Sig Dispense Refill    (Magic mouthwash) 1:1:1 Benadryl 12.5mg/5ml liq, aluminum & magnesium hydroxide-simehticone (Maalox),  lidocaine viscous 2% Swish and spit 5 mLs every 4 (four) hours as needed. for mouth sores 300 mL 0    azelastine (ASTELIN) 137 mcg (0.1 %) nasal spray 1 spray (137 mcg total) by Nasal route 2 (two) times daily. 30 mL 0    buPROPion (WELLBUTRIN) 100 MG tablet TAKE 1 TABLET(100 MG) BY MOUTH TWICE DAILY 180 tablet 0    chlorthalidone (HYGROTEN) 25 MG Tab Take 1 tablet (25 mg total) by mouth once daily. 90 tablet 0    cholecalciferol, vitamin D3, 2,000 unit Tab Take 1 tablet by mouth Once a week.      cholecalciferol, vitamin D3, 50,000 unit capsule Take 1 capsule (50,000 Units total) by mouth every 14 (fourteen) days. 18 capsule 1    clindamycin (CLEOCIN T) 1 % external solution       clobetasol (CLOBEX) 0.05 % topical spray Apply topically Twice daily. AAA bie. Disp 125 ml      COSENTYX PEN, 2 PENS, 150 mg/mL PnIj INJECT 2 PENS INTO THE SKIN EVERY 7 DAYS FOR 5 WEEKS. 10 mL 0    gabapentin (NEURONTIN) 300 MG capsule Take 300 mg by mouth once daily.      leflunomide (ARAVA) 20 MG Tab Take 1 tablet (20 mg total) by mouth once daily. 90 tablet 3    levocetirizine (XYZAL) 5 MG tablet TAKE 1 TABLET (5 MG TOTAL) BY MOUTH DAILY AS NEEDED FOR ALLERGIES. 90 tablet 3    liothyronine (CYTOMEL) 5 MCG Tab TAKE 2 TABLETS (10 MCG TOTAL) BY MOUTH ONCE DAILY. 180 tablet 0    losartan (COZAAR) 50 MG tablet Take 1 tablet (50 mg total) by mouth once daily. 90 tablet 3    methadone (DOLOPHINE) 10 MG tablet Take 1 tablet by mouth Three times a day.      mupirocin calcium 2% (BACTROBAN) 2 % cream APPLY TO AFFECTED AREA TWICE A DAY INTRANASALLY FOR 1ST WEEK OF THE MONTH 30 g 3    naproxen (NAPROSYN) 500 MG tablet Take 1 tablet (500 mg total) by mouth 3 (three) times daily with meals. 270 tablet 3    potassium chloride (MICRO-K) 10 MEQ CpSR Take 1 capsule (10 mEq total) by mouth once daily. 90 capsule 3    PROAIR HFA 90 mcg/actuation inhaler INHALE 2 PUFFS INTO THE LUNGS EVERY 6 (SIX) HOURS AS NEEDED FOR WHEEZING. RESCUE 8.5  "Inhaler 0    promethazine (PHENERGAN) 25 MG tablet Take 1 tablet by mouth Once daily as needed.      ranitidine (ZANTAC) 150 MG tablet Take 1 tablet (150 mg total) by mouth 2 (two) times daily. 180 tablet 2    secukinumab (COSENTYX PEN, 2 PENS,) 150 mg/mL PnIj Inject 300 mg into the skin every 28 days. 6 Syringe 2    SYNTHROID 125 mcg tablet Take 1 tablet (125 mcg total) by mouth before breakfast. 90 tablet 3    tizanidine (ZANAFLEX) 4 MG tablet Take 4 mg by mouth 3 (three) times daily as needed.  6    trazodone (DESYREL) 50 MG tablet       triamcinolone (KENALOG) 0.1 % cream Apply topically Twice daily. Dirs: disp: 1# jar AAA      triamcinolone acetonide 0.1% (KENALOG) 0.1 % paste       urea (CARMOL) 40 % Crea AAA foot qd after soak 198.6 g 3     No current facility-administered medications on file prior to visit.        Allergies   Review of patient's allergies indicates:   Allergen Reactions    Doxycycline hcl Nausea And Vomiting    Enbrel [etanercept] Other (See Comments)     Optic neurtits       Review of Systems (Pertinent positives)  Review of Systems   Constitutional: Negative.    HENT: Negative.    Eyes: Negative.    Respiratory: Negative.    Cardiovascular: Positive for leg swelling. Negative for chest pain and palpitations.   Gastrointestinal: Negative.    Skin: Negative.          /78 (BP Location: Left arm, Patient Position: Sitting, BP Method: Small (Manual))   Pulse 97   Temp 98.3 °F (36.8 °C) (Oral)   Resp 16   Ht 5' 5.98" (1.676 m)   Wt 105.9 kg (233 lb 7.5 oz)   SpO2 98%   BMI 37.71 kg/m²     GENERAL APPEARANCE: in no apparent distress and well developed and well nourished  HEENT: PERRLA, EOMI, Sclera clear, anicteric, Oropharynx clear, no lesions, Neck supple with midline trachea  NECK: normal, supple, no adenopathy, thyroid normal in size  RESPIRATORY: appears well, vitals normal, no respiratory distress, acyanotic, normal RR, chest clear, no wheezing, crepitations, " rhonchi, normal symmetric air entry  HEART: regular rate and rhythm, S1, S2 normal, no murmur, click, rub or gallop.    ABDOMEN: abdomen is soft without tenderness, no masses, no hernias, no organomegaly, no rebound, no guarding. Suprapubic tenderness absent. No CVA tenderness.  Extremities: warm/well perfused.  No abnormal hair patterns.  No clubbing, cyanosis 2+ edema to BLE  SKIN: no rashes, no wounds, no other lesions  PSYCH: Alert, oriented x 3, thought content appropriate, speech normal, pleasant and cooperative, good eye contact, well groomed    Assessment/Plan:  Keli Gilbert is a 43 y.o. female who presents today for :    Keli was seen today for follow-up.    Diagnoses and all orders for this visit:    Bilateral leg edema    Benign essential HTN  -     Comprehensive metabolic panel; Future      1.  Cut losartan in half and double chlorthalidone  2.  Increased potassium replacement to 20mEq daily  3.  Repeat labs next week to check potassium  4.  Notify me if swelling persists  5.  Call with any concerns      Andriy Moran MD

## 2019-08-22 ENCOUNTER — PATIENT MESSAGE (OUTPATIENT)
Dept: FAMILY MEDICINE | Facility: CLINIC | Age: 44
End: 2019-08-22

## 2019-08-22 DIAGNOSIS — R60.0 BILATERAL LEG EDEMA: Primary | ICD-10-CM

## 2019-08-23 ENCOUNTER — PATIENT MESSAGE (OUTPATIENT)
Dept: FAMILY MEDICINE | Facility: CLINIC | Age: 44
End: 2019-08-23

## 2019-08-23 RX ORDER — FUROSEMIDE 20 MG/1
20 TABLET ORAL DAILY
Qty: 15 TABLET | Refills: 0 | Status: SHIPPED | OUTPATIENT
Start: 2019-08-23 | End: 2019-09-04

## 2019-08-31 ENCOUNTER — LAB VISIT (OUTPATIENT)
Dept: LAB | Facility: HOSPITAL | Age: 44
End: 2019-08-31
Attending: FAMILY MEDICINE
Payer: COMMERCIAL

## 2019-08-31 DIAGNOSIS — I10 BENIGN ESSENTIAL HTN: ICD-10-CM

## 2019-08-31 LAB
ALBUMIN SERPL BCP-MCNC: 3.5 G/DL (ref 3.5–5.2)
ALP SERPL-CCNC: 66 U/L (ref 55–135)
ALT SERPL W/O P-5'-P-CCNC: 16 U/L (ref 10–44)
ANION GAP SERPL CALC-SCNC: 7 MMOL/L (ref 8–16)
AST SERPL-CCNC: 17 U/L (ref 10–40)
BILIRUB SERPL-MCNC: 0.2 MG/DL (ref 0.1–1)
BUN SERPL-MCNC: 13 MG/DL (ref 6–20)
CALCIUM SERPL-MCNC: 9 MG/DL (ref 8.7–10.5)
CHLORIDE SERPL-SCNC: 104 MMOL/L (ref 95–110)
CO2 SERPL-SCNC: 27 MMOL/L (ref 23–29)
CREAT SERPL-MCNC: 0.7 MG/DL (ref 0.5–1.4)
EST. GFR  (AFRICAN AMERICAN): >60 ML/MIN/1.73 M^2
EST. GFR  (NON AFRICAN AMERICAN): >60 ML/MIN/1.73 M^2
GLUCOSE SERPL-MCNC: 68 MG/DL (ref 70–110)
POTASSIUM SERPL-SCNC: 3.8 MMOL/L (ref 3.5–5.1)
PROT SERPL-MCNC: 6.9 G/DL (ref 6–8.4)
SODIUM SERPL-SCNC: 138 MMOL/L (ref 136–145)

## 2019-08-31 PROCEDURE — 80053 COMPREHEN METABOLIC PANEL: CPT

## 2019-08-31 PROCEDURE — 36415 COLL VENOUS BLD VENIPUNCTURE: CPT | Mod: PO

## 2019-09-01 ENCOUNTER — PATIENT MESSAGE (OUTPATIENT)
Dept: FAMILY MEDICINE | Facility: CLINIC | Age: 44
End: 2019-09-01

## 2019-09-01 ENCOUNTER — PATIENT MESSAGE (OUTPATIENT)
Dept: RHEUMATOLOGY | Facility: CLINIC | Age: 44
End: 2019-09-01

## 2019-09-01 DIAGNOSIS — E87.6 HYPOKALEMIA: ICD-10-CM

## 2019-09-03 ENCOUNTER — PATIENT MESSAGE (OUTPATIENT)
Dept: RHEUMATOLOGY | Facility: CLINIC | Age: 44
End: 2019-09-03

## 2019-09-04 ENCOUNTER — PATIENT MESSAGE (OUTPATIENT)
Dept: VASCULAR SURGERY | Facility: CLINIC | Age: 44
End: 2019-09-04

## 2019-09-04 ENCOUNTER — PATIENT MESSAGE (OUTPATIENT)
Dept: RHEUMATOLOGY | Facility: CLINIC | Age: 44
End: 2019-09-04

## 2019-09-04 ENCOUNTER — PATIENT MESSAGE (OUTPATIENT)
Dept: FAMILY MEDICINE | Facility: CLINIC | Age: 44
End: 2019-09-04

## 2019-09-04 DIAGNOSIS — R60.0 BILATERAL LEG EDEMA: Primary | ICD-10-CM

## 2019-09-04 DIAGNOSIS — R60.0 BILATERAL LEG EDEMA: ICD-10-CM

## 2019-09-04 DIAGNOSIS — I87.2 VENOUS INSUFFICIENCY: Primary | ICD-10-CM

## 2019-09-04 RX ORDER — FUROSEMIDE 20 MG/1
20 TABLET ORAL DAILY
Qty: 30 TABLET | Refills: 1 | Status: SHIPPED | OUTPATIENT
Start: 2019-09-04 | End: 2019-09-23

## 2019-09-04 RX ORDER — POTASSIUM CHLORIDE 750 MG/1
20 CAPSULE, EXTENDED RELEASE ORAL DAILY
Qty: 180 CAPSULE | Refills: 0 | Status: SHIPPED | OUTPATIENT
Start: 2019-09-04 | End: 2020-01-07

## 2019-09-09 ENCOUNTER — OFFICE VISIT (OUTPATIENT)
Dept: VASCULAR SURGERY | Facility: CLINIC | Age: 44
End: 2019-09-09
Payer: COMMERCIAL

## 2019-09-09 ENCOUNTER — HOSPITAL ENCOUNTER (OUTPATIENT)
Dept: CARDIOLOGY | Facility: HOSPITAL | Age: 44
Discharge: HOME | End: 2019-09-09
Attending: SURGERY
Payer: COMMERCIAL

## 2019-09-09 VITALS
WEIGHT: 248 LBS | SYSTOLIC BLOOD PRESSURE: 146 MMHG | HEIGHT: 65 IN | DIASTOLIC BLOOD PRESSURE: 92 MMHG | BODY MASS INDEX: 41.32 KG/M2 | HEART RATE: 87 BPM

## 2019-09-09 DIAGNOSIS — I87.303 VENOUS HYPERTENSION OF BOTH LOWER EXTREMITIES: Primary | ICD-10-CM

## 2019-09-09 DIAGNOSIS — I87.2 VENOUS INSUFFICIENCY: ICD-10-CM

## 2019-09-09 LAB
LEFT GREAT SAPHENOUS DISTAL THIGH DIA: 4 CM
LEFT GREAT SAPHENOUS DISTAL THIGH REFLUX: 0 MS
LEFT GREAT SAPHENOUS JUNCTION DIA: 7 CM
LEFT GREAT SAPHENOUS JUNCTION REFLUX: 0 MS
LEFT GREAT SAPHENOUS KNEE DIA: 3 CM
LEFT GREAT SAPHENOUS KNEE REFLUX: 0 MS
LEFT GREAT SAPHENOUS MIDDLE THIGH DIA: 2 CM
LEFT GREAT SAPHENOUS MIDDLE THIGH REFLUX: 0 MS
LEFT GREAT SAPHENOUS PROXIMAL CALF DIA: 3 CM
LEFT GREAT SAPHENOUS PROXIMAL CALF REFLUX: 0 MS
LEFT SMALL SAPHENOUS KNEE DIA: 1 CM
LEFT SMALL SAPHENOUS KNEE REFLUX: 0 MS
LEFT SMALL SAPHENOUS SPJ DIA: 3 CM
LEFT SMALL SAPHENOUS SPJ REFLUX: 0 MS
RIGHT GREAT SAPHENOUS DISTAL THIGH DIA: 3 CM
RIGHT GREAT SAPHENOUS DISTAL THIGH REFLUX: 0 MS
RIGHT GREAT SAPHENOUS JUNCTION DIA: 7 CM
RIGHT GREAT SAPHENOUS JUNCTION REFLUX: 0 MS
RIGHT GREAT SAPHENOUS KNEE DIA: 3 CM
RIGHT GREAT SAPHENOUS KNEE REFLUX: 0 MS
RIGHT GREAT SAPHENOUS MIDDLE THIGH DIA: 4 CM
RIGHT GREAT SAPHENOUS MIDDLE THIGH REFLUX: 0 MS
RIGHT GREAT SAPHENOUS PROXIMAL CALF DIA: 2 CM
RIGHT GREAT SAPHENOUS PROXIMAL CALF REFLUX: 0 MS
RIGHT SMALL SAPHENOUS KNEE DIA: 2 CM
RIGHT SMALL SAPHENOUS KNEE REFLUX: 0 MS
RIGHT SMALL SAPHENOUS SPJ DIA: 2 CM
RIGHT SMALL SAPHENOUS SPJ REFLUX: 0 MS

## 2019-09-09 PROCEDURE — 93970 EXTREMITY STUDY: CPT | Mod: 50,TC

## 2019-09-09 PROCEDURE — 99244 OFF/OP CNSLTJ NEW/EST MOD 40: CPT | Mod: S$GLB,,, | Performed by: SURGERY

## 2019-09-09 PROCEDURE — 93970 EXTREMITY STUDY: CPT | Mod: 26,,, | Performed by: SURGERY

## 2019-09-09 PROCEDURE — 93970 CV US LOWER VENOUS INSUFFICIENCY BILATERAL (CUPID ONLY): ICD-10-PCS | Mod: 26,,, | Performed by: SURGERY

## 2019-09-09 PROCEDURE — 99244 PR OFFICE CONSULTATION,LEVEL IV: ICD-10-PCS | Mod: S$GLB,,, | Performed by: SURGERY

## 2019-09-09 PROCEDURE — 99999 PR PBB SHADOW E&M-EST. PATIENT-LVL III: CPT | Mod: PBBFAC,,, | Performed by: SURGERY

## 2019-09-09 PROCEDURE — 99999 PR PBB SHADOW E&M-EST. PATIENT-LVL III: ICD-10-PCS | Mod: PBBFAC,,, | Performed by: SURGERY

## 2019-09-09 NOTE — PROGRESS NOTES
Bonifacio Hernandez MD, RPVI                                 [facility]                           [facility]                             09/09/2019    HPI:  Keli Gilbert is a 43 y.o. female with   Patient Active Problem List   Diagnosis    Psoriasis    Psoriatic arthritis    Fibromyalgia    Demyelinating disorder    Generalized osteoarthritis of multiple sites    Optic neuritis, left-resolved    DJD (degenerative joint disease)    Furunculosis    Hypothyroidism    Vitamin D deficiency disease    HTN (hypertension), benign    AR (allergic rhinitis)    Intrauterine device    Obesity    History of diabetes mellitus    Dysthymia    Hypokalemia    being managed by PCP and specialists who is here today for evaluation of BLE edema.  Patient states location is ankle bilaterally occurring for 3 mo ago.  Associated signs and symptoms include bloating.  No pain from edema, just from arthritis.  Severity is 8/10.  Symptoms began 3 mo ago worsening, has always had some BLE edema.  Alleviating factors include elevation.  Worsening factors include dependency.  Patient is not wearing compression stockings daily, wears occasionally.  +FH of venous disease.  Symptoms do not limit patient's functional status and daily activities.  No DVT history.  No venous interventions.  No low sodium diet.  + excessive water intake.    Migraine with aura: No  PFO/ASD/right to left shunt: No  Pregnant: No  Breastfeeding: No    No MI  no Stroke  Tobacco use: never smoker    Past Medical History:   Diagnosis Date    Abnormal Pap smear of vagina     AR (allergic rhinitis)     Demyelinating disorder     Depression     Fever blister     Fibromyalgia     followed by pain management    Generalized osteoarthritis of multiple sites     History of abnormal Pap smear     Hypertension     Hypothyroidism     Insulin resistance     Mixed anxiety and depressive disorder     Morbid obesity      Myelitis, optic neuritis in     treated with high-dose steroids and resolved; positive MRI and spinal fluid for a mass, but on embrel for psoriatic arthritis - she will see a MS specialist at [facility]; MRI normalized off embrel    Obesity     Pre-diabetes     hx diabetes on stereoids /    Psoriasis     Psoriatic arthritis     Vitamin D deficiency disease      Past Surgical History:   Procedure Laterality Date    SINUS SURGERY  1998     Family History   Problem Relation Age of Onset    Psoriasis Father     Cancer Father         throat    Thyroid disease Mother     Heart disease Mother     Hypertension Mother     Hyperlipidemia Mother     Coronary artery disease Mother         s/p CABG    Heart attack Mother     Heart disease Sister         MVP    Diabetes Paternal Uncle     Diabetes Maternal Grandfather     Heart disease Maternal Grandfather     Thyroid disease Maternal Grandfather     ADD / ADHD Son     Melanoma Neg Hx     Lupus Neg Hx     Eczema Neg Hx     Acne Neg Hx     Breast cancer Neg Hx     Colon cancer Neg Hx     Ovarian cancer Neg Hx      Social History     Socioeconomic History    Marital status: Single     Spouse name: Not on file    Number of children: 1    Years of education: Not on file    Highest education level: Not on file   Occupational History    Occupation: [employer]     Employer: [employer] (MAIN OFFICE)   Social Needs    Financial resource strain: Not on file    Food insecurity:     Worry: Not on file     Inability: Not on file    Transportation needs:     Medical: Not on file     Non-medical: Not on file   Tobacco Use    Smoking status: Never Smoker    Smokeless tobacco: Never Used   Substance and Sexual Activity    Alcohol use: No    Drug use: No     Comment: 7/7/15 one weed brownie    Sexual activity: Yes     Partners: Male     Birth control/protection: IUD   Lifestyle    Physical activity:     Days per week: Not on file     Minutes per session: Not on file     Stress: Not on file   Relationships    Social connections:     Talks on phone: Not on file     Gets together: Not on file     Attends [employer] service: Not on file     Active member of club or organization: Not on file     Attends meetings of clubs or organizations: Not on file     Relationship status: Not on file   Other Topics Concern    Are you pregnant or think you may be? No    Breast-feeding No   Social History Narrative    Not on file       Current Outpatient Medications:     (Magic mouthwash) 1:1:1 Benadryl 12.5mg/5ml liq, aluminum & magnesium hydroxide-simehticone (Maalox), lidocaine viscous 2%, Swish and spit 5 mLs every 4 (four) hours as needed. for mouth sores, Disp: 300 mL, Rfl: 0    buPROPion (WELLBUTRIN) 100 MG tablet, TAKE 1 TABLET(100 MG) BY MOUTH TWICE DAILY, Disp: 180 tablet, Rfl: 0    cholecalciferol, vitamin D3, 2,000 unit Tab, Take 1 tablet by mouth Once a week., Disp: , Rfl:     cholecalciferol, vitamin D3, 50,000 unit capsule, Take 1 capsule (50,000 Units total) by mouth every 14 (fourteen) days., Disp: 18 capsule, Rfl: 1    clindamycin (CLEOCIN T) 1 % external solution, , Disp: , Rfl:     clobetasol (CLOBEX) 0.05 % topical spray, Apply topically Twice daily. AAA bie. Disp 125 ml, Disp: , Rfl:     COSENTYX PEN, 2 PENS, 150 mg/mL PnIj, INJECT 2 PENS INTO THE SKIN EVERY 7 DAYS FOR 5 WEEKS., Disp: 10 mL, Rfl: 0    furosemide (LASIX) 20 MG tablet, Take 1 tablet (20 mg total) by mouth once daily., Disp: 30 tablet, Rfl: 1    gabapentin (NEURONTIN) 300 MG capsule, Take 300 mg by mouth once daily., Disp: , Rfl:     leflunomide (ARAVA) 20 MG Tab, Take 1 tablet (20 mg total) by mouth once daily., Disp: 90 tablet, Rfl: 3    levocetirizine (XYZAL) 5 MG tablet, TAKE 1 TABLET (5 MG TOTAL) BY MOUTH DAILY AS NEEDED FOR ALLERGIES., Disp: 90 tablet, Rfl: 3    liothyronine (CYTOMEL) 5 MCG Tab, TAKE 2 TABLETS (10 MCG TOTAL) BY MOUTH ONCE DAILY., Disp: 180 tablet, Rfl: 0    losartan (COZAAR)  50 MG tablet, Take 1 tablet (50 mg total) by mouth once daily., Disp: 90 tablet, Rfl: 3    methadone (DOLOPHINE) 10 MG tablet, Take 1 tablet by mouth Three times a day., Disp: , Rfl:     mupirocin calcium 2% (BACTROBAN) 2 % cream, APPLY TO AFFECTED AREA TWICE A DAY INTRANASALLY FOR 1ST WEEK OF THE MONTH, Disp: 30 g, Rfl: 3    naproxen (NAPROSYN) 500 MG tablet, Take 1 tablet (500 mg total) by mouth 3 (three) times daily with meals., Disp: 270 tablet, Rfl: 3    potassium chloride (MICRO-K) 10 MEQ CpSR, Take 2 capsules (20 mEq total) by mouth once daily., Disp: 180 capsule, Rfl: 0    promethazine (PHENERGAN) 25 MG tablet, Take 1 tablet by mouth Once daily as needed., Disp: , Rfl:     ranitidine (ZANTAC) 150 MG tablet, Take 1 tablet (150 mg total) by mouth 2 (two) times daily., Disp: 180 tablet, Rfl: 2    secukinumab (COSENTYX PEN, 2 PENS,) 150 mg/mL PnIj, Inject 300 mg into the skin every 28 days., Disp: 6 Syringe, Rfl: 2    SYNTHROID 125 mcg tablet, Take 1 tablet (125 mcg total) by mouth before breakfast., Disp: 90 tablet, Rfl: 3    tizanidine (ZANAFLEX) 4 MG tablet, Take 4 mg by mouth 3 (three) times daily as needed., Disp: , Rfl: 6    trazodone (DESYREL) 50 MG tablet, , Disp: , Rfl:     triamcinolone (KENALOG) 0.1 % cream, Apply topically Twice daily. Dirs: disp: 1# jar AAA, Disp: , Rfl:     triamcinolone acetonide 0.1% (KENALOG) 0.1 % paste, , Disp: , Rfl:     urea (CARMOL) 40 % Crea, AAA foot qd after soak, Disp: 198.6 g, Rfl: 3    azelastine (ASTELIN) 137 mcg (0.1 %) nasal spray, 1 spray (137 mcg total) by Nasal route 2 (two) times daily., Disp: 30 mL, Rfl: 0    PROAIR HFA 90 mcg/actuation inhaler, INHALE 2 PUFFS INTO THE LUNGS EVERY 6 (SIX) HOURS AS NEEDED FOR WHEEZING. RESCUE, Disp: 8.5 Inhaler, Rfl: 0    REVIEW OF SYSTEMS:  General: No fevers or chills; ENT: No sore throat; Allergy and Immunology: no persistent infections; Hematological and Lymphatic: No history of bleeding or easy bruising;  Endocrine: negative; Respiratory: no cough, shortness of breath, or wheezing; Cardiovascular: no chest pain or dyspnea on exertion; Gastrointestinal: no abdominal pain/back, change in bowel habits, or bloody stools; Genito-Urinary: no dysuria, trouble voiding, or hematuria; Musculoskeletal: edema; Neurological: no TIA or stroke symptoms; Psychiatric: no nervousness, anxiety or depression.    PHYSICAL EXAM:      Pulse: 87         General appearance:  Alert, well-appearing, and in no distress.  Oriented to person, place, and time                    Neurological: Normal speech, no focal findings noted; CN II - XII grossly intact. RLE with sensation to light touch, LLE with sensation to light touch.            Musculoskeletal: Digits/nail without cyanosis/clubbing.  Strength 5/5 BLE.                    Neck: Supple, no significant adenopathy                  Chest:  No wheezes, symmetric air entry. No use of accessory muscles               Cardiac: Normal rate and regular rhythm            Abdomen: Soft, nontender, nondistended      Extremities:   2+ R DP pulse, 2+ L DP pulse      2+ RLE edema, 2+ LLE edema    Skin:  RLE no ulcer; LLE no ulcer      RLE no spider veins, LLE no spider veins      RLE no varicose veins, LLE no varicose veins    CEAP 3/3    LAB RESULTS:  No results found for: CBC  No results found for: LABPROT, INR  Lab Results   Component Value Date     08/31/2019    K 3.8 08/31/2019     08/31/2019    CO2 27 08/31/2019    GLU 68 (L) 08/31/2019    BUN 13 08/31/2019    CREATININE 0.7 08/31/2019    CALCIUM 9.0 08/31/2019    ANIONGAP 7 (L) 08/31/2019    EGFRNONAA >60.0 08/31/2019     Lab Results   Component Value Date    WBC 9.27 07/27/2019    RBC 4.29 07/27/2019    HGB 12.0 07/27/2019    HCT 40.3 07/27/2019    MCV 94 07/27/2019    MCH 28.0 07/27/2019    MCHC 29.8 (L) 07/27/2019    RDW 14.1 07/27/2019     07/27/2019    MPV 9.2 07/27/2019    GRAN 6.3 07/27/2019    GRAN 67.5 07/27/2019    LYMPH  1.6 07/27/2019    LYMPH 17.3 (L) 07/27/2019    MONO 0.8 07/27/2019    MONO 9.1 07/27/2019    EOS 0.5 07/27/2019    BASO 0.06 07/27/2019    EOSINOPHIL 5.0 07/27/2019    BASOPHIL 0.6 07/27/2019    DIFFMETHOD Automated 07/27/2019     .  Lab Results   Component Value Date    HGBA1C 5.4 03/05/2016       IMAGING:  All pertinent imaging has been reviewed and interpreted independently.    Venous US 9/9/19 Impression:    No BLE DVT or reflux.    IMP/PLAN:  43 y.o. female with   Patient Active Problem List   Diagnosis    Psoriasis    Psoriatic arthritis    Fibromyalgia    Demyelinating disorder    Generalized osteoarthritis of multiple sites    Optic neuritis, left-resolved    DJD (degenerative joint disease)    Furunculosis    Hypothyroidism    Vitamin D deficiency disease    HTN (hypertension), benign    AR (allergic rhinitis)    Intrauterine device    Obesity    History of diabetes mellitus    Dysthymia    Hypokalemia    being managed by PCP and specialists who is here today for evaluation of BLE edema.    -recommend compression with Rx stockings, elevation, dietary changes associated with water and sodium intake discussed at length with patient  -Exercise   -If symptoms persist, may have a lymphedema component  -RTC 3 months for further evaluation    I spent 20 minutes evaluating this patient and greater than 50% of the time was spent counseling, coordinator care and discussing the plan of care.  All questions were answered and patient stated understanding with agreement with the above treatment plan.    Bonifacio Hernandez MD [facility]  Vascular and Endovascular Surgery

## 2019-09-09 NOTE — PATIENT INSTRUCTIONS
Putting on Compression Stockings     Turn the stocking inside-out, then fit it over your toes and heel.          Roll the stocking up your leg.            Once stockings are on, make sure the top of the stocking is about two fingers width below the crease of the knee (or the groin if you wear thigh-high stockings).          Use equipment, such as a stocking nayan, or wear rubber gloves to make it easier to put on compression stockings.         Elastic compression stockings are prescribed to treat many vein problems. Wearing them may be the most important thing you do to manage your symptoms. The stockings fit tightly around your ankle, gradually reducing in pressure as they go up your legs. This helps keep blood flowing to your heart. As a result, swelling is reduced. Your healthcare provider will prescribe stockings at a safe pressure for you. He or she will also tell you how often to wear and remove the stockings. Follow these instructions closely. Also, do not buy or wear compression stockings without first seeing your healthcare provider.  Tips for wear and care  To wear stockings safely and to get the most benefit:  · Wear the length prescribed by your healthcare provider.  · Pull them to the designated height and no farther. Dont let them bunch at the top. This can restrict blood flow and increase swelling.  · Wear the stockings for the amount of time your healthcare provider recommends. Replace them when they start to feel loose. This will likely be every 3 to 6 months.  · Remove them as your healthcare provider directs. When removed, wash your legs. Then check your legs and feet for sores. Call your healthcare provider if you find a sore. Dont put the stockings back on unless your healthcare provider directs.   · Wash the stockings as instructed. They may need to be hand-washed.  Date Last Reviewed: 5/1/2016  © 1746-3286 [employer]. [address] [place] [address], [place] [address]. All rights  reserved. This information is not intended as a substitute for professional medical care. Always follow your healthcare professional's instructions.        Tips for Using Less Salt    Most people with heart problems need to eat less salt (sodium). Reducing the amount of salt you eat may help control your blood pressure. The higher your blood pressure, the greater your risk for heart disease, stroke, blindness, and kidney problems.  At the store  · Make low-salt choices by reading labels carefully. Look for the total amount of sodium per serving.  · Use more fresh food. Buy more fruits and vegetables. Select lean meats, fish, and poultry.  · Use fewer frozen, canned, and packaged foods which often contain a lot of sodium.  · Use plain frozen vegetables without sauces or toppings. These products are often low- or no-sodium.  · Opt for reduced-sodium or no-salt-added versions of canned vegetables and soups.  In the kitchen  · Don't add salt to food when you're cooking. Season with flavorings such as onion, garlic, pepper, salt-free herbal blends, and lemon or lime juice.  · Use a cookbook containing low-salt recipes. It can give you ideas for tasty meals that are healthy for your heart.  · Sprinkle salt-free herbal blends on vegetables and meat.  · Drain and rinse canned foods, such as canned beans and vegetables, before cooking or eating.  Eating out  · Tell the  you're on a low-salt diet. Ask questions about the menu.  · Order fish, chicken, and meat broiled, baked, poached, or grilled without salt, butter, or breading.  · Use lemon, pepper, and salt-free herb mixes to add flavor.  · Choose plain steamed rice, boiled noodles, and baked or boiled potatoes. Top potatoes with chives and a little sour cream.     Beware! Salt goes by many other names. Limit foods with these words listed as ingredients: salt, sodium, soy sauce, baking soda, baking powder, MSG, monosodium, Na (the chemical symbol for sodium). Some  antacids are also high in salt.   Date Last Reviewed: 6/19/2015  © 1329-2234 [employer]. [address] [place] [address], [place] [address]. All rights reserved. This information is not intended as a substitute for professional medical care. Always follow your healthcare professional's instructions.        Low-Salt Diet  This diet removes foods that are high in salt. It also limits the amount of salt you use when cooking. It is most often used for people with high blood pressure, edema (fluid retention), and kidney, liver, or heart disease.  Table salt contains the mineral sodium. Your body needs sodium to work normally. But too much sodium can make your health problems worse. Your healthcare provider is recommending a low-salt (also called low-sodium) diet for you. Your total daily allowance of salt is 1,500 to 2,300 milligrams (mg). It is less than 1 teaspoon of table salt. This means you can have only about 500 to 700 mg of sodium at each meal. People with certain health problems should limit salt intake to the lower end of the recommended range.    When you cook, dont add much salt. If you can cook without using salt, even better. Dont add salt to your food at the table.  When shopping, read food labels. Salt is often called sodium on the label. Choose foods that are salt-free, low salt, or very low salt. Note that foods with reduced salt may not lower your salt intake enough.    Beans, potatoes, and pasta  Ok: Dry beans, split peas, lentils, potatoes, rice, macaroni, pasta, spaghetti without added salt  Avoid: Potato chips, tortilla chips, and similar products  Breads and cereals  Ok: Low-sodium breads, rolls, cereals, and cakes; low-salt crackers, matzo crackers  Avoid: Salted crackers, pretzels, popcorn, Mozamb[place] toast, pancakes, muffins  Dairy  Ok: Milk, chocolate milk, hot chocolate mix, low-salt cheeses, and yogurt  Avoid: Processed cheese and cheese spreads; Roquefort, Camembert, and cottage cheese;  buttermilk, instant breakfast drink  Desserts  Ok: Ice cream, frozen yogurt, juice bars, gelatin, cookies and pies, sugar, honey, jelly, hard candy  Avoid: Most pies, cakes and cookies prepared or processed with salt; instant pudding  Drinks  Ok: Tea, coffee, fizzy (carbonated) drinks, juices  Avoid: Flavored coffees, electrolyte replacement drinks, sports drinks  Meats  Ok: All fresh meat, fish, poultry, low-salt tuna, eggs, egg substitute  Avoid: Smoked, pickled, brine-cured, or salted meats and fish. This includes griffith, chipped beef, corned beef, hot dogs, deli meats, ham, kosher meats, salt pork, sausage, canned tuna, salted codfish, smoked salmon, herring, sardines, or anchovies.  Seasonings and spices  Ok: Most seasonings are okay. Good substitutes for salt include: fresh herb blends, hot sauce, lemon, garlic, stanton, vinegar, dry mustard, parsley, cilantro, horseradish, tomato paste, regular margarine, mayonnaise, unsalted butter, cream cheese, vegetable oil, cream, low-salt salad dressing and gravy.  Avoid: Regular ketchup, relishes, pickles, soy sauce, teriyaki sauce, Worcestershire sauce, BBQ sauce, tartar sauce, meat tenderizer, chili sauce, regular gravy, regular salad dressing, salted butter  Soups  Ok: Low-salt soups and broths made with allowed foods  Avoid: Bouillon cubes, soups with smoked or salted meats, regular soup and broth  Vegetables  Ok: Most vegetables are okay; also low-salt tomato and vegetable juices  Avoid: Sauerkraut and other brine-soaked vegetables; pickles and other pickled vegetables; tomato juice, olives  Date Last Reviewed: 8/1/2016  © 6679-2374 [employer]. [address] [place] [address]. All rights reserved. This information is not intended as a substitute for professional medical care. Always follow your healthcare professional's instructions.        Low-Salt Choices  Eating salt (sodium) can make your body retain too much water. Excess water makes  your heart work harder. Canned, packaged, and frozen foods are easy to prepare, but they are often high in sodium. Here are some ideas for low-salt foods you can easily prepare yourself.    For breakfast  · Fruit or 100% fruit juice  · Whole-wheat bread or an English muffin. Compare sodium content on labels.  · Low-fat milk or yogurt  · Unsalted eggs  · Shredded wheat  · Corn tortillas  · Unsalted steamed rice  · Regular (not instant) hot cereal, made without salt  Stay away from:  · Sausage, griffith, and ham  · Flour tortillas  · Packaged muffins, pancakes, and biscuits  · Instant hot cereals  · Cottage cheese  For lunch and dinner  · Fresh fish, chicken, turkey, or meat--baked, broiled, or roasted without salt  · Dry beans, cooked without salt  · Tofu, stir-fried without salt  · Unsalted fresh fruit and vegetables, or frozen or canned fruit and vegetables with no added salt  Stay away from:  · Lunch or deli meat that is cured or smoked  · Cheese  · Tomato juice and catsup  · Canned vegetables, soups, and fish not labeled as no-salt-added or reduced sodium  · Packaged gravies and sauces  · Olives, pickles, and relish  · Bottled salad dressings  For snacks and desserts  · Yogurt  · Unsalted, air popped popcorn  · Unsalted nuts or seeds  Stay away from:  · Pies and cakes  · Packaged dessert mixes  · Pizza  · Canned and packaged puddings  · Pretzels, chips, crackers, and nuts--unless the label says unsalted  Date Last Reviewed: 6/17/2015  © 3806-1469 [employer]. [address] [place] [address], [place] [address]. All rights reserved. This information is not intended as a substitute for professional medical care. Always follow your healthcare professional's instructions.

## 2019-09-09 NOTE — LETTER
September 9, 2019      Andriy Moran MD  [address] [facility] [address]           [facility]  [address] [facility] [address]  [facility] [address]  Phone: [phone]  Fax: [phone]          Patient: Keli Gilbert   MR Number: [id number]   YOB: 1975   Date of Visit: 9/9/2019       Dear Dr. Andriy RUSSO Page:    Thank you for referring Keli Gilbert to me for evaluation. Attached you will find relevant portions of my assessment and plan of care.    If you have questions, please do not hesitate to call me. I look forward to following Keli Gilbert along with you.    Sincerely,    Bonifacio Hernandez MD    Enclosure  CC:  No Recipients    If you would like to receive this communication electronically, please contact [email or web address] or [phone] to request more information on [employer] access.    For providers and/or their staff who would like to refer a patient to [facility], please contact us through our one-stop-shop provider referral line, [facility], at [phone].    If you feel you have received this communication in error or would no longer like to receive these types of communications, please e-mail [email or web address]

## 2019-09-11 ENCOUNTER — PATIENT MESSAGE (OUTPATIENT)
Dept: FAMILY MEDICINE | Facility: CLINIC | Age: 44
End: 2019-09-11

## 2019-09-14 ENCOUNTER — LAB VISIT (OUTPATIENT)
Dept: LAB | Facility: HOSPITAL | Age: 44
End: 2019-09-14
Attending: INTERNAL MEDICINE
Payer: COMMERCIAL

## 2019-09-14 DIAGNOSIS — L40.50 PSORIATIC ARTHRITIS: ICD-10-CM

## 2019-09-14 LAB
ALBUMIN SERPL BCP-MCNC: 3.6 G/DL (ref 3.5–5.2)
ALP SERPL-CCNC: 63 U/L (ref 55–135)
ALT SERPL W/O P-5'-P-CCNC: 20 U/L (ref 10–44)
ANION GAP SERPL CALC-SCNC: 8 MMOL/L (ref 8–16)
AST SERPL-CCNC: 22 U/L (ref 10–40)
BASOPHILS # BLD AUTO: 0.06 K/UL (ref 0–0.2)
BASOPHILS NFR BLD: 0.7 % (ref 0–1.9)
BILIRUB SERPL-MCNC: 0.3 MG/DL (ref 0.1–1)
BUN SERPL-MCNC: 9 MG/DL (ref 6–20)
CALCIUM SERPL-MCNC: 9.1 MG/DL (ref 8.7–10.5)
CHLORIDE SERPL-SCNC: 104 MMOL/L (ref 95–110)
CO2 SERPL-SCNC: 29 MMOL/L (ref 23–29)
CREAT SERPL-MCNC: 0.7 MG/DL (ref 0.5–1.4)
CRP SERPL-MCNC: 9.8 MG/L (ref 0–8.2)
DIFFERENTIAL METHOD: ABNORMAL
EOSINOPHIL # BLD AUTO: 0.4 K/UL (ref 0–0.5)
EOSINOPHIL NFR BLD: 4.9 % (ref 0–8)
ERYTHROCYTE [DISTWIDTH] IN BLOOD BY AUTOMATED COUNT: 13.8 % (ref 11.5–14.5)
ERYTHROCYTE [SEDIMENTATION RATE] IN BLOOD BY WESTERGREN METHOD: 24 MM/HR (ref 0–36)
EST. GFR  (AFRICAN AMERICAN): >60 ML/MIN/1.73 M^2
EST. GFR  (NON AFRICAN AMERICAN): >60 ML/MIN/1.73 M^2
GLUCOSE SERPL-MCNC: 97 MG/DL (ref 70–110)
HCT VFR BLD AUTO: 37 % (ref 37–48.5)
HGB BLD-MCNC: 11.5 G/DL (ref 12–16)
IMM GRANULOCYTES # BLD AUTO: 0.05 K/UL (ref 0–0.04)
IMM GRANULOCYTES NFR BLD AUTO: 0.6 % (ref 0–0.5)
LYMPHOCYTES # BLD AUTO: 1.7 K/UL (ref 1–4.8)
LYMPHOCYTES NFR BLD: 19.9 % (ref 18–48)
MCH RBC QN AUTO: 27.8 PG (ref 27–31)
MCHC RBC AUTO-ENTMCNC: 31.1 G/DL (ref 32–36)
MCV RBC AUTO: 90 FL (ref 82–98)
MONOCYTES # BLD AUTO: 0.8 K/UL (ref 0.3–1)
MONOCYTES NFR BLD: 10 % (ref 4–15)
NEUTROPHILS # BLD AUTO: 5.3 K/UL (ref 1.8–7.7)
NEUTROPHILS NFR BLD: 63.9 % (ref 38–73)
NRBC BLD-RTO: 0 /100 WBC
PLATELET # BLD AUTO: 301 K/UL (ref 150–350)
PMV BLD AUTO: 8.9 FL (ref 9.2–12.9)
POTASSIUM SERPL-SCNC: 4.3 MMOL/L (ref 3.5–5.1)
PROT SERPL-MCNC: 6.9 G/DL (ref 6–8.4)
RBC # BLD AUTO: 4.13 M/UL (ref 4–5.4)
SODIUM SERPL-SCNC: 141 MMOL/L (ref 136–145)
WBC # BLD AUTO: 8.31 K/UL (ref 3.9–12.7)

## 2019-09-14 PROCEDURE — 80053 COMPREHEN METABOLIC PANEL: CPT

## 2019-09-14 PROCEDURE — 85652 RBC SED RATE AUTOMATED: CPT

## 2019-09-14 PROCEDURE — 85025 COMPLETE CBC W/AUTO DIFF WBC: CPT

## 2019-09-14 PROCEDURE — 86140 C-REACTIVE PROTEIN: CPT

## 2019-09-14 PROCEDURE — 36415 COLL VENOUS BLD VENIPUNCTURE: CPT | Mod: PO

## 2019-09-16 ENCOUNTER — PATIENT MESSAGE (OUTPATIENT)
Dept: FAMILY MEDICINE | Facility: CLINIC | Age: 44
End: 2019-09-16

## 2019-09-19 ENCOUNTER — PATIENT MESSAGE (OUTPATIENT)
Dept: FAMILY MEDICINE | Facility: CLINIC | Age: 44
End: 2019-09-19

## 2019-09-19 DIAGNOSIS — R60.0 PEDAL EDEMA: Primary | ICD-10-CM

## 2019-09-23 ENCOUNTER — PATIENT MESSAGE (OUTPATIENT)
Dept: FAMILY MEDICINE | Facility: CLINIC | Age: 44
End: 2019-09-23

## 2019-09-23 RX ORDER — FUROSEMIDE 40 MG/1
40 TABLET ORAL DAILY
Qty: 30 TABLET | Refills: 4 | Status: SHIPPED | OUTPATIENT
Start: 2019-09-23 | End: 2019-11-27 | Stop reason: SDUPTHER

## 2019-09-25 ENCOUNTER — PATIENT MESSAGE (OUTPATIENT)
Dept: RHEUMATOLOGY | Facility: CLINIC | Age: 44
End: 2019-09-25

## 2019-10-02 DIAGNOSIS — K21.9 GASTROESOPHAGEAL REFLUX DISEASE, ESOPHAGITIS PRESENCE NOT SPECIFIED: ICD-10-CM

## 2019-10-07 DIAGNOSIS — F34.1 DYSTHYMIA: ICD-10-CM

## 2019-10-07 RX ORDER — BUPROPION HYDROCHLORIDE 100 MG/1
TABLET ORAL
Qty: 180 TABLET | Refills: 0 | Status: SHIPPED | OUTPATIENT
Start: 2019-10-07 | End: 2020-01-07

## 2019-10-17 DIAGNOSIS — E03.9 HYPOTHYROIDISM, UNSPECIFIED TYPE: ICD-10-CM

## 2019-10-17 RX ORDER — LIOTHYRONINE SODIUM 5 UG/1
TABLET ORAL
Qty: 180 TABLET | Refills: 0 | Status: SHIPPED | OUTPATIENT
Start: 2019-10-17 | End: 2020-01-22

## 2019-10-31 ENCOUNTER — PATIENT MESSAGE (OUTPATIENT)
Dept: RHEUMATOLOGY | Facility: CLINIC | Age: 44
End: 2019-10-31

## 2019-11-03 ENCOUNTER — PATIENT MESSAGE (OUTPATIENT)
Dept: FAMILY MEDICINE | Facility: CLINIC | Age: 44
End: 2019-11-03

## 2019-11-03 DIAGNOSIS — R60.0 LOCALIZED EDEMA: Primary | ICD-10-CM

## 2019-11-18 ENCOUNTER — PATIENT OUTREACH (OUTPATIENT)
Dept: ADMINISTRATIVE | Facility: OTHER | Age: 44
End: 2019-11-18

## 2019-11-18 ENCOUNTER — PATIENT MESSAGE (OUTPATIENT)
Dept: FAMILY MEDICINE | Facility: CLINIC | Age: 44
End: 2019-11-18

## 2019-11-18 NOTE — PROGRESS NOTES
Subjective:       Patient ID: Keli Gilbert is a 43 y.o. female with psoriatic arthritis on Cosentyx & Leflunomide (& naproxen); demyelinating disorder secondary to Enbrel; incomplete effect on chronic pain syndrome on savella & methadone     Chief Complaint: Disease management    Ms. Gilbert is a 42 yo woman with history of PsA last seen on 6/18/19 . At that time she was on xeljanz (& lefllunomide 20 & naproxen) switched from Taltz for hand pain & swelling to which she had a temporary improvement and then regressed. Xeljanz did not help. We switched her again, last back to Cosentyx.  She was loaded up and now is on Cosentyx 300 mg/4 weeks.    She is doing better on Cosentyx than she was on Taltz,or Xeljanz but is still having AM stiffness x 30 minutes. Her R 3rd PIP which was especially painful and swollen is improved as is the base of her L thumb There is a suspicious lesion for erosion on imaging here.  Other joints are doing well without swelling and without much pain (she still has her chronic muscle/jt pains which she recognizes as different).  She is still taking naproxen & gabapentin and has gained more weight since her last visit.  All in all she's gained around 25 lbs since February. She dates this to after she started xeljanz.   She saw a vascular surgeon and had testing & was told her veins were ok. She was started on a furosemide and found that taking it nightly she diuresed better. She feels she was weighing more and lost some weight.     She still reports insomnia, anxiety and depression.            Associated symptoms include fatigue. Pertinent negatives include no fever or headaches.          Current Outpatient Medications   Medication Sig Dispense Refill    buPROPion (WELLBUTRIN) 100 MG tablet TAKE 1 TABLET(100 MG) BY MOUTH TWICE DAILY 180 tablet 0    clindamycin (CLEOCIN T) 1 % external solution       clobetasol (CLOBEX) 0.05 % topical spray Apply topically Twice daily. AAA bie. Disp  125 ml      COSENTYX PEN, 2 PENS, 150 mg/mL PnIj INJECT 2 PENS INTO THE SKIN EVERY 7 DAYS FOR 5 WEEKS. 10 mL 0    furosemide (LASIX) 40 MG tablet Take 1 tablet (40 mg total) by mouth once daily. 30 tablet 4    gabapentin (NEURONTIN) 300 MG capsule Take 300 mg by mouth once daily.      leflunomide (ARAVA) 20 MG Tab Take 1 tablet (20 mg total) by mouth once daily. 90 tablet 3    liothyronine (CYTOMEL) 5 MCG Tab TAKE 2 TABLETS (10 MCG TOTAL) BY MOUTH ONCE DAILY. 180 tablet 0    losartan (COZAAR) 50 MG tablet Take 1 tablet (50 mg total) by mouth once daily. 90 tablet 3    methadone (DOLOPHINE) 10 MG tablet Take 1 tablet by mouth Three times a day.      naproxen (NAPROSYN) 500 MG tablet Take 1 tablet (500 mg total) by mouth 3 (three) times daily with meals. 270 tablet 3    potassium chloride (MICRO-K) 10 MEQ CpSR Take 2 capsules (20 mEq total) by mouth once daily. 180 capsule 0    promethazine (PHENERGAN) 25 MG tablet Take 1 tablet by mouth Once daily as needed.      ranitidine (ZANTAC) 150 MG tablet Take 1 tablet (150 mg total) by mouth 2 (two) times daily. 180 tablet 2    secukinumab (COSENTYX PEN, 2 PENS,) 150 mg/mL PnIj Inject 300 mg into the skin every 28 days. 6 Syringe 2    SYNTHROID 125 mcg tablet Take 1 tablet (125 mcg total) by mouth before breakfast. 90 tablet 3    tizanidine (ZANAFLEX) 4 MG tablet Take 4 mg by mouth 3 (three) times daily as needed.  6    trazodone (DESYREL) 50 MG tablet       triamcinolone (KENALOG) 0.1 % cream Apply topically Twice daily. Dirs: disp: 1# jar AAA      triamcinolone acetonide 0.1% (KENALOG) 0.1 % paste       urea (CARMOL) 40 % Crea AAA foot qd after soak 198.6 g 3    (Magic mouthwash) 1:1:1 Benadryl 12.5mg/5ml liq, aluminum & magnesium hydroxide-simehticone (Maalox), lidocaine viscous 2% Swish and spit 5 mLs every 4 (four) hours as needed. for mouth sores (Patient not taking: Reported on 11/21/2019) 300 mL 0    azelastine (ASTELIN) 137 mcg (0.1 %) nasal  spray 1 spray (137 mcg total) by Nasal route 2 (two) times daily. 30 mL 0    azithromycin (Z-LINDSEY) 250 MG tablet Take 2 tablets by mouth on day 1; Take 1 tablet by mouth on days 2-5 (Patient not taking: Reported on 11/21/2019) 6 tablet 0    cholecalciferol, vitamin D3, 2,000 unit Tab Take 1 tablet by mouth Once a week.      cholecalciferol, vitamin D3, 50,000 unit capsule Take 1 capsule (50,000 Units total) by mouth every 14 (fourteen) days. (Patient not taking: Reported on 11/21/2019) 18 capsule 1    levocetirizine (XYZAL) 5 MG tablet TAKE 1 TABLET (5 MG TOTAL) BY MOUTH DAILY AS NEEDED FOR ALLERGIES. (Patient not taking: Reported on 11/21/2019) 90 tablet 3    mupirocin calcium 2% (BACTROBAN) 2 % cream APPLY TO AFFECTED AREA TWICE A DAY INTRANASALLY FOR 1ST WEEK OF THE MONTH (Patient not taking: Reported on 11/21/2019) 30 g 3    PROAIR HFA 90 mcg/actuation inhaler INHALE 2 PUFFS INTO THE LUNGS EVERY 6 (SIX) HOURS AS NEEDED FOR WHEEZING. RESCUE 8.5 Inhaler 0     No current facility-administered medications for this visit.            Review of patient's allergies indicates:   Allergen Reactions    Doxycycline hcl Nausea And Vomiting    Enbrel [etanercept] Other (See Comments)     Optic neurtits     Past Medical History:   Diagnosis Date    Abnormal Pap smear of vagina     AR (allergic rhinitis)     Demyelinating disorder     Depression     Fever blister     Fibromyalgia     followed by pain management    Generalized osteoarthritis of multiple sites     History of abnormal Pap smear     Hypertension     Hypothyroidism     Insulin resistance     Mixed anxiety and depressive disorder     Morbid obesity     Myelitis, optic neuritis in     treated with high-dose steroids and resolved; positive MRI and spinal fluid for a mass, but on embrel for psoriatic arthritis - [facility]; MRI normalized off embrel    Obesity     Pre-diabetes     hx diabetes on stereoids /    Psoriasis      Psoriatic arthritis     Vitamin D deficiency disease      Past Surgical History:   Procedure Laterality Date    SINUS SURGERY  1998       Review of Systems   Constitutional: Positive for fatigue. Negative for fever and unexpected weight change.   HENT: Negative.  Negative for dental problem and mouth sores.         Dry mouth   Eyes: Negative.  Negative for redness, itching and visual disturbance.        Dry eyes   Respiratory: Negative.    Cardiovascular: Negative.  Negative for chest pain, palpitations and leg swelling.   Gastrointestinal: Negative.  Negative for abdominal pain, anal bleeding, blood in stool, constipation, diarrhea and nausea.        Heartburn.   Endocrine: Negative.    Genitourinary: Negative.  Negative for frequency, menstrual problem, pelvic pain and urgency.   Musculoskeletal: Negative.  Negative for arthralgias, back pain, gait problem and neck pain.   Skin: Negative.  Negative for color change and rash.   Allergic/Immunologic: Positive for immunocompromised state.   Neurological: Negative.  Negative for dizziness, seizures, weakness, numbness and headaches.   Hematological: Negative.  Negative for adenopathy. Does not bruise/bleed easily.   Psychiatric/Behavioral: Positive for dysphoric mood and sleep disturbance. Negative for decreased concentration and suicidal ideas. The patient is nervous/anxious.          Family History   Problem Relation Age of Onset    Psoriasis Father     Cancer Father         throat    Thyroid disease Mother     Heart disease Mother     Hypertension Mother     Hyperlipidemia Mother     Coronary artery disease Mother         s/p CABG    Heart attack Mother     Heart disease Sister         MVP    Diabetes Paternal Uncle     Diabetes Maternal Grandfather     Heart disease Maternal Grandfather     Thyroid disease Maternal Grandfather     ADD / ADHD Son     Melanoma Neg Hx     Lupus Neg Hx     Eczema Neg Hx     Acne Neg Hx     Breast cancer Neg Hx  "    Colon cancer Neg Hx     Ovarian cancer Neg Hx        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   Mom had a CVA & is home but not really doing too well. She has cognitive issues.   No alcohol; no cigarettes;   Objective:   /80   Pulse 72   Ht 5' 6" (1.676 m)   Wt 111.9 kg (246 lb 11.1 oz)   BMI 39.82 kg/m²   Was 236 lb 12.4 oz on 6/8/19  Was 224 lb 13.9 oz on 2/19/19.    Physical Exam   Vitals reviewed.  Constitutional: She is oriented to person, place, and time and well-developed, well-nourished, and in no distress. No distress.   HENT:   Head: Normocephalic and atraumatic.   Mouth/Throat: Oropharynx is clear and moist. No oropharyngeal exudate.   No facial rashes  Parotids not enlarged  No oral ulcers  Face oliva   Eyes: Conjunctivae and EOM are normal. Pupils are equal, round, and reactive to light. Right eye exhibits no discharge. Left eye exhibits no discharge. No scleral icterus.   Neck: Neck supple. No JVD present. No tracheal deviation present. No thyromegaly present.   Cardiovascular: Normal rate, regular rhythm, normal heart sounds and intact distal pulses.  Exam reveals no gallop and no friction rub.    No murmur heard.  Pulmonary/Chest: Effort normal and breath sounds normal. No respiratory distress. She has no wheezes. She has no rales. She exhibits no tenderness.   Abdominal: Soft. Bowel sounds are normal. She exhibits no distension and no mass. There is no splenomegaly or hepatomegaly. There is no tenderness. There is no rebound and no guarding.   Lymphadenopathy:     She has no cervical adenopathy.   Neurological: She is alert and oriented to person, place, and time. She has normal reflexes. No cranial nerve deficit. Gait normal.   Proximal and distal muscle strength 5/5.   Skin: Skin is warm and dry. She is not diaphoretic.     No psoriasis.   Psychiatric: Mood, memory, affect and judgment normal.   Drowsy.   Musculoskeletal: Normal range of motion. She exhibits edema (tr-1+ pitting edema). " She exhibits no tenderness.   Cspine slight decrease in lateral flexion & rotation; no tenderness  Tspine FROM no tenderness  Lspine FROM no tenderness.  TMJ: unremarkable  Shoulders: FROM; no synovitis  Elbows: FROM; no synovitis; no tophi or nodules  Wrists: no SHT; no tenderness; good ROM  MCPs: L 1st C-MCP tender with mild SHT;1st MCP mild SHT;  no metatarsalgia;  PIPs: minimal dactylitis 2nd ray R; R 3rd PIP SHT & mildly erythematous; mildly tender; cannot fully extend this joint. Seems a bit improved from last visit however.   DIPs: FROM; no synovitis  HIPS: FROM  Knees: FROM; no synovitis; no instability;  Ankles: FROM: no synovitis   Toes: ok; no metatarsalgia                               Labs:   9/14/19: ESR 24 (36); CRP 9.8; CBC Hg 11.5; CP ok;   2/19/19: ESR 13; CRP 5.8; CBC plts 362; CMP ok; HCV/HBV/QG neg;  11/17/18: ESR 8; CRP 3.9; Hg 11.9; low indices; CMP ok;   8/22/18: ESR 10; CRP 10.9; CBC Hg 10.7; Ht 35.7; CMP ok  5/14/18: ESR 13; CRP 13.4;  Hg 11.3; low indices; eos 12.4%; CMP ok;   2/10/18: ESR 30; CRP 10.5; Hg 11.3; plt 366; CMP ok;   10/19/17: HBV neg; HCV neg;   9/2/17: ESR 16; CRp 5.9; Hg 11.6; Ht 36.6; CMP ok;   5/29/17: QG neg  2/20/17: ESR 34; CRP 8.1; CBC Hg 11.4; Ht 35.6; CMP ok;   11/19/16: ESR 20; CRP 4.8; CBC Hg 11.9; CMP K+3.3, otherwise ok;   8/27/16: ESR 22; CRP 7; Hg 11.8; Ht 36.9; CMP ok;   5/26/16: ESR 35; CRP 11.2Hg 11.8; Ht 36.9; CMP ok;   5/21/16: Vit d 37  10/8/15: ESR 45; CRP 14.2; Hg 11.2; Ht 35.8; CMP; ok  7/7/15: CBC plt 380; K+ 3.4; Glu 154  7/6/15: ESR 48; CRP 9.6  4/4/15: ESR 41; CRP 19; Hg 11.0; Ht 34.6; plt 377; CMP ok;  1/10/15: ESR 34; CRP 18; CBC ok; ; Alb. 3.6  10/4/14: ESR 43; CRP 18.5; Hg 11.8; Ht 36.5; plt 378; Alb. 3.4;   3/20/14: ESR 41; CRP 14.3; plt 356; CMP ok;  2/15/13: ESR 30; CRP 9.8; CBC ok; Alb. 3.3  11/2/13: ESR 28; CRP 11.6; plt 363; Alb. 3.3;   5/28/13: ESR 45; CRP 8.8; plt 366; CMP ok;     12/1/18: Bilateral hands: personally viewed:  Mild degenerative changes noted at the interphalangeal joints.  Mild-to-moderate degenerative change noted at the bilateral 1st CMC joints.  Mild degenerative changes also present at both radiocarpal joints, triscaphe joints and right distal radioulnar joint.  Minimal degenerative changes also present at the MCP joints bilaterally and greatest at the 1st MCP joint on the left.  6/15/17: Bilateral hands: personally reviewed: mild osteoarthritis w stable periarticular erosion at the left fourth MCP joint; no change since last year.   8/27/16: Bilateral feet: personally reviewed. Mild HV; mild OA shell 1st MTP dali; ? Erosion PIP left small toe;   5/26/16: Bilateral hands: personally reviewed: L 4th MCP (new) erosive lesion; R & L 2nd & 3rd metacarpal head cysts; R mild PIP erosion & STS 2nd.    Assessment:     Psoriatic arthritis-- with mild SHT 2 MCPs bilaterally (R >>L) & with sausage digit of R index finger--currently w SHT & flexion deformity of R 3rd PIP.    a) History of sausage digits of both toes      PIPs, DIPs, wrists, and knees., now w. only one sausage digit.   b) Psoriasis of the abdomen, elbow, and the shins--resolved.    c) Enbrel stopped 10/21/11 due to optic neuritis.     d) New erosion (4th L metacarpal head) on x-ray & possibly L 5th toe PIP     e) Persisting elevation of ESR and CRP    f) inadequate response to leflunomide and Stelara & apremilast (w. Intolerable nausea)   G) could not tolerate SSZ--nausea.   H) MTX x 2 even SC did not help sxs.   I) improved on Cosentyx (+leflunomide + naproxen) initially incompletely-- hand joints still hurt despite normalization of  ESR & CRP   J) Orencia begun 11/17 with incomplete response   K)Taltz begun 4/20/18-2/19   L) Tofacitinib 3/19 - 6/18/19    Chronic SHT 3rd R PIP & base of L thumb with questionable erosive lesion.   M) Back to Cosentyx + leflunomide +naproxen     Weight gain & edema   Gabapentin?   Naproxen?    Chronic pain/fibromyalgia syndrome with  fatigue, tender points, withdrawals to palpation in the past, pain all over, responsive to Savella but with some nausea (off it now), followed by [place]. Odom at the [facility] [place]    On methadone    Left optic neuritis with demyelinating lesions secondary to Enbrel--now resolved.    Repeat MRI ok    Diabetes mellitus, diet controlled     Degenerative joint disease of the cervical spine, knees, and feet--very mild.     Depression on wellbutrin    Off savella    Hypertension.     Hypothyroidism.     Vitamin D deficiency with regular prescription vitamin D supplementation.     Obesity         Plan:   Stay on Cosentyx 300 mg/4 wks  Say on leflunomide 20 mg/d  Switch from naproxen to celebrex 200 mg bid to see if added effect on edema.  We will get labs in December & q 3 months as she is on leflunomide 20 mg/d which she is to continue.  Curtail salt.    RTC 4 months.

## 2019-11-20 ENCOUNTER — PATIENT MESSAGE (OUTPATIENT)
Dept: FAMILY MEDICINE | Facility: CLINIC | Age: 44
End: 2019-11-20

## 2019-11-20 DIAGNOSIS — J01.90 ACUTE BACTERIAL SINUSITIS: Primary | ICD-10-CM

## 2019-11-20 DIAGNOSIS — B96.89 ACUTE BACTERIAL SINUSITIS: Primary | ICD-10-CM

## 2019-11-21 ENCOUNTER — OFFICE VISIT (OUTPATIENT)
Dept: RHEUMATOLOGY | Facility: CLINIC | Age: 44
End: 2019-11-21
Payer: COMMERCIAL

## 2019-11-21 ENCOUNTER — PATIENT MESSAGE (OUTPATIENT)
Dept: FAMILY MEDICINE | Facility: CLINIC | Age: 44
End: 2019-11-21

## 2019-11-21 VITALS
BODY MASS INDEX: 39.65 KG/M2 | HEART RATE: 72 BPM | HEIGHT: 66 IN | WEIGHT: 246.69 LBS | SYSTOLIC BLOOD PRESSURE: 130 MMHG | DIASTOLIC BLOOD PRESSURE: 80 MMHG

## 2019-11-21 DIAGNOSIS — Z79.1 LONG TERM (CURRENT) USE OF NON-STEROIDAL ANTI-INFLAMMATORIES (NSAID): ICD-10-CM

## 2019-11-21 DIAGNOSIS — T39.395A ADVERSE EFFECT OF NON-STEROIDAL ANTI-INFLAMMATORY DRUG (NSAID), INITIAL ENCOUNTER: ICD-10-CM

## 2019-11-21 DIAGNOSIS — M15.9 GENERALIZED OSTEOARTHRITIS OF MULTIPLE SITES: ICD-10-CM

## 2019-11-21 DIAGNOSIS — R60.0 PEDAL EDEMA: ICD-10-CM

## 2019-11-21 DIAGNOSIS — B96.89 ACUTE BACTERIAL SINUSITIS: Primary | ICD-10-CM

## 2019-11-21 DIAGNOSIS — L40.50 PSORIATIC ARTHRITIS: Primary | ICD-10-CM

## 2019-11-21 DIAGNOSIS — Z79.899 LONG-TERM USE OF HIGH-RISK MEDICATION: ICD-10-CM

## 2019-11-21 DIAGNOSIS — J01.90 ACUTE BACTERIAL SINUSITIS: Primary | ICD-10-CM

## 2019-11-21 PROCEDURE — 3079F DIAST BP 80-89 MM HG: CPT | Mod: CPTII,S$GLB,, | Performed by: INTERNAL MEDICINE

## 2019-11-21 PROCEDURE — 3075F PR MOST RECENT SYSTOLIC BLOOD PRESS GE 130-139MM HG: ICD-10-PCS | Mod: CPTII,S$GLB,, | Performed by: INTERNAL MEDICINE

## 2019-11-21 PROCEDURE — 99214 OFFICE O/P EST MOD 30 MIN: CPT | Mod: S$GLB,,, | Performed by: INTERNAL MEDICINE

## 2019-11-21 PROCEDURE — 99999 PR PBB SHADOW E&M-EST. PATIENT-LVL III: ICD-10-PCS | Mod: PBBFAC,,, | Performed by: INTERNAL MEDICINE

## 2019-11-21 PROCEDURE — 99999 PR PBB SHADOW E&M-EST. PATIENT-LVL III: CPT | Mod: PBBFAC,,, | Performed by: INTERNAL MEDICINE

## 2019-11-21 PROCEDURE — 3008F PR BODY MASS INDEX (BMI) DOCUMENTED: ICD-10-PCS | Mod: CPTII,S$GLB,, | Performed by: INTERNAL MEDICINE

## 2019-11-21 PROCEDURE — 99214 PR OFFICE/OUTPT VISIT, EST, LEVL IV, 30-39 MIN: ICD-10-PCS | Mod: S$GLB,,, | Performed by: INTERNAL MEDICINE

## 2019-11-21 PROCEDURE — 3008F BODY MASS INDEX DOCD: CPT | Mod: CPTII,S$GLB,, | Performed by: INTERNAL MEDICINE

## 2019-11-21 PROCEDURE — 3075F SYST BP GE 130 - 139MM HG: CPT | Mod: CPTII,S$GLB,, | Performed by: INTERNAL MEDICINE

## 2019-11-21 PROCEDURE — 3079F PR MOST RECENT DIASTOLIC BLOOD PRESSURE 80-89 MM HG: ICD-10-PCS | Mod: CPTII,S$GLB,, | Performed by: INTERNAL MEDICINE

## 2019-11-21 RX ORDER — CELECOXIB 200 MG/1
200 CAPSULE ORAL 2 TIMES DAILY
Qty: 180 CAPSULE | Refills: 2 | Status: SHIPPED | OUTPATIENT
Start: 2019-11-21 | End: 2020-03-20 | Stop reason: ALTCHOICE

## 2019-11-21 RX ORDER — AZITHROMYCIN 250 MG/1
TABLET, FILM COATED ORAL
Qty: 6 TABLET | Refills: 0 | Status: SHIPPED | OUTPATIENT
Start: 2019-11-21 | End: 2019-11-22

## 2019-11-21 ASSESSMENT — ROUTINE ASSESSMENT OF PATIENT INDEX DATA (RAPID3)
PSYCHOLOGICAL DISTRESS SCORE: 3.3
WHEN YOU AWAKENED IN THE MORNING OVER THE LAST WEEK, PLEASE INDICATE THE AMOUNT OF TIME IT TAKES UNTIL YOU ARE AS LIMBER AS YOU WILL BE FOR THE DAY: 30 MINUTES
PAIN SCORE: 6
AM STIFFNESS SCORE: 1, YES
TOTAL RAPID3 SCORE: 4.11
PATIENT GLOBAL ASSESSMENT SCORE: 5
MDHAQ FUNCTION SCORE: .4
FATIGUE SCORE: 5

## 2019-11-22 ENCOUNTER — PATIENT MESSAGE (OUTPATIENT)
Dept: FAMILY MEDICINE | Facility: CLINIC | Age: 44
End: 2019-11-22

## 2019-11-22 RX ORDER — AMOXICILLIN 875 MG/1
875 TABLET, FILM COATED ORAL EVERY 12 HOURS
Qty: 20 TABLET | Refills: 0 | Status: SHIPPED | OUTPATIENT
Start: 2019-11-22 | End: 2019-12-02

## 2019-11-23 ENCOUNTER — LAB VISIT (OUTPATIENT)
Dept: LAB | Facility: HOSPITAL | Age: 44
End: 2019-11-23
Attending: FAMILY MEDICINE
Payer: COMMERCIAL

## 2019-11-23 DIAGNOSIS — R60.0 LOCALIZED EDEMA: ICD-10-CM

## 2019-11-23 LAB
ALBUMIN SERPL BCP-MCNC: 3.4 G/DL (ref 3.5–5.2)
ALP SERPL-CCNC: 67 U/L (ref 55–135)
ALT SERPL W/O P-5'-P-CCNC: 16 U/L (ref 10–44)
ANION GAP SERPL CALC-SCNC: 7 MMOL/L (ref 8–16)
AST SERPL-CCNC: 23 U/L (ref 10–40)
BILIRUB SERPL-MCNC: 0.4 MG/DL (ref 0.1–1)
BUN SERPL-MCNC: 9 MG/DL (ref 6–20)
CALCIUM SERPL-MCNC: 9.5 MG/DL (ref 8.7–10.5)
CHLORIDE SERPL-SCNC: 105 MMOL/L (ref 95–110)
CO2 SERPL-SCNC: 29 MMOL/L (ref 23–29)
CREAT SERPL-MCNC: 0.8 MG/DL (ref 0.5–1.4)
EST. GFR  (AFRICAN AMERICAN): >60 ML/MIN/1.73 M^2
EST. GFR  (NON AFRICAN AMERICAN): >60 ML/MIN/1.73 M^2
GLUCOSE SERPL-MCNC: 105 MG/DL (ref 70–110)
POTASSIUM SERPL-SCNC: 4 MMOL/L (ref 3.5–5.1)
PROT SERPL-MCNC: 6.9 G/DL (ref 6–8.4)
SODIUM SERPL-SCNC: 141 MMOL/L (ref 136–145)

## 2019-11-23 PROCEDURE — 80053 COMPREHEN METABOLIC PANEL: CPT

## 2019-11-23 PROCEDURE — 36415 COLL VENOUS BLD VENIPUNCTURE: CPT | Mod: PO

## 2019-11-27 ENCOUNTER — OFFICE VISIT (OUTPATIENT)
Dept: FAMILY MEDICINE | Facility: CLINIC | Age: 44
End: 2019-11-27
Payer: COMMERCIAL

## 2019-11-27 VITALS
WEIGHT: 245.38 LBS | BODY MASS INDEX: 39.43 KG/M2 | TEMPERATURE: 100 F | SYSTOLIC BLOOD PRESSURE: 136 MMHG | HEART RATE: 92 BPM | OXYGEN SATURATION: 96 % | HEIGHT: 66 IN | DIASTOLIC BLOOD PRESSURE: 78 MMHG | RESPIRATION RATE: 17 BRPM

## 2019-11-27 DIAGNOSIS — I10 HTN (HYPERTENSION), BENIGN: ICD-10-CM

## 2019-11-27 DIAGNOSIS — E03.9 HYPOTHYROIDISM, UNSPECIFIED TYPE: ICD-10-CM

## 2019-11-27 DIAGNOSIS — R60.0 PEDAL EDEMA: ICD-10-CM

## 2019-11-27 DIAGNOSIS — R60.0 BILATERAL LEG EDEMA: Primary | ICD-10-CM

## 2019-11-27 PROCEDURE — 3078F DIAST BP <80 MM HG: CPT | Mod: CPTII,S$GLB,, | Performed by: FAMILY MEDICINE

## 2019-11-27 PROCEDURE — 3008F PR BODY MASS INDEX (BMI) DOCUMENTED: ICD-10-PCS | Mod: CPTII,S$GLB,, | Performed by: FAMILY MEDICINE

## 2019-11-27 PROCEDURE — 3008F BODY MASS INDEX DOCD: CPT | Mod: CPTII,S$GLB,, | Performed by: FAMILY MEDICINE

## 2019-11-27 PROCEDURE — 99999 PR PBB SHADOW E&M-EST. PATIENT-LVL III: CPT | Mod: PBBFAC,,, | Performed by: FAMILY MEDICINE

## 2019-11-27 PROCEDURE — 99214 OFFICE O/P EST MOD 30 MIN: CPT | Mod: S$GLB,,, | Performed by: FAMILY MEDICINE

## 2019-11-27 PROCEDURE — 99214 PR OFFICE/OUTPT VISIT, EST, LEVL IV, 30-39 MIN: ICD-10-PCS | Mod: S$GLB,,, | Performed by: FAMILY MEDICINE

## 2019-11-27 PROCEDURE — 3075F PR MOST RECENT SYSTOLIC BLOOD PRESS GE 130-139MM HG: ICD-10-PCS | Mod: CPTII,S$GLB,, | Performed by: FAMILY MEDICINE

## 2019-11-27 PROCEDURE — 3075F SYST BP GE 130 - 139MM HG: CPT | Mod: CPTII,S$GLB,, | Performed by: FAMILY MEDICINE

## 2019-11-27 PROCEDURE — 3078F PR MOST RECENT DIASTOLIC BLOOD PRESSURE < 80 MM HG: ICD-10-PCS | Mod: CPTII,S$GLB,, | Performed by: FAMILY MEDICINE

## 2019-11-27 PROCEDURE — 99999 PR PBB SHADOW E&M-EST. PATIENT-LVL III: ICD-10-PCS | Mod: PBBFAC,,, | Performed by: FAMILY MEDICINE

## 2019-11-27 RX ORDER — FUROSEMIDE 40 MG/1
40 TABLET ORAL DAILY
Qty: 90 TABLET | Refills: 1 | Status: SHIPPED | OUTPATIENT
Start: 2019-11-27 | End: 2020-05-14

## 2019-11-27 NOTE — PROGRESS NOTES
Routine Office Visit    Patient Name: Keli Gilbert    : 1975  MRN: [id number]    Subjective:  Keli is a 43 y.o. female who presents today for:    1. Leg swelling  Patient presenting today with improved swelling after starting lasix and getting off Naproxen.  It began when she started her Consentyx for psoriatic arthritis.  She states that she wasn't sure if it was that or another medication she was taking, but thinks so given results after stopping naproxen.  She states that she initially lost a lot of fluid when she started Chlorthalidone, but then it came back.  She has not changed her diet any.  There has been no rashes or ulcers to the legs.  She denies any pain at this time.  Her blood pressure is better controlled and she denies any chest pain, shortness of breath, weakness, or slurred speech    Past Medical History  Past Medical History:   Diagnosis Date    Abnormal Pap smear of vagina     AR (allergic rhinitis)     Demyelinating disorder     Depression     Fever blister     Fibromyalgia     followed by pain management    Generalized osteoarthritis of multiple sites     History of abnormal Pap smear     Hypertension     Hypothyroidism     Insulin resistance     Mixed anxiety and depressive disorder     Morbid obesity     Myelitis, optic neuritis in     treated with high-dose steroids and resolved; positive MRI and spinal fluid for a mass, but on embrel for psoriatic arthritis - she will see a MS specialist at [facility]; MRI normalized off embrel    Obesity     Pre-diabetes     hx diabetes on stereoids /    Psoriasis     Psoriatic arthritis     Vitamin D deficiency disease        Past Surgical History  Past Surgical History:   Procedure Laterality Date    SINUS SURGERY         Family History  Family History   Problem Relation Age of Onset    Psoriasis Father     Cancer Father         throat    Thyroid disease Mother     Heart disease Mother     Hypertension Mother      Hyperlipidemia Mother     Coronary artery disease Mother         s/p CABG    Heart attack Mother     Heart disease Sister         MVP    Diabetes Paternal Uncle     Diabetes Maternal Grandfather     Heart disease Maternal Grandfather     Thyroid disease Maternal Grandfather     ADD / ADHD Son     Melanoma Neg Hx     Lupus Neg Hx     Eczema Neg Hx     Acne Neg Hx     Breast cancer Neg Hx     Colon cancer Neg Hx     Ovarian cancer Neg Hx        Social History  Social History     Socioeconomic History    Marital status: Single     Spouse name: Not on file    Number of children: 1    Years of education: Not on file    Highest education level: Not on file   Occupational History    Occupation: [employer]     Employer: [employer] (MAIN OFFICE)   Social Needs    Financial resource strain: Not on file    Food insecurity:     Worry: Not on file     Inability: Not on file    Transportation needs:     Medical: Not on file     Non-medical: Not on file   Tobacco Use    Smoking status: Never Smoker    Smokeless tobacco: Never Used   Substance and Sexual Activity    Alcohol use: No    Drug use: No     Comment: 7/7/15 one weed brownie    Sexual activity: Yes     Partners: Male     Birth control/protection: IUD   Lifestyle    Physical activity:     Days per week: Not on file     Minutes per session: Not on file    Stress: Not on file   Relationships    Social connections:     Talks on phone: Not on file     Gets together: Not on file     Attends [employer]ism service: Not on file     Active member of club or organization: Not on file     Attends meetings of clubs or organizations: Not on file     Relationship status: Not on file   Other Topics Concern    Are you pregnant or think you may be? No    Breast-feeding No   Social History Narrative    Not on file       Current Medications  Current Outpatient Medications on File Prior to Visit   Medication Sig Dispense Refill    amoxicillin (AMOXIL) 875 MG tablet  Take 1 tablet (875 mg total) by mouth every 12 (twelve) hours. for 10 days 20 tablet 0    azelastine (ASTELIN) 137 mcg (0.1 %) nasal spray 1 spray (137 mcg total) by Nasal route 2 (two) times daily. 30 mL 0    buPROPion (WELLBUTRIN) 100 MG tablet TAKE 1 TABLET(100 MG) BY MOUTH TWICE DAILY 180 tablet 0    celecoxib (CELEBREX) 200 MG capsule Take 1 capsule (200 mg total) by mouth 2 (two) times daily. 180 capsule 2    cholecalciferol, vitamin D3, 2,000 unit Tab Take 1 tablet by mouth Once a week.      cholecalciferol, vitamin D3, 50,000 unit capsule Take 1 capsule (50,000 Units total) by mouth every 14 (fourteen) days. (Patient not taking: Reported on 11/21/2019) 18 capsule 1    COSENTYX PEN, 2 PENS, 150 mg/mL PnIj INJECT 2 PENS INTO THE SKIN EVERY 7 DAYS FOR 5 WEEKS. 10 mL 0    gabapentin (NEURONTIN) 300 MG capsule Take 300 mg by mouth once daily.      leflunomide (ARAVA) 20 MG Tab Take 1 tablet (20 mg total) by mouth once daily. 90 tablet 3    liothyronine (CYTOMEL) 5 MCG Tab TAKE 2 TABLETS (10 MCG TOTAL) BY MOUTH ONCE DAILY. 180 tablet 0    losartan (COZAAR) 50 MG tablet Take 1 tablet (50 mg total) by mouth once daily. 90 tablet 3    methadone (DOLOPHINE) 10 MG tablet Take 1 tablet by mouth Three times a day.      potassium chloride (MICRO-K) 10 MEQ CpSR Take 2 capsules (20 mEq total) by mouth once daily. 180 capsule 0    PROAIR HFA 90 mcg/actuation inhaler INHALE 2 PUFFS INTO THE LUNGS EVERY 6 (SIX) HOURS AS NEEDED FOR WHEEZING. RESCUE 8.5 Inhaler 0    ranitidine (ZANTAC) 150 MG tablet Take 1 tablet (150 mg total) by mouth 2 (two) times daily. 180 tablet 2    secukinumab (COSENTYX PEN, 2 PENS,) 150 mg/mL PnIj Inject 300 mg into the skin every 28 days. 6 Syringe 2    SYNTHROID 125 mcg tablet Take 1 tablet (125 mcg total) by mouth before breakfast. 90 tablet 3    [DISCONTINUED] (Magic mouthwash) 1:1:1 Benadryl 12.5mg/5ml liq, aluminum & magnesium hydroxide-simehticone (Maalox), lidocaine viscous 2%  Swish and spit 5 mLs every 4 (four) hours as needed. for mouth sores (Patient not taking: Reported on 11/21/2019) 300 mL 0    [DISCONTINUED] clindamycin (CLEOCIN T) 1 % external solution       [DISCONTINUED] clobetasol (CLOBEX) 0.05 % topical spray Apply topically Twice daily. AAA bie. Disp 125 ml      [DISCONTINUED] furosemide (LASIX) 40 MG tablet Take 1 tablet (40 mg total) by mouth once daily. 30 tablet 4    [DISCONTINUED] levocetirizine (XYZAL) 5 MG tablet TAKE 1 TABLET (5 MG TOTAL) BY MOUTH DAILY AS NEEDED FOR ALLERGIES. (Patient not taking: Reported on 11/21/2019) 90 tablet 3    [DISCONTINUED] mupirocin calcium 2% (BACTROBAN) 2 % cream APPLY TO AFFECTED AREA TWICE A DAY INTRANASALLY FOR 1ST WEEK OF THE MONTH (Patient not taking: Reported on 11/21/2019) 30 g 3    [DISCONTINUED] promethazine (PHENERGAN) 25 MG tablet Take 1 tablet by mouth Once daily as needed.      [DISCONTINUED] tizanidine (ZANAFLEX) 4 MG tablet Take 4 mg by mouth 3 (three) times daily as needed.  6    [DISCONTINUED] trazodone (DESYREL) 50 MG tablet       [DISCONTINUED] triamcinolone (KENALOG) 0.1 % cream Apply topically Twice daily. Dirs: disp: 1# jar AAA      [DISCONTINUED] triamcinolone acetonide 0.1% (KENALOG) 0.1 % paste       [DISCONTINUED] urea (CARMOL) 40 % Crea AAA foot qd after soak 198.6 g 3     No current facility-administered medications on file prior to visit.        Allergies   Review of patient's allergies indicates:   Allergen Reactions    Doxycycline hcl Nausea And Vomiting    Enbrel [etanercept] Other (See Comments)     Optic neurtits       Review of Systems (Pertinent positives)  Review of Systems   Constitutional: Negative.    HENT: Negative.    Eyes: Negative.    Respiratory: Negative.    Cardiovascular: Negative for chest pain, palpitations and leg swelling.   Gastrointestinal: Negative.    Skin: Negative.          /78 (BP Location: Left arm, Patient Position: Sitting, BP Method: Large (Manual))    "Pulse 92   Temp 99.5 °F (37.5 °C) (Oral)   Resp 17   Ht 5' 5.98" (1.676 m)   Wt 111.3 kg (245 lb 6 oz)   SpO2 96%   BMI 39.62 kg/m²     GENERAL APPEARANCE: in no apparent distress and well developed and well nourished  HEENT: PERRL, EOMI, Sclera clear, anicteric, Oropharynx clear, no lesions, Neck supple with midline trachea  NECK: normal, supple, no adenopathy, thyroid normal in size  RESPIRATORY: appears well, vitals normal, no respiratory distress, acyanotic, normal RR, chest clear, no wheezing, crepitations, rhonchi, normal symmetric air entry  HEART: regular rate and rhythm, S1, S2 normal, no murmur, click, rub or gallop.    ABDOMEN: abdomen is soft without tenderness, no masses, no hernias, no organomegaly, no rebound, no guarding. Suprapubic tenderness absent. No CVA tenderness.  Extremities: warm/well perfused.  No abnormal hair patterns.  No clubbing, cyanosis 2+ edema to BLE  SKIN: no rashes, no wounds, no other lesions  PSYCH: Alert, oriented x 3, thought content appropriate, speech normal, pleasant and cooperative, good eye contact, well groomed    Assessment/Plan:  Keli Gilbert is a 43 y.o. female who presents today for :    Keli was seen today for follow-up.    Diagnoses and all orders for this visit:    Bilateral leg edema    HTN (hypertension), benign  -     Comprehensive metabolic panel; Future    Hypothyroidism, unspecified type  -     TSH; Future  -     T4, free; Future    Pedal edema  -     furosemide (LASIX) 40 MG tablet; Take 1 tablet (40 mg total) by mouth once daily.      1.  Continue current medication regimen  2. Continued potassium replacement at 20mEq daily  3.  Repeat labs done prior to visit and normal  4.  Notify me if swelling persists  5.  Call with any concerns  6.  Follow up 2 months or sooner if needed      Andriy Moran MD    "

## 2019-12-07 ENCOUNTER — LAB VISIT (OUTPATIENT)
Dept: LAB | Facility: HOSPITAL | Age: 44
End: 2019-12-07
Attending: INTERNAL MEDICINE
Payer: COMMERCIAL

## 2019-12-07 DIAGNOSIS — Z79.899 LONG-TERM USE OF HIGH-RISK MEDICATION: ICD-10-CM

## 2019-12-07 DIAGNOSIS — L40.50 PSORIATIC ARTHRITIS: ICD-10-CM

## 2019-12-07 LAB
ALBUMIN SERPL BCP-MCNC: 3.6 G/DL (ref 3.5–5.2)
ALP SERPL-CCNC: 75 U/L (ref 55–135)
ALT SERPL W/O P-5'-P-CCNC: 16 U/L (ref 10–44)
ANION GAP SERPL CALC-SCNC: 8 MMOL/L (ref 8–16)
AST SERPL-CCNC: 18 U/L (ref 10–40)
BASOPHILS # BLD AUTO: 0.06 K/UL (ref 0–0.2)
BASOPHILS NFR BLD: 0.9 % (ref 0–1.9)
BILIRUB SERPL-MCNC: 0.2 MG/DL (ref 0.1–1)
BUN SERPL-MCNC: 12 MG/DL (ref 6–20)
CALCIUM SERPL-MCNC: 9.3 MG/DL (ref 8.7–10.5)
CHLORIDE SERPL-SCNC: 104 MMOL/L (ref 95–110)
CO2 SERPL-SCNC: 29 MMOL/L (ref 23–29)
CREAT SERPL-MCNC: 0.8 MG/DL (ref 0.5–1.4)
CRP SERPL-MCNC: 10 MG/L (ref 0–8.2)
DIFFERENTIAL METHOD: ABNORMAL
EOSINOPHIL # BLD AUTO: 0.4 K/UL (ref 0–0.5)
EOSINOPHIL NFR BLD: 5.6 % (ref 0–8)
ERYTHROCYTE [DISTWIDTH] IN BLOOD BY AUTOMATED COUNT: 14.6 % (ref 11.5–14.5)
ERYTHROCYTE [SEDIMENTATION RATE] IN BLOOD BY WESTERGREN METHOD: 17 MM/HR (ref 0–36)
EST. GFR  (AFRICAN AMERICAN): >60 ML/MIN/1.73 M^2
EST. GFR  (NON AFRICAN AMERICAN): >60 ML/MIN/1.73 M^2
GLUCOSE SERPL-MCNC: 140 MG/DL (ref 70–110)
HCT VFR BLD AUTO: 39.4 % (ref 37–48.5)
HGB BLD-MCNC: 11.8 G/DL (ref 12–16)
IMM GRANULOCYTES # BLD AUTO: 0.03 K/UL (ref 0–0.04)
IMM GRANULOCYTES NFR BLD AUTO: 0.5 % (ref 0–0.5)
LYMPHOCYTES # BLD AUTO: 1.7 K/UL (ref 1–4.8)
LYMPHOCYTES NFR BLD: 25.1 % (ref 18–48)
MCH RBC QN AUTO: 27.3 PG (ref 27–31)
MCHC RBC AUTO-ENTMCNC: 29.9 G/DL (ref 32–36)
MCV RBC AUTO: 91 FL (ref 82–98)
MONOCYTES # BLD AUTO: 0.4 K/UL (ref 0.3–1)
MONOCYTES NFR BLD: 6.6 % (ref 4–15)
NEUTROPHILS # BLD AUTO: 4.1 K/UL (ref 1.8–7.7)
NEUTROPHILS NFR BLD: 61.3 % (ref 38–73)
NRBC BLD-RTO: 0 /100 WBC
PLATELET # BLD AUTO: 298 K/UL (ref 150–350)
PMV BLD AUTO: 9.6 FL (ref 9.2–12.9)
POTASSIUM SERPL-SCNC: 4 MMOL/L (ref 3.5–5.1)
PROT SERPL-MCNC: 7.1 G/DL (ref 6–8.4)
RBC # BLD AUTO: 4.32 M/UL (ref 4–5.4)
SODIUM SERPL-SCNC: 141 MMOL/L (ref 136–145)
WBC # BLD AUTO: 6.66 K/UL (ref 3.9–12.7)

## 2019-12-07 PROCEDURE — 85025 COMPLETE CBC W/AUTO DIFF WBC: CPT

## 2019-12-07 PROCEDURE — 86140 C-REACTIVE PROTEIN: CPT

## 2019-12-07 PROCEDURE — 36415 COLL VENOUS BLD VENIPUNCTURE: CPT | Mod: PO

## 2019-12-07 PROCEDURE — 85652 RBC SED RATE AUTOMATED: CPT

## 2019-12-07 PROCEDURE — 80053 COMPREHEN METABOLIC PANEL: CPT

## 2020-01-06 DIAGNOSIS — E87.6 HYPOKALEMIA: ICD-10-CM

## 2020-01-06 DIAGNOSIS — F34.1 DYSTHYMIA: ICD-10-CM

## 2020-01-06 DIAGNOSIS — E03.9 HYPOTHYROIDISM, UNSPECIFIED TYPE: ICD-10-CM

## 2020-01-07 RX ORDER — POTASSIUM CHLORIDE 750 MG/1
CAPSULE, EXTENDED RELEASE ORAL
Qty: 180 CAPSULE | Refills: 0 | Status: SHIPPED | OUTPATIENT
Start: 2020-01-07 | End: 2020-03-31

## 2020-01-07 RX ORDER — LEVOTHYROXINE SODIUM 125 UG/1
TABLET ORAL
Qty: 90 TABLET | Refills: 3 | Status: SHIPPED | OUTPATIENT
Start: 2020-01-07 | End: 2021-03-10 | Stop reason: SDUPTHER

## 2020-01-07 RX ORDER — BUPROPION HYDROCHLORIDE 100 MG/1
TABLET ORAL
Qty: 180 TABLET | Refills: 0 | Status: SHIPPED | OUTPATIENT
Start: 2020-01-07 | End: 2020-03-31

## 2020-01-22 DIAGNOSIS — E03.9 HYPOTHYROIDISM, UNSPECIFIED TYPE: ICD-10-CM

## 2020-01-22 RX ORDER — LIOTHYRONINE SODIUM 5 UG/1
TABLET ORAL
Qty: 180 TABLET | Refills: 0 | Status: SHIPPED | OUTPATIENT
Start: 2020-01-22 | End: 2020-04-22

## 2020-01-25 ENCOUNTER — LAB VISIT (OUTPATIENT)
Dept: LAB | Facility: HOSPITAL | Age: 45
End: 2020-01-25
Attending: FAMILY MEDICINE
Payer: COMMERCIAL

## 2020-01-25 DIAGNOSIS — E03.9 HYPOTHYROIDISM, UNSPECIFIED TYPE: ICD-10-CM

## 2020-01-25 DIAGNOSIS — I10 HTN (HYPERTENSION), BENIGN: ICD-10-CM

## 2020-01-25 LAB
ALBUMIN SERPL BCP-MCNC: 3.8 G/DL (ref 3.5–5.2)
ALP SERPL-CCNC: 79 U/L (ref 55–135)
ALT SERPL W/O P-5'-P-CCNC: 16 U/L (ref 10–44)
ANION GAP SERPL CALC-SCNC: 8 MMOL/L (ref 8–16)
AST SERPL-CCNC: 18 U/L (ref 10–40)
BILIRUB SERPL-MCNC: 0.2 MG/DL (ref 0.1–1)
BUN SERPL-MCNC: 10 MG/DL (ref 6–20)
CALCIUM SERPL-MCNC: 10 MG/DL (ref 8.7–10.5)
CHLORIDE SERPL-SCNC: 101 MMOL/L (ref 95–110)
CO2 SERPL-SCNC: 30 MMOL/L (ref 23–29)
CREAT SERPL-MCNC: 0.8 MG/DL (ref 0.5–1.4)
EST. GFR  (AFRICAN AMERICAN): >60 ML/MIN/1.73 M^2
EST. GFR  (NON AFRICAN AMERICAN): >60 ML/MIN/1.73 M^2
GLUCOSE SERPL-MCNC: 100 MG/DL (ref 70–110)
POTASSIUM SERPL-SCNC: 4 MMOL/L (ref 3.5–5.1)
PROT SERPL-MCNC: 7.6 G/DL (ref 6–8.4)
SODIUM SERPL-SCNC: 139 MMOL/L (ref 136–145)
T4 FREE SERPL-MCNC: 0.92 NG/DL (ref 0.71–1.51)
TSH SERPL DL<=0.005 MIU/L-ACNC: 0.75 UIU/ML (ref 0.4–4)

## 2020-01-25 PROCEDURE — 36415 COLL VENOUS BLD VENIPUNCTURE: CPT | Mod: PO

## 2020-01-25 PROCEDURE — 80053 COMPREHEN METABOLIC PANEL: CPT

## 2020-01-25 PROCEDURE — 84439 ASSAY OF FREE THYROXINE: CPT

## 2020-01-25 PROCEDURE — 84443 ASSAY THYROID STIM HORMONE: CPT

## 2020-01-29 ENCOUNTER — OFFICE VISIT (OUTPATIENT)
Dept: FAMILY MEDICINE | Facility: CLINIC | Age: 45
End: 2020-01-29
Payer: COMMERCIAL

## 2020-01-29 VITALS
WEIGHT: 240.31 LBS | SYSTOLIC BLOOD PRESSURE: 132 MMHG | RESPIRATION RATE: 17 BRPM | TEMPERATURE: 98 F | HEART RATE: 86 BPM | OXYGEN SATURATION: 97 % | DIASTOLIC BLOOD PRESSURE: 84 MMHG | HEIGHT: 66 IN | BODY MASS INDEX: 38.62 KG/M2

## 2020-01-29 DIAGNOSIS — R60.0 BILATERAL LEG EDEMA: ICD-10-CM

## 2020-01-29 DIAGNOSIS — I10 HTN (HYPERTENSION), BENIGN: ICD-10-CM

## 2020-01-29 DIAGNOSIS — E03.9 HYPOTHYROIDISM, UNSPECIFIED TYPE: Primary | ICD-10-CM

## 2020-01-29 PROCEDURE — 99999 PR PBB SHADOW E&M-EST. PATIENT-LVL III: CPT | Mod: PBBFAC,,, | Performed by: FAMILY MEDICINE

## 2020-01-29 PROCEDURE — 3075F SYST BP GE 130 - 139MM HG: CPT | Mod: CPTII,S$GLB,, | Performed by: FAMILY MEDICINE

## 2020-01-29 PROCEDURE — 3008F BODY MASS INDEX DOCD: CPT | Mod: CPTII,S$GLB,, | Performed by: FAMILY MEDICINE

## 2020-01-29 PROCEDURE — 99214 OFFICE O/P EST MOD 30 MIN: CPT | Mod: S$GLB,,, | Performed by: FAMILY MEDICINE

## 2020-01-29 PROCEDURE — 3079F PR MOST RECENT DIASTOLIC BLOOD PRESSURE 80-89 MM HG: ICD-10-PCS | Mod: CPTII,S$GLB,, | Performed by: FAMILY MEDICINE

## 2020-01-29 PROCEDURE — 3008F PR BODY MASS INDEX (BMI) DOCUMENTED: ICD-10-PCS | Mod: CPTII,S$GLB,, | Performed by: FAMILY MEDICINE

## 2020-01-29 PROCEDURE — 99999 PR PBB SHADOW E&M-EST. PATIENT-LVL III: ICD-10-PCS | Mod: PBBFAC,,, | Performed by: FAMILY MEDICINE

## 2020-01-29 PROCEDURE — 3079F DIAST BP 80-89 MM HG: CPT | Mod: CPTII,S$GLB,, | Performed by: FAMILY MEDICINE

## 2020-01-29 PROCEDURE — 99214 PR OFFICE/OUTPT VISIT, EST, LEVL IV, 30-39 MIN: ICD-10-PCS | Mod: S$GLB,,, | Performed by: FAMILY MEDICINE

## 2020-01-29 PROCEDURE — 3075F PR MOST RECENT SYSTOLIC BLOOD PRESS GE 130-139MM HG: ICD-10-PCS | Mod: CPTII,S$GLB,, | Performed by: FAMILY MEDICINE

## 2020-01-29 RX ORDER — PROMETHAZINE HYDROCHLORIDE 25 MG/1
TABLET ORAL
COMMUNITY
Start: 2020-01-06

## 2020-01-29 NOTE — PROGRESS NOTES
Routine Office Visit    Patient Name: Keli Gilbert    : 1975  MRN: [id number]    Subjective:  Keli is a 44 y.o. female who presents today for:    1. Follow up  Patient presenting today for follow up of bilateral leg edema and hypothyroidism.  She states that her edema is controlled with lasix while here.  She states that when she was in [facility] for several days the swelling was gone and she skipped several days of lasix without any swelling occurring.  There has been no redness or drainage from either lower extremity.  Her thyroid labs were within normal limits, but had dropped significantly from previous readings.  She is more tired lately, but states that her mom has been in the hospital for over a week after having another stroke.  She states she is stressed out a lot from her family and feels that might be contributing to her fatigue    Past Medical History  Past Medical History:   Diagnosis Date    Abnormal Pap smear of vagina     AR (allergic rhinitis)     Demyelinating disorder     Depression     Fever blister     Fibromyalgia     followed by pain management    Generalized osteoarthritis of multiple sites     History of abnormal Pap smear     Hypertension     Hypothyroidism     Insulin resistance     Mixed anxiety and depressive disorder     Morbid obesity     Myelitis, optic neuritis in     treated with high-dose steroids and resolved; positive MRI and spinal fluid for a mass, but on embrel for psoriatic arthritis - [facility]; MRI normalized off embrel    Obesity     Pre-diabetes     hx diabetes on stereoids /    Psoriasis     Psoriatic arthritis     Vitamin D deficiency disease        Past Surgical History  Past Surgical History:   Procedure Laterality Date    SINUS SURGERY         Family History  Family History   Problem Relation Age of Onset    Psoriasis Father     Cancer Father         throat    Thyroid disease Mother     Heart disease  Mother     Hypertension Mother     Hyperlipidemia Mother     Coronary artery disease Mother         s/p CABG    Heart attack Mother     Heart disease Sister         MVP    Diabetes Paternal Uncle     Diabetes Maternal Grandfather     Heart disease Maternal Grandfather     Thyroid disease Maternal Grandfather     ADD / ADHD Son     Melanoma Neg Hx     Lupus Neg Hx     Eczema Neg Hx     Acne Neg Hx     Breast cancer Neg Hx     Colon cancer Neg Hx     Ovarian cancer Neg Hx        Social History  Social History     Socioeconomic History    Marital status: Single     Spouse name: Not on file    Number of children: 1    Years of education: Not on file    Highest education level: Not on file   Occupational History    Occupation: Banking     Employer: [employer] (MAIN OFFICE)   Social Needs    Financial resource strain: Not on file    Food insecurity:     Worry: Not on file     Inability: Not on file    Transportation needs:     Medical: Not on file     Non-medical: Not on file   Tobacco Use    Smoking status: Never Smoker    Smokeless tobacco: Never Used   Substance and Sexual Activity    Alcohol use: No    Drug use: No     Comment: 7/7/15 one weed brownie    Sexual activity: Yes     Partners: Male     Birth control/protection: IUD   Lifestyle    Physical activity:     Days per week: Not on file     Minutes per session: Not on file    Stress: Not on file   Relationships    Social connections:     Talks on phone: Not on file     Gets together: Not on file     Attends Druze service: Not on file     Active member of club or organization: Not on file     Attends meetings of clubs or organizations: Not on file     Relationship status: Not on file   Other Topics Concern    Are you pregnant or think you may be? No    Breast-feeding No   Social History Narrative    Not on file       Current Medications  Current Outpatient Medications on File Prior to Visit   Medication Sig Dispense Refill     azelastine (ASTELIN) 137 mcg (0.1 %) nasal spray 1 spray (137 mcg total) by Nasal route 2 (two) times daily. 30 mL 0    buPROPion (WELLBUTRIN) 100 MG tablet TAKE 1 TABLET(100 MG) BY MOUTH TWICE DAILY 180 tablet 0    celecoxib (CELEBREX) 200 MG capsule Take 1 capsule (200 mg total) by mouth 2 (two) times daily. 180 capsule 2    cholecalciferol, vitamin D3, 2,000 unit Tab Take 1 tablet by mouth Once a week.      cholecalciferol, vitamin D3, 50,000 unit capsule Take 1 capsule (50,000 Units total) by mouth every 14 (fourteen) days. (Patient not taking: Reported on 11/21/2019) 18 capsule 1    COSENTYX PEN, 2 PENS, 150 mg/mL PnIj INJECT 2 PENS INTO THE SKIN EVERY 7 DAYS FOR 5 WEEKS. 10 mL 0    furosemide (LASIX) 40 MG tablet Take 1 tablet (40 mg total) by mouth once daily. 90 tablet 1    gabapentin (NEURONTIN) 300 MG capsule Take 300 mg by mouth once daily.      leflunomide (ARAVA) 20 MG Tab Take 1 tablet (20 mg total) by mouth once daily. 90 tablet 3    liothyronine (CYTOMEL) 5 MCG Tab TAKE 2 TABLETS(10 MCG) BY MOUTH EVERY  tablet 0    losartan (COZAAR) 50 MG tablet Take 1 tablet (50 mg total) by mouth once daily. 90 tablet 3    methadone (DOLOPHINE) 10 MG tablet Take 1 tablet by mouth Three times a day.      potassium chloride (MICRO-K) 10 MEQ CpSR TAKE 2 CAPSULES(20 MEQ) BY MOUTH EVERY  capsule 0    PROAIR HFA 90 mcg/actuation inhaler INHALE 2 PUFFS INTO THE LUNGS EVERY 6 (SIX) HOURS AS NEEDED FOR WHEEZING. RESCUE 8.5 Inhaler 0    promethazine (PHENERGAN) 25 MG tablet TK 1 T PO BID PRN N      ranitidine (ZANTAC) 150 MG tablet Take 1 tablet (150 mg total) by mouth 2 (two) times daily. 180 tablet 2    secukinumab (COSENTYX PEN, 2 PENS,) 150 mg/mL PnIj Inject 300 mg into the skin every 28 days. 6 Syringe 2    SYNTHROID 125 mcg tablet TAKE 1 TABLET(125 MCG) BY MOUTH BEFORE BREAKFAST 90 tablet 3     No current facility-administered medications on file prior to visit.        Allergies  "  Review of patient's allergies indicates:   Allergen Reactions    Doxycycline hcl Nausea And Vomiting    Enbrel [etanercept] Other (See Comments)     Optic neurtits       Review of Systems (Pertinent positives)  Review of Systems   Constitutional: Positive for malaise/fatigue.   HENT: Negative.    Eyes: Negative.    Respiratory: Negative.    Cardiovascular: Positive for leg swelling.   Gastrointestinal: Negative.    Skin: Negative.    Neurological: Negative.          /84 (BP Location: Left arm, Patient Position: Sitting, BP Method: Large (Manual))   Pulse 86   Temp 98.2 °F (36.8 °C) (Oral)   Resp 17   Ht 5' 5.98" (1.676 m)   Wt 109 kg (240 lb 4.8 oz)   SpO2 97%   BMI 38.80 kg/m²     GENERAL APPEARANCE: in no apparent distress and well developed and well nourished  HEENT: PERRL, EOMI, Sclera clear, anicteric, Oropharynx clear, no lesions, Neck supple with midline trachea  NECK: normal, supple, no adenopathy, thyroid normal in size  RESPIRATORY: appears well, vitals normal, no respiratory distress, acyanotic, normal RR, chest clear, no wheezing, crepitations, rhonchi, normal symmetric air entry  HEART: regular rate and rhythm, S1, S2 normal, no murmur, click, rub or gallop.    ABDOMEN: abdomen is soft without tenderness, no masses, no hernias, no organomegaly, no rebound, no guarding. Suprapubic tenderness absent. No CVA tenderness.  Extremities: warm/well perfused.  No abnormal hair patterns.  No clubbing, cyanosis +1 BLE edema.  no muscular tenderness noted, full range of motion without pain.    SKIN: no rashes, no wounds, no other lesions  PSYCH: Alert, oriented x 3, thought content appropriate, speech normal, pleasant and cooperative, good eye contact, well groomed    Assessment/Plan:  Keli Gilbert is a 44 y.o. female who presents today for :    Keli was seen today for annual exam.    Diagnoses and all orders for this visit:    Hypothyroidism, unspecified type  -     TSH; Future  -     " T4, free; Future    HTN (hypertension), benign    Bilateral leg edema      1.  Repeat thyroid labs in 8 weeks  2.  HTN stable  3.  Edema stable  4.  Follow up with me in 6  Months for repeat of labs  5.  Follow up with rheumatology as scheduled      Andriy Moran MD

## 2020-01-31 ENCOUNTER — PATIENT MESSAGE (OUTPATIENT)
Dept: ADMINISTRATIVE | Facility: HOSPITAL | Age: 45
End: 2020-01-31

## 2020-02-03 ENCOUNTER — OFFICE VISIT (OUTPATIENT)
Dept: OBSTETRICS AND GYNECOLOGY | Facility: CLINIC | Age: 45
End: 2020-02-03
Payer: COMMERCIAL

## 2020-02-03 VITALS
BODY MASS INDEX: 40.3 KG/M2 | SYSTOLIC BLOOD PRESSURE: 130 MMHG | WEIGHT: 241.88 LBS | DIASTOLIC BLOOD PRESSURE: 80 MMHG | HEIGHT: 65 IN

## 2020-02-03 DIAGNOSIS — Z30.431 ENCOUNTER FOR ROUTINE CHECKING OF INTRAUTERINE CONTRACEPTIVE DEVICE (IUD): ICD-10-CM

## 2020-02-03 DIAGNOSIS — Z12.39 BREAST CANCER SCREENING: ICD-10-CM

## 2020-02-03 DIAGNOSIS — Z01.419 VISIT FOR GYNECOLOGIC EXAMINATION: Primary | ICD-10-CM

## 2020-02-03 PROCEDURE — 87624 HPV HI-RISK TYP POOLED RSLT: CPT

## 2020-02-03 PROCEDURE — 99999 PR PBB SHADOW E&M-EST. PATIENT-LVL III: CPT | Mod: PBBFAC,,, | Performed by: OBSTETRICS & GYNECOLOGY

## 2020-02-03 PROCEDURE — 3075F PR MOST RECENT SYSTOLIC BLOOD PRESS GE 130-139MM HG: ICD-10-PCS | Mod: CPTII,S$GLB,, | Performed by: OBSTETRICS & GYNECOLOGY

## 2020-02-03 PROCEDURE — 99999 PR PBB SHADOW E&M-EST. PATIENT-LVL III: ICD-10-PCS | Mod: PBBFAC,,, | Performed by: OBSTETRICS & GYNECOLOGY

## 2020-02-03 PROCEDURE — 3075F SYST BP GE 130 - 139MM HG: CPT | Mod: CPTII,S$GLB,, | Performed by: OBSTETRICS & GYNECOLOGY

## 2020-02-03 PROCEDURE — 99396 PR PREVENTIVE VISIT,EST,40-64: ICD-10-PCS | Mod: S$GLB,,, | Performed by: OBSTETRICS & GYNECOLOGY

## 2020-02-03 PROCEDURE — 3079F PR MOST RECENT DIASTOLIC BLOOD PRESSURE 80-89 MM HG: ICD-10-PCS | Mod: CPTII,S$GLB,, | Performed by: OBSTETRICS & GYNECOLOGY

## 2020-02-03 PROCEDURE — 3079F DIAST BP 80-89 MM HG: CPT | Mod: CPTII,S$GLB,, | Performed by: OBSTETRICS & GYNECOLOGY

## 2020-02-03 PROCEDURE — 99396 PREV VISIT EST AGE 40-64: CPT | Mod: S$GLB,,, | Performed by: OBSTETRICS & GYNECOLOGY

## 2020-02-03 PROCEDURE — 88175 CYTOPATH C/V AUTO FLUID REDO: CPT

## 2020-02-03 NOTE — PROGRESS NOTES
HISTORY OF PRESENT ILLNESS:    Keli Gilbert is a 44 y.o. female, , No LMP recorded (lmp unknown). Patient has had an implant.,  presents for a routine exam and has no complaints.  COTEST.  MIRENA SINCE .  MAMMO ORDERED  NO GYN C/O, SON RECENTLY ENGAGED TO GIRL IN [employer]    LAST VISIT 2018:   MAMMO ORDERED AND PAP 2019.  NO MENSES WITH IUD AND NO C/O.  PLANNING [place] NEXT WEEK  Last visit 2017:  DUE MIRENA REPLACEMENT TODAY.  PAP DUE 2019 REF MAMMO; SON NOW 23YO  ON ABX FOR SINUS INFECTION - DIFLUCAN PRN.  [place] LAST YEAR  LAST VISIT 2016:  DUE PAP SUBMITTED AND REF MAMMO.  IUD DUE REPLACEMENT 2017.  MOTHER WITH RECENT MI  LAST VISIT 2014:  IUD IN PLACE SINCE 3/2012 CAN FEEL STRING AND MINIMAL MENSES  LAST VISIT 2013:  PAP SUBMITTED AND DISCUSSED 3YR SCREENING. SHORT, LIGHT PERIODS ON MIRENA. PATIENT IS HAPPY WITH THE PATTERN   OPTIC NEURITIS IS BETTER   IUD SURVEILLANCE    Past Medical History:   Diagnosis Date    Abnormal Pap smear of vagina     AR (allergic rhinitis)     Demyelinating disorder     Depression     Fever blister     Fibromyalgia     followed by pain management    Generalized osteoarthritis of multiple sites     History of abnormal Pap smear     Hypertension     Hypothyroidism     Insulin resistance     Mixed anxiety and depressive disorder     Morbid obesity     Myelitis, optic neuritis in     treated with high-dose steroids and resolved; positive MRI and spinal fluid for a mass, but on embrel for psoriatic arthritis - she will see a MS specialist at [facility]; MRI normalized off embrel    Obesity     Pre-diabetes     hx diabetes on stereoids /    Psoriasis     Psoriatic arthritis     Vitamin D deficiency disease        Past Surgical History:   Procedure Laterality Date    SINUS SURGERY         MEDICATIONS AND ALLERGIES:      Current Outpatient Medications:     azelastine (ASTELIN) 137 mcg (0.1 %) nasal spray, 1 spray (137 mcg total) by  Nasal route 2 (two) times daily., Disp: 30 mL, Rfl: 0    buPROPion (WELLBUTRIN) 100 MG tablet, TAKE 1 TABLET(100 MG) BY MOUTH TWICE DAILY, Disp: 180 tablet, Rfl: 0    celecoxib (CELEBREX) 200 MG capsule, Take 1 capsule (200 mg total) by mouth 2 (two) times daily., Disp: 180 capsule, Rfl: 2    cholecalciferol, vitamin D3, 2,000 unit Tab, Take 1 tablet by mouth Once a week., Disp: , Rfl:     cholecalciferol, vitamin D3, 50,000 unit capsule, Take 1 capsule (50,000 Units total) by mouth every 14 (fourteen) days. (Patient not taking: Reported on 11/21/2019), Disp: 18 capsule, Rfl: 1    COSENTYX PEN, 2 PENS, 150 mg/mL PnIj, INJECT 2 PENS INTO THE SKIN EVERY 7 DAYS FOR 5 WEEKS., Disp: 10 mL, Rfl: 0    furosemide (LASIX) 40 MG tablet, Take 1 tablet (40 mg total) by mouth once daily., Disp: 90 tablet, Rfl: 1    gabapentin (NEURONTIN) 300 MG capsule, Take 300 mg by mouth once daily., Disp: , Rfl:     leflunomide (ARAVA) 20 MG Tab, Take 1 tablet (20 mg total) by mouth once daily., Disp: 90 tablet, Rfl: 3    liothyronine (CYTOMEL) 5 MCG Tab, TAKE 2 TABLETS(10 MCG) BY MOUTH EVERY DAY, Disp: 180 tablet, Rfl: 0    losartan (COZAAR) 50 MG tablet, Take 1 tablet (50 mg total) by mouth once daily., Disp: 90 tablet, Rfl: 3    methadone (DOLOPHINE) 10 MG tablet, Take 1 tablet by mouth Three times a day., Disp: , Rfl:     potassium chloride (MICRO-K) 10 MEQ CpSR, TAKE 2 CAPSULES(20 MEQ) BY MOUTH EVERY DAY, Disp: 180 capsule, Rfl: 0    PROAIR HFA 90 mcg/actuation inhaler, INHALE 2 PUFFS INTO THE LUNGS EVERY 6 (SIX) HOURS AS NEEDED FOR WHEEZING. RESCUE, Disp: 8.5 Inhaler, Rfl: 0    promethazine (PHENERGAN) 25 MG tablet, TK 1 T PO BID PRN N, Disp: , Rfl:     ranitidine (ZANTAC) 150 MG tablet, Take 1 tablet (150 mg total) by mouth 2 (two) times daily., Disp: 180 tablet, Rfl: 2    secukinumab (COSENTYX PEN, 2 PENS,) 150 mg/mL PnIj, Inject 300 mg into the skin every 28 days., Disp: 6 Syringe, Rfl: 2    SYNTHROID 125 mcg  tablet, TAKE 1 TABLET(125 MCG) BY MOUTH BEFORE BREAKFAST, Disp: 90 tablet, Rfl: 3    Review of patient's allergies indicates:   Allergen Reactions    Doxycycline hcl Nausea And Vomiting    Enbrel [etanercept] Other (See Comments)     Optic neurtits       Family History   Problem Relation Age of Onset    Psoriasis Father     Cancer Father         throat    Thyroid disease Mother     Heart disease Mother     Hypertension Mother     Hyperlipidemia Mother     Coronary artery disease Mother         s/p CABG    Heart attack Mother     Heart disease Sister         MVP    Diabetes Paternal Uncle     Diabetes Maternal Grandfather     Heart disease Maternal Grandfather     Thyroid disease Maternal Grandfather     ADD / ADHD Son     Melanoma Neg Hx     Lupus Neg Hx     Eczema Neg Hx     Acne Neg Hx     Breast cancer Neg Hx     Colon cancer Neg Hx     Ovarian cancer Neg Hx        Social History     Socioeconomic History    Marital status: Single     Spouse name: Not on file    Number of children: 1    Years of education: Not on file    Highest education level: Not on file   Occupational History    Occupation: [employer]     Employer: [employer] (MAIN OFFICE)   Social Needs    Financial resource strain: Not on file    Food insecurity:     Worry: Not on file     Inability: Not on file    Transportation needs:     Medical: Not on file     Non-medical: Not on file   Tobacco Use    Smoking status: Never Smoker    Smokeless tobacco: Never Used   Substance and Sexual Activity    Alcohol use: No    Drug use: No     Comment: 7/7/15 one weed brownie    Sexual activity: Yes     Partners: Male     Birth control/protection: IUD   Lifestyle    Physical activity:     Days per week: Not on file     Minutes per session: Not on file    Stress: Not on file   Relationships    Social connections:     Talks on phone: Not on file     Gets together: Not on file     Attends Sikhism service: Not on file     Active  "member of club or organization: Not on file     Attends meetings of clubs or organizations: Not on file     Relationship status: Not on file   Other Topics Concern    Are you pregnant or think you may be? No    Breast-feeding No   Social History Narrative    Not on file       COMPREHENSIVE GYN HISTORY:  PAP History: Denies abnormal Paps.  Infection History: Denies STDs. Denies PID.  Benign History: Denies uterine fibroids. Denies ovarian cysts. Denies endometriosis. Denies other conditions.  Cancer History: Denies cervical cancer. Denies uterine cancer or hyperplasia. Denies ovarian cancer. Denies vulvar cancer or pre-cancer. Denies vaginal cancer or pre-cancer. Denies breast cancer. Denies colon cancer.  Sexual Activity History: Reports currently being sexually active  Menstrual History: Monthly, mild-moderate.  Contraception:IUD    ROS:  GENERAL: No weight changes. No swelling. No fatigue. No fever.  CARDIOVASCULAR: No chest pain. No shortness of breath. No leg cramps.   NEUROLOGICAL: No headaches. No vision changes.  BREASTS: No pain. No lumps. No discharge.  ABDOMEN: No pain. No nausea. No vomiting. No diarrhea. No constipation.  REPRODUCTIVE: No abnormal bleeding.   VULVA: No pain. No lesions. No itching.  VAGINA: No relaxation. No itching. No odor. No discharge. No lesions.  URINARY: No incontinence. No nocturia. No frequency. No dysuria.    /80   Ht 5' 5" (1.651 m)   Wt 109.7 kg (241 lb 13.5 oz)   LMP  (LMP Unknown)   BMI 40.25 kg/m²     PE:  APPEARANCE: Well nourished, well developed, in no acute distress.  AFFECT: WNL, alert and oriented x 3.  SKIN: No acne or hirsutism.  NECK: Neck symmetric, without masses or thyromegaly.  NODES: No inguinal, cervical, axillary or femoral lymph node enlargement.  CHEST: Good respiratory effort.   ABDOMEN: Soft. No tenderness or masses. No hepatosplenomegaly. No hernias.  BREASTS: Symmetrical, no skin changes, visible lesions, palpable masses or nipple " discharge bilaterally.  PELVIC: External female genitalia without lesions.  Female hair distribution. Adequate perineal body, Normal urethral meatus. Vagina moist and well rugated without lesions or discharge.  No significant cystocele or rectocele present. Cervix pink without lesions, discharge or tenderness, STRING AT EXTERNAL OS. Uterus is normal size, regular, mobile and nontender. Adnexa without masses or tenderness.  EXTREMITIES: No edema    PROCEDURES:  Pap    DIAGNOSIS:  1. Visit for gynecologic examination  Liquid-Based Pap Smear, Screening    HPV High Risk Genotypes, PCR   2. Breast cancer screening  Mammo Digital Screening Bilat w/ Philippe   3. Encounter for routine checking of intrauterine contraceptive device (IUD)         LABS AND TESTS ORDERED:    MEDICATIONS PRESCRIBED:    COUNSELING:   The patient was counseled today on ACS PAP guidelines, with recommendations for yearly pelvic exams unless their uterus, cervix, and ovaries were removed for benign reasons; in that case, examinations every 3-5 years are recommended.  The patient was also counseled regarding monthly breast self-examination, routine STD screening for at-risk populations, prophylactic immunizations for transmitted infections such as  HPV, Pertussis, or Influenza as appropriate, and yearly mammograms when indicated by ACS guidelines.  She was advised to see her primary care physician for all other health maintenance.    FOLLOW-UP with me annually.

## 2020-02-17 DIAGNOSIS — Z79.1 LONG TERM (CURRENT) USE OF NON-STEROIDAL ANTI-INFLAMMATORIES (NSAID): ICD-10-CM

## 2020-02-17 DIAGNOSIS — L40.50 PSORIATIC ARTHRITIS: ICD-10-CM

## 2020-02-17 RX ORDER — LEFLUNOMIDE 20 MG/1
20 TABLET ORAL DAILY
Qty: 90 TABLET | Refills: 3 | Status: SHIPPED | OUTPATIENT
Start: 2020-02-17 | End: 2021-04-21 | Stop reason: SDUPTHER

## 2020-02-18 LAB
HPV HR 12 DNA SPEC QL NAA+PROBE: NEGATIVE
HPV16 AG SPEC QL: NEGATIVE
HPV18 DNA SPEC QL NAA+PROBE: NEGATIVE

## 2020-02-28 LAB
FINAL PATHOLOGIC DIAGNOSIS: NORMAL
Lab: NORMAL

## 2020-03-01 ENCOUNTER — PATIENT MESSAGE (OUTPATIENT)
Dept: OBSTETRICS AND GYNECOLOGY | Facility: CLINIC | Age: 45
End: 2020-03-01

## 2020-03-02 ENCOUNTER — HOSPITAL ENCOUNTER (OUTPATIENT)
Dept: RADIOLOGY | Facility: HOSPITAL | Age: 45
Discharge: HOME | End: 2020-03-02
Attending: OBSTETRICS & GYNECOLOGY
Payer: COMMERCIAL

## 2020-03-02 ENCOUNTER — TELEPHONE (OUTPATIENT)
Dept: RHEUMATOLOGY | Facility: CLINIC | Age: 45
End: 2020-03-02

## 2020-03-02 DIAGNOSIS — Z12.39 BREAST CANCER SCREENING: ICD-10-CM

## 2020-03-02 PROCEDURE — 77063 BREAST TOMOSYNTHESIS BI: CPT | Mod: 26,,, | Performed by: RADIOLOGY

## 2020-03-02 PROCEDURE — 77067 SCR MAMMO BI INCL CAD: CPT | Mod: 26,,, | Performed by: RADIOLOGY

## 2020-03-02 PROCEDURE — 77063 MAMMO DIGITAL SCREENING BILAT WITH TOMOSYNTHESIS_CAD: ICD-10-PCS | Mod: 26,,, | Performed by: RADIOLOGY

## 2020-03-02 PROCEDURE — 77067 SCR MAMMO BI INCL CAD: CPT | Mod: TC

## 2020-03-02 PROCEDURE — 77067 MAMMO DIGITAL SCREENING BILAT WITH TOMOSYNTHESIS_CAD: ICD-10-PCS | Mod: 26,,, | Performed by: RADIOLOGY

## 2020-03-02 NOTE — TELEPHONE ENCOUNTER
Called to inform patient of the need to reschedule 3/24/20 appointment with Dr. Richards, due to a change in the provider's schedule.  Patient verbalizes understanding.  Appointment rescheduled to 3/20/20

## 2020-03-05 ENCOUNTER — PATIENT OUTREACH (OUTPATIENT)
Dept: ADMINISTRATIVE | Facility: HOSPITAL | Age: 45
End: 2020-03-05

## 2020-03-09 ENCOUNTER — PATIENT MESSAGE (OUTPATIENT)
Dept: FAMILY MEDICINE | Facility: CLINIC | Age: 45
End: 2020-03-09

## 2020-03-09 DIAGNOSIS — B96.89 ACUTE BACTERIAL SINUSITIS: Primary | ICD-10-CM

## 2020-03-09 DIAGNOSIS — J01.90 ACUTE BACTERIAL SINUSITIS: Primary | ICD-10-CM

## 2020-03-09 NOTE — TELEPHONE ENCOUNTER
Called pt and offered the option of seeing either NP Malachi tomorrow at 8:20 am . Also Dr. Cole 3/11/2020 at 3:40 pm , she declined both . Just wants to know if Dr. Moran can send something to pharmacy instead . Please advise   LOV 1/29/20

## 2020-03-13 ENCOUNTER — PATIENT MESSAGE (OUTPATIENT)
Dept: FAMILY MEDICINE | Facility: CLINIC | Age: 45
End: 2020-03-13

## 2020-03-13 RX ORDER — AMOXICILLIN AND CLAVULANATE POTASSIUM 875; 125 MG/1; MG/1
1 TABLET, FILM COATED ORAL 2 TIMES DAILY
Qty: 20 TABLET | Refills: 0 | Status: SHIPPED | OUTPATIENT
Start: 2020-03-13 | End: 2020-03-23

## 2020-03-16 ENCOUNTER — PATIENT MESSAGE (OUTPATIENT)
Dept: RHEUMATOLOGY | Facility: CLINIC | Age: 45
End: 2020-03-16

## 2020-03-17 ENCOUNTER — PATIENT MESSAGE (OUTPATIENT)
Dept: RHEUMATOLOGY | Facility: CLINIC | Age: 45
End: 2020-03-17

## 2020-03-20 ENCOUNTER — OFFICE VISIT (OUTPATIENT)
Dept: RHEUMATOLOGY | Facility: CLINIC | Age: 45
End: 2020-03-20
Payer: COMMERCIAL

## 2020-03-20 DIAGNOSIS — Z79.899 LONG TERM CURRENT USE OF IMMUNOSUPPRESSIVE DRUG: ICD-10-CM

## 2020-03-20 DIAGNOSIS — Z79.1 LONG TERM (CURRENT) USE OF NON-STEROIDAL ANTI-INFLAMMATORIES (NSAID): ICD-10-CM

## 2020-03-20 DIAGNOSIS — R60.0 PEDAL EDEMA: ICD-10-CM

## 2020-03-20 DIAGNOSIS — E55.9 VITAMIN D INSUFFICIENCY: ICD-10-CM

## 2020-03-20 DIAGNOSIS — L40.50 PSORIATIC ARTHRITIS: Primary | ICD-10-CM

## 2020-03-20 DIAGNOSIS — Z79.899 LONG-TERM USE OF HIGH-RISK MEDICATION: ICD-10-CM

## 2020-03-20 DIAGNOSIS — Z55.9 EDUCATIONAL CIRCUMSTANCE: ICD-10-CM

## 2020-03-20 DIAGNOSIS — M15.9 GENERALIZED OSTEOARTHRITIS OF MULTIPLE SITES: ICD-10-CM

## 2020-03-20 PROCEDURE — 99214 OFFICE O/P EST MOD 30 MIN: CPT | Mod: 95,,, | Performed by: INTERNAL MEDICINE

## 2020-03-20 PROCEDURE — 99214 PR OFFICE/OUTPT VISIT, EST, LEVL IV, 30-39 MIN: ICD-10-PCS | Mod: 95,,, | Performed by: INTERNAL MEDICINE

## 2020-03-20 RX ORDER — SULINDAC 200 MG/1
200 TABLET ORAL 2 TIMES DAILY
Qty: 180 TABLET | Refills: 1 | Status: SHIPPED | OUTPATIENT
Start: 2020-03-20 | End: 2020-09-25

## 2020-03-20 SDOH — SOCIAL DETERMINANTS OF HEALTH (SDOH): PROBLEMS RELATED TO EDUCATION AND LITERACY, UNSPECIFIED: Z55.9

## 2020-03-20 NOTE — PATIENT INSTRUCTIONS
Wash your hands often and as shown x 20 secs    Stop Celebrex and begin sulindac (clinoril) on tablet twice a day.  Information on sulindac is below. Contact us if you have any questions.   Weigh yourself before beginning and after a week or so to see if it impacted your fluid retention.      Sulindac tablets  What is this medicine?  SULINDAC (sul IN dak) is a non-steroidal anti-inflammatory drug (NSAID). It is used to reduce swelling and to treat pain. It may be used for painful joint and muscular problems such as arthritis, tendinitis, bursitis, and gout.  How should I use this medicine?  Take this medicine by mouth with food and with a full glass of water. Follow the directions on the prescription label. Take your medicine at regular intervals. Do not take your medicine more often than directed. Long-term, continuous use may increase the risk of heart attack or stroke.  A special MedGuide will be given to you by the pharmacist with each prescription and refill. Be sure to read this information carefully each time.  Talk to your pediatrician regarding the use of this medicine in children. Special care may be needed.  Elderly patients over 65 years old may have a stronger reaction and need a smaller dose.  What side effects may I notice from receiving this medicine?  Side effects that you should report to your doctor or health care professional as soon as possible:  · allergic reactions like skin rash, itching or hives, swelling of the face, lips, or tongue  · changes in vision  · chest pain  · difficulty breathing or wheezing  · nausea or vomiting  · redness, blistering, peeling or loosening of the skin, including inside the mouth  · severe abdominal pain  · slurred speech or weakness on one side of the body  · unexplained weight gain or swelling  · unusual bleeding or bruising  · unusually weak or tired  · yellowing of eyes or skin  Side effects that usually do not require medical attention (report to your doctor  or health care professional if they continue or are bothersome):  · diarrhea  · dizziness  · headache  · heartburn  What may interact with this medicine?  Do not take this medicine with any of the following medications:  · cidofovir  · ketorolac  · methotrexate  · pemetrexed  This medicine may also interact with the following medications:  · alcohol  · aspirin and aspirin-like medicines  · cyclosporine  · diflunisal  · dimethyl sulfoxide, DMSO  · diuretics  · lithium  · medicines for diabetes  · medicines for high blood pressure  · medicines that affect platelets  · medicines that treat or prevent blood clots like warfarin  · NSAIDs, medicines for pain and inflammation, like ibuprofen or naproxen  · probenecid  · steroid medicines like prednisone or cortisone  What if I miss a dose?  If you miss a dose, take it as soon as you can. If it is almost time for your next dose, take only that dose. Do not take double or extra doses.  Where should I keep my medicine?  Keep out of the reach of children.  Store at room temperature between 15 and 30 degrees C (59 and 86 degrees F). Keep container tightly closed. Throw away any unused medicine after the expiration date.  What should I tell my health care provider before I take this medicine?  They need to know if you have any of these conditions:  · asthma, especially aspirin sensitive asthma  · coronary artery bypass graft (CABG) surgery within the past 2 weeks  · drink more than 3 alcohol containing drinks a day  · heart disease or circulation problems like heart failure or leg edema (fluid retention)  · high blood pressure  · kidney disease  · liver disease  · stomach bleeding or ulcers  · an unusual or allergic reaction to sulindac, aspirin, other NSAIDs, other medicines, foods, dyes, or preservatives  · pregnant or trying to get pregnant  · breast-feeding  What should I watch for while using this medicine?  Tell your doctor or health care professional if your pain does not  get better. Talk to your doctor before taking another medicine for pain. Do not treat yourself.  This medicine does not prevent heart attack or stroke. In fact, this medicine may increase the chance of a heart attack or stroke. The chance may increase with longer use of this medicine and in people who have heart disease. If you take aspirin to prevent heart attack or stroke, talk with your doctor or health care professional.  Do not take medicines such as ibuprofen and naproxen with this medicine. Side effects such as stomach upset, nausea, or ulcers may be more likely to occur. Many medicines available without a prescription should not be taken with this medicine.  This medicine can cause ulcers and bleeding in the stomach and intestines at any time during treatment. Do not smoke cigarettes or drink alcohol. These increase irritation to your stomach and can make it more susceptible to damage from this medicine. Ulcers and bleeding can happen without warning symptoms and can cause death.  You may get drowsy or dizzy. Do not drive, use machinery, or do anything that needs mental alertness until you know how this medicine affects you. Do not stand or sit up quickly, especially if you are an older patient. This reduces the risk of dizzy or fainting spells.  This medicine can cause you to bleed more easily. Try to avoid damage to your teeth and gums when you brush or floss your teeth.  NOTE:This sheet is a summary. It may not cover all possible information. If you have questions about this medicine, talk to your doctor, pharmacist, or health care provider. Copyright© 2017 [employer]

## 2020-03-20 NOTE — PROGRESS NOTES
Pre chart done *Adriane Kirby MA  Answers for HPI/ROS submitted by the patient on 3/20/2020   fever: No  eye redness: No  headaches: No  shortness of breath: No  chest pain: No  trouble swallowing: No  diarrhea: No  constipation: No  unexpected weight change: No  genital sore: No  dysuria: No  During the last 3 days, have you had a skin rash?: No  Bruises or bleeds easily: No  cough: No

## 2020-03-20 NOTE — PROGRESS NOTES
Subjective:       Patient ID: Keli Concha Gilbert is a 44 y.o. female with psoriatic arthritis on Cosentyx & Leflunomide (& naproxen); demyelinating disorder secondary to Enbrel; incomplete effect on chronic pain syndrome on savella & methadone     Chief Complaint: Disease management    The patient location is: home  The chief complaint leading to consultation is:Psoriatic Arthritis  Visit type: Virtual visit with synchronous audio and video  Total time spent with patient: 40 minutes.   Each patient to whom he or she provides medical services by telemedicine is:  (1) informed of the relationship between the physician and patient and the respective role of any other health care provider with respect to management of the patient; and (2) notified that he or she may decline to receive medical services by telemedicine and may withdraw from such care at any time.      Ms. Gilbert is a 45 yo woman with history of PsA last seen on 11/21/19.  She is on Cosentyx 300 mg/4weeks. Also on celebrex 200 mg bid. In the recent past she was on xeljanz (& lefllunomide 20 & naproxen) switched from Taltz for hand pain & swelling to which she had a temporary improvement and then regressed. Xeljanz did not help. We switched her back to Cosentyx which she had taken in the past with incomplete results.    She feels like she is stable on Cosentyx and prefers it to Taltz,or Xeljanz but she continue with swelling of her R 3rd PIP w some pain and pain at the base of her L thumb. (suspicious lesion for erosion on imaging here). She is still having AM stiffness x 30 minutes-1 hour. Other joints are doing well without swelling and without much pain (she still has her chronic muscle/jt pains which she recognizes as different).    We had switched her from naproxen to celebrex, to see if it would have an effect on her edema.  It did not. However, she is also taking gabapentin. She is still taking nightly furosemide.              Associated symptoms  include fatigue. Pertinent negatives include no fever or headaches.          Current Outpatient Medications   Medication Sig Dispense Refill    amoxicillin-clavulanate 875-125mg (AUGMENTIN) 875-125 mg per tablet Take 1 tablet by mouth 2 (two) times daily. for 10 days 20 tablet 0    azelastine (ASTELIN) 137 mcg (0.1 %) nasal spray 1 spray (137 mcg total) by Nasal route 2 (two) times daily. 30 mL 0    buPROPion (WELLBUTRIN) 100 MG tablet TAKE 1 TABLET(100 MG) BY MOUTH TWICE DAILY 180 tablet 0    celecoxib (CELEBREX) 200 MG capsule Take 1 capsule (200 mg total) by mouth 2 (two) times daily. 180 capsule 2    cholecalciferol, vitamin D3, 2,000 unit Tab Take 1 tablet by mouth Once a week.      cholecalciferol, vitamin D3, 50,000 unit capsule Take 1 capsule (50,000 Units total) by mouth every 14 (fourteen) days. (Patient not taking: Reported on 11/21/2019) 18 capsule 1    COSENTYX PEN, 2 PENS, 150 mg/mL PnIj INJECT 2 PENS INTO THE SKIN EVERY 7 DAYS FOR 5 WEEKS. 10 mL 0    furosemide (LASIX) 40 MG tablet Take 1 tablet (40 mg total) by mouth once daily. 90 tablet 1    gabapentin (NEURONTIN) 300 MG capsule Take 300 mg by mouth once daily.      leflunomide (ARAVA) 20 MG Tab Take 1 tablet (20 mg total) by mouth once daily. 90 tablet 3    liothyronine (CYTOMEL) 5 MCG Tab TAKE 2 TABLETS(10 MCG) BY MOUTH EVERY  tablet 0    losartan (COZAAR) 50 MG tablet Take 1 tablet (50 mg total) by mouth once daily. 90 tablet 3    methadone (DOLOPHINE) 10 MG tablet Take 1 tablet by mouth Three times a day.      potassium chloride (MICRO-K) 10 MEQ CpSR TAKE 2 CAPSULES(20 MEQ) BY MOUTH EVERY  capsule 0    PROAIR HFA 90 mcg/actuation inhaler INHALE 2 PUFFS INTO THE LUNGS EVERY 6 (SIX) HOURS AS NEEDED FOR WHEEZING. RESCUE 8.5 Inhaler 0    promethazine (PHENERGAN) 25 MG tablet TK 1 T PO BID PRN N      ranitidine (ZANTAC) 150 MG tablet Take 1 tablet (150 mg total) by mouth 2 (two) times daily. 180 tablet 2     secukinumab (COSENTYX PEN, 2 PENS,) 150 mg/mL PnIj Inject 300 mg into the skin every 28 days. 6 Syringe 2    SYNTHROID 125 mcg tablet TAKE 1 TABLET(125 MCG) BY MOUTH BEFORE BREAKFAST 90 tablet 3     No current facility-administered medications for this visit.    On Gabapentin 300 mg tid  Not on inhaler  Both vitamin Ds  Not on augmentin      Review of patient's allergies indicates:   Allergen Reactions    Doxycycline hcl Nausea And Vomiting    Enbrel [etanercept] Other (See Comments)     Optic neurtits     Past Medical History:   Diagnosis Date    Abnormal Pap smear of vagina     AR (allergic rhinitis)     Demyelinating disorder     Depression     Fever blister     Fibromyalgia     followed by pain management    Generalized osteoarthritis of multiple sites     History of abnormal Pap smear     Hypertension     Hypothyroidism     Insulin resistance     Mixed anxiety and depressive disorder     Morbid obesity     Myelitis, optic neuritis in     treated with high-dose steroids and resolved; positive MRI and spinal fluid for a mass, but on embrel for psoriatic arthritis - she will see a MS specialist at [facility]; MRI normalized off embrel    Obesity     Pre-diabetes     hx diabetes on stereoids /    Psoriasis     Psoriatic arthritis     Vitamin D deficiency disease      Past Surgical History:   Procedure Laterality Date    SINUS SURGERY  1998       Review of Systems   Constitutional: Positive for fatigue. Negative for fever and unexpected weight change.   HENT: Negative.  Negative for dental problem and mouth sores.         Dry mouth only w antihistamines   Eyes: Negative.  Negative for redness, itching and visual disturbance.        Dry eyes only with antihistamines   Respiratory: Negative.    Cardiovascular: Positive for leg swelling. Negative for chest pain and palpitations.   Gastrointestinal: Negative.  Negative for abdominal pain, anal bleeding, blood in stool, constipation, diarrhea and nausea.         Heartburn.   Endocrine: Negative.    Genitourinary: Negative.  Negative for frequency, menstrual problem, pelvic pain and urgency.   Musculoskeletal: Negative.  Negative for arthralgias, back pain, gait problem and neck pain.   Skin: Negative.  Negative for color change and rash.   Allergic/Immunologic: Positive for immunocompromised state.   Neurological: Negative.  Negative for dizziness, seizures, weakness, numbness and headaches.   Hematological: Negative.  Negative for adenopathy. Does not bruise/bleed easily.   Psychiatric/Behavioral: Positive for dysphoric mood (stable) and sleep disturbance (stable). Negative for decreased concentration, self-injury and suicidal ideas.         Family History   Problem Relation Age of Onset    Psoriasis Father     Cancer Father         throat    Thyroid disease Mother     Heart disease Mother     Hypertension Mother     Hyperlipidemia Mother     Coronary artery disease Mother         s/p CABG    Heart attack Mother     Heart disease Sister         MVP    Diabetes Paternal Uncle     Diabetes Maternal Grandfather     Heart disease Maternal Grandfather     Thyroid disease Maternal Grandfather     ADD / ADHD Son     Melanoma Neg Hx     Lupus Neg Hx     Eczema Neg Hx     Acne Neg Hx     Breast cancer Neg Hx     Colon cancer Neg Hx     Ovarian cancer Neg Hx      Mom had a CVA & is home but not really doing too well. She has cognitive issues.     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   Son is living with her.   No alcohol; no cigarettes;   Objective:   LMP  (LMP Unknown) Comment: IUD   Swelling in R 3rd PIP looks unchanged.    Exam is from 11/21/19  Was 236 lb 12.4 oz on 6/8/19  Was 224 lb 13.9 oz on 2/19/19.    Physical Exam   Vitals reviewed.  Constitutional: She is oriented to person, place, and time and well-developed, well-nourished, and in no distress. No distress.   HENT:   Head: Normocephalic and atraumatic.    Mouth/Throat: Oropharynx is clear and moist. No oropharyngeal exudate.   No facial rashes  Parotids not enlarged  No oral ulcers  Face oliva   Eyes: Conjunctivae and EOM are normal. Pupils are equal, round, and reactive to light. Right eye exhibits no discharge. Left eye exhibits no discharge. No scleral icterus.   Neck: Neck supple. No JVD present. No tracheal deviation present. No thyromegaly present.   Cardiovascular: Normal rate, regular rhythm, normal heart sounds and intact distal pulses.  Exam reveals no gallop and no friction rub.    No murmur heard.  Pulmonary/Chest: Effort normal and breath sounds normal. No respiratory distress. She has no wheezes. She has no rales. She exhibits no tenderness.   Abdominal: Soft. Bowel sounds are normal. She exhibits no distension and no mass. There is no splenomegaly or hepatomegaly. There is no tenderness. There is no rebound and no guarding.   Lymphadenopathy:     She has no cervical adenopathy.   Neurological: She is alert and oriented to person, place, and time. She has normal reflexes. No cranial nerve deficit. Gait normal.   Proximal and distal muscle strength 5/5.   Skin: Skin is warm and dry. She is not diaphoretic.     No psoriasis.   Psychiatric: Mood, memory, affect and judgment normal.   Drowsy.   Musculoskeletal: Normal range of motion. She exhibits edema (tr-1+ pitting edema). She exhibits no tenderness.   Cspine slight decrease in lateral flexion & rotation; no tenderness  Tspine FROM no tenderness  Lspine FROM no tenderness.  TMJ: unremarkable  Shoulders: FROM; no synovitis  Elbows: FROM; no synovitis; no tophi or nodules  Wrists: no SHT; no tenderness; good ROM  MCPs: L 1st C-MCP tender with mild SHT;1st MCP mild SHT;  no metatarsalgia;  PIPs: minimal dactylitis 2nd ray R; R 3rd PIP SHT & mildly erythematous; mildly tender; cannot fully extend this joint. Seems a bit improved from last visit however.   DIPs: FROM; no synovitis  HIPS: FROM  Knees: FROM;  no synovitis; no instability;  Ankles: FROM: no synovitis   Toes: ok; no metatarsalgia                               Labs:   12/7/19: ESR 17; CRP CBC Hg 11.8; CMP ok; TFTs ok;   9/14/19: ESR 24 (36); CRP 9.8; CBC Hg 11.5; CP ok;   2/19/19: ESR 13; CRP 5.8; CBC plts 362; CMP ok; HCV/HBV/QG neg;  11/17/18: ESR 8; CRP 3.9; Hg 11.9; low indices; CMP ok;   8/22/18: ESR 10; CRP 10.9; CBC Hg 10.7; Ht 35.7; CMP ok  5/14/18: ESR 13; CRP 13.4;  Hg 11.3; low indices; eos 12.4%; CMP ok;   2/10/18: ESR 30; CRP 10.5; Hg 11.3; plt 366; CMP ok;   10/19/17: HBV neg; HCV neg;   9/2/17: ESR 16; CRp 5.9; Hg 11.6; Ht 36.6; CMP ok;   5/29/17: QG neg  2/20/17: ESR 34; CRP 8.1; CBC Hg 11.4; Ht 35.6; CMP ok;   11/19/16: ESR 20; CRP 4.8; CBC Hg 11.9; CMP K+3.3, otherwise ok;   8/27/16: ESR 22; CRP 7; Hg 11.8; Ht 36.9; CMP ok;   5/26/16: ESR 35; CRP 11.2Hg 11.8; Ht 36.9; CMP ok;   5/21/16: Vit d 37  10/8/15: ESR 45; CRP 14.2; Hg 11.2; Ht 35.8; CMP; ok  7/7/15: CBC plt 380; K+ 3.4; Glu 154  7/6/15: ESR 48; CRP 9.6  4/4/15: ESR 41; CRP 19; Hg 11.0; Ht 34.6; plt 377; CMP ok;  1/10/15: ESR 34; CRP 18; CBC ok; ; Alb. 3.6  10/4/14: ESR 43; CRP 18.5; Hg 11.8; Ht 36.5; plt 378; Alb. 3.4;   3/20/14: ESR 41; CRP 14.3; plt 356; CMP ok;  2/15/13: ESR 30; CRP 9.8; CBC ok; Alb. 3.3  11/2/13: ESR 28; CRP 11.6; plt 363; Alb. 3.3;   5/28/13: ESR 45; CRP 8.8; plt 366; CMP ok;     12/1/18: Bilateral hands: personally viewed: Mild degenerative changes noted at the interphalangeal joints.  Mild-to-moderate degenerative change noted at the bilateral 1st CMC joints.  Mild degenerative changes also present at both radiocarpal joints, triscaphe joints and right distal radioulnar joint.  Minimal degenerative changes also present at the MCP joints bilaterally and greatest at the 1st MCP joint on the left.  6/15/17: Bilateral hands: personally reviewed: mild osteoarthritis w stable periarticular erosion at the left fourth MCP joint; no change since last year.    8/27/16: Bilateral feet: personally reviewed. Mild HV; mild OA shell 1st MTP dali; ? Erosion PIP left small toe;   5/26/16: Bilateral hands: personally reviewed: L 4th MCP (new) erosive lesion; R & L 2nd & 3rd metacarpal head cysts; R mild PIP erosion & STS 2nd.    Assessment:     Psoriatic arthritis-- with mild SHT 2 MCPs bilaterally (R >>L) & with sausage digit of R index finger--currently w SHT & flexion deformity of R 3rd PIP.    a) History of sausage digits of both toes      PIPs, DIPs, wrists, and knees., now w. only one sausage digit.   b) Psoriasis of the abdomen, elbow, and the shins--resolved.    c) Enbrel stopped 10/21/11 due to optic neuritis.     d) New erosion (4th L metacarpal head) on x-ray & possibly L 5th toe PIP     e) Persisting elevation of ESR and CRP    f) inadequate response to leflunomide and Stelara & apremilast (w. Intolerable nausea)   G) could not tolerate SSZ--nausea.   H) MTX x 2 even SC did not help sxs.   I) improved on Cosentyx (+leflunomide + naproxen) initially incompletely-- hand joints still hurt despite normalization of  ESR & CRP   J) Orencia begun 11/17 with incomplete response   K)Taltz begun 4/20/18-2/19   L) Tofacitinib 3/19 - 6/18/19    Chronic SHT 3rd R PIP & base of L thumb with questionable erosive lesion.   M) Back to Cosentyx + leflunomide +now celebrex     Weight gain & edema   Gabapentin?   Naproxen? No effect on switching to celecoxib    Chronic pain/fibromyalgia syndrome with fatigue, tender points, withdrawals to palpation in the past, pain all over, responsive to Savella but with some nausea (off it now), followed by Dr[facility] the [facility],    On methadone    Left optic neuritis with demyelinating lesions secondary to Enbrel--now resolved.    Repeat MRI ok    Diabetes mellitus, diet controlled     Degenerative joint disease of the cervical spine, knees, and feet--very mild.     Depression on wellbutrin    Off savella    Hypertension.     Hypothyroidism.      Vitamin D deficiency with regular prescription vitamin D supplementation.     Obesity         Plan:   Stay on Cosentyx 300 mg/4 wks  Say on leflunomide 20 mg/d  Switch from celebrex 200 mg bid to sulindac 200 mg bid see if better on edema.  We will get labs now & q 3 months as she is on leflunomide 20 mg/d which she is to continue.  Curtail salt as always.  Reviewed Covid precautions and demonstrated handwashing.     RTC 3- 4 months.

## 2020-03-30 DIAGNOSIS — E87.6 HYPOKALEMIA: ICD-10-CM

## 2020-03-30 DIAGNOSIS — F34.1 DYSTHYMIA: ICD-10-CM

## 2020-03-31 RX ORDER — BUPROPION HYDROCHLORIDE 100 MG/1
TABLET ORAL
Qty: 180 TABLET | Refills: 0 | Status: SHIPPED | OUTPATIENT
Start: 2020-03-31 | End: 2020-07-01

## 2020-03-31 RX ORDER — POTASSIUM CHLORIDE 750 MG/1
CAPSULE, EXTENDED RELEASE ORAL
Qty: 180 CAPSULE | Refills: 0 | Status: SHIPPED | OUTPATIENT
Start: 2020-03-31 | End: 2020-07-01

## 2020-04-01 DIAGNOSIS — L40.50 PSORIATIC ARTHRITIS: ICD-10-CM

## 2020-04-01 RX ORDER — ASPIRIN 325 MG
50000 TABLET, DELAYED RELEASE (ENTERIC COATED) ORAL
Qty: 18 CAPSULE | Refills: 1 | Status: SHIPPED | OUTPATIENT
Start: 2020-04-01 | End: 2021-03-10

## 2020-04-02 ENCOUNTER — PATIENT MESSAGE (OUTPATIENT)
Dept: RHEUMATOLOGY | Facility: CLINIC | Age: 45
End: 2020-04-02

## 2020-04-02 DIAGNOSIS — L40.50 PSORIATIC ARTHRITIS: ICD-10-CM

## 2020-04-02 RX ORDER — SECUKINUMAB 150 MG/ML
INJECTION SUBCUTANEOUS
Qty: 6 SYRINGE | Refills: 2 | Status: SHIPPED | OUTPATIENT
Start: 2020-04-02 | End: 2020-07-16 | Stop reason: SDUPTHER

## 2020-04-22 DIAGNOSIS — E03.9 HYPOTHYROIDISM, UNSPECIFIED TYPE: ICD-10-CM

## 2020-04-22 RX ORDER — LIOTHYRONINE SODIUM 5 UG/1
TABLET ORAL
Qty: 180 TABLET | Refills: 0 | Status: SHIPPED | OUTPATIENT
Start: 2020-04-22 | End: 2020-08-02

## 2020-04-29 ENCOUNTER — PATIENT MESSAGE (OUTPATIENT)
Dept: RHEUMATOLOGY | Facility: CLINIC | Age: 45
End: 2020-04-29

## 2020-05-07 ENCOUNTER — PATIENT MESSAGE (OUTPATIENT)
Dept: FAMILY MEDICINE | Facility: CLINIC | Age: 45
End: 2020-05-07

## 2020-05-14 ENCOUNTER — OFFICE VISIT (OUTPATIENT)
Dept: FAMILY MEDICINE | Facility: CLINIC | Age: 45
End: 2020-05-14
Payer: COMMERCIAL

## 2020-05-14 ENCOUNTER — PATIENT MESSAGE (OUTPATIENT)
Dept: FAMILY MEDICINE | Facility: CLINIC | Age: 45
End: 2020-05-14

## 2020-05-14 DIAGNOSIS — E03.9 HYPOTHYROIDISM, UNSPECIFIED TYPE: ICD-10-CM

## 2020-05-14 DIAGNOSIS — R60.0 PEDAL EDEMA: ICD-10-CM

## 2020-05-14 DIAGNOSIS — F41.9 ANXIETY: Primary | ICD-10-CM

## 2020-05-14 PROCEDURE — 99214 OFFICE O/P EST MOD 30 MIN: CPT | Mod: 95,,, | Performed by: FAMILY MEDICINE

## 2020-05-14 PROCEDURE — 99214 PR OFFICE/OUTPT VISIT, EST, LEVL IV, 30-39 MIN: ICD-10-PCS | Mod: 95,,, | Performed by: FAMILY MEDICINE

## 2020-05-14 RX ORDER — FUROSEMIDE 40 MG/1
TABLET ORAL
Qty: 90 TABLET | Refills: 1 | Status: SHIPPED | OUTPATIENT
Start: 2020-05-14 | End: 2020-11-04 | Stop reason: SDUPTHER

## 2020-05-14 RX ORDER — SERTRALINE HYDROCHLORIDE 25 MG/1
25 TABLET, FILM COATED ORAL DAILY
Qty: 30 TABLET | Refills: 1 | Status: SHIPPED | OUTPATIENT
Start: 2020-05-14 | End: 2020-06-24

## 2020-05-14 RX ORDER — CLONAZEPAM 0.25 MG/1
0.25 TABLET, ORALLY DISINTEGRATING ORAL DAILY
Qty: 10 TABLET | Refills: 0 | Status: SHIPPED | OUTPATIENT
Start: 2020-05-14 | End: 2021-03-10

## 2020-05-14 NOTE — PROGRESS NOTES
The patient location is: work  The chief complaint leading to consultation is: anxiety  Visit type: audiovisual  Total time spent with patient: 15 minutes  Each patient to whom he or she provides medical services by telemedicine is:  (1) informed of the relationship between the physician and patient and the respective role of any other health care provider with respect to management of the patient; and (2) notified that he or she may decline to receive medical services by telemedicine and may withdraw from such care at any time.    Notes:     Routine Office Visit    Patient Name: Keli Gilbert    : 1975  MRN: [id number]    Subjective:  Keli is a 44 y.o. female who presents today for:    1. Anxiety  Patient presenting today for increased anxiety and panic attacks.  She states she has been under a lot of stress with work, covid, and now with her mother being moved to a nursing home.  She states she has never had panic attacks, but has had several over the past month.  She has been trying to control her breathing to help get the panic attack to pass.  She does have a history of depression for which she takes wellbutrin 100mg BID.  She states that this does not feel like depression at all.  She is not crying at random or uncontrollably.  No SI or HI.  Patient has not had any AH/VH.  She does suffer with hypothyroidism, but that has been well controlled.    Past Medical History  Past Medical History:   Diagnosis Date    Abnormal Pap smear of vagina     AR (allergic rhinitis)     Demyelinating disorder     Depression     Fever blister     Fibromyalgia     followed by pain management    Generalized osteoarthritis of multiple sites     History of abnormal Pap smear     Hypertension     Hypothyroidism     Insulin resistance     Mixed anxiety and depressive disorder     Morbid obesity     Myelitis, optic neuritis in     treated with high-dose steroids and resolved; positive MRI and spinal fluid  for a mass, but on embrel for psoriatic arthritis - she will see a MS specialist at [facility]; MRI normalized off embrel    Obesity     Pre-diabetes     hx diabetes on stereoids /    Psoriasis     Psoriatic arthritis     Vitamin D deficiency disease        Past Surgical History  Past Surgical History:   Procedure Laterality Date    SINUS SURGERY  1998       Family History  Family History   Problem Relation Age of Onset    Psoriasis Father     Cancer Father         throat    Thyroid disease Mother     Heart disease Mother     Hypertension Mother     Hyperlipidemia Mother     Coronary artery disease Mother         s/p CABG    Heart attack Mother     Heart disease Sister         MVP    Diabetes Paternal Uncle     Diabetes Maternal Grandfather     Heart disease Maternal Grandfather     Thyroid disease Maternal Grandfather     ADD / ADHD Son     Melanoma Neg Hx     Lupus Neg Hx     Eczema Neg Hx     Acne Neg Hx     Breast cancer Neg Hx     Colon cancer Neg Hx     Ovarian cancer Neg Hx        Social History  Social History     Socioeconomic History    Marital status: Single     Spouse name: Not on file    Number of children: 1    Years of education: Not on file    Highest education level: Not on file   Occupational History    Occupation: [employer]     Employer: [employer] (MAIN OFFICE)   Social Needs    Financial resource strain: Not on file    Food insecurity:     Worry: Not on file     Inability: Not on file    Transportation needs:     Medical: Not on file     Non-medical: Not on file   Tobacco Use    Smoking status: Never Smoker    Smokeless tobacco: Never Used   Substance and Sexual Activity    Alcohol use: No    Drug use: No     Comment: 7/7/15 one weed brownie    Sexual activity: Yes     Partners: Male     Birth control/protection: IUD   Lifestyle    Physical activity:     Days per week: Not on file     Minutes per session: Not on file    Stress: Not on file   Relationships     Social connections:     Talks on phone: Not on file     Gets together: Not on file     Attends Yarsani service: Not on file     Active member of club or organization: Not on file     Attends meetings of clubs or organizations: Not on file     Relationship status: Not on file   Other Topics Concern    Are you pregnant or think you may be? No    Breast-feeding No   Social History Narrative    Not on file       Current Medications  Current Outpatient Medications on File Prior to Visit   Medication Sig Dispense Refill    azelastine (ASTELIN) 137 mcg (0.1 %) nasal spray 1 spray (137 mcg total) by Nasal route 2 (two) times daily. 30 mL 0    buPROPion (WELLBUTRIN) 100 MG tablet TAKE 1 TABLET(100 MG) BY MOUTH TWICE DAILY 180 tablet 0    cholecalciferol, vitamin D3, 1,250 mcg (50,000 unit) capsule Take 1 capsule (50,000 Units total) by mouth every 14 (fourteen) days. 18 capsule 1    cholecalciferol, vitamin D3, 2,000 unit Tab Take 1 tablet by mouth Once a week.      COSENTYX PEN, 2 PENS, 150 mg/mL PnIj INJECT 300MG INTO THE SKIN EVERY 28 DAYS 6 Syringe 2    furosemide (LASIX) 40 MG tablet Take 1 tablet (40 mg total) by mouth once daily. 90 tablet 1    gabapentin (NEURONTIN) 300 MG capsule Take 300 mg by mouth 3 (three) times daily.      leflunomide (ARAVA) 20 MG Tab Take 1 tablet (20 mg total) by mouth once daily. 90 tablet 3    liothyronine (CYTOMEL) 5 MCG Tab TAKE 2 TABLETS(10 MCG) BY MOUTH EVERY  tablet 0    losartan (COZAAR) 50 MG tablet Take 1 tablet (50 mg total) by mouth once daily. 90 tablet 3    methadone (DOLOPHINE) 10 MG tablet Take 1 tablet by mouth Three times a day.      potassium chloride (MICRO-K) 10 MEQ CpSR TAKE 2 CAPSULES(20 MEQ) BY MOUTH EVERY  capsule 0    promethazine (PHENERGAN) 25 MG tablet TK 1 T PO BID PRN N      ranitidine (ZANTAC) 150 MG tablet Take 1 tablet (150 mg total) by mouth 2 (two) times daily. 180 tablet 2    sulindac (CLINORIL) 200 MG Tab Take 1 tablet  (200 mg total) by mouth 2 (two) times daily. 180 tablet 1    SYNTHROID 125 mcg tablet TAKE 1 TABLET(125 MCG) BY MOUTH BEFORE BREAKFAST 90 tablet 3     No current facility-administered medications on file prior to visit.        Allergies   Review of patient's allergies indicates:   Allergen Reactions    Doxycycline hcl Nausea And Vomiting    Enbrel [etanercept] Other (See Comments)     Optic neurtits       Review of Systems (Pertinent positives)  Review of Systems   Constitutional: Negative.    HENT: Negative for hearing loss.    Eyes: Negative for discharge.   Respiratory: Negative for wheezing.    Cardiovascular: Negative for chest pain and palpitations.   Gastrointestinal: Negative for blood in stool, constipation, diarrhea and vomiting.   Genitourinary: Negative for dysuria and hematuria.   Musculoskeletal: Negative for neck pain.   Skin: Negative.    Neurological: Negative for weakness and headaches.   Endo/Heme/Allergies: Negative for polydipsia.   Psychiatric/Behavioral: Negative for hallucinations, memory loss, substance abuse and suicidal ideas. The patient is nervous/anxious. The patient does not have insomnia.          There were no vitals taken for this visit.    GENERAL APPEARANCE: in no apparent distress and well developed and well nourished  HEENT: PERRL, EOMI, Sclera clear, anicteric  RESPIRATORY: appears well, vitals normal, no respiratory distress, acyanotic  SKIN: no rashes, no wounds, no other lesions  PSYCH: Alert, oriented x 3, thought content appropriate, speech normal, pleasant and cooperative, good eye contact, well groomed    Assessment/Plan:  Keli Gilbert is a 44 y.o. female who presents today for :    Diagnoses and all orders for this visit:    Anxiety  -     Comprehensive metabolic panel; Future  -     TSH; Future  -     sertraline (ZOLOFT) 25 MG tablet; Take 1 tablet (25 mg total) by mouth once daily.  -     clonazePAM (KLONOPIN) 0.25 MG TbDL; Take 1 tablet (0.25 mg total) by  mouth once daily. For acute anxiety only    Hypothyroidism, unspecified type  -     Comprehensive metabolic panel; Future  -     TSH; Future      1.  Cut wellbutrin down to 100mg daily  2.  Add zoloft at bedtime  3.  Check thyroid function  4.  She is to call with any concerns  5.  Follow up 4 weeks or sooner if needed      Andriy Moran MD

## 2020-05-22 ENCOUNTER — PATIENT MESSAGE (OUTPATIENT)
Dept: RHEUMATOLOGY | Facility: CLINIC | Age: 45
End: 2020-05-22

## 2020-05-26 ENCOUNTER — PATIENT MESSAGE (OUTPATIENT)
Dept: FAMILY MEDICINE | Facility: CLINIC | Age: 45
End: 2020-05-26

## 2020-05-26 DIAGNOSIS — K21.9 GASTROESOPHAGEAL REFLUX DISEASE, ESOPHAGITIS PRESENCE NOT SPECIFIED: Primary | ICD-10-CM

## 2020-05-26 RX ORDER — PANTOPRAZOLE SODIUM 20 MG/1
20 TABLET, DELAYED RELEASE ORAL DAILY
Qty: 30 TABLET | Refills: 2 | Status: SHIPPED | OUTPATIENT
Start: 2020-05-26 | End: 2022-01-19

## 2020-06-18 ENCOUNTER — PATIENT MESSAGE (OUTPATIENT)
Dept: FAMILY MEDICINE | Facility: CLINIC | Age: 45
End: 2020-06-18

## 2020-06-19 ENCOUNTER — PATIENT MESSAGE (OUTPATIENT)
Dept: FAMILY MEDICINE | Facility: CLINIC | Age: 45
End: 2020-06-19

## 2020-06-24 ENCOUNTER — PATIENT MESSAGE (OUTPATIENT)
Dept: FAMILY MEDICINE | Facility: CLINIC | Age: 45
End: 2020-06-24

## 2020-06-24 DIAGNOSIS — F41.9 ANXIETY: Primary | ICD-10-CM

## 2020-06-24 RX ORDER — SERTRALINE HYDROCHLORIDE 50 MG/1
50 TABLET, FILM COATED ORAL DAILY
Qty: 90 TABLET | Refills: 1 | Status: SHIPPED | OUTPATIENT
Start: 2020-06-24 | End: 2021-01-04 | Stop reason: SDUPTHER

## 2020-07-16 DIAGNOSIS — L40.50 PSORIATIC ARTHRITIS: ICD-10-CM

## 2020-07-16 RX ORDER — SECUKINUMAB 150 MG/ML
INJECTION SUBCUTANEOUS
Qty: 6 ML | Refills: 2 | Status: SHIPPED | OUTPATIENT
Start: 2020-07-16 | End: 2020-07-22 | Stop reason: SDUPTHER

## 2020-07-17 ENCOUNTER — TELEPHONE (OUTPATIENT)
Dept: PHARMACY | Facility: CLINIC | Age: 45
End: 2020-07-17

## 2020-07-17 ENCOUNTER — PATIENT MESSAGE (OUTPATIENT)
Dept: FAMILY MEDICINE | Facility: CLINIC | Age: 45
End: 2020-07-17

## 2020-07-22 ENCOUNTER — LAB VISIT (OUTPATIENT)
Dept: LAB | Facility: HOSPITAL | Age: 45
End: 2020-07-22
Attending: INTERNAL MEDICINE
Payer: COMMERCIAL

## 2020-07-22 ENCOUNTER — PATIENT MESSAGE (OUTPATIENT)
Dept: RHEUMATOLOGY | Facility: CLINIC | Age: 45
End: 2020-07-22

## 2020-07-22 DIAGNOSIS — Z79.899 LONG-TERM USE OF HIGH-RISK MEDICATION: ICD-10-CM

## 2020-07-22 DIAGNOSIS — Z79.899 LONG TERM CURRENT USE OF IMMUNOSUPPRESSIVE DRUG: ICD-10-CM

## 2020-07-22 DIAGNOSIS — L40.50 PSORIATIC ARTHRITIS: ICD-10-CM

## 2020-07-22 DIAGNOSIS — Z79.1 LONG TERM (CURRENT) USE OF NON-STEROIDAL ANTI-INFLAMMATORIES (NSAID): ICD-10-CM

## 2020-07-22 LAB
ALBUMIN SERPL BCP-MCNC: 3.6 G/DL (ref 3.5–5.2)
ALP SERPL-CCNC: 89 U/L (ref 55–135)
ALT SERPL W/O P-5'-P-CCNC: 17 U/L (ref 10–44)
ANION GAP SERPL CALC-SCNC: 6 MMOL/L (ref 8–16)
AST SERPL-CCNC: 18 U/L (ref 10–40)
BASOPHILS # BLD AUTO: 0.04 K/UL (ref 0–0.2)
BASOPHILS NFR BLD: 0.4 % (ref 0–1.9)
BILIRUB SERPL-MCNC: 0.2 MG/DL (ref 0.1–1)
BUN SERPL-MCNC: 10 MG/DL (ref 6–20)
CALCIUM SERPL-MCNC: 9.4 MG/DL (ref 8.7–10.5)
CHLORIDE SERPL-SCNC: 103 MMOL/L (ref 95–110)
CO2 SERPL-SCNC: 30 MMOL/L (ref 23–29)
CREAT SERPL-MCNC: 0.7 MG/DL (ref 0.5–1.4)
CRP SERPL-MCNC: 16.1 MG/L (ref 0–8.2)
DIFFERENTIAL METHOD: ABNORMAL
EOSINOPHIL # BLD AUTO: 0.4 K/UL (ref 0–0.5)
EOSINOPHIL NFR BLD: 4 % (ref 0–8)
ERYTHROCYTE [DISTWIDTH] IN BLOOD BY AUTOMATED COUNT: 14 % (ref 11.5–14.5)
ERYTHROCYTE [SEDIMENTATION RATE] IN BLOOD BY WESTERGREN METHOD: 34 MM/HR (ref 0–36)
EST. GFR  (AFRICAN AMERICAN): >60 ML/MIN/1.73 M^2
EST. GFR  (NON AFRICAN AMERICAN): >60 ML/MIN/1.73 M^2
GLUCOSE SERPL-MCNC: 97 MG/DL (ref 70–110)
HCT VFR BLD AUTO: 36.4 % (ref 37–48.5)
HGB BLD-MCNC: 10.8 G/DL (ref 12–16)
IMM GRANULOCYTES # BLD AUTO: 0.04 K/UL (ref 0–0.04)
IMM GRANULOCYTES NFR BLD AUTO: 0.4 % (ref 0–0.5)
LYMPHOCYTES # BLD AUTO: 1.6 K/UL (ref 1–4.8)
LYMPHOCYTES NFR BLD: 17.6 % (ref 18–48)
MCH RBC QN AUTO: 27 PG (ref 27–31)
MCHC RBC AUTO-ENTMCNC: 29.7 G/DL (ref 32–36)
MCV RBC AUTO: 91 FL (ref 82–98)
MONOCYTES # BLD AUTO: 0.8 K/UL (ref 0.3–1)
MONOCYTES NFR BLD: 9.2 % (ref 4–15)
NEUTROPHILS # BLD AUTO: 6.1 K/UL (ref 1.8–7.7)
NEUTROPHILS NFR BLD: 68.4 % (ref 38–73)
NRBC BLD-RTO: 0 /100 WBC
PLATELET # BLD AUTO: 262 K/UL (ref 150–350)
PMV BLD AUTO: 8.4 FL (ref 9.2–12.9)
POTASSIUM SERPL-SCNC: 4 MMOL/L (ref 3.5–5.1)
PROT SERPL-MCNC: 7.5 G/DL (ref 6–8.4)
RBC # BLD AUTO: 4 M/UL (ref 4–5.4)
SODIUM SERPL-SCNC: 139 MMOL/L (ref 136–145)
WBC # BLD AUTO: 8.91 K/UL (ref 3.9–12.7)

## 2020-07-22 PROCEDURE — 86140 C-REACTIVE PROTEIN: CPT

## 2020-07-22 PROCEDURE — 85652 RBC SED RATE AUTOMATED: CPT

## 2020-07-22 PROCEDURE — 80053 COMPREHEN METABOLIC PANEL: CPT

## 2020-07-22 PROCEDURE — 85025 COMPLETE CBC W/AUTO DIFF WBC: CPT

## 2020-07-22 PROCEDURE — 36415 COLL VENOUS BLD VENIPUNCTURE: CPT

## 2020-07-22 RX ORDER — SECUKINUMAB 150 MG/ML
INJECTION SUBCUTANEOUS
Qty: 6 ML | Refills: 2 | Status: SHIPPED | OUTPATIENT
Start: 2020-07-22 | End: 2020-07-23 | Stop reason: CLARIF

## 2020-07-23 ENCOUNTER — TELEPHONE (OUTPATIENT)
Dept: RHEUMATOLOGY | Facility: CLINIC | Age: 45
End: 2020-07-23

## 2020-07-23 DIAGNOSIS — L40.50 PSORIATIC ARTHRITIS: Primary | ICD-10-CM

## 2020-07-23 RX ORDER — SECUKINUMAB 150 MG/ML
300 INJECTION SUBCUTANEOUS
Qty: 6 SYRINGE | Refills: 2 | Status: SHIPPED | OUTPATIENT
Start: 2020-07-23 | End: 2021-07-06

## 2020-07-23 NOTE — TELEPHONE ENCOUNTER
FYI:  COSENTYX prior authorization has been approved through 07/23/2021. Patient's insurance requires the patient to fill through [facility]. Please send prescription to [facility], which has been added to the patients EPIC profile. Patient has been notified and provided with the necessary info to call and schedule a delivery.    To complete this in EPIC, the original order MUST be discontinued and re-typed as a new prescription with the updated pharmacy listed. Clicking reorder will continue to route the rx to [facility] even if the pharmacy is changed. Please opt the patient out of [facility] when the BPA is fired.

## 2020-07-23 NOTE — TELEPHONE ENCOUNTER
"----- Message from Corinegudelia Leung sent at 7/23/2020 12:30 PM CDT -----  Regarding: Transfer COSENTYX to [facility]  FYI:  COSENTYX prior authorization has been approved through 07/23/2021. Patient's insurance requires the patient to fill through [facility]. Please send prescription to [facility], which has been added to the patients EPIC profile. Patient has been notified and provided with the necessary info to call and schedule a delivery.    To complete this in EPIC, the original order MUST be discontinued and re-typed as a new prescription with the updated pharmacy listed. Clicking "reorder" will continue to route the rx to [facility] even if the pharmacy is changed. Please opt the patient out of [facility] when the BPA is fired.      Thank you       Corine Leung [facility]  Patient Advocate   [facility]  Direct ext: [phone]  Fax: [phone]  [email or web address]      "

## 2020-08-06 ENCOUNTER — OFFICE VISIT (OUTPATIENT)
Dept: DERMATOLOGY | Facility: CLINIC | Age: 45
End: 2020-08-06
Payer: COMMERCIAL

## 2020-08-06 DIAGNOSIS — L02.92 FURUNCULOSIS: Primary | ICD-10-CM

## 2020-08-06 DIAGNOSIS — L40.9 PSORIASIS: ICD-10-CM

## 2020-08-06 PROCEDURE — 99214 PR OFFICE/OUTPT VISIT, EST, LEVL IV, 30-39 MIN: ICD-10-PCS | Mod: S$GLB,,, | Performed by: DERMATOLOGY

## 2020-08-06 PROCEDURE — 99999 PR PBB SHADOW E&M-EST. PATIENT-LVL III: CPT | Mod: PBBFAC,,, | Performed by: DERMATOLOGY

## 2020-08-06 PROCEDURE — 99214 OFFICE O/P EST MOD 30 MIN: CPT | Mod: S$GLB,,, | Performed by: DERMATOLOGY

## 2020-08-06 PROCEDURE — 87070 CULTURE OTHR SPECIMN AEROBIC: CPT

## 2020-08-06 PROCEDURE — 99999 PR PBB SHADOW E&M-EST. PATIENT-LVL III: ICD-10-PCS | Mod: PBBFAC,,, | Performed by: DERMATOLOGY

## 2020-08-06 RX ORDER — SULFAMETHOXAZOLE AND TRIMETHOPRIM 800; 160 MG/1; MG/1
1 TABLET ORAL 2 TIMES DAILY
Qty: 28 TABLET | Refills: 0 | Status: SHIPPED | OUTPATIENT
Start: 2020-08-06 | End: 2020-08-20

## 2020-08-06 NOTE — PROGRESS NOTES
Subjective:       Patient ID:  Keli Gilbert is a 44 y.o. female who presents for   Chief Complaint   Patient presents with    Psoriasis     neck    Cyst     boils stomach      Pt with long h/o PsO and PsA - managed by dr. ozuna with cosentyx - fairly stable        Cyst - Initial  Affected locations: neck and abdomen (neck x 2 months, abd x 1 week)  Signs / symptoms: tender  Timing: has had intermittent flares of same x years.  Aggravated by: heat  Relieving factors/Treatments tried: nothing (uses hibiclens wash)        Review of Systems   Constitutional: Negative for fever, weight loss, fatigue and malaise.   HENT: Negative for sore throat and postnasal drip.    Eyes: Negative for visual change.   Respiratory: Negative for cough and shortness of breath.    Cardiovascular: Negative for palpitations.   Gastrointestinal: Negative for nausea, vomiting and diarrhea.   Musculoskeletal: Positive for joint swelling (fingers - chronic  mild) and arthralgias (hands - seemack ozuna - had xray showing degenerative change to left 4th finger).   Skin: Positive for abscesses. Negative for itching and rash.   Neurological: Negative for focal weakness, seizures and numbness.   Psychiatric/Behavioral: Positive for high stress.   Hematologic/Lymphatic: Does not bruise/bleed easily.   Allergic/Immunologic: Positive for environmental allergies (takes zyrtec d and nasal spray).        Objective:    Physical Exam   Constitutional: She appears well-developed and well-nourished. She is obese.  No distress.   Neurological: She is alert and oriented to person, place, and time. She is not disoriented.   Psychiatric: She has a normal mood and affect.   Skin:   Areas Examined (abnormalities noted in diagram):   Neck Inspection Performed  Abdomen Inspection Performed                   Diagram Legend     Erythematous scaling macule/papule c/w actinic keratosis       Vascular papule c/w angioma      Pigmented verrucoid  papule/plaque c/w seborrheic keratosis      Yellow umbilicated papule c/w sebaceous hyperplasia      Irregularly shaped tan macule c/w lentigo     1-2 mm smooth white papules consistent with Milia      Movable subcutaneous cyst with punctum c/w epidermal inclusion cyst      Subcutaneous movable cyst c/w pilar cyst      Firm pink to brown papule c/w dermatofibroma      Pedunculated fleshy papule(s) c/w skin tag(s)      Evenly pigmented macule c/w junctional nevus     Mildly variegated pigmented, slightly irregular-bordered macule c/w mildly atypical nevus      Flesh colored to evenly pigmented papule c/w intradermal nevus       Pink pearly papule/plaque c/w basal cell carcinoma      Erythematous hyperkeratotic cursted plaque c/w SCC      Surgical scar with no sign of skin cancer recurrence      Open and closed comedones      Inflammatory papules and pustules      Verrucoid papule consistent consistent with wart     Erythematous eczematous patches and plaques     Dystrophic onycholytic nail with subungual debris c/w onychomycosis     Umbilicated papule    Erythematous-base heme-crusted tan verrucoid plaque consistent with inflamed seborrheic keratosis     Erythematous Silvery Scaling Plaque c/w Psoriasis     See annotation      Assessment / Plan:        Furunculosis - abdomen and neck  -     Aerobic culture  -     sulfamethoxazole-trimethoprim 800-160mg (BACTRIM DS) 800-160 mg Tab; Take 1 tablet by mouth 2 (two) times daily. for 14 days  Dispense: 28 tablet; Refill: 0  Lesion incised with #11 blade and drained on today's date. Bacterial culture performed.  Instructions for removal provided to patient.   Patient instructed to perform warm compresses 2 - 3 times/daily.    Psoriasis  Cont cosentyx per scopelitis             Follow up if symptoms worsen or fail to improve.

## 2020-08-08 LAB — BACTERIA SPEC AEROBE CULT: NORMAL

## 2020-09-25 ENCOUNTER — PATIENT MESSAGE (OUTPATIENT)
Dept: RHEUMATOLOGY | Facility: CLINIC | Age: 45
End: 2020-09-25

## 2020-09-25 DIAGNOSIS — M15.9 GENERALIZED OSTEOARTHRITIS OF MULTIPLE SITES: ICD-10-CM

## 2020-09-25 DIAGNOSIS — L40.50 PSORIATIC ARTHRITIS: Primary | ICD-10-CM

## 2020-09-25 RX ORDER — CELECOXIB 200 MG/1
200 CAPSULE ORAL 2 TIMES DAILY
Qty: 60 CAPSULE | Refills: 1 | Status: SHIPPED | OUTPATIENT
Start: 2020-09-25 | End: 2020-10-06

## 2020-09-27 ENCOUNTER — PATIENT MESSAGE (OUTPATIENT)
Dept: RHEUMATOLOGY | Facility: CLINIC | Age: 45
End: 2020-09-27

## 2020-09-29 ENCOUNTER — PATIENT OUTREACH (OUTPATIENT)
Dept: ADMINISTRATIVE | Facility: OTHER | Age: 45
End: 2020-09-29

## 2020-09-30 ENCOUNTER — PATIENT MESSAGE (OUTPATIENT)
Dept: RHEUMATOLOGY | Facility: CLINIC | Age: 45
End: 2020-09-30

## 2020-10-01 ENCOUNTER — PATIENT MESSAGE (OUTPATIENT)
Dept: RHEUMATOLOGY | Facility: CLINIC | Age: 45
End: 2020-10-01

## 2020-10-05 NOTE — PROGRESS NOTES
Subjective:       Patient ID: Keli Gilbert is a 44 y.o. female with psoriatic arthritis on Cosentyx & Leflunomide (& naproxen); demyelinating disorder secondary to Enbrel; incomplete effect on chronic pain syndrome on savella & methadone     Chief Complaint: Disease management      Ms. Gilbert is a 43 yo woman with history of PsA last seen last by TM on 3/20/20.    She is on Cosentyx 300 mg/4weeks, leflunomide 20 mg/d and also on celebrex 200 mg bid.    In the recent past she was on xeljanz (& lefllunomide 20 & naproxen) switched from Taltz for hand pain & swelling to which she had a temporary improvement and then regressed. Xeljanz did not help. We switched her back to Cosentyx which she had taken in the past with incomplete results.    She has also been on abatacept w inadequate response. She cannot take TNFis due to demyelinating syndrome.    She is stable/unchanged on Cosentyx.  She prefers it to Taltz,or Xeljanz but she continue with swelling of her R 3rd PIP w some pain and pain at the base of her L thumb. (suspicious lesion for erosion on imaging here). She is still having AM stiffness x ~1 hour. Other joints are doing well without swelling and without much pain (she still has her chronic muscle/jt pains which she recognizes as different).    We had switched her from naproxen to celebrex, to see if it would have an effect on her edema.  It did not. Neither did switching to sulindac. Also taking gabapentin. She is still taking nightly furosemide.              Associated symptoms include fatigue. Pertinent negatives include no dysuria, fever, trouble swallowing or headaches.          Current Outpatient Medications   Medication Sig Dispense Refill    azelastine (ASTELIN) 137 mcg (0.1 %) nasal spray 1 spray (137 mcg total) by Nasal route 2 (two) times daily. 30 mL 0    buPROPion (WELLBUTRIN) 100 MG tablet TAKE 1 TABLET(100 MG) BY MOUTH TWICE DAILY 180 tablet 0    celecoxib (CELEBREX) 200 MG capsule  Take 1 capsule (200 mg total) by mouth 2 (two) times daily. 60 capsule 1    cholecalciferol, vitamin D3, 1,250 mcg (50,000 unit) capsule Take 1 capsule (50,000 Units total) by mouth every 14 (fourteen) days. (Patient not taking: Reported on 9/30/2020) 18 capsule 1    cholecalciferol, vitamin D3, 2,000 unit Tab Take 1 tablet by mouth Once a week.      clonazePAM (KLONOPIN) 0.25 MG TbDL Take 1 tablet (0.25 mg total) by mouth once daily. For acute anxiety only 10 tablet 0    furosemide (LASIX) 40 MG tablet TAKE 1 TABLET BY MOUTH DAILY 90 tablet 1    gabapentin (NEURONTIN) 300 MG capsule Take 300 mg by mouth 3 (three) times daily.      leflunomide (ARAVA) 20 MG Tab Take 1 tablet (20 mg total) by mouth once daily. 90 tablet 3    liothyronine (CYTOMEL) 5 MCG Tab TAKE 2 TABLETS(10 MCG) BY MOUTH EVERY  tablet 0    losartan (COZAAR) 50 MG tablet TAKE 1 TABLET BY MOUTH EVERY DAY 90 tablet 3    methadone (DOLOPHINE) 10 MG tablet Take 1 tablet by mouth Three times a day.      pantoprazole (PROTONIX) 20 MG tablet Take 1 tablet (20 mg total) by mouth once daily. 30 tablet 2    potassium chloride (MICRO-K) 10 MEQ CpSR TAKE 2 CAPSULES(20 MEQ) BY MOUTH EVERY  capsule 0    promethazine (PHENERGAN) 25 MG tablet TK 1 T PO BID PRN N      secukinumab (COSENTYX PEN) 150 mg/mL PnIj Inject 300 mg into the skin every 28 days. 6 Syringe 2    sertraline (ZOLOFT) 50 MG tablet Take 1 tablet (50 mg total) by mouth once daily. 90 tablet 1    SYNTHROID 125 mcg tablet TAKE 1 TABLET(125 MCG) BY MOUTH BEFORE BREAKFAST 90 tablet 3     No current facility-administered medications for this visit.    On Gabapentin 300 mg tid  Not on inhaler  Both vitamin Ds  Not on augmentin      Review of patient's allergies indicates:   Allergen Reactions    Doxycycline hcl Nausea And Vomiting    Enbrel [etanercept] Other (See Comments)     Optic neurtits     Past Medical History:   Diagnosis Date    Abnormal Pap smear of vagina     AR  (allergic rhinitis)     Demyelinating disorder     Depression     Fever blister     Fibromyalgia     followed by pain management    Generalized osteoarthritis of multiple sites     History of abnormal Pap smear     Hypertension     Hypothyroidism     Insulin resistance     Mixed anxiety and depressive disorder     Morbid obesity     Myelitis, optic neuritis in     treated with high-dose steroids and resolved; positive MRI and spinal fluid for a mass, but on embrel for psoriatic arthritis - she will see a MS specialist at [facility]; MRI normalized off embrel    Obesity     Pre-diabetes     hx diabetes on stereoids /    Psoriasis     Psoriatic arthritis     Vitamin D deficiency disease      Past Surgical History:   Procedure Laterality Date    SINUS SURGERY  1998       Review of Systems   Constitutional: Positive for fatigue. Negative for fever and unexpected weight change.   HENT: Negative.  Negative for dental problem, mouth sores and trouble swallowing.         Dry mouth only w antihistamines   Eyes: Negative.  Negative for redness, itching and visual disturbance.        Dry eyes only with antihistamines   Respiratory: Negative.  Negative for cough and shortness of breath.    Cardiovascular: Negative.  Negative for chest pain, palpitations and leg swelling.   Gastrointestinal: Negative.  Negative for abdominal pain, anal bleeding, blood in stool, constipation, diarrhea and nausea.        Heartburn.   Endocrine: Negative.    Genitourinary: Negative.  Negative for dysuria, frequency, genital sores, menstrual problem, pelvic pain and urgency.   Musculoskeletal: Negative.  Negative for arthralgias, back pain, gait problem and neck pain.   Skin: Negative.  Negative for color change and rash.   Allergic/Immunologic: Positive for immunocompromised state.   Neurological: Negative.  Negative for dizziness, seizures, weakness, numbness and headaches.   Hematological: Negative.  Negative for adenopathy. Does not  bruise/bleed easily.   Psychiatric/Behavioral: Positive for dysphoric mood (stable) and sleep disturbance (stable). Negative for decreased concentration, self-injury and suicidal ideas.         Family History   Problem Relation Age of Onset    Psoriasis Father     Cancer Father         throat    Thyroid disease Mother     Heart disease Mother     Hypertension Mother     Hyperlipidemia Mother     Coronary artery disease Mother         s/p CABG    Heart attack Mother     Heart disease Sister         MVP    Diabetes Paternal Uncle     Diabetes Maternal Grandfather     Heart disease Maternal Grandfather     Thyroid disease Maternal Grandfather     ADD / ADHD Son     Melanoma Neg Hx     Lupus Neg Hx     Eczema Neg Hx     Acne Neg Hx     Breast cancer Neg Hx     Colon cancer Neg Hx     Ovarian cancer Neg Hx      Mom had a CVA & is home but not really doing too well. She has cognitive issues.     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   Son is living with her.   No alcohol; no cigarettes;   Objective:   There were no vitals taken for this visit.   Swelling in R 3rd PIP looks unchanged.    Exam is from 11/21/19  Was 236 lb 12.4 oz on 6/8/19  Was 224 lb 13.9 oz on 2/19/19.    Physical Exam   Vitals reviewed.  Constitutional: She is oriented to person, place, and time and well-developed, well-nourished, and in no distress. No distress.   HENT:   Head: Normocephalic and atraumatic.   Mouth/Throat: Oropharynx is clear and moist. No oropharyngeal exudate.   No facial rashes  Parotids not enlarged  No oral ulcers  Face oliva   Eyes: Conjunctivae and EOM are normal. Pupils are equal, round, and reactive to light. Right eye exhibits no discharge. Left eye exhibits no discharge. No scleral icterus.   Neck: Neck supple. No JVD present. No tracheal deviation present. No thyromegaly present.   Cardiovascular: Normal rate, regular rhythm, normal heart sounds and intact distal pulses.  Exam  reveals no gallop and no friction rub.    No murmur heard.  Pulmonary/Chest: Effort normal and breath sounds normal. No respiratory distress. She has no wheezes. She has no rales. She exhibits no tenderness.   Abdominal: Soft. Bowel sounds are normal. She exhibits no distension and no mass. There is no splenomegaly or hepatomegaly. There is no abdominal tenderness. There is no rebound and no guarding.   Lymphadenopathy:     She has no cervical adenopathy.   Neurological: She is alert and oriented to person, place, and time. She has normal reflexes. No cranial nerve deficit. Gait normal.   Proximal and distal muscle strength 5/5.   Skin: Skin is warm and dry. She is not diaphoretic.     No psoriasis.   Psychiatric: Mood, memory, affect and judgment normal.   Drowsy.   Musculoskeletal: Normal range of motion. Edema (tr-1+ pitting edema) present. No tenderness.      Comments: Cspine slight decrease in lateral flexion & rotation; no tenderness  Tspine FROM no tenderness  Lspine FROM no tenderness.  TMJ: unremarkable  Shoulders: FROM; no synovitis  Elbows: FROM; no synovitis; no tophi or nodules  Wrists: no SHT; no tenderness; good ROM  MCPs: L 1st C-MCP tender with mild SHT;1st MCP mild SHT;  no metatarsalgia;  PIPs: minimal dactylitis 2nd ray R; R 3rd PIP SHT & mildly erythematous; mildly tender; cannot fully extend this joint. Seems a bit improved from last visit however.   DIPs: FROM; no synovitis  HIPS: FROM  Knees: FROM; no synovitis; no instability;  Ankles: FROM: no synovitis   Toes: ok; no metatarsalgia                               Labs:   7/22/20: ESR 34 (36); CRP 16.1; CBC Hg 10.8; Ht 36.8; CMP ok;   12/7/19: ESR 17; CRP CBC Hg 11.8; CMP ok; TFTs ok;   9/14/19: ESR 24 (36); CRP 9.8; CBC Hg 11.5; CP ok;   2/19/19: ESR 13; CRP 5.8; CBC plts 362; CMP ok; HCV/HBV/QG neg;  11/17/18: ESR 8; CRP 3.9; Hg 11.9; low indices; CMP ok;   8/22/18: ESR 10; CRP 10.9; CBC Hg 10.7; Ht 35.7; CMP ok  5/14/18: ESR 13; CRP 13.4;   Hg 11.3; low indices; eos 12.4%; CMP ok;   2/10/18: ESR 30; CRP 10.5; Hg 11.3; plt 366; CMP ok;   10/19/17: HBV neg; HCV neg;   9/2/17: ESR 16; CRp 5.9; Hg 11.6; Ht 36.6; CMP ok;   5/29/17: QG neg  2/20/17: ESR 34; CRP 8.1; CBC Hg 11.4; Ht 35.6; CMP ok;   11/19/16: ESR 20; CRP 4.8; CBC Hg 11.9; CMP K+3.3, otherwise ok;   8/27/16: ESR 22; CRP 7; Hg 11.8; Ht 36.9; CMP ok;   5/26/16: ESR 35; CRP 11.2Hg 11.8; Ht 36.9; CMP ok;   5/21/16: Vit d 37  10/8/15: ESR 45; CRP 14.2; Hg 11.2; Ht 35.8; CMP; ok  7/7/15: CBC plt 380; K+ 3.4; Glu 154  7/6/15: ESR 48; CRP 9.6  4/4/15: ESR 41; CRP 19; Hg 11.0; Ht 34.6; plt 377; CMP ok;  1/10/15: ESR 34; CRP 18; CBC ok; ; Alb. 3.6  10/4/14: ESR 43; CRP 18.5; Hg 11.8; Ht 36.5; plt 378; Alb. 3.4;   3/20/14: ESR 41; CRP 14.3; plt 356; CMP ok;  2/15/13: ESR 30; CRP 9.8; CBC ok; Alb. 3.3  11/2/13: ESR 28; CRP 11.6; plt 363; Alb. 3.3;   5/28/13: ESR 45; CRP 8.8; plt 366; CMP ok;     12/1/18: Bilateral hands: personally viewed: Mild degenerative changes noted at the interphalangeal joints.  Mild-to-moderate degenerative change noted at the bilateral 1st CMC joints.  Mild degenerative changes also present at both radiocarpal joints, triscaphe joints and right distal radioulnar joint.  Minimal degenerative changes also present at the MCP joints bilaterally and greatest at the 1st MCP joint on the left.  6/15/17: Bilateral hands: personally reviewed: mild osteoarthritis w stable periarticular erosion at the left fourth MCP joint; no change since last year.   8/27/16: Bilateral feet: personally reviewed. Mild HV; mild OA shell 1st MTP dali; ? Erosion PIP left small toe;   5/26/16: Bilateral hands: personally reviewed: L 4th MCP (new) erosive lesion; R & L 2nd & 3rd metacarpal head cysts; R mild PIP erosion & STS 2nd.    Assessment:     Psoriatic arthritis-- with mild SHT 2 MCPs bilaterally (R >>L) & with sausage digit of R index finger--currently w SHT & flexion deformity of R 3rd PIP.    a)  History of sausage digits of both toes      PIPs, DIPs, wrists, and knees., now w. only one sausage digit.   b) Psoriasis of the abdomen, elbow, and the shins--resolved.    c) Enbrel stopped 10/21/11 due to optic neuritis.     d) New erosion (4th L metacarpal head) on x-ray & possibly L 5th toe PIP     e) Persisting elevation of ESR and CRP    f) inadequate response to leflunomide and Stelara & apremilast (w. Intolerable nausea)   G) could not tolerate SSZ--nausea.   H) MTX x 2 even SC did not help sxs.   I) improved on Cosentyx (+leflunomide + naproxen) initially incompletely-- hand joints still hurt despite normalization of  ESR & CRP   J) Orencia begun 11/17 with incomplete response   K)Taltz begun 4/20/18-2/19   L) Tofacitinib 3/19 - 6/18/19    Chronic SHT 3rd R PIP & base of L thumb with questionable erosive lesion.   M) Back to Cosentyx + leflunomide + celebrex     Weight gain & edema   Gabapentin?    No effect on switching to celecoxib (sulindac not as effective and no effect on edema)    Chronic pain/fibromyalgia syndrome with fatigue, tender points, withdrawals to palpation in the past, pain all over, responsive to Savella but with some nausea (off it now), followed by Dr. Odom at the [facility] [place],    On methadone    Left optic neuritis with demyelinating lesions secondary to Enbrel--now resolved.    Repeat MRI ok    Diabetes mellitus, diet controlled     Degenerative joint disease of the cervical spine, knees, and feet--very mild.     Depression on wellbutrin    Off savella    Hypertension.     Hypothyroidism.     Vitamin D deficiency with regular prescription vitamin D supplementation.     Obesity         Plan:   Stay on Cosentyx 300 mg/4 wks  Say on leflunomide 20 mg/d  Celebrex 200 mg bid   Can supplement with acetaminophen up to 3,000 mg/24 hrs.  Consider Tremfya 100 mg wk 0, 4, & q 8 weeks.  We will discuss at next visit.   Labs ~ 10/22/20 & q 3 months.    RTC 3- 4 months.

## 2020-10-06 ENCOUNTER — OFFICE VISIT (OUTPATIENT)
Dept: RHEUMATOLOGY | Facility: CLINIC | Age: 45
End: 2020-10-06
Payer: COMMERCIAL

## 2020-10-06 ENCOUNTER — PATIENT MESSAGE (OUTPATIENT)
Dept: RHEUMATOLOGY | Facility: CLINIC | Age: 45
End: 2020-10-06

## 2020-10-06 DIAGNOSIS — M15.9 GENERALIZED OSTEOARTHRITIS OF MULTIPLE SITES: ICD-10-CM

## 2020-10-06 DIAGNOSIS — Z79.899 LONG-TERM USE OF HIGH-RISK MEDICATION: Primary | ICD-10-CM

## 2020-10-06 DIAGNOSIS — L40.50 PSORIATIC ARTHRITIS: ICD-10-CM

## 2020-10-06 DIAGNOSIS — Z79.899 LONG TERM CURRENT USE OF IMMUNOSUPPRESSIVE DRUG: ICD-10-CM

## 2020-10-06 PROCEDURE — 99214 PR OFFICE/OUTPT VISIT, EST, LEVL IV, 30-39 MIN: ICD-10-PCS | Mod: 95,,, | Performed by: INTERNAL MEDICINE

## 2020-10-06 PROCEDURE — 99214 OFFICE O/P EST MOD 30 MIN: CPT | Mod: 95,,, | Performed by: INTERNAL MEDICINE

## 2020-10-06 RX ORDER — CELECOXIB 200 MG/1
200 CAPSULE ORAL 2 TIMES DAILY
Qty: 180 CAPSULE | Refills: 1 | Status: SHIPPED | OUTPATIENT
Start: 2020-10-06 | End: 2021-01-19 | Stop reason: ALTCHOICE

## 2020-10-06 NOTE — PATIENT INSTRUCTIONS
Look up Tremfya (guselkumab).  This is a new drug approved for Psoriatic arthritis.  We can discuss switching from Cosentyx to it at your next visit.

## 2020-10-07 ENCOUNTER — TELEPHONE (OUTPATIENT)
Dept: FAMILY MEDICINE | Facility: CLINIC | Age: 45
End: 2020-10-07

## 2020-10-07 DIAGNOSIS — I10 BENIGN ESSENTIAL HTN: ICD-10-CM

## 2020-10-07 RX ORDER — CHLORTHALIDONE 25 MG/1
25 TABLET ORAL DAILY
Qty: 30 TABLET | Refills: 0 | Status: SHIPPED | OUTPATIENT
Start: 2020-10-07 | End: 2021-03-10

## 2020-10-07 NOTE — TELEPHONE ENCOUNTER
Pt is requesting a refill of Chlorthalidone 25 MG. It is not on current medication list. I am unable to pend.

## 2020-10-09 DIAGNOSIS — F34.1 DYSTHYMIA: ICD-10-CM

## 2020-10-09 RX ORDER — BUPROPION HYDROCHLORIDE 100 MG/1
100 TABLET ORAL 2 TIMES DAILY
Qty: 180 TABLET | Refills: 0 | Status: SHIPPED | OUTPATIENT
Start: 2020-10-09 | End: 2021-01-04 | Stop reason: SDUPTHER

## 2020-11-03 ENCOUNTER — PATIENT MESSAGE (OUTPATIENT)
Dept: FAMILY MEDICINE | Facility: CLINIC | Age: 45
End: 2020-11-03

## 2020-11-03 DIAGNOSIS — E03.9 HYPOTHYROIDISM, UNSPECIFIED TYPE: ICD-10-CM

## 2020-11-03 DIAGNOSIS — R60.0 PEDAL EDEMA: ICD-10-CM

## 2020-11-04 RX ORDER — LIOTHYRONINE SODIUM 5 UG/1
10 TABLET ORAL DAILY
Qty: 180 TABLET | Refills: 0 | Status: SHIPPED | OUTPATIENT
Start: 2020-11-04 | End: 2021-01-26 | Stop reason: SDUPTHER

## 2020-11-04 RX ORDER — FUROSEMIDE 40 MG/1
40 TABLET ORAL DAILY
Qty: 90 TABLET | Refills: 1 | Status: SHIPPED | OUTPATIENT
Start: 2020-11-04 | End: 2021-04-20

## 2020-11-29 NOTE — TELEPHONE ENCOUNTER
Consults    History Of Present Illness  Zainab Burgos is a 79year old male with a past medical history significant for osteoarthritis, chronic atrial fibrillation, hypertension, cholecystectomy, peptic ulcer disease, influenza A infection February 2020. The patient was admitted to the hospital with increased weakness, cough and shortness of breath for approximately 2-3 days. The patient had documented low-grade temperatures overnight, his chest x-ray revealed a left lower lobe infiltrate, he is currently not requiring oxygen supplementation and is saturating in the mid 90s. Past Medical History  Past Medical History:   Diagnosis Date   â¢ Arthritis    â¢ Atrial fibrillation (CMS/HCC)    â¢ Essential (primary) hypertension         Surgical History  Past Surgical History:   Procedure Laterality Date   â¢ Abdomen surgery      ulcer sx   â¢ Cholecystectomy     â¢ Eye surgery     â¢ Removal gallbladder     â¢ Skin biopsy          Social History  Social History     Tobacco Use   â¢ Smoking status: Former Smoker     Packs/day: 0.00   â¢ Smokeless tobacco: Never Used   Substance Use Topics   â¢ Alcohol use: Not Currently     Frequency: Never   â¢ Drug use: Never       Family History  History reviewed. No pertinent family history. Allergies  ALLERGIES:  Penicillins    Medications  Medications Prior to Admission   Medication Sig Dispense Refill   â¢ albuterol (PROAIR HFA) 108 (90 Base) MCG/ACT inhaler Inhale 2 puffs into the lungs every 4 hours as needed for Shortness of Breath or Wheezing. 1 Inhaler 12   â¢ omeprazole (PRILOSEC) 40 MG capsule Take 40 mg by mouth daily. â¢ terazosin (HYTRIN) 5 MG capsule Take 5 mg by mouth nightly. â¢ lisinopril (ZESTRIL) 10 MG tablet Take 10 mg by mouth daily. â¢ metoPROLOL succinate (TOPROL-XL) 25 MG 24 hr tablet Take 12.5 mg by mouth daily. â¢ terazosin (HYTRIN) 2 MG capsule Take 2 mg by mouth daily. â¢ Lactobacillus (PROBIOTIC ACIDOPHILUS) Cap Take 2 capsules by mouth every evening. Losartan sent to her pharmacy    Thanks,  Dr. Moran      â¢ Calcium-Vitamin D 600-200 MG-UNIT Tab Take 2 tablets by mouth daily. â¢ Omega-3 Fatty Acids (FISH OIL) 1200 MG CAPSULE DELAYED RELEASE Take 1,200 mg by mouth daily. â¢ Multiple Vitamins-Minerals (ICAPS AREDS 2 PO) Take 1 tablet by mouth daily. â¢ Glucosamine-Chondroit-Vit C-Mn (GLUCOSAMINE CHONDR 500 COMPLEX) tablet Take 1 tablet by mouth 2 times daily. Review of Systems  Review of Systems   Constitutional: Positive for fatigue. Negative for appetite change, chills, diaphoresis and fever. HENT: Negative for congestion, ear pain, facial swelling, hearing loss and mouth sores. Eyes: Negative for pain, discharge, redness and itching. Respiratory: Positive for cough and shortness of breath. Negative for apnea, chest tightness and wheezing. Cardiovascular: Negative for chest pain, palpitations and leg swelling. Gastrointestinal: Negative for abdominal distention, abdominal pain, diarrhea, nausea and vomiting. Genitourinary: Negative for difficulty urinating, frequency and hematuria. Musculoskeletal: Negative for arthralgias, back pain, joint swelling, neck pain and neck stiffness. Skin: Negative for color change, pallor, rash and wound. Neurological: Positive for weakness. Negative for dizziness, seizures, light-headedness, numbness and headaches. Psychiatric/Behavioral: Negative for agitation, confusion and hallucinations. Physical Exam  Physical Exam   Constitutional: He is oriented to person, place, and time. He appears well-developed and well-nourished. No distress. HENT:   Head: Normocephalic and atraumatic. Nose: Nose normal.   Eyes: Pupils are equal, round, and reactive to light. Conjunctivae are normal. No scleral icterus. Neck: Normal range of motion. Neck supple. No JVD present. No thyromegaly present. Cardiovascular: Normal rate, regular rhythm, normal heart sounds and intact distal pulses. Exam reveals no friction rub.    No murmur "heard. Pulmonary/Chest: Effort normal. No respiratory distress. He has no wheezes. He has rales. Abdominal: Soft. Bowel sounds are normal. He exhibits no distension. There is no abdominal tenderness. There is no rebound. Musculoskeletal:         General: No tenderness, deformity or edema. Lymphadenopathy:     He has no cervical adenopathy. Neurological: He is alert and oriented to person, place, and time. Skin: Skin is warm and dry. No rash noted. He is not diaphoretic. No erythema. No pallor. Psychiatric: He has a normal mood and affect. His behavior is normal. Judgment and thought content normal.   Nursing note and vitals reviewed. Last Recorded Vitals  Blood pressure (!) 141/65, pulse 72, temperature 99.3 Â°F (37.4 Â°C), temperature source Oral, resp. rate 19, height 6' 4"" (1.93 m), weight 115.1 kg (253 lb 12 oz), SpO2 94 %.         LABS  Admission on 11/28/2020   Component Date Value Ref Range Status   â¢ WBC 11/28/2020 7.1  4.2 - 11.0 K/mcL Final   â¢ RBC 11/28/2020 3.75* 4.50 - 5.90 mil/mcL Final   â¢ HGB 11/28/2020 11.7* 13.0 - 17.0 g/dL Final   â¢ HCT 11/28/2020 36.5* 39.0 - 51.0 % Final   â¢ MCV 11/28/2020 97.3  78.0 - 100.0 fl Final   â¢ MCH 11/28/2020 31.2  26.0 - 34.0 pg Final   â¢ MCHC 11/28/2020 32.1  32.0 - 36.5 g/dL Final   â¢ RDW-CV 11/28/2020 13.5  11.0 - 15.0 % Final   â¢ PLT 11/28/2020 143  140 - 450 K/mcL Final   â¢ NRBC 11/28/2020 0  0 /100 WBC Final   â¢ DIFF TYPE 11/28/2020 AUTOMATED DIFFERENTIAL   Final   â¢ Neutrophil 11/28/2020 61  % Final   â¢ LYMPH 11/28/2020 30  % Final   â¢ MONO 11/28/2020 9  % Final   â¢ EOSIN 11/28/2020 0  % Final   â¢ BASO 11/28/2020 0  % Final   â¢ Percent Immature Granuloctyes 11/28/2020 0  % Final   â¢ Absolute Neutrophil 11/28/2020 4.3  1.8 - 7.7 K/mcL Final   â¢ Absolute Lymph 11/28/2020 2.1  1.0 - 4.0 K/mcL Final   â¢ Absolute Mono 11/28/2020 0.7  0.3 - 0.9 K/mcL Final   â¢ Absolute Eos 11/28/2020 0.0* 0.1 - 0.5 K/mcL Final   â¢ Absolute Baso 11/28/2020 0.0  0.0 - " 0.3 K/mcL Final   â¢ Absolute Immature Granulocytes 11/28/2020 0.0  0 - 0.2 K/mcl Final   â¢ Sodium 11/28/2020 143  135 - 145 mmol/L Final   â¢ Potassium 11/28/2020 4.8  3.4 - 5.1 mmol/L Final    Slight to moderate hemolysis, result may be falsely increased. â¢ Chloride 11/28/2020 113* 98 - 107 mmol/L Final   â¢ Carbon Dioxide 11/28/2020 24  21 - 32 mmol/L Final   â¢ Anion Gap 11/28/2020 11  10 - 20 mmol/L Final   â¢ Glucose 11/28/2020 107* 65 - 99 mg/dL Final   â¢ BUN 11/28/2020 42* 6 - 20 mg/dL Final   â¢ Creatinine 11/28/2020 1.97* 0.67 - 1.17 mg/dL Final   â¢ GFR Estimate,  11/28/2020 34   Final    eGFR 30-59 mL/min/1.73m2 = Moderate decrease in kidney function. Stage 3 CKD (chronic kidney disease) or moderate kidney disease. â¢ GFR Estimate, Non  11/28/2020 29   Final    eGFR 15 - 29 mL/min/1.73m2 = Severe decrease in kidney function. Stage 4 CKD (chronic kidney disease), or severe kidney disease. â¢ BUN/Creatinine Ratio 11/28/2020 21  7 - 25 Final   â¢ CALCIUM 11/28/2020 8.3* 8.4 - 10.2 mg/dL Final   â¢ TOTAL BILIRUBIN 11/28/2020 1.2* 0.2 - 1.0 mg/dL Final   â¢ AST/SGOT 11/28/2020 50* <38 Units/L Final    Slight to moderate hemolysis, result may be falsely increased.    â¢ ALT/SGPT 11/28/2020 23  <64 Units/L Final   â¢ ALK PHOSPHATASE 11/28/2020 62  45 - 117 Units/L Final   â¢ TOTAL PROTEIN 11/28/2020 7.3  6.4 - 8.2 g/dL Final   â¢ Albumin 11/28/2020 3.4* 3.6 - 5.1 g/dL Final   â¢ GLOBULIN 11/28/2020 3.9  2.0 - 4.0 g/dL Final   â¢ A/G Ratio, Serum 11/28/2020 0.9* 1.0 - 2.4 Final   â¢ Ventricular Rate EKG/Min (BPM) 11/28/2020 71   Preliminary   â¢ Atrial Rate (BPM) 11/28/2020 68   Preliminary   â¢ WI-Interval (MSEC) 11/28/2020 200   Preliminary   â¢ QRS-Interval (MSEC) 11/28/2020 66   Preliminary   â¢ QT-Interval (MSEC) 11/28/2020 376   Preliminary   â¢ QTc 11/28/2020 409   Preliminary   â¢ R Axis (Degrees) 11/28/2020 1   Preliminary   â¢ T Axis (Degrees) 11/28/2020 -5   Preliminary   â¢ REPORT TEXT 11/28/2020    Preliminary                    Value:Accelerated  Junctional rhythm  with frequent  premature ventricular complexes  Inferior infarct  , age undetermined  Cannot rule out  Anterior infarct  , age undetermined  Abnormal ECG  When compared with ECG of  26-FEB-2020 11:39,  Vent. rate  has decreased  BY  47 BPM  Inverted T waves have replaced nonspecific T wave abnormality in  Inferior leads     â¢ TROPONIN I 11/28/2020 0.04  <0.05 ng/mL Final   â¢ Lactic Acid Venous 11/28/2020 1.2  0 - 2.0 mmol/L Final   â¢ PROTIME 11/28/2020 11.7  9.7 - 11.8 sec Final   â¢ INR 11/28/2020 1.1   Final    INR Therapeutic Range: 2.0 to 3.0 (2.5 to 3.5 recommended for recurrent thrombotic episodes and mechanical prosthetic heart valves.)    â¢ Specimen Description 11/28/2020 BLOOD, PERIPHERAL HANL   Final   â¢ CULTURE 11/28/2020 NO GROWTH <24 HRS. Final   â¢ REPORT STATUS 11/28/2020 PENDING   Incomplete   â¢ NT proBNP 11/29/2020 2,094* <451 pg/mL Final   â¢ Ferritin 11/29/2020 383  26 - 388 ng/mL Final   â¢ PROCALCITONIN 11/29/2020 <0.05  <0.10 ng/mL Final    Comment:    Bacterial Sepsis:  < 0.5 ng/mL Sepsis not likely. Localized bacterial infection possible  = 0.5 - 2 ng/mL Sepsis or other conditions possible  > 2 ng/mL High risk of sepsis/septic shock     NOTE: If bacterial sepsis is of high clinical suspicion but PCT<2 ng/mL, consider recheck PCT 6-24 hours after initial test.     Lower Respiratory Tract Infection (LRTI):  <0.1 ng/mL Bacterial infection highly unlikely  0.1 - 0.25 ng/mL Bacterial infection unlikely  0.26 - 0.5 ng/mL Bacterial infection likely  > 0.5 ng/mL Bacterial infection highly likely     NOTE: Patients with advanced CKD or medical/surgical trauma may have elevated baseline PCT (>0.5 ng/mL) in the absence of bacterial infection. If bacterial infection is suspected, consider repeat PCT in 2 to 4 days to guide de-escalation or   discontinuation of antibiotic therapy.      Higher reference range cutoffs may be appropriate for patients less than 1days old. Cultures: Pending    Imaging  LAST X-RAY:  11/28/20   XR CHEST PA OR AP 1 VIEW  Narrative  EXAM: XR CHEST PA OR AP 1 VIEWCLINICAL INFORMATION: Shortness of breath. .COMPARISON: 02/26/2020FINDINGS: Portable AP upright at 1809. There is limited inspiration. Patchy airspace haziness and interstitial opacities in left base noted. Suggestive of pneumonia. Follow-up to clearing advised to exclude an underlying lesion. Cardiac silhouette is in the upper limits of normal.  Borderline vascular congestion seen. Impression  1. Patchy left base opacities. Suggestive of pneumonia. Mimic not excluded. Please correlate clinically. Follow-up to clearing advised to exclude an underlying lesion. **The absence of findings should not deter follow-up or further evaluation of concerning clinical findings. Electronically Signed by: Abhay Montanez M.D. Signed on: 11/28/2020 6:14 PM     02/26/20   XR CHEST PA AND LATERAL 2 VIEWS  Narrative  EXAM: XR CHEST PA AND LATERAL 2 VIEWSCLINICAL INDICATION: Chest pain. COMPARISON: No previous available. FINDINGS: Frontal and lateral views of the chest were obtained. The cardiac silhouette is at the upper limits of normal in size. There is minimal bibasilar patchy atelectasis. No consolidation with air bronchograms or pneumothorax is noted. Degenerative changes are seen at the spine. Impression  No acute abnormality is noted. Electronically Signed by: Zahra Magana M.D. Signed on: 2/26/2020 2:13 PM       Assessment & Plan   1. Cough and shortness of breath, low-grade fevers, suspected community-acquired pneumonia. Rule out COVID-19.  2.  Hypertension. 3.  Acute kidney injury. Plan:  -I will order a follow-up chest x-ray today.  -Agree with Rocephin and azithromycin to continue.  -Awaiting COVID-19 testing result.  -Noted procalcitonin is within normal range, not consistent with bacterial pneumonia. -Influenza a and B swab.   -We will follow closely. Thank you Dr. Dafne Solis for this consultation, I will follow  Lashanda Terell with you.     Code Status    Code Status: Full Resuscitation      Yasir Zimmerman MD

## 2020-12-15 DIAGNOSIS — E87.6 HYPOKALEMIA: ICD-10-CM

## 2020-12-15 RX ORDER — POTASSIUM CHLORIDE 750 MG/1
CAPSULE, EXTENDED RELEASE ORAL
Qty: 180 CAPSULE | Refills: 0 | Status: SHIPPED | OUTPATIENT
Start: 2020-12-15 | End: 2021-03-10 | Stop reason: SDUPTHER

## 2020-12-30 ENCOUNTER — PATIENT MESSAGE (OUTPATIENT)
Dept: FAMILY MEDICINE | Facility: CLINIC | Age: 45
End: 2020-12-30

## 2020-12-30 DIAGNOSIS — Z91.89 AT INCREASED RISK OF EXPOSURE TO COVID-19 VIRUS: Primary | ICD-10-CM

## 2020-12-31 ENCOUNTER — LAB VISIT (OUTPATIENT)
Dept: INTERNAL MEDICINE | Facility: CLINIC | Age: 45
End: 2020-12-31
Payer: COMMERCIAL

## 2020-12-31 DIAGNOSIS — Z91.89 AT INCREASED RISK OF EXPOSURE TO COVID-19 VIRUS: ICD-10-CM

## 2020-12-31 PROCEDURE — U0003 INFECTIOUS AGENT DETECTION BY NUCLEIC ACID (DNA OR RNA); SEVERE ACUTE RESPIRATORY SYNDROME CORONAVIRUS 2 (SARS-COV-2) (CORONAVIRUS DISEASE [COVID-19]), AMPLIFIED PROBE TECHNIQUE, MAKING USE OF HIGH THROUGHPUT TECHNOLOGIES AS DESCRIBED BY CMS-2020-01-R: HCPCS

## 2021-01-01 LAB — SARS-COV-2 RNA RESP QL NAA+PROBE: NOT DETECTED

## 2021-01-04 ENCOUNTER — PATIENT MESSAGE (OUTPATIENT)
Dept: FAMILY MEDICINE | Facility: CLINIC | Age: 46
End: 2021-01-04

## 2021-01-04 DIAGNOSIS — F34.1 DYSTHYMIA: ICD-10-CM

## 2021-01-04 DIAGNOSIS — F41.9 ANXIETY: ICD-10-CM

## 2021-01-04 RX ORDER — SERTRALINE HYDROCHLORIDE 50 MG/1
50 TABLET, FILM COATED ORAL DAILY
Qty: 90 TABLET | Refills: 1 | Status: SHIPPED | OUTPATIENT
Start: 2021-01-04 | End: 2021-07-06

## 2021-01-04 RX ORDER — BUPROPION HYDROCHLORIDE 100 MG/1
100 TABLET ORAL 2 TIMES DAILY
Qty: 180 TABLET | Refills: 0 | Status: SHIPPED | OUTPATIENT
Start: 2021-01-04 | End: 2021-03-10 | Stop reason: SDUPTHER

## 2021-01-18 ENCOUNTER — PATIENT MESSAGE (OUTPATIENT)
Dept: RHEUMATOLOGY | Facility: CLINIC | Age: 46
End: 2021-01-18

## 2021-01-18 DIAGNOSIS — L40.50 PSORIATIC ARTHRITIS: Primary | ICD-10-CM

## 2021-01-19 ENCOUNTER — PATIENT MESSAGE (OUTPATIENT)
Dept: RHEUMATOLOGY | Facility: CLINIC | Age: 46
End: 2021-01-19

## 2021-01-19 RX ORDER — NAPROXEN 500 MG/1
500 TABLET ORAL 2 TIMES DAILY WITH MEALS
Qty: 60 TABLET | Refills: 3 | Status: SHIPPED | OUTPATIENT
Start: 2021-01-19 | End: 2021-01-25

## 2021-01-25 ENCOUNTER — PATIENT MESSAGE (OUTPATIENT)
Dept: RHEUMATOLOGY | Facility: CLINIC | Age: 46
End: 2021-01-25

## 2021-01-25 DIAGNOSIS — L40.50 PSORIATIC ARTHRITIS: ICD-10-CM

## 2021-01-25 RX ORDER — NAPROXEN 500 MG/1
500 TABLET ORAL 2 TIMES DAILY WITH MEALS
Qty: 180 TABLET | Refills: 1 | Status: SHIPPED | OUTPATIENT
Start: 2021-01-25 | End: 2021-07-06

## 2021-01-26 DIAGNOSIS — E03.9 HYPOTHYROIDISM, UNSPECIFIED TYPE: ICD-10-CM

## 2021-01-26 RX ORDER — LIOTHYRONINE SODIUM 5 UG/1
10 TABLET ORAL DAILY
Qty: 180 TABLET | Refills: 0 | Status: SHIPPED | OUTPATIENT
Start: 2021-01-26 | End: 2021-05-13

## 2021-02-06 ENCOUNTER — PATIENT MESSAGE (OUTPATIENT)
Dept: FAMILY MEDICINE | Facility: CLINIC | Age: 46
End: 2021-02-06

## 2021-02-08 ENCOUNTER — OFFICE VISIT (OUTPATIENT)
Dept: OBSTETRICS AND GYNECOLOGY | Facility: CLINIC | Age: 46
End: 2021-02-08
Payer: COMMERCIAL

## 2021-02-08 ENCOUNTER — PATIENT OUTREACH (OUTPATIENT)
Dept: ADMINISTRATIVE | Facility: OTHER | Age: 46
End: 2021-02-08

## 2021-02-08 VITALS
SYSTOLIC BLOOD PRESSURE: 132 MMHG | WEIGHT: 256.94 LBS | DIASTOLIC BLOOD PRESSURE: 82 MMHG | BODY MASS INDEX: 42.76 KG/M2

## 2021-02-08 DIAGNOSIS — L02.92 FURUNCULOSIS: ICD-10-CM

## 2021-02-08 DIAGNOSIS — Z12.31 ENCOUNTER FOR SCREENING MAMMOGRAM FOR MALIGNANT NEOPLASM OF BREAST: ICD-10-CM

## 2021-02-08 DIAGNOSIS — Z01.419 VISIT FOR GYNECOLOGIC EXAMINATION: Primary | ICD-10-CM

## 2021-02-08 DIAGNOSIS — M79.605 PAIN OF LEFT LOWER EXTREMITY: ICD-10-CM

## 2021-02-08 PROCEDURE — 3079F DIAST BP 80-89 MM HG: CPT | Mod: CPTII,S$GLB,, | Performed by: OBSTETRICS & GYNECOLOGY

## 2021-02-08 PROCEDURE — 3008F PR BODY MASS INDEX (BMI) DOCUMENTED: ICD-10-PCS | Mod: CPTII,S$GLB,, | Performed by: OBSTETRICS & GYNECOLOGY

## 2021-02-08 PROCEDURE — 99396 PR PREVENTIVE VISIT,EST,40-64: ICD-10-PCS | Mod: S$GLB,,, | Performed by: OBSTETRICS & GYNECOLOGY

## 2021-02-08 PROCEDURE — 99999 PR PBB SHADOW E&M-EST. PATIENT-LVL III: CPT | Mod: PBBFAC,,, | Performed by: OBSTETRICS & GYNECOLOGY

## 2021-02-08 PROCEDURE — 3079F PR MOST RECENT DIASTOLIC BLOOD PRESSURE 80-89 MM HG: ICD-10-PCS | Mod: CPTII,S$GLB,, | Performed by: OBSTETRICS & GYNECOLOGY

## 2021-02-08 PROCEDURE — 3075F SYST BP GE 130 - 139MM HG: CPT | Mod: CPTII,S$GLB,, | Performed by: OBSTETRICS & GYNECOLOGY

## 2021-02-08 PROCEDURE — 3008F BODY MASS INDEX DOCD: CPT | Mod: CPTII,S$GLB,, | Performed by: OBSTETRICS & GYNECOLOGY

## 2021-02-08 PROCEDURE — 3075F PR MOST RECENT SYSTOLIC BLOOD PRESS GE 130-139MM HG: ICD-10-PCS | Mod: CPTII,S$GLB,, | Performed by: OBSTETRICS & GYNECOLOGY

## 2021-02-08 PROCEDURE — 99396 PREV VISIT EST AGE 40-64: CPT | Mod: S$GLB,,, | Performed by: OBSTETRICS & GYNECOLOGY

## 2021-02-08 PROCEDURE — 99999 PR PBB SHADOW E&M-EST. PATIENT-LVL III: ICD-10-PCS | Mod: PBBFAC,,, | Performed by: OBSTETRICS & GYNECOLOGY

## 2021-02-08 RX ORDER — SULFAMETHOXAZOLE AND TRIMETHOPRIM 400; 80 MG/1; MG/1
1 TABLET ORAL 2 TIMES DAILY
Qty: 14 TABLET | Refills: 0 | Status: SHIPPED | OUTPATIENT
Start: 2021-02-08 | End: 2021-02-15

## 2021-02-09 ENCOUNTER — PATIENT MESSAGE (OUTPATIENT)
Dept: OBSTETRICS AND GYNECOLOGY | Facility: CLINIC | Age: 46
End: 2021-02-09

## 2021-02-09 RX ORDER — MUPIROCIN CALCIUM 20 MG/G
CREAM TOPICAL
Qty: 30 G | Refills: 0 | Status: SHIPPED | OUTPATIENT
Start: 2021-02-09 | End: 2022-02-08 | Stop reason: CLARIF

## 2021-02-18 ENCOUNTER — HOSPITAL ENCOUNTER (OUTPATIENT)
Dept: RADIOLOGY | Facility: HOSPITAL | Age: 46
Discharge: HOME | End: 2021-02-18
Attending: OBSTETRICS & GYNECOLOGY
Payer: COMMERCIAL

## 2021-02-18 DIAGNOSIS — M79.605 PAIN OF LEFT LOWER EXTREMITY: ICD-10-CM

## 2021-02-18 PROCEDURE — 93971 EXTREMITY STUDY: CPT | Mod: TC,LT

## 2021-02-18 PROCEDURE — 93971 EXTREMITY STUDY: CPT | Mod: 26,LT,, | Performed by: RADIOLOGY

## 2021-02-18 PROCEDURE — 93971 US LOWER EXTREMITY VEINS LEFT: ICD-10-PCS | Mod: 26,LT,, | Performed by: RADIOLOGY

## 2021-02-23 ENCOUNTER — PATIENT MESSAGE (OUTPATIENT)
Dept: OBSTETRICS AND GYNECOLOGY | Facility: CLINIC | Age: 46
End: 2021-02-23

## 2021-02-23 ENCOUNTER — PATIENT MESSAGE (OUTPATIENT)
Dept: RHEUMATOLOGY | Facility: CLINIC | Age: 46
End: 2021-02-23

## 2021-02-25 ENCOUNTER — PATIENT MESSAGE (OUTPATIENT)
Dept: FAMILY MEDICINE | Facility: CLINIC | Age: 46
End: 2021-02-25

## 2021-02-26 ENCOUNTER — PATIENT MESSAGE (OUTPATIENT)
Dept: OBSTETRICS AND GYNECOLOGY | Facility: CLINIC | Age: 46
End: 2021-02-26

## 2021-03-07 ENCOUNTER — PATIENT MESSAGE (OUTPATIENT)
Dept: FAMILY MEDICINE | Facility: CLINIC | Age: 46
End: 2021-03-07

## 2021-03-10 ENCOUNTER — OFFICE VISIT (OUTPATIENT)
Dept: FAMILY MEDICINE | Facility: CLINIC | Age: 46
End: 2021-03-10
Payer: COMMERCIAL

## 2021-03-10 DIAGNOSIS — E87.6 HYPOKALEMIA: ICD-10-CM

## 2021-03-10 DIAGNOSIS — E03.9 HYPOTHYROIDISM, UNSPECIFIED TYPE: ICD-10-CM

## 2021-03-10 DIAGNOSIS — F34.1 DYSTHYMIA: ICD-10-CM

## 2021-03-10 PROCEDURE — 99214 PR OFFICE/OUTPT VISIT, EST, LEVL IV, 30-39 MIN: ICD-10-PCS | Mod: 95,,, | Performed by: FAMILY MEDICINE

## 2021-03-10 PROCEDURE — 99214 OFFICE O/P EST MOD 30 MIN: CPT | Mod: 95,,, | Performed by: FAMILY MEDICINE

## 2021-03-10 RX ORDER — LEVOTHYROXINE SODIUM 125 UG/1
125 TABLET ORAL
Qty: 90 TABLET | Refills: 3 | Status: SHIPPED | OUTPATIENT
Start: 2021-03-10 | End: 2022-02-14 | Stop reason: SDUPTHER

## 2021-03-10 RX ORDER — BUPROPION HYDROCHLORIDE 100 MG/1
100 TABLET ORAL 2 TIMES DAILY
Qty: 180 TABLET | Refills: 0 | Status: SHIPPED | OUTPATIENT
Start: 2021-03-10 | End: 2021-07-29

## 2021-03-10 RX ORDER — POTASSIUM CHLORIDE 750 MG/1
CAPSULE, EXTENDED RELEASE ORAL
Qty: 180 CAPSULE | Refills: 0 | Status: SHIPPED | OUTPATIENT
Start: 2021-03-10 | End: 2021-06-07

## 2021-03-15 ENCOUNTER — PATIENT OUTREACH (OUTPATIENT)
Dept: ADMINISTRATIVE | Facility: OTHER | Age: 46
End: 2021-03-15

## 2021-03-16 ENCOUNTER — LAB VISIT (OUTPATIENT)
Dept: LAB | Facility: HOSPITAL | Age: 46
End: 2021-03-16
Attending: INTERNAL MEDICINE
Payer: COMMERCIAL

## 2021-03-16 ENCOUNTER — OFFICE VISIT (OUTPATIENT)
Dept: RHEUMATOLOGY | Facility: CLINIC | Age: 46
End: 2021-03-16
Payer: COMMERCIAL

## 2021-03-16 VITALS
DIASTOLIC BLOOD PRESSURE: 84 MMHG | HEART RATE: 76 BPM | SYSTOLIC BLOOD PRESSURE: 134 MMHG | HEIGHT: 66 IN | BODY MASS INDEX: 40.85 KG/M2 | WEIGHT: 254.19 LBS

## 2021-03-16 DIAGNOSIS — Z79.899 LONG TERM CURRENT USE OF IMMUNOSUPPRESSIVE DRUG: ICD-10-CM

## 2021-03-16 DIAGNOSIS — Z79.899 LONG-TERM USE OF HIGH-RISK MEDICATION: ICD-10-CM

## 2021-03-16 DIAGNOSIS — L40.50 PSORIATIC ARTHRITIS: Primary | ICD-10-CM

## 2021-03-16 DIAGNOSIS — M15.9 GENERALIZED OSTEOARTHRITIS OF MULTIPLE SITES: ICD-10-CM

## 2021-03-16 DIAGNOSIS — L40.50 PSORIATIC ARTHRITIS: ICD-10-CM

## 2021-03-16 DIAGNOSIS — Z79.1 LONG TERM (CURRENT) USE OF NON-STEROIDAL ANTI-INFLAMMATORIES (NSAID): ICD-10-CM

## 2021-03-16 LAB
ALBUMIN SERPL BCP-MCNC: 3.4 G/DL (ref 3.5–5.2)
ALP SERPL-CCNC: 90 U/L (ref 55–135)
ALT SERPL W/O P-5'-P-CCNC: 13 U/L (ref 10–44)
ANION GAP SERPL CALC-SCNC: 8 MMOL/L (ref 8–16)
AST SERPL-CCNC: 14 U/L (ref 10–40)
BASOPHILS # BLD AUTO: 0.04 K/UL (ref 0–0.2)
BASOPHILS NFR BLD: 0.4 % (ref 0–1.9)
BILIRUB SERPL-MCNC: 0.5 MG/DL (ref 0.1–1)
BUN SERPL-MCNC: 12 MG/DL (ref 6–20)
CALCIUM SERPL-MCNC: 9.3 MG/DL (ref 8.7–10.5)
CHLORIDE SERPL-SCNC: 103 MMOL/L (ref 95–110)
CO2 SERPL-SCNC: 28 MMOL/L (ref 23–29)
CREAT SERPL-MCNC: 0.7 MG/DL (ref 0.5–1.4)
CRP SERPL-MCNC: 25.1 MG/L (ref 0–8.2)
DIFFERENTIAL METHOD: ABNORMAL
EOSINOPHIL # BLD AUTO: 0.5 K/UL (ref 0–0.5)
EOSINOPHIL NFR BLD: 5.9 % (ref 0–8)
ERYTHROCYTE [DISTWIDTH] IN BLOOD BY AUTOMATED COUNT: 16 % (ref 11.5–14.5)
ERYTHROCYTE [SEDIMENTATION RATE] IN BLOOD BY WESTERGREN METHOD: 72 MM/HR (ref 0–36)
EST. GFR  (AFRICAN AMERICAN): >60 ML/MIN/1.73 M^2
EST. GFR  (NON AFRICAN AMERICAN): >60 ML/MIN/1.73 M^2
GLUCOSE SERPL-MCNC: 125 MG/DL (ref 70–110)
HCT VFR BLD AUTO: 37.4 % (ref 37–48.5)
HGB BLD-MCNC: 11.4 G/DL (ref 12–16)
IMM GRANULOCYTES # BLD AUTO: 0.03 K/UL (ref 0–0.04)
IMM GRANULOCYTES NFR BLD AUTO: 0.3 % (ref 0–0.5)
LYMPHOCYTES # BLD AUTO: 2.1 K/UL (ref 1–4.8)
LYMPHOCYTES NFR BLD: 23 % (ref 18–48)
MCH RBC QN AUTO: 25.4 PG (ref 27–31)
MCHC RBC AUTO-ENTMCNC: 30.5 G/DL (ref 32–36)
MCV RBC AUTO: 83 FL (ref 82–98)
MONOCYTES # BLD AUTO: 0.7 K/UL (ref 0.3–1)
MONOCYTES NFR BLD: 7.2 % (ref 4–15)
NEUTROPHILS # BLD AUTO: 5.7 K/UL (ref 1.8–7.7)
NEUTROPHILS NFR BLD: 63.2 % (ref 38–73)
NRBC BLD-RTO: 0 /100 WBC
PLATELET # BLD AUTO: 310 K/UL (ref 150–350)
PMV BLD AUTO: 9.2 FL (ref 9.2–12.9)
POTASSIUM SERPL-SCNC: 4 MMOL/L (ref 3.5–5.1)
PROT SERPL-MCNC: 7.3 G/DL (ref 6–8.4)
RBC # BLD AUTO: 4.49 M/UL (ref 4–5.4)
SODIUM SERPL-SCNC: 139 MMOL/L (ref 136–145)
WBC # BLD AUTO: 9.03 K/UL (ref 3.9–12.7)

## 2021-03-16 PROCEDURE — 3079F DIAST BP 80-89 MM HG: CPT | Mod: CPTII,S$GLB,, | Performed by: INTERNAL MEDICINE

## 2021-03-16 PROCEDURE — 99214 OFFICE O/P EST MOD 30 MIN: CPT | Mod: S$GLB,,, | Performed by: INTERNAL MEDICINE

## 2021-03-16 PROCEDURE — 99999 PR PBB SHADOW E&M-EST. PATIENT-LVL IV: ICD-10-PCS | Mod: PBBFAC,,, | Performed by: INTERNAL MEDICINE

## 2021-03-16 PROCEDURE — 86787 VARICELLA-ZOSTER ANTIBODY: CPT | Performed by: INTERNAL MEDICINE

## 2021-03-16 PROCEDURE — 85652 RBC SED RATE AUTOMATED: CPT | Performed by: INTERNAL MEDICINE

## 2021-03-16 PROCEDURE — 3075F PR MOST RECENT SYSTOLIC BLOOD PRESS GE 130-139MM HG: ICD-10-PCS | Mod: CPTII,S$GLB,, | Performed by: INTERNAL MEDICINE

## 2021-03-16 PROCEDURE — 86140 C-REACTIVE PROTEIN: CPT | Performed by: INTERNAL MEDICINE

## 2021-03-16 PROCEDURE — 99999 PR PBB SHADOW E&M-EST. PATIENT-LVL IV: CPT | Mod: PBBFAC,,, | Performed by: INTERNAL MEDICINE

## 2021-03-16 PROCEDURE — 99214 PR OFFICE/OUTPT VISIT, EST, LEVL IV, 30-39 MIN: ICD-10-PCS | Mod: S$GLB,,, | Performed by: INTERNAL MEDICINE

## 2021-03-16 PROCEDURE — 1125F AMNT PAIN NOTED PAIN PRSNT: CPT | Mod: S$GLB,,, | Performed by: INTERNAL MEDICINE

## 2021-03-16 PROCEDURE — 86706 HEP B SURFACE ANTIBODY: CPT | Performed by: INTERNAL MEDICINE

## 2021-03-16 PROCEDURE — 1125F PR PAIN SEVERITY QUANTIFIED, PAIN PRESENT: ICD-10-PCS | Mod: S$GLB,,, | Performed by: INTERNAL MEDICINE

## 2021-03-16 PROCEDURE — 3008F BODY MASS INDEX DOCD: CPT | Mod: CPTII,S$GLB,, | Performed by: INTERNAL MEDICINE

## 2021-03-16 PROCEDURE — 3075F SYST BP GE 130 - 139MM HG: CPT | Mod: CPTII,S$GLB,, | Performed by: INTERNAL MEDICINE

## 2021-03-16 PROCEDURE — 3079F PR MOST RECENT DIASTOLIC BLOOD PRESSURE 80-89 MM HG: ICD-10-PCS | Mod: CPTII,S$GLB,, | Performed by: INTERNAL MEDICINE

## 2021-03-16 PROCEDURE — 86790 VIRUS ANTIBODY NOS: CPT | Performed by: INTERNAL MEDICINE

## 2021-03-16 PROCEDURE — 87340 HEPATITIS B SURFACE AG IA: CPT | Performed by: INTERNAL MEDICINE

## 2021-03-16 PROCEDURE — 86682 HELMINTH ANTIBODY: CPT | Performed by: INTERNAL MEDICINE

## 2021-03-16 PROCEDURE — 86704 HEP B CORE ANTIBODY TOTAL: CPT | Performed by: INTERNAL MEDICINE

## 2021-03-16 PROCEDURE — 3008F PR BODY MASS INDEX (BMI) DOCUMENTED: ICD-10-PCS | Mod: CPTII,S$GLB,, | Performed by: INTERNAL MEDICINE

## 2021-03-16 PROCEDURE — 85025 COMPLETE CBC W/AUTO DIFF WBC: CPT | Performed by: INTERNAL MEDICINE

## 2021-03-16 PROCEDURE — 80053 COMPREHEN METABOLIC PANEL: CPT | Performed by: INTERNAL MEDICINE

## 2021-03-16 PROCEDURE — 86592 SYPHILIS TEST NON-TREP QUAL: CPT | Performed by: INTERNAL MEDICINE

## 2021-03-16 PROCEDURE — 86803 HEPATITIS C AB TEST: CPT | Performed by: INTERNAL MEDICINE

## 2021-03-16 RX ORDER — GUSELKUMAB 100 MG/ML
100 INJECTION SUBCUTANEOUS
Qty: 3 SYRINGE | Refills: 3 | Status: SHIPPED | OUTPATIENT
Start: 2021-03-16 | End: 2021-10-06 | Stop reason: SDUPTHER

## 2021-03-16 RX ORDER — GUSELKUMAB 100 MG/ML
100 INJECTION SUBCUTANEOUS
Qty: 2 ML | Refills: 0 | Status: SHIPPED | OUTPATIENT
Start: 2021-03-16 | End: 2021-10-06 | Stop reason: SDUPTHER

## 2021-03-16 ASSESSMENT — ROUTINE ASSESSMENT OF PATIENT INDEX DATA (RAPID3)
FATIGUE SCORE: 8
PATIENT GLOBAL ASSESSMENT SCORE: 7.5
PSYCHOLOGICAL DISTRESS SCORE: 5.5
AM STIFFNESS SCORE: 1, YES
TOTAL RAPID3 SCORE: 5.83
WHEN YOU AWAKENED IN THE MORNING OVER THE LAST WEEK, PLEASE INDICATE THE AMOUNT OF TIME IT TAKES UNTIL YOU ARE AS LIMBER AS YOU WILL BE FOR THE DAY: 30 MINUTES
MDHAQ FUNCTION SCORE: 0.6
PAIN SCORE: 8

## 2021-03-17 ENCOUNTER — PATIENT MESSAGE (OUTPATIENT)
Dept: RHEUMATOLOGY | Facility: CLINIC | Age: 46
End: 2021-03-17

## 2021-03-17 ENCOUNTER — SPECIALTY PHARMACY (OUTPATIENT)
Dept: PHARMACY | Facility: CLINIC | Age: 46
End: 2021-03-17

## 2021-03-17 LAB
HBV CORE AB SERPL QL IA: NEGATIVE
HBV SURFACE AB SER-ACNC: NEGATIVE M[IU]/ML
HBV SURFACE AG SERPL QL IA: NEGATIVE
HCV AB SERPL QL IA: NEGATIVE
HEPATITIS A ANTIBODY, IGG: NEGATIVE
RPR SER QL: NORMAL
VARICELLA INTERPRETATION: POSITIVE
VARICELLA ZOSTER IGG: 2.65 ISR (ref 0–0.9)

## 2021-03-18 ENCOUNTER — PATIENT MESSAGE (OUTPATIENT)
Dept: RHEUMATOLOGY | Facility: CLINIC | Age: 46
End: 2021-03-18

## 2021-03-19 ENCOUNTER — PATIENT MESSAGE (OUTPATIENT)
Dept: RHEUMATOLOGY | Facility: CLINIC | Age: 46
End: 2021-03-19

## 2021-03-19 ENCOUNTER — PATIENT MESSAGE (OUTPATIENT)
Dept: PHARMACY | Facility: CLINIC | Age: 46
End: 2021-03-19

## 2021-03-19 LAB — STRONGYLOIDES ANTIBODY IGG: NEGATIVE

## 2021-03-26 ENCOUNTER — PATIENT MESSAGE (OUTPATIENT)
Dept: RHEUMATOLOGY | Facility: CLINIC | Age: 46
End: 2021-03-26

## 2021-03-30 ENCOUNTER — PATIENT MESSAGE (OUTPATIENT)
Dept: RHEUMATOLOGY | Facility: CLINIC | Age: 46
End: 2021-03-30

## 2021-03-31 ENCOUNTER — PATIENT MESSAGE (OUTPATIENT)
Dept: PHARMACY | Facility: CLINIC | Age: 46
End: 2021-03-31

## 2021-04-01 ENCOUNTER — TELEPHONE (OUTPATIENT)
Dept: PHARMACY | Facility: CLINIC | Age: 46
End: 2021-04-01

## 2021-04-05 ENCOUNTER — PATIENT MESSAGE (OUTPATIENT)
Dept: FAMILY MEDICINE | Facility: CLINIC | Age: 46
End: 2021-04-05

## 2021-04-14 ENCOUNTER — PATIENT MESSAGE (OUTPATIENT)
Dept: RHEUMATOLOGY | Facility: CLINIC | Age: 46
End: 2021-04-14

## 2021-04-14 ENCOUNTER — PATIENT MESSAGE (OUTPATIENT)
Dept: FAMILY MEDICINE | Facility: CLINIC | Age: 46
End: 2021-04-14

## 2021-04-20 DIAGNOSIS — R60.0 PEDAL EDEMA: ICD-10-CM

## 2021-04-20 RX ORDER — FUROSEMIDE 40 MG/1
TABLET ORAL
Qty: 90 TABLET | Refills: 1 | Status: SHIPPED | OUTPATIENT
Start: 2021-04-20 | End: 2021-10-16

## 2021-04-21 DIAGNOSIS — Z79.1 LONG TERM (CURRENT) USE OF NON-STEROIDAL ANTI-INFLAMMATORIES (NSAID): ICD-10-CM

## 2021-04-21 DIAGNOSIS — L40.50 PSORIATIC ARTHRITIS: ICD-10-CM

## 2021-04-21 RX ORDER — LEFLUNOMIDE 20 MG/1
20 TABLET ORAL DAILY
Qty: 90 TABLET | Refills: 3 | Status: SHIPPED | OUTPATIENT
Start: 2021-04-21 | End: 2022-05-03 | Stop reason: SDUPTHER

## 2021-04-27 ENCOUNTER — PATIENT MESSAGE (OUTPATIENT)
Dept: RHEUMATOLOGY | Facility: CLINIC | Age: 46
End: 2021-04-27

## 2021-05-15 ENCOUNTER — LAB VISIT (OUTPATIENT)
Dept: LAB | Facility: HOSPITAL | Age: 46
End: 2021-05-15
Attending: FAMILY MEDICINE
Payer: COMMERCIAL

## 2021-05-15 DIAGNOSIS — E03.9 HYPOTHYROIDISM, UNSPECIFIED TYPE: ICD-10-CM

## 2021-05-15 DIAGNOSIS — E87.6 HYPOKALEMIA: ICD-10-CM

## 2021-05-15 LAB
ALBUMIN SERPL BCP-MCNC: 3.8 G/DL (ref 3.5–5.2)
ALP SERPL-CCNC: 91 U/L (ref 55–135)
ALT SERPL W/O P-5'-P-CCNC: 16 U/L (ref 10–44)
ANION GAP SERPL CALC-SCNC: 10 MMOL/L (ref 8–16)
AST SERPL-CCNC: 20 U/L (ref 10–40)
BILIRUB SERPL-MCNC: 0.3 MG/DL (ref 0.1–1)
BUN SERPL-MCNC: 12 MG/DL (ref 6–20)
CALCIUM SERPL-MCNC: 9.8 MG/DL (ref 8.7–10.5)
CHLORIDE SERPL-SCNC: 101 MMOL/L (ref 95–110)
CHOLEST SERPL-MCNC: 159 MG/DL (ref 120–199)
CHOLEST/HDLC SERPL: 4.3 {RATIO} (ref 2–5)
CO2 SERPL-SCNC: 29 MMOL/L (ref 23–29)
CREAT SERPL-MCNC: 0.7 MG/DL (ref 0.5–1.4)
EST. GFR  (AFRICAN AMERICAN): >60 ML/MIN/1.73 M^2
EST. GFR  (NON AFRICAN AMERICAN): >60 ML/MIN/1.73 M^2
GLUCOSE SERPL-MCNC: 74 MG/DL (ref 70–110)
HDLC SERPL-MCNC: 37 MG/DL (ref 40–75)
HDLC SERPL: 23.3 % (ref 20–50)
LDLC SERPL CALC-MCNC: 83.8 MG/DL (ref 63–159)
NONHDLC SERPL-MCNC: 122 MG/DL
POTASSIUM SERPL-SCNC: 4.5 MMOL/L (ref 3.5–5.1)
PROT SERPL-MCNC: 7.9 G/DL (ref 6–8.4)
SODIUM SERPL-SCNC: 140 MMOL/L (ref 136–145)
TRIGL SERPL-MCNC: 191 MG/DL (ref 30–150)
TSH SERPL DL<=0.005 MIU/L-ACNC: 1.75 UIU/ML (ref 0.4–4)

## 2021-05-15 PROCEDURE — 84443 ASSAY THYROID STIM HORMONE: CPT | Performed by: FAMILY MEDICINE

## 2021-05-15 PROCEDURE — 36415 COLL VENOUS BLD VENIPUNCTURE: CPT | Mod: PO | Performed by: FAMILY MEDICINE

## 2021-05-15 PROCEDURE — 80053 COMPREHEN METABOLIC PANEL: CPT | Performed by: FAMILY MEDICINE

## 2021-05-15 PROCEDURE — 80061 LIPID PANEL: CPT | Performed by: FAMILY MEDICINE

## 2021-06-18 ENCOUNTER — HOSPITAL ENCOUNTER (OUTPATIENT)
Dept: RADIOLOGY | Facility: HOSPITAL | Age: 46
Discharge: HOME | End: 2021-06-18
Attending: OBSTETRICS & GYNECOLOGY
Payer: COMMERCIAL

## 2021-06-18 VITALS — BODY MASS INDEX: 40.85 KG/M2 | WEIGHT: 254.19 LBS | HEIGHT: 66 IN

## 2021-06-18 DIAGNOSIS — Z12.31 ENCOUNTER FOR SCREENING MAMMOGRAM FOR MALIGNANT NEOPLASM OF BREAST: ICD-10-CM

## 2021-06-18 PROCEDURE — 77067 MAMMO DIGITAL SCREENING BILAT WITH TOMO: ICD-10-PCS | Mod: 26,,, | Performed by: RADIOLOGY

## 2021-06-18 PROCEDURE — 77067 SCR MAMMO BI INCL CAD: CPT | Mod: 26,,, | Performed by: RADIOLOGY

## 2021-06-18 PROCEDURE — 77063 MAMMO DIGITAL SCREENING BILAT WITH TOMO: ICD-10-PCS | Mod: 26,,, | Performed by: RADIOLOGY

## 2021-06-18 PROCEDURE — 77067 SCR MAMMO BI INCL CAD: CPT | Mod: TC

## 2021-06-18 PROCEDURE — 77063 BREAST TOMOSYNTHESIS BI: CPT | Mod: 26,,, | Performed by: RADIOLOGY

## 2021-06-28 ENCOUNTER — PATIENT MESSAGE (OUTPATIENT)
Dept: RHEUMATOLOGY | Facility: CLINIC | Age: 46
End: 2021-06-28

## 2021-07-02 ENCOUNTER — PATIENT OUTREACH (OUTPATIENT)
Dept: ADMINISTRATIVE | Facility: OTHER | Age: 46
End: 2021-07-02

## 2021-07-06 ENCOUNTER — OFFICE VISIT (OUTPATIENT)
Dept: RHEUMATOLOGY | Facility: CLINIC | Age: 46
End: 2021-07-06
Payer: COMMERCIAL

## 2021-07-06 VITALS
WEIGHT: 246.69 LBS | BODY MASS INDEX: 39.65 KG/M2 | HEART RATE: 84 BPM | DIASTOLIC BLOOD PRESSURE: 72 MMHG | SYSTOLIC BLOOD PRESSURE: 137 MMHG | HEIGHT: 66 IN

## 2021-07-06 DIAGNOSIS — Z79.899 LONG-TERM USE OF HIGH-RISK MEDICATION: ICD-10-CM

## 2021-07-06 DIAGNOSIS — L40.50 PSORIATIC ARTHRITIS: Primary | ICD-10-CM

## 2021-07-06 DIAGNOSIS — E78.1 HYPERTRIGLYCERIDEMIA: ICD-10-CM

## 2021-07-06 DIAGNOSIS — Z79.1 LONG TERM (CURRENT) USE OF NON-STEROIDAL ANTI-INFLAMMATORIES (NSAID): ICD-10-CM

## 2021-07-06 DIAGNOSIS — Z79.899 LONG TERM CURRENT USE OF IMMUNOSUPPRESSIVE DRUG: ICD-10-CM

## 2021-07-06 DIAGNOSIS — E55.9 VITAMIN D INSUFFICIENCY: ICD-10-CM

## 2021-07-06 PROCEDURE — 99214 OFFICE O/P EST MOD 30 MIN: CPT | Mod: S$GLB,,, | Performed by: INTERNAL MEDICINE

## 2021-07-06 PROCEDURE — 99999 PR PBB SHADOW E&M-EST. PATIENT-LVL IV: ICD-10-PCS | Mod: PBBFAC,,, | Performed by: INTERNAL MEDICINE

## 2021-07-06 PROCEDURE — 1125F PR PAIN SEVERITY QUANTIFIED, PAIN PRESENT: ICD-10-PCS | Mod: S$GLB,,, | Performed by: INTERNAL MEDICINE

## 2021-07-06 PROCEDURE — 3008F BODY MASS INDEX DOCD: CPT | Mod: CPTII,S$GLB,, | Performed by: INTERNAL MEDICINE

## 2021-07-06 PROCEDURE — 3008F PR BODY MASS INDEX (BMI) DOCUMENTED: ICD-10-PCS | Mod: CPTII,S$GLB,, | Performed by: INTERNAL MEDICINE

## 2021-07-06 PROCEDURE — 1125F AMNT PAIN NOTED PAIN PRSNT: CPT | Mod: S$GLB,,, | Performed by: INTERNAL MEDICINE

## 2021-07-06 PROCEDURE — 99214 PR OFFICE/OUTPT VISIT, EST, LEVL IV, 30-39 MIN: ICD-10-PCS | Mod: S$GLB,,, | Performed by: INTERNAL MEDICINE

## 2021-07-06 PROCEDURE — 99999 PR PBB SHADOW E&M-EST. PATIENT-LVL IV: CPT | Mod: PBBFAC,,, | Performed by: INTERNAL MEDICINE

## 2021-07-06 RX ORDER — NAPROXEN 500 MG/1
500 TABLET ORAL
Qty: 270 TABLET | Refills: 2 | Status: SHIPPED | OUTPATIENT
Start: 2021-07-06 | End: 2022-04-19 | Stop reason: SDUPTHER

## 2021-07-06 RX ORDER — TRAZODONE HYDROCHLORIDE 50 MG/1
TABLET ORAL
COMMUNITY
Start: 2021-04-13

## 2021-07-10 ENCOUNTER — HOSPITAL ENCOUNTER (OUTPATIENT)
Dept: RADIOLOGY | Facility: HOSPITAL | Age: 46
Discharge: HOME | End: 2021-07-10
Attending: INTERNAL MEDICINE
Payer: COMMERCIAL

## 2021-07-10 DIAGNOSIS — Z79.899 LONG-TERM USE OF HIGH-RISK MEDICATION: ICD-10-CM

## 2021-07-10 PROCEDURE — 73130 X-RAY EXAM OF HAND: CPT | Mod: TC,50,FY,PO

## 2021-07-10 PROCEDURE — 73130 X-RAY EXAM OF HAND: CPT | Mod: 26,,, | Performed by: RADIOLOGY

## 2021-07-10 PROCEDURE — 73130 XR HAND COMPLETE 3 VIEWS BILATERAL: ICD-10-PCS | Mod: 26,,, | Performed by: RADIOLOGY

## 2021-07-22 ENCOUNTER — PATIENT MESSAGE (OUTPATIENT)
Dept: FAMILY MEDICINE | Facility: CLINIC | Age: 46
End: 2021-07-22

## 2021-07-23 ENCOUNTER — CLINICAL SUPPORT (OUTPATIENT)
Dept: URGENT CARE | Facility: CLINIC | Age: 46
End: 2021-07-23
Payer: COMMERCIAL

## 2021-07-23 DIAGNOSIS — Z20.822 ENCOUNTER FOR LABORATORY TESTING FOR COVID-19 VIRUS: Primary | ICD-10-CM

## 2021-07-23 LAB
CTP QC/QA: YES
SARS-COV-2 RDRP RESP QL NAA+PROBE: NEGATIVE

## 2021-07-23 PROCEDURE — U0002 COVID-19 LAB TEST NON-CDC: HCPCS | Mod: QW,S$GLB,, | Performed by: INTERNAL MEDICINE

## 2021-07-23 PROCEDURE — U0002: ICD-10-PCS | Mod: QW,S$GLB,, | Performed by: INTERNAL MEDICINE

## 2021-08-10 ENCOUNTER — PATIENT MESSAGE (OUTPATIENT)
Dept: RHEUMATOLOGY | Facility: CLINIC | Age: 46
End: 2021-08-10

## 2021-08-19 ENCOUNTER — PATIENT MESSAGE (OUTPATIENT)
Dept: RHEUMATOLOGY | Facility: CLINIC | Age: 46
End: 2021-08-19

## 2021-09-04 ENCOUNTER — PATIENT MESSAGE (OUTPATIENT)
Dept: RHEUMATOLOGY | Facility: CLINIC | Age: 46
End: 2021-09-04

## 2021-09-04 DIAGNOSIS — L40.9 PSORIASIS: ICD-10-CM

## 2021-09-04 DIAGNOSIS — L40.50 PSORIATIC ARTHRITIS: Primary | ICD-10-CM

## 2021-09-04 RX ORDER — SECUKINUMAB 150 MG/ML
300 INJECTION SUBCUTANEOUS
Qty: 6 SYRINGE | Refills: 1 | Status: SHIPPED | OUTPATIENT
Start: 2021-09-04 | End: 2021-10-06 | Stop reason: SDUPTHER

## 2021-09-04 RX ORDER — SECUKINUMAB 150 MG/ML
300 INJECTION SUBCUTANEOUS
Qty: 10 SYRINGE | Refills: 0 | Status: SHIPPED | OUTPATIENT
Start: 2021-09-04 | End: 2021-10-05

## 2021-09-10 ENCOUNTER — SPECIALTY PHARMACY (OUTPATIENT)
Dept: PHARMACY | Facility: CLINIC | Age: 46
End: 2021-09-10

## 2021-10-05 ENCOUNTER — TELEPHONE (OUTPATIENT)
Dept: RHEUMATOLOGY | Facility: CLINIC | Age: 46
End: 2021-10-05

## 2021-10-05 DIAGNOSIS — L40.9 PSORIASIS: ICD-10-CM

## 2021-10-05 DIAGNOSIS — L40.50 PSORIATIC ARTHRITIS: Primary | ICD-10-CM

## 2021-10-06 DIAGNOSIS — L40.9 PSORIASIS: ICD-10-CM

## 2021-10-06 DIAGNOSIS — L40.50 PSORIATIC ARTHRITIS: ICD-10-CM

## 2021-10-07 RX ORDER — SECUKINUMAB 150 MG/ML
300 INJECTION SUBCUTANEOUS
Qty: 6 SYRINGE | Refills: 1 | Status: CANCELLED | OUTPATIENT
Start: 2021-10-07

## 2021-10-07 RX ORDER — SECUKINUMAB 150 MG/ML
INJECTION SUBCUTANEOUS
Qty: 10 SYRINGE | Refills: 0 | OUTPATIENT
Start: 2021-10-07

## 2021-10-09 ENCOUNTER — LAB VISIT (OUTPATIENT)
Dept: LAB | Facility: HOSPITAL | Age: 46
End: 2021-10-09
Attending: INTERNAL MEDICINE
Payer: COMMERCIAL

## 2021-10-09 DIAGNOSIS — Z79.899 LONG-TERM USE OF HIGH-RISK MEDICATION: ICD-10-CM

## 2021-10-09 LAB
ALBUMIN SERPL BCP-MCNC: 3.5 G/DL (ref 3.5–5.2)
ALP SERPL-CCNC: 92 U/L (ref 55–135)
ALT SERPL W/O P-5'-P-CCNC: 13 U/L (ref 10–44)
ANION GAP SERPL CALC-SCNC: 14 MMOL/L (ref 8–16)
AST SERPL-CCNC: 17 U/L (ref 10–40)
BASOPHILS # BLD AUTO: 0.05 K/UL (ref 0–0.2)
BASOPHILS NFR BLD: 0.7 % (ref 0–1.9)
BILIRUB SERPL-MCNC: 0.2 MG/DL (ref 0.1–1)
BUN SERPL-MCNC: 9 MG/DL (ref 6–20)
CALCIUM SERPL-MCNC: 9.7 MG/DL (ref 8.7–10.5)
CHLORIDE SERPL-SCNC: 99 MMOL/L (ref 95–110)
CO2 SERPL-SCNC: 23 MMOL/L (ref 23–29)
CREAT SERPL-MCNC: 0.7 MG/DL (ref 0.5–1.4)
CRP SERPL-MCNC: 11.7 MG/L (ref 0–8.2)
DIFFERENTIAL METHOD: ABNORMAL
EOSINOPHIL # BLD AUTO: 0.4 K/UL (ref 0–0.5)
EOSINOPHIL NFR BLD: 4.9 % (ref 0–8)
ERYTHROCYTE [DISTWIDTH] IN BLOOD BY AUTOMATED COUNT: 15.8 % (ref 11.5–14.5)
ERYTHROCYTE [SEDIMENTATION RATE] IN BLOOD BY WESTERGREN METHOD: 28 MM/HR (ref 0–36)
EST. GFR  (AFRICAN AMERICAN): >60 ML/MIN/1.73 M^2
EST. GFR  (NON AFRICAN AMERICAN): >60 ML/MIN/1.73 M^2
GLUCOSE SERPL-MCNC: 70 MG/DL (ref 70–110)
HCT VFR BLD AUTO: 42 % (ref 37–48.5)
HGB BLD-MCNC: 12.9 G/DL (ref 12–16)
IMM GRANULOCYTES # BLD AUTO: 0.02 K/UL (ref 0–0.04)
IMM GRANULOCYTES NFR BLD AUTO: 0.3 % (ref 0–0.5)
LYMPHOCYTES # BLD AUTO: 2.1 K/UL (ref 1–4.8)
LYMPHOCYTES NFR BLD: 28.1 % (ref 18–48)
MCH RBC QN AUTO: 26.5 PG (ref 27–31)
MCHC RBC AUTO-ENTMCNC: 30.7 G/DL (ref 32–36)
MCV RBC AUTO: 86 FL (ref 82–98)
MONOCYTES # BLD AUTO: 0.6 K/UL (ref 0.3–1)
MONOCYTES NFR BLD: 7.8 % (ref 4–15)
NEUTROPHILS # BLD AUTO: 4.4 K/UL (ref 1.8–7.7)
NEUTROPHILS NFR BLD: 58.2 % (ref 38–73)
NRBC BLD-RTO: 0 /100 WBC
PLATELET # BLD AUTO: 373 K/UL (ref 150–450)
PMV BLD AUTO: 9.5 FL (ref 9.2–12.9)
POTASSIUM SERPL-SCNC: 3.8 MMOL/L (ref 3.5–5.1)
PROT SERPL-MCNC: 7.3 G/DL (ref 6–8.4)
RBC # BLD AUTO: 4.86 M/UL (ref 4–5.4)
SODIUM SERPL-SCNC: 136 MMOL/L (ref 136–145)
WBC # BLD AUTO: 7.61 K/UL (ref 3.9–12.7)

## 2021-10-09 PROCEDURE — 36415 COLL VENOUS BLD VENIPUNCTURE: CPT | Mod: PO | Performed by: INTERNAL MEDICINE

## 2021-10-09 PROCEDURE — 86140 C-REACTIVE PROTEIN: CPT | Performed by: INTERNAL MEDICINE

## 2021-10-09 PROCEDURE — 85652 RBC SED RATE AUTOMATED: CPT | Performed by: INTERNAL MEDICINE

## 2021-10-09 PROCEDURE — 80053 COMPREHEN METABOLIC PANEL: CPT | Performed by: INTERNAL MEDICINE

## 2021-10-09 PROCEDURE — 85025 COMPLETE CBC W/AUTO DIFF WBC: CPT | Performed by: INTERNAL MEDICINE

## 2021-10-18 ENCOUNTER — PATIENT OUTREACH (OUTPATIENT)
Dept: ADMINISTRATIVE | Facility: OTHER | Age: 46
End: 2021-10-18

## 2021-10-18 RX ORDER — SECUKINUMAB 150 MG/ML
300 INJECTION SUBCUTANEOUS
Qty: 6 SYRINGE | Refills: 1 | Status: SHIPPED | OUTPATIENT
Start: 2021-10-18 | End: 2022-01-04 | Stop reason: SDUPTHER

## 2021-10-19 ENCOUNTER — OFFICE VISIT (OUTPATIENT)
Dept: RHEUMATOLOGY | Facility: CLINIC | Age: 46
End: 2021-10-19
Payer: COMMERCIAL

## 2021-10-19 VITALS
HEIGHT: 66 IN | SYSTOLIC BLOOD PRESSURE: 130 MMHG | WEIGHT: 244.06 LBS | BODY MASS INDEX: 39.22 KG/M2 | DIASTOLIC BLOOD PRESSURE: 70 MMHG | HEART RATE: 64 BPM

## 2021-10-19 DIAGNOSIS — Z79.899 LONG-TERM USE OF HIGH-RISK MEDICATION: ICD-10-CM

## 2021-10-19 DIAGNOSIS — M15.9 GENERALIZED OSTEOARTHRITIS OF MULTIPLE SITES: ICD-10-CM

## 2021-10-19 DIAGNOSIS — L40.50 PSORIATIC ARTHRITIS: Primary | ICD-10-CM

## 2021-10-19 DIAGNOSIS — Z79.899 LONG TERM CURRENT USE OF IMMUNOSUPPRESSIVE DRUG: ICD-10-CM

## 2021-10-19 PROCEDURE — 3075F SYST BP GE 130 - 139MM HG: CPT | Mod: CPTII,S$GLB,, | Performed by: INTERNAL MEDICINE

## 2021-10-19 PROCEDURE — 99999 PR PBB SHADOW E&M-EST. PATIENT-LVL IV: CPT | Mod: PBBFAC,,, | Performed by: INTERNAL MEDICINE

## 2021-10-19 PROCEDURE — 99214 OFFICE O/P EST MOD 30 MIN: CPT | Mod: S$GLB,,, | Performed by: INTERNAL MEDICINE

## 2021-10-19 PROCEDURE — 4010F PR ACE/ARB THEARPY RXD/TAKEN: ICD-10-PCS | Mod: CPTII,S$GLB,, | Performed by: INTERNAL MEDICINE

## 2021-10-19 PROCEDURE — 3008F PR BODY MASS INDEX (BMI) DOCUMENTED: ICD-10-PCS | Mod: CPTII,S$GLB,, | Performed by: INTERNAL MEDICINE

## 2021-10-19 PROCEDURE — 1160F RVW MEDS BY RX/DR IN RCRD: CPT | Mod: CPTII,S$GLB,, | Performed by: INTERNAL MEDICINE

## 2021-10-19 PROCEDURE — 3078F PR MOST RECENT DIASTOLIC BLOOD PRESSURE < 80 MM HG: ICD-10-PCS | Mod: CPTII,S$GLB,, | Performed by: INTERNAL MEDICINE

## 2021-10-19 PROCEDURE — 3078F DIAST BP <80 MM HG: CPT | Mod: CPTII,S$GLB,, | Performed by: INTERNAL MEDICINE

## 2021-10-19 PROCEDURE — 1160F PR REVIEW ALL MEDS BY PRESCRIBER/CLIN PHARMACIST DOCUMENTED: ICD-10-PCS | Mod: CPTII,S$GLB,, | Performed by: INTERNAL MEDICINE

## 2021-10-19 PROCEDURE — 1159F PR MEDICATION LIST DOCUMENTED IN MEDICAL RECORD: ICD-10-PCS | Mod: CPTII,S$GLB,, | Performed by: INTERNAL MEDICINE

## 2021-10-19 PROCEDURE — 4010F ACE/ARB THERAPY RXD/TAKEN: CPT | Mod: CPTII,S$GLB,, | Performed by: INTERNAL MEDICINE

## 2021-10-19 PROCEDURE — 1159F MED LIST DOCD IN RCRD: CPT | Mod: CPTII,S$GLB,, | Performed by: INTERNAL MEDICINE

## 2021-10-19 PROCEDURE — 3075F PR MOST RECENT SYSTOLIC BLOOD PRESS GE 130-139MM HG: ICD-10-PCS | Mod: CPTII,S$GLB,, | Performed by: INTERNAL MEDICINE

## 2021-10-19 PROCEDURE — 99214 PR OFFICE/OUTPT VISIT, EST, LEVL IV, 30-39 MIN: ICD-10-PCS | Mod: S$GLB,,, | Performed by: INTERNAL MEDICINE

## 2021-10-19 PROCEDURE — 99999 PR PBB SHADOW E&M-EST. PATIENT-LVL IV: ICD-10-PCS | Mod: PBBFAC,,, | Performed by: INTERNAL MEDICINE

## 2021-10-19 PROCEDURE — 3008F BODY MASS INDEX DOCD: CPT | Mod: CPTII,S$GLB,, | Performed by: INTERNAL MEDICINE

## 2021-10-19 ASSESSMENT — ROUTINE ASSESSMENT OF PATIENT INDEX DATA (RAPID3)
TOTAL RAPID3 SCORE: 5.78
AM STIFFNESS SCORE: 1, YES
PAIN SCORE: 6.5
WHEN YOU AWAKENED IN THE MORNING OVER THE LAST WEEK, PLEASE INDICATE THE AMOUNT OF TIME IT TAKES UNTIL YOU ARE AS LIMBER AS YOU WILL BE FOR THE DAY: 30 MINUTES
FATIGUE SCORE: 6.5
MDHAQ FUNCTION SCORE: 0.7
PSYCHOLOGICAL DISTRESS SCORE: 3.3
PATIENT GLOBAL ASSESSMENT SCORE: 8.5

## 2021-11-18 ENCOUNTER — LAB VISIT (OUTPATIENT)
Dept: LAB | Facility: HOSPITAL | Age: 46
End: 2021-11-18
Attending: STUDENT IN AN ORGANIZED HEALTH CARE EDUCATION/TRAINING PROGRAM
Payer: COMMERCIAL

## 2021-11-18 ENCOUNTER — PATIENT MESSAGE (OUTPATIENT)
Dept: FAMILY MEDICINE | Facility: CLINIC | Age: 46
End: 2021-11-18

## 2021-11-18 ENCOUNTER — OFFICE VISIT (OUTPATIENT)
Dept: FAMILY MEDICINE | Facility: CLINIC | Age: 46
End: 2021-11-18
Payer: COMMERCIAL

## 2021-11-18 ENCOUNTER — PATIENT MESSAGE (OUTPATIENT)
Dept: FAMILY MEDICINE | Facility: CLINIC | Age: 46
End: 2021-11-18
Payer: COMMERCIAL

## 2021-11-18 VITALS
WEIGHT: 236.44 LBS | TEMPERATURE: 98 F | BODY MASS INDEX: 38 KG/M2 | HEART RATE: 110 BPM | HEIGHT: 66 IN | DIASTOLIC BLOOD PRESSURE: 62 MMHG | SYSTOLIC BLOOD PRESSURE: 136 MMHG | OXYGEN SATURATION: 99 %

## 2021-11-18 DIAGNOSIS — R11.0 NAUSEA: ICD-10-CM

## 2021-11-18 DIAGNOSIS — R82.2 BILIRUBIN IN URINE: ICD-10-CM

## 2021-11-18 DIAGNOSIS — R82.90 ABNORMAL FINDING ON URINALYSIS: ICD-10-CM

## 2021-11-18 DIAGNOSIS — R35.0 FREQUENCY OF URINATION: Primary | ICD-10-CM

## 2021-11-18 LAB
ALBUMIN SERPL BCP-MCNC: 3.3 G/DL (ref 3.5–5.2)
ALP SERPL-CCNC: 121 U/L (ref 55–135)
ALT SERPL W/O P-5'-P-CCNC: 25 U/L (ref 10–44)
ANION GAP SERPL CALC-SCNC: 9 MMOL/L (ref 8–16)
AST SERPL-CCNC: 31 U/L (ref 10–40)
BILIRUB SERPL-MCNC: 0.7 MG/DL (ref 0.1–1)
BILIRUB SERPL-MCNC: ABNORMAL MG/DL
BLOOD URINE, POC: NEGATIVE
BUN SERPL-MCNC: 7 MG/DL (ref 6–20)
CALCIUM SERPL-MCNC: 9.1 MG/DL (ref 8.7–10.5)
CHLORIDE SERPL-SCNC: 100 MMOL/L (ref 95–110)
CLARITY, POC UA: CLEAR
CO2 SERPL-SCNC: 29 MMOL/L (ref 23–29)
COLOR, POC UA: ABNORMAL
CREAT SERPL-MCNC: 0.7 MG/DL (ref 0.5–1.4)
EST. GFR  (AFRICAN AMERICAN): >60 ML/MIN/1.73 M^2
EST. GFR  (NON AFRICAN AMERICAN): >60 ML/MIN/1.73 M^2
GLUCOSE SERPL-MCNC: 98 MG/DL (ref 70–110)
GLUCOSE UR QL STRIP: NORMAL
KETONES UR QL STRIP: NEGATIVE
LEUKOCYTE ESTERASE URINE, POC: ABNORMAL
NITRITE, POC UA: NEGATIVE
PH, POC UA: 5
POTASSIUM SERPL-SCNC: 4.2 MMOL/L (ref 3.5–5.1)
PROT SERPL-MCNC: 7.2 G/DL (ref 6–8.4)
PROTEIN, POC: ABNORMAL
SODIUM SERPL-SCNC: 138 MMOL/L (ref 136–145)
SPECIFIC GRAVITY, POC UA: 1015
UROBILINOGEN, POC UA: NORMAL

## 2021-11-18 PROCEDURE — 87086 URINE CULTURE/COLONY COUNT: CPT | Performed by: STUDENT IN AN ORGANIZED HEALTH CARE EDUCATION/TRAINING PROGRAM

## 2021-11-18 PROCEDURE — 4010F PR ACE/ARB THEARPY RXD/TAKEN: ICD-10-PCS | Mod: CPTII,S$GLB,, | Performed by: STUDENT IN AN ORGANIZED HEALTH CARE EDUCATION/TRAINING PROGRAM

## 2021-11-18 PROCEDURE — 36415 COLL VENOUS BLD VENIPUNCTURE: CPT | Mod: PO | Performed by: STUDENT IN AN ORGANIZED HEALTH CARE EDUCATION/TRAINING PROGRAM

## 2021-11-18 PROCEDURE — 1159F PR MEDICATION LIST DOCUMENTED IN MEDICAL RECORD: ICD-10-PCS | Mod: CPTII,S$GLB,, | Performed by: STUDENT IN AN ORGANIZED HEALTH CARE EDUCATION/TRAINING PROGRAM

## 2021-11-18 PROCEDURE — 3078F PR MOST RECENT DIASTOLIC BLOOD PRESSURE < 80 MM HG: ICD-10-PCS | Mod: CPTII,S$GLB,, | Performed by: STUDENT IN AN ORGANIZED HEALTH CARE EDUCATION/TRAINING PROGRAM

## 2021-11-18 PROCEDURE — 3075F SYST BP GE 130 - 139MM HG: CPT | Mod: CPTII,S$GLB,, | Performed by: STUDENT IN AN ORGANIZED HEALTH CARE EDUCATION/TRAINING PROGRAM

## 2021-11-18 PROCEDURE — 81002 POCT URINE DIPSTICK WITHOUT MICROSCOPE: ICD-10-PCS | Mod: S$GLB,,, | Performed by: STUDENT IN AN ORGANIZED HEALTH CARE EDUCATION/TRAINING PROGRAM

## 2021-11-18 PROCEDURE — 81002 URINALYSIS NONAUTO W/O SCOPE: CPT | Mod: S$GLB,,, | Performed by: STUDENT IN AN ORGANIZED HEALTH CARE EDUCATION/TRAINING PROGRAM

## 2021-11-18 PROCEDURE — 4010F ACE/ARB THERAPY RXD/TAKEN: CPT | Mod: CPTII,S$GLB,, | Performed by: STUDENT IN AN ORGANIZED HEALTH CARE EDUCATION/TRAINING PROGRAM

## 2021-11-18 PROCEDURE — 1159F MED LIST DOCD IN RCRD: CPT | Mod: CPTII,S$GLB,, | Performed by: STUDENT IN AN ORGANIZED HEALTH CARE EDUCATION/TRAINING PROGRAM

## 2021-11-18 PROCEDURE — 3075F PR MOST RECENT SYSTOLIC BLOOD PRESS GE 130-139MM HG: ICD-10-PCS | Mod: CPTII,S$GLB,, | Performed by: STUDENT IN AN ORGANIZED HEALTH CARE EDUCATION/TRAINING PROGRAM

## 2021-11-18 PROCEDURE — 99214 PR OFFICE/OUTPT VISIT, EST, LEVL IV, 30-39 MIN: ICD-10-PCS | Mod: S$GLB,,, | Performed by: STUDENT IN AN ORGANIZED HEALTH CARE EDUCATION/TRAINING PROGRAM

## 2021-11-18 PROCEDURE — 80053 COMPREHEN METABOLIC PANEL: CPT | Performed by: STUDENT IN AN ORGANIZED HEALTH CARE EDUCATION/TRAINING PROGRAM

## 2021-11-18 PROCEDURE — 3008F BODY MASS INDEX DOCD: CPT | Mod: CPTII,S$GLB,, | Performed by: STUDENT IN AN ORGANIZED HEALTH CARE EDUCATION/TRAINING PROGRAM

## 2021-11-18 PROCEDURE — 3078F DIAST BP <80 MM HG: CPT | Mod: CPTII,S$GLB,, | Performed by: STUDENT IN AN ORGANIZED HEALTH CARE EDUCATION/TRAINING PROGRAM

## 2021-11-18 PROCEDURE — 99999 PR PBB SHADOW E&M-EST. PATIENT-LVL IV: ICD-10-PCS | Mod: PBBFAC,,, | Performed by: STUDENT IN AN ORGANIZED HEALTH CARE EDUCATION/TRAINING PROGRAM

## 2021-11-18 PROCEDURE — 99999 PR PBB SHADOW E&M-EST. PATIENT-LVL IV: CPT | Mod: PBBFAC,,, | Performed by: STUDENT IN AN ORGANIZED HEALTH CARE EDUCATION/TRAINING PROGRAM

## 2021-11-18 PROCEDURE — 99214 OFFICE O/P EST MOD 30 MIN: CPT | Mod: S$GLB,,, | Performed by: STUDENT IN AN ORGANIZED HEALTH CARE EDUCATION/TRAINING PROGRAM

## 2021-11-18 PROCEDURE — 3008F PR BODY MASS INDEX (BMI) DOCUMENTED: ICD-10-PCS | Mod: CPTII,S$GLB,, | Performed by: STUDENT IN AN ORGANIZED HEALTH CARE EDUCATION/TRAINING PROGRAM

## 2021-11-18 RX ORDER — CEPHALEXIN 500 MG/1
500 CAPSULE ORAL EVERY 12 HOURS
Qty: 8 CAPSULE | Refills: 0 | Status: SHIPPED | OUTPATIENT
Start: 2021-11-18 | End: 2021-11-22

## 2021-11-18 RX ORDER — ONDANSETRON 8 MG/1
8 TABLET, ORALLY DISINTEGRATING ORAL EVERY 6 HOURS PRN
Qty: 15 TABLET | Refills: 1 | Status: SHIPPED | OUTPATIENT
Start: 2021-11-18 | End: 2022-02-08 | Stop reason: CLARIF

## 2021-11-19 ENCOUNTER — PATIENT MESSAGE (OUTPATIENT)
Dept: FAMILY MEDICINE | Facility: CLINIC | Age: 46
End: 2021-11-19
Payer: COMMERCIAL

## 2021-11-19 DIAGNOSIS — K80.10 CALCULUS OF GALLBLADDER WITH CHRONIC CHOLECYSTITIS WITHOUT OBSTRUCTION: ICD-10-CM

## 2021-11-19 LAB — BACTERIA UR CULT: NO GROWTH

## 2021-11-22 DIAGNOSIS — R10.11 RUQ PAIN: Primary | ICD-10-CM

## 2021-12-02 ENCOUNTER — OFFICE VISIT (OUTPATIENT)
Dept: SURGERY | Facility: CLINIC | Age: 46
End: 2021-12-02
Payer: COMMERCIAL

## 2021-12-02 ENCOUNTER — PATIENT MESSAGE (OUTPATIENT)
Dept: SURGERY | Facility: CLINIC | Age: 46
End: 2021-12-02

## 2021-12-02 ENCOUNTER — PATIENT MESSAGE (OUTPATIENT)
Dept: FAMILY MEDICINE | Facility: CLINIC | Age: 46
End: 2021-12-02
Payer: COMMERCIAL

## 2021-12-02 VITALS — BODY MASS INDEX: 38.6 KG/M2 | WEIGHT: 240.19 LBS | HEIGHT: 66 IN

## 2021-12-02 DIAGNOSIS — K80.20 SYMPTOMATIC CHOLELITHIASIS: Primary | ICD-10-CM

## 2021-12-02 DIAGNOSIS — R10.11 RUQ PAIN: ICD-10-CM

## 2021-12-02 PROCEDURE — 4010F ACE/ARB THERAPY RXD/TAKEN: CPT | Mod: CPTII,S$GLB,, | Performed by: SURGERY

## 2021-12-02 PROCEDURE — 4010F PR ACE/ARB THEARPY RXD/TAKEN: ICD-10-PCS | Mod: CPTII,S$GLB,, | Performed by: SURGERY

## 2021-12-02 PROCEDURE — 99204 PR OFFICE/OUTPT VISIT, NEW, LEVL IV, 45-59 MIN: ICD-10-PCS | Mod: S$GLB,,, | Performed by: SURGERY

## 2021-12-02 PROCEDURE — 99204 OFFICE O/P NEW MOD 45 MIN: CPT | Mod: S$GLB,,, | Performed by: SURGERY

## 2021-12-06 ENCOUNTER — PATIENT MESSAGE (OUTPATIENT)
Dept: FAMILY MEDICINE | Facility: CLINIC | Age: 46
End: 2021-12-06
Payer: COMMERCIAL

## 2021-12-07 ENCOUNTER — PATIENT MESSAGE (OUTPATIENT)
Dept: RHEUMATOLOGY | Facility: CLINIC | Age: 46
End: 2021-12-07
Payer: COMMERCIAL

## 2021-12-13 ENCOUNTER — PATIENT MESSAGE (OUTPATIENT)
Dept: SURGERY | Facility: CLINIC | Age: 46
End: 2021-12-13
Payer: COMMERCIAL

## 2021-12-16 DIAGNOSIS — E87.6 HYPOKALEMIA: ICD-10-CM

## 2021-12-16 DIAGNOSIS — E03.9 HYPOTHYROIDISM, UNSPECIFIED TYPE: ICD-10-CM

## 2021-12-17 ENCOUNTER — PATIENT MESSAGE (OUTPATIENT)
Dept: FAMILY MEDICINE | Facility: CLINIC | Age: 46
End: 2021-12-17
Payer: COMMERCIAL

## 2021-12-17 DIAGNOSIS — E03.9 HYPOTHYROIDISM, UNSPECIFIED TYPE: ICD-10-CM

## 2021-12-17 DIAGNOSIS — E87.6 HYPOKALEMIA: ICD-10-CM

## 2021-12-17 RX ORDER — POTASSIUM CHLORIDE 750 MG/1
10 CAPSULE, EXTENDED RELEASE ORAL DAILY
Qty: 90 CAPSULE | Refills: 0 | Status: SHIPPED | OUTPATIENT
Start: 2021-12-17 | End: 2021-12-19 | Stop reason: SDUPTHER

## 2021-12-17 RX ORDER — LIOTHYRONINE SODIUM 5 UG/1
5 TABLET ORAL DAILY
Qty: 180 TABLET | Refills: 0 | Status: SHIPPED | OUTPATIENT
Start: 2021-12-17 | End: 2021-12-20 | Stop reason: SDUPTHER

## 2021-12-19 DIAGNOSIS — E03.9 HYPOTHYROIDISM, UNSPECIFIED TYPE: ICD-10-CM

## 2021-12-19 DIAGNOSIS — E87.6 HYPOKALEMIA: ICD-10-CM

## 2021-12-20 RX ORDER — POTASSIUM CHLORIDE 750 MG/1
10 CAPSULE, EXTENDED RELEASE ORAL DAILY
Qty: 90 CAPSULE | Refills: 0 | Status: SHIPPED | OUTPATIENT
Start: 2021-12-20 | End: 2022-01-26 | Stop reason: SDUPTHER

## 2021-12-20 RX ORDER — LIOTHYRONINE SODIUM 5 UG/1
5 TABLET ORAL DAILY
Qty: 180 TABLET | Refills: 0 | Status: SHIPPED | OUTPATIENT
Start: 2021-12-20 | End: 2021-12-22 | Stop reason: SDUPTHER

## 2021-12-22 DIAGNOSIS — E03.9 HYPOTHYROIDISM, UNSPECIFIED TYPE: ICD-10-CM

## 2021-12-22 RX ORDER — LIOTHYRONINE SODIUM 5 UG/1
5 TABLET ORAL DAILY
Qty: 180 TABLET | Refills: 0 | Status: SHIPPED | OUTPATIENT
Start: 2021-12-22 | End: 2022-03-17 | Stop reason: SDUPTHER

## 2021-12-26 RX ORDER — POTASSIUM CHLORIDE 750 MG/1
CAPSULE, EXTENDED RELEASE ORAL
Qty: 180 CAPSULE | Refills: 0 | OUTPATIENT
Start: 2021-12-26

## 2021-12-26 RX ORDER — POTASSIUM CHLORIDE 750 MG/1
CAPSULE, EXTENDED RELEASE ORAL
Qty: 90 CAPSULE | Refills: 0 | OUTPATIENT
Start: 2021-12-26

## 2021-12-26 RX ORDER — LIOTHYRONINE SODIUM 5 UG/1
TABLET ORAL
Qty: 180 TABLET | Refills: 0 | OUTPATIENT
Start: 2021-12-26

## 2022-01-03 ENCOUNTER — OFFICE VISIT (OUTPATIENT)
Dept: FAMILY MEDICINE | Facility: CLINIC | Age: 47
End: 2022-01-03
Payer: COMMERCIAL

## 2022-01-03 VITALS
BODY MASS INDEX: 38.16 KG/M2 | SYSTOLIC BLOOD PRESSURE: 152 MMHG | WEIGHT: 237.44 LBS | TEMPERATURE: 98 F | HEART RATE: 85 BPM | HEIGHT: 66 IN | DIASTOLIC BLOOD PRESSURE: 72 MMHG | OXYGEN SATURATION: 99 %

## 2022-01-03 DIAGNOSIS — Z12.11 COLON CANCER SCREENING: ICD-10-CM

## 2022-01-03 DIAGNOSIS — Z01.818 PREOP EXAMINATION: Primary | ICD-10-CM

## 2022-01-03 DIAGNOSIS — K80.10 CALCULUS OF GALLBLADDER WITH CHRONIC CHOLECYSTITIS WITHOUT OBSTRUCTION: ICD-10-CM

## 2022-01-03 PROCEDURE — 99999 PR PBB SHADOW E&M-EST. PATIENT-LVL IV: CPT | Mod: PBBFAC,,, | Performed by: FAMILY MEDICINE

## 2022-01-03 PROCEDURE — 99999 PR PBB SHADOW E&M-EST. PATIENT-LVL IV: ICD-10-PCS | Mod: PBBFAC,,, | Performed by: FAMILY MEDICINE

## 2022-01-03 PROCEDURE — 1160F PR REVIEW ALL MEDS BY PRESCRIBER/CLIN PHARMACIST DOCUMENTED: ICD-10-PCS | Mod: CPTII,S$GLB,, | Performed by: FAMILY MEDICINE

## 2022-01-03 PROCEDURE — 3078F DIAST BP <80 MM HG: CPT | Mod: CPTII,S$GLB,, | Performed by: FAMILY MEDICINE

## 2022-01-03 PROCEDURE — 3077F PR MOST RECENT SYSTOLIC BLOOD PRESSURE >= 140 MM HG: ICD-10-PCS | Mod: CPTII,S$GLB,, | Performed by: FAMILY MEDICINE

## 2022-01-03 PROCEDURE — 99214 PR OFFICE/OUTPT VISIT, EST, LEVL IV, 30-39 MIN: ICD-10-PCS | Mod: S$GLB,,, | Performed by: FAMILY MEDICINE

## 2022-01-03 PROCEDURE — 1159F PR MEDICATION LIST DOCUMENTED IN MEDICAL RECORD: ICD-10-PCS | Mod: CPTII,S$GLB,, | Performed by: FAMILY MEDICINE

## 2022-01-03 PROCEDURE — 3077F SYST BP >= 140 MM HG: CPT | Mod: CPTII,S$GLB,, | Performed by: FAMILY MEDICINE

## 2022-01-03 PROCEDURE — 1159F MED LIST DOCD IN RCRD: CPT | Mod: CPTII,S$GLB,, | Performed by: FAMILY MEDICINE

## 2022-01-03 PROCEDURE — 99214 OFFICE O/P EST MOD 30 MIN: CPT | Mod: S$GLB,,, | Performed by: FAMILY MEDICINE

## 2022-01-03 PROCEDURE — 1160F RVW MEDS BY RX/DR IN RCRD: CPT | Mod: CPTII,S$GLB,, | Performed by: FAMILY MEDICINE

## 2022-01-03 PROCEDURE — 3008F BODY MASS INDEX DOCD: CPT | Mod: CPTII,S$GLB,, | Performed by: FAMILY MEDICINE

## 2022-01-03 PROCEDURE — 3008F PR BODY MASS INDEX (BMI) DOCUMENTED: ICD-10-PCS | Mod: CPTII,S$GLB,, | Performed by: FAMILY MEDICINE

## 2022-01-03 PROCEDURE — 3078F PR MOST RECENT DIASTOLIC BLOOD PRESSURE < 80 MM HG: ICD-10-PCS | Mod: CPTII,S$GLB,, | Performed by: FAMILY MEDICINE

## 2022-01-03 NOTE — PROGRESS NOTES
Routine Office Visit    Patient Name: Keli Gilbert    : 1975  MRN: [id number]    Subjective:  Keli is a 46 y.o. female who presents today for:    1. preop  Patient presenting today for preop clearance as she plans on having cholecystectomy in the next 3-4 weeks.  The pain has improved with diet changes.  No recent infections.  She has stopped Cosentyx in order to have this done.  She has not had covid vaccine stating she is scared she will have an adverse reaction given her history of adverse reactions.  She has no current acute illnesses.      Past Medical History  Past Medical History:   Diagnosis Date    Abnormal Pap smear of vagina     AR (allergic rhinitis)     Demyelinating disorder     Depression     Fever blister     Fibromyalgia     followed by pain management    Generalized osteoarthritis of multiple sites     History of abnormal Pap smear     Hypertension     Hypothyroidism     Insulin resistance     Mixed anxiety and depressive disorder     Morbid obesity     Myelitis, optic neuritis in     treated with high-dose steroids and resolved; positive MRI and spinal fluid for a mass, but on embrel for psoriatic arthritis - she will see a MS specialist at [facility]; MRI normalized off embrel    Obesity     Pre-diabetes     hx diabetes on stereoids /    Psoriasis     Psoriatic arthritis     Vitamin D deficiency disease        Past Surgical History  Past Surgical History:   Procedure Laterality Date    SINUS SURGERY         Family History  Family History   Problem Relation Age of Onset    Psoriasis Father     Cancer Father         throat    Thyroid disease Mother     Heart disease Mother     Hypertension Mother     Hyperlipidemia Mother     Coronary artery disease Mother         s/p CABG    Heart attack Mother     Heart disease Sister         MVP    Diabetes Paternal Uncle     Diabetes Maternal Grandfather     Heart disease Maternal Grandfather     Thyroid disease  Maternal Grandfather     ADD / ADHD Son     Melanoma Neg Hx     Lupus Neg Hx     Eczema Neg Hx     Acne Neg Hx     Breast cancer Neg Hx     Colon cancer Neg Hx     Ovarian cancer Neg Hx        Social History  Social History     Socioeconomic History    Marital status: Single    Number of children: 1   Occupational History    Occupation: [employer]     Employer: [employer] (MAIN OFFICE)   Tobacco Use    Smoking status: Never Smoker    Smokeless tobacco: Never Used   Substance and Sexual Activity    Alcohol use: No    Drug use: No     Comment: 7/7/15 one weed brownie    Sexual activity: Yes     Partners: Male     Birth control/protection: I.U.D.   Other Topics Concern    Are you pregnant or think you may be? No    Breast-feeding No       Current Medications  Current Outpatient Medications on File Prior to Visit   Medication Sig Dispense Refill    buPROPion (WELLBUTRIN) 100 MG tablet TAKE 1 TABLET(100 MG) BY MOUTH TWICE DAILY 180 tablet 0    cholecalciferol, vitamin D3, 2,000 unit Tab Take 1 tablet by mouth Once a week.      COSENTYX PEN, 2 PENS, 150 mg/mL PnIj RX1: INJECT 300 MG INTO THE SKIN EVERY 7 DAYS FOR LOADING DOSES 10 Syringe 0    furosemide (LASIX) 40 MG tablet TAKE 1 TABLET(40 MG) BY MOUTH EVERY DAY 90 tablet 1    gabapentin (NEURONTIN) 300 MG capsule Take 300 mg by mouth 3 (three) times daily.      leflunomide (ARAVA) 20 MG Tab Take 1 tablet (20 mg total) by mouth once daily. 90 tablet 3    liothyronine (CYTOMEL) 5 MCG Tab Take 1 tablet (5 mcg total) by mouth once daily. 180 tablet 0    losartan (COZAAR) 50 MG tablet TAKE 1 TABLET BY MOUTH EVERY DAY 90 tablet 3    methadone (DOLOPHINE) 10 MG tablet Take 1 tablet by mouth Three times a day.      mupirocin calcium 2% (BACTROBAN) 2 % cream Apply within nares twice daily for 2 weeks 30 g 0    naproxen (NAPROSYN) 500 MG tablet Take 1 tablet (500 mg total) by mouth every meal as needed (pain). 270 tablet 2    ondansetron (ZOFRAN-ODT) 8  "MG TbDL Take 1 tablet (8 mg total) by mouth every 6 (six) hours as needed (nausea). 15 tablet 1    potassium chloride (MICRO-K) 10 MEQ CpSR Take 1 capsule (10 mEq total) by mouth once daily. 90 capsule 0    promethazine (PHENERGAN) 25 MG tablet TK 1 T PO BID PRN N      secukinumab (COSENTYX PEN) 150 mg/mL PnIj Inject 300 mg into the skin every 28 days. 6 Syringe 1    SYNTHROID 125 mcg tablet Take 1 tablet (125 mcg total) by mouth before breakfast. 90 tablet 3    traZODone (DESYREL) 50 MG tablet TAKE 1 TO 2 TABLET BY MOUTH EVERY NIGHT      azelastine (ASTELIN) 137 mcg (0.1 %) nasal spray 1 spray (137 mcg total) by Nasal route 2 (two) times daily. (Patient not taking: Reported on 11/18/2021) 30 mL 0    pantoprazole (PROTONIX) 20 MG tablet Take 1 tablet (20 mg total) by mouth once daily. (Patient not taking: Reported on 11/18/2021) 30 tablet 2     No current facility-administered medications on file prior to visit.       Allergies   Review of patient's allergies indicates:   Allergen Reactions    Doxycycline hcl Nausea And Vomiting    Enbrel [etanercept] Other (See Comments)     Optic neurtits       Review of Systems (Pertinent positives)  Review of Systems   Constitutional: Negative.    HENT: Negative.    Eyes: Negative.    Respiratory: Negative.    Cardiovascular: Negative.    Gastrointestinal: Positive for heartburn.   Musculoskeletal: Negative.    Skin: Negative.          BP (!) 152/72 (BP Location: Right arm, Patient Position: Sitting, BP Method: Large (Manual))   Pulse 85   Temp 98.1 °F (36.7 °C) (Oral)   Ht 5' 6" (1.676 m)   Wt 107.7 kg (237 lb 7 oz)   SpO2 99%   BMI 38.32 kg/m²     GENERAL APPEARANCE: in no apparent distress and well developed and well nourished  HEENT: PERRL, EOMI, Sclera clear, anicteric, Oropharynx clear, no lesions, Neck supple with midline trachea  NECK: normal, supple, no adenopathy, thyroid normal in size  RESPIRATORY: appears well, vitals normal, no respiratory distress, " acyanotic, normal RR, chest clear, no wheezing, crepitations, rhonchi, normal symmetric air entry  HEART: regular rate and rhythm, S1, S2 normal, no murmur, click, rub or gallop.    ABDOMEN: abdomen is soft without tenderness, no masses, no hernias, no organomegaly, no rebound, no guarding. Suprapubic tenderness absent. No CVA tenderness.  SKIN: no rashes, no wounds, no other lesions  PSYCH: Alert, oriented x 3, thought content appropriate, speech normal, pleasant and cooperative, good eye contact, well groomed,    Assessment/Plan:  Keli Gilbert is a 46 y.o. female who presents today for :    Keli was seen today for pre-op exam.    Diagnoses and all orders for this visit:    Preop examination    Colon cancer screening  -     Case Request Endoscopy: COLONOSCOPY      1.  Patient is medium risk for medium risk procedure  2.  Call with any concerns  3.  Due for colonoscopy, so order placed  4.  Follow up with general surgery for cholecystectomy date  5.  Encouraged covid vaccine    Andriy Moran MD

## 2022-01-04 ENCOUNTER — TELEPHONE (OUTPATIENT)
Dept: SURGERY | Facility: CLINIC | Age: 47
End: 2022-01-04
Payer: COMMERCIAL

## 2022-01-04 ENCOUNTER — PATIENT MESSAGE (OUTPATIENT)
Dept: RHEUMATOLOGY | Facility: CLINIC | Age: 47
End: 2022-01-04
Payer: COMMERCIAL

## 2022-01-04 DIAGNOSIS — L40.9 PSORIASIS: ICD-10-CM

## 2022-01-04 DIAGNOSIS — L40.50 PSORIATIC ARTHRITIS: ICD-10-CM

## 2022-01-04 RX ORDER — SECUKINUMAB 150 MG/ML
300 INJECTION SUBCUTANEOUS
Qty: 6 EACH | Refills: 1 | OUTPATIENT
Start: 2022-01-04 | End: 2022-01-26 | Stop reason: ALTCHOICE

## 2022-01-04 NOTE — TELEPHONE ENCOUNTER
Spoke with pt, scheduled her sx w/ [facility] for 1/28/. Surgery instructions given. Pt advised to contact pre op 1 day this week to schedule. Pt verbalized understanding.      ----- Message from Lindsey Moya sent at 1/4/2022  1:08 PM CST -----  Regarding: self [phone]  .Type: Patient Call Back    Who called: self     What is the request in detail: patient is calling to schedule her surgery date, had edy with him 12/21/21    Can the clinic reply by [facility]? No     Would the patient rather a call back or a response via [facility]? Call     Best call back number:  [phone]

## 2022-01-07 DIAGNOSIS — K80.20 SYMPTOMATIC CHOLELITHIASIS: Primary | ICD-10-CM

## 2022-01-10 ENCOUNTER — PATIENT OUTREACH (OUTPATIENT)
Dept: ADMINISTRATIVE | Facility: OTHER | Age: 47
End: 2022-01-10
Payer: COMMERCIAL

## 2022-01-10 NOTE — PROGRESS NOTES
Health Maintenance Due   Topic Date Due    COVID-19 Vaccine (1) Never done    HIV Screening  Never done    Colorectal Cancer Screening  Never done    TETANUS VACCINE  11/16/2020    Influenza Vaccine (1) 09/01/2021     Updates were requested from care everywhere.  Chart was reviewed for overdue Proactive [facility] Encounters ([facility]) topics (CRS, Breast Cancer Screening, Eye exam)  Health Maintenance has been updated.  LINKS immunization registry triggered.  Immunizations were reconciled.  Case Request Endoscopy: COLONOSCOPYOrdered 1/3/2022

## 2022-01-11 ENCOUNTER — PATIENT MESSAGE (OUTPATIENT)
Dept: FAMILY MEDICINE | Facility: CLINIC | Age: 47
End: 2022-01-11
Payer: COMMERCIAL

## 2022-01-14 ENCOUNTER — PATIENT MESSAGE (OUTPATIENT)
Dept: FAMILY MEDICINE | Facility: CLINIC | Age: 47
End: 2022-01-14
Payer: COMMERCIAL

## 2022-01-15 NOTE — PROGRESS NOTES
Subjective:       Patient ID: Keli Gilbert is a 46 y.o. female with psoriatic arthritis on Cosentyx & Leflunomide (& naproxen); demyelinating disorder secondary to Enbrel; incomplete effect on chronic pain syndrome on savella & methadone     Chief Complaint: Disease management      Ms. Gilbert is a 47 yo woman with history of PsA last seen last on 10/19/21. There is a disconnect between her fluctuating ESR & CRP and joint pain & swelling. She is scheduled for a laparoscopic cholecystectomy on  1/28/22 & stopped her Cosentyx on 12/15/21 (last dose) in preparation. She had restarted Cosentyx on 10/6/21 after the failure of Tremfya.  She remains on leflunomide 20 mg/d & naproxen.   So far she is unchanged. She still has swelling of her left hand & dactylitis of R 3rd PIP. AM stiffness ~ 30 minutes.   She has no skin lesions.     She has had a suspicious lesion for erosion on imaging base of L thumb..    Had laser surgery to release pressure behind eyes on 9/30/21. Follows by Dr. Saji Bryant Jr, MD   She does not know what the specific dx is or the reason for this problem.      Current Outpatient Medications   Medication Sig Dispense Refill    buPROPion (WELLBUTRIN) 100 MG tablet TAKE 1 TABLET(100 MG) BY MOUTH TWICE DAILY 180 tablet 0    cholecalciferol, vitamin D3, 2,000 unit Tab Take 1 tablet by mouth Once a week.      COSENTYX PEN, 2 PENS, 150 mg/mL PnIj RX1: INJECT 300 MG INTO THE SKIN EVERY 7 DAYS FOR LOADING DOSES 10 Syringe 0    furosemide (LASIX) 40 MG tablet TAKE 1 TABLET(40 MG) BY MOUTH EVERY DAY 90 tablet 1    gabapentin (NEURONTIN) 300 MG capsule Take 300 mg by mouth 3 (three) times daily.      leflunomide (ARAVA) 20 MG Tab Take 1 tablet (20 mg total) by mouth once daily. 90 tablet 3    liothyronine (CYTOMEL) 5 MCG Tab Take 1 tablet (5 mcg total) by mouth once daily. 180 tablet 0    losartan (COZAAR) 50 MG tablet TAKE 1 TABLET BY MOUTH EVERY DAY 90 tablet 3    methadone (DOLOPHINE) 10 MG  tablet Take 1 tablet by mouth Three times a day.      mupirocin calcium 2% (BACTROBAN) 2 % cream Apply within nares twice daily for 2 weeks 30 g 0    naproxen (NAPROSYN) 500 MG tablet Take 1 tablet (500 mg total) by mouth every meal as needed (pain). 270 tablet 2    ondansetron (ZOFRAN-ODT) 8 MG TbDL Take 1 tablet (8 mg total) by mouth every 6 (six) hours as needed (nausea). 15 tablet 1    potassium chloride (MICRO-K) 10 MEQ CpSR Take 1 capsule (10 mEq total) by mouth once daily. 90 capsule 0    promethazine (PHENERGAN) 25 MG tablet TK 1 T PO BID PRN N      secukinumab (COSENTYX PEN) 150 mg/mL PnIj Inject 300 mg into the skin every 28 days. 6 each 1    SYNTHROID 125 mcg tablet Take 1 tablet (125 mcg total) by mouth before breakfast. 90 tablet 3    traZODone (DESYREL) 50 MG tablet TAKE 1 TO 2 TABLET BY MOUTH EVERY NIGHT       No current facility-administered medications for this visit.                     Review of patient's allergies indicates:   Allergen Reactions    Doxycycline hcl Nausea And Vomiting    Enbrel [etanercept] Other (See Comments)     Optic neurtits     Past Medical History:   Diagnosis Date    Abnormal Pap smear of vagina     AR (allergic rhinitis)     Demyelinating disorder     Depression     Fever blister     Fibromyalgia     followed by pain management    Generalized osteoarthritis of multiple sites     History of abnormal Pap smear     Hypertension     Hypothyroidism     Insulin resistance     Mixed anxiety and depressive disorder     Morbid obesity     Myelitis, optic neuritis in     treated with high-dose steroids and resolved; positive MRI and spinal fluid for a mass, but on embrel for psoriatic arthritis - [facility]; MRI normalized off embrel    Obesity     Pre-diabetes     hx diabetes on stereoids /    Psoriasis     Psoriatic arthritis     Vitamin D deficiency disease      Past Surgical History:   Procedure Laterality Date    SINUS  SURGERY  1998       Review of Systems   Constitutional: Positive for fatigue. Negative for fever and unexpected weight change.   HENT: Negative.  Negative for dental problem, mouth sores and trouble swallowing.         Dry mouth only w antihistamines   Eyes: Positive for redness. Negative for itching and visual disturbance.        Dry eyes only with antihistamines   Respiratory: Negative.  Negative for cough and shortness of breath.    Cardiovascular: Negative.  Negative for chest pain, palpitations and leg swelling.   Gastrointestinal: Negative.  Negative for abdominal pain, anal bleeding, blood in stool, constipation, diarrhea and nausea.        Heartburn.   Endocrine: Negative.    Genitourinary: Negative.  Negative for dysuria, frequency, genital sores, menstrual problem, pelvic pain and urgency.   Musculoskeletal: Negative.  Negative for arthralgias, back pain, gait problem and neck pain.   Skin: Negative.  Negative for color change and rash.   Allergic/Immunologic: Positive for immunocompromised state.   Neurological: Negative.  Negative for dizziness, seizures, weakness, numbness and headaches.   Hematological: Negative.  Negative for adenopathy. Does not bruise/bleed easily.   Psychiatric/Behavioral: Positive for dysphoric mood (stable) and sleep disturbance (stable). Negative for decreased concentration, self-injury and suicidal ideas. The patient is nervous/anxious.          Family History   Problem Relation Age of Onset    Psoriasis Father     Cancer Father         throat    Thyroid disease Mother     Heart disease Mother     Hypertension Mother     Hyperlipidemia Mother     Coronary artery disease Mother         s/p CABG    Heart attack Mother     Heart disease Sister         MVP    Diabetes Paternal Uncle     Diabetes Maternal Grandfather     Heart disease Maternal Grandfather     Thyroid disease Maternal Grandfather     ADD / ADHD Son     Melanoma Neg Hx     Lupus Neg Hx     Eczema Neg  "Hx     Acne Neg Hx     Breast cancer Neg Hx     Colon cancer Neg Hx     Ovarian cancer Neg Hx      Mom w CVAs & in a nursing home. She has cognitive issues.     SH:   Working at a bank   Previously worked at a bar as well.     Son just got .   No alcohol; no cigarettes;   Objective:   /74   Pulse 68   Ht 5' 6" (1.676 m)   Wt 110 kg (242 lb 8.1 oz)   BMI 39.14 kg/m²     Was 254 lb 3.1 oz on 3/16/21  Was 236 lb 12.4 oz on 6/8/19  Was 224 lb 13.9 oz on 2/19/19.    Physical Exam   Constitutional: She is oriented to person, place, and time. No distress.   HENT:   Head: Normocephalic and atraumatic.   Mouth/Throat: Oropharynx is clear and moist. No oropharyngeal exudate.   No facial rashes  Parotids not enlarged  No oral ulcers  Face oliva   Eyes: Pupils are equal, round, and reactive to light. Conjunctivae are normal. Right eye exhibits no discharge. Left eye exhibits no discharge. No scleral icterus.   Neck: No JVD present. No tracheal deviation present. No thyromegaly present.   Cardiovascular: Normal rate, regular rhythm and normal heart sounds. Exam reveals no gallop and no friction rub.   No murmur heard.  Pulmonary/Chest: Effort normal and breath sounds normal. No respiratory distress. She has no wheezes. She has no rales. She exhibits no tenderness.   Abdominal: Soft. Bowel sounds are normal. She exhibits no distension and no mass. There is no splenomegaly or hepatomegaly. There is no abdominal tenderness. There is no rebound and no guarding.   Musculoskeletal:         General: No tenderness. Normal range of motion.      Cervical back: Neck supple.      Comments: Cspine slight decrease in lateral flexion & rotation; no tenderness  Tspine FROM no tenderness  Lspine FROM no tenderness.  TMJ: unremarkable  Shoulders: FROM; no synovitis  Elbows: FROM; no synovitis; no tophi or nodules  Wrists: no SHT; no tenderness; good ROM  MCPs: L hand puffy wo metatarsalgia;  PIPs: minimal dactylitis 2nd ray " R; min dactylitis R 3rd PIP w min SHT no TTP;  cannot fully extend this joint.   DIPs: FROM; no synovitis  HIPS: FROM  Knees: FROM; no synovitis; no instability;  Ankles: FROM: no synovitis   Toes: ok; no metatarsalgia                 Lymphadenopathy:     She has no cervical adenopathy.   Neurological: She is alert and oriented to person, place, and time. She has normal reflexes. No cranial nerve deficit. Gait normal.   Proximal and distal muscle strength 5/5.   Skin: Skin is warm and dry. She is not diaphoretic.   No psoriasis.   Psychiatric: Mood, memory, affect and judgment normal.   Drowsy.   Vitals reviewed.        Labs:   10/9/21: ESR 38 (36); CRP 11.7; CBC ok; CMP ok;   5/15/21: CMP ok;  TSH ok; (150)  3/16/21: ESR 72 (36); CRP 25.1; CBC  Hg 11.4; CMP glu 125; alb 3.4;   7/22/20: ESR 34 (36); CRP 16.1; CBC Hg 10.8; Ht 36.8; CMP ok; last labs  12/7/19: ESR 17; CRP CBC Hg 11.8; CMP ok; TFTs ok;   9/14/19: ESR 24 (36); CRP 9.8; CBC Hg 11.5; CP ok;   2/19/19: ESR 13; CRP 5.8; CBC plts 362; CMP ok; HCV/HBV/QG neg;  11/17/18: ESR 8; CRP 3.9; Hg 11.9; low indices; CMP ok;   8/22/18: ESR 10; CRP 10.9; CBC Hg 10.7; Ht 35.7; CMP ok  5/14/18: ESR 13; CRP 13.4;  Hg 11.3; low indices; eos 12.4%; CMP ok;   2/10/18: ESR 30; CRP 10.5; Hg 11.3; plt 366; CMP ok;   10/19/17: HBV neg; HCV neg;   9/2/17: ESR 16; CRp 5.9; Hg 11.6; Ht 36.6; CMP ok;   5/29/17: QG neg  2/20/17: ESR 34; CRP 8.1; CBC Hg 11.4; Ht 35.6; CMP ok;   11/19/16: ESR 20; CRP 4.8; CBC Hg 11.9; CMP K+3.3, otherwise ok;   8/27/16: ESR 22; CRP 7; Hg 11.8; Ht 36.9; CMP ok;   5/26/16: ESR 35; CRP 11.2Hg 11.8; Ht 36.9; CMP ok;   5/21/16: Vit d 37  10/8/15: ESR 45; CRP 14.2; Hg 11.2; Ht 35.8; CMP; ok  7/7/15: CBC plt 380; K+ 3.4; Glu 154  7/6/15: ESR 48; CRP 9.6  4/4/15: ESR 41; CRP 19; Hg 11.0; Ht 34.6; plt 377; CMP ok;  1/10/15: ESR 34; CRP 18; CBC ok; ; Alb. 3.6  10/4/14: ESR 43; CRP 18.5; Hg 11.8; Ht 36.5; plt 378; Alb. 3.4;   3/20/14: ESR 41; CRP 14.3;  plt 356; CMP ok;  2/15/13: ESR 30; CRP 9.8; CBC ok; Alb. 3.3  11/2/13: ESR 28; CRP 11.6; plt 363; Alb. 3.3;   5/28/13: ESR 45; CRP 8.8; plt 366; CMP ok;     12/1/18: Bilateral hands: personally viewed: Mild degenerative changes noted at the interphalangeal joints.  Mild-to-moderate degenerative change noted at the bilateral 1st CMC joints.  Mild degenerative changes also present at both radiocarpal joints, triscaphe joints and right distal radioulnar joint.  Minimal degenerative changes also present at the MCP joints bilaterally and greatest at the 1st MCP joint on the left.  6/15/17: Bilateral hands: personally reviewed: mild osteoarthritis w stable periarticular erosion at the left fourth MCP joint; no change since last year.   8/27/16: Bilateral feet: personally reviewed. Mild HV; mild OA shell 1st MTP dali; ? Erosion PIP left small toe;   5/26/16: Bilateral hands: personally reviewed: L 4th MCP (new) erosive lesion; R & L 2nd & 3rd metacarpal head cysts; R mild PIP erosion & STS 2nd.    Assessment:     Psoriatic arthritis-- with mild SHT 2 MCPs bilaterally (R >>L) & with sausage digit of R index finger--currently w SHT & flexion deformity of R 3rd PIP--stable at present .    a) History of sausage digits of both toes      PIPs, DIPs, wrists, and knees., now w. only one sausage digit.   b) Psoriasis of the abdomen, elbow, and the shins--resolved.    c) Enbrel stopped 10/21/11 due to optic neuritis.     d) New erosion (4th L metacarpal head) on x-ray & possibly L 5th toe PIP     e) Persisting elevation of ESR and CRP    f) inadequate response to leflunomide and Stelara & apremilast (w. Intolerable nausea)   G) could not tolerate SSZ--nausea.   H) MTX x 2 even SC did not help sxs.   I) improved on Cosentyx (+leflunomide + naproxen) initially incompletely-- hand joints still hurt despite normalization of  ESR & CRP   J) Orencia begun 11/17 with incomplete response   K)Taltz begun 4/20/18-2/19   L) Tofacitinib 3/19 -  6/18/19    Chronic SHT 3rd R PIP & base of L thumb with questionable erosive lesion.   M) Back to Cosentyx + leflunomide + celebrex    N) Tremfya begun 4/14/21--failed: stopped w headache & lack of efficacy   O) Back to Cosentyx 10/6/21 with improvement; held since 12/15/21.    Left optic neuritis with demyelinating lesions secondary to Enbrel--now resolved.    Repeat MRI ok    Pressure behind both eyes?    Chronic pain/fibromyalgia syndrome with fatigue, tender points, withdrawals to palpation in the past, pain all over, responsive to Savella but with some nausea (off it now), followed by Dr. Odom at the [facility],    On methadone    Diabetes mellitus    Degenerative joint disease of the cervical spine, knees, and feet--very mild.     Depression on wellbutrin   Off savella & sertraline (much weight gain)    Hypertriglyceridemia    Hypertension.     Hypothyroidism.     Vitamin D deficiency with regular prescription vitamin D supplementation.     Severe obesity from morbid obesity        Plan:   Reviewed results of the Oral Surveillance study & increased risk of MACE in patients over 50 w CVD risk factors; also reviewed VTE data & increased risk of malignancy.  Patient would like to switch to Rinvoq after her surgery.  Side effects and toxicities reviewed.  Patient directed to [email or web address] website.  Request put in for Rinvoq.  She knows not to mix Rinvoq w Cosentyx  Stay on leflunomide 20 mg/d  Ok to continue naproxen 500 mg up to tid but less is best.   Labs today & in 3 months.   Patient will keep us in the loop.     RTC 3 months after labs

## 2022-01-17 ENCOUNTER — PATIENT MESSAGE (OUTPATIENT)
Dept: RHEUMATOLOGY | Facility: CLINIC | Age: 47
End: 2022-01-17
Payer: COMMERCIAL

## 2022-01-19 ENCOUNTER — OFFICE VISIT (OUTPATIENT)
Dept: RHEUMATOLOGY | Facility: CLINIC | Age: 47
End: 2022-01-19
Payer: COMMERCIAL

## 2022-01-19 ENCOUNTER — LAB VISIT (OUTPATIENT)
Dept: LAB | Facility: HOSPITAL | Age: 47
End: 2022-01-19
Attending: INTERNAL MEDICINE
Payer: COMMERCIAL

## 2022-01-19 VITALS
HEIGHT: 66 IN | HEART RATE: 68 BPM | BODY MASS INDEX: 38.97 KG/M2 | WEIGHT: 242.5 LBS | DIASTOLIC BLOOD PRESSURE: 74 MMHG | SYSTOLIC BLOOD PRESSURE: 128 MMHG

## 2022-01-19 DIAGNOSIS — Z79.899 LONG-TERM USE OF HIGH-RISK MEDICATION: ICD-10-CM

## 2022-01-19 DIAGNOSIS — M15.9 GENERALIZED OSTEOARTHRITIS OF MULTIPLE SITES: ICD-10-CM

## 2022-01-19 DIAGNOSIS — L40.50 PSORIATIC ARTHRITIS: Primary | ICD-10-CM

## 2022-01-19 DIAGNOSIS — L40.9 PSORIASIS: ICD-10-CM

## 2022-01-19 DIAGNOSIS — Z79.899 LONG TERM CURRENT USE OF IMMUNOSUPPRESSIVE DRUG: ICD-10-CM

## 2022-01-19 LAB
ALBUMIN SERPL BCP-MCNC: 3.8 G/DL (ref 3.5–5.2)
ALP SERPL-CCNC: 95 U/L (ref 55–135)
ALT SERPL W/O P-5'-P-CCNC: 13 U/L (ref 10–44)
ANION GAP SERPL CALC-SCNC: 8 MMOL/L (ref 8–16)
AST SERPL-CCNC: 17 U/L (ref 10–40)
BASOPHILS # BLD AUTO: 0.07 K/UL (ref 0–0.2)
BASOPHILS NFR BLD: 0.8 % (ref 0–1.9)
BILIRUB SERPL-MCNC: 0.2 MG/DL (ref 0.1–1)
BUN SERPL-MCNC: 11 MG/DL (ref 6–20)
CALCIUM SERPL-MCNC: 9.8 MG/DL (ref 8.7–10.5)
CHLORIDE SERPL-SCNC: 100 MMOL/L (ref 95–110)
CO2 SERPL-SCNC: 29 MMOL/L (ref 23–29)
CREAT SERPL-MCNC: 0.7 MG/DL (ref 0.5–1.4)
CRP SERPL-MCNC: 18.1 MG/L (ref 0–8.2)
DIFFERENTIAL METHOD: ABNORMAL
EOSINOPHIL # BLD AUTO: 0.6 K/UL (ref 0–0.5)
EOSINOPHIL NFR BLD: 7.5 % (ref 0–8)
ERYTHROCYTE [DISTWIDTH] IN BLOOD BY AUTOMATED COUNT: 14.8 % (ref 11.5–14.5)
ERYTHROCYTE [SEDIMENTATION RATE] IN BLOOD BY WESTERGREN METHOD: 66 MM/HR (ref 0–36)
EST. GFR  (AFRICAN AMERICAN): >60 ML/MIN/1.73 M^2
EST. GFR  (NON AFRICAN AMERICAN): >60 ML/MIN/1.73 M^2
GLUCOSE SERPL-MCNC: 115 MG/DL (ref 70–110)
HCT VFR BLD AUTO: 38.4 % (ref 37–48.5)
HGB BLD-MCNC: 11.9 G/DL (ref 12–16)
IMM GRANULOCYTES # BLD AUTO: 0.03 K/UL (ref 0–0.04)
IMM GRANULOCYTES NFR BLD AUTO: 0.4 % (ref 0–0.5)
LYMPHOCYTES # BLD AUTO: 2.2 K/UL (ref 1–4.8)
LYMPHOCYTES NFR BLD: 25.3 % (ref 18–48)
MCH RBC QN AUTO: 25.9 PG (ref 27–31)
MCHC RBC AUTO-ENTMCNC: 31 G/DL (ref 32–36)
MCV RBC AUTO: 84 FL (ref 82–98)
MONOCYTES # BLD AUTO: 0.6 K/UL (ref 0.3–1)
MONOCYTES NFR BLD: 6.9 % (ref 4–15)
NEUTROPHILS # BLD AUTO: 5 K/UL (ref 1.8–7.7)
NEUTROPHILS NFR BLD: 59.1 % (ref 38–73)
NRBC BLD-RTO: 0 /100 WBC
PLATELET # BLD AUTO: 324 K/UL (ref 150–450)
PMV BLD AUTO: 9.1 FL (ref 9.2–12.9)
POTASSIUM SERPL-SCNC: 3.8 MMOL/L (ref 3.5–5.1)
PROT SERPL-MCNC: 7.7 G/DL (ref 6–8.4)
RBC # BLD AUTO: 4.59 M/UL (ref 4–5.4)
SODIUM SERPL-SCNC: 137 MMOL/L (ref 136–145)
WBC # BLD AUTO: 8.49 K/UL (ref 3.9–12.7)

## 2022-01-19 PROCEDURE — 36415 COLL VENOUS BLD VENIPUNCTURE: CPT | Performed by: INTERNAL MEDICINE

## 2022-01-19 PROCEDURE — 3074F SYST BP LT 130 MM HG: CPT | Mod: CPTII,S$GLB,, | Performed by: INTERNAL MEDICINE

## 2022-01-19 PROCEDURE — 3008F PR BODY MASS INDEX (BMI) DOCUMENTED: ICD-10-PCS | Mod: CPTII,S$GLB,, | Performed by: INTERNAL MEDICINE

## 2022-01-19 PROCEDURE — 99214 OFFICE O/P EST MOD 30 MIN: CPT | Mod: S$GLB,,, | Performed by: INTERNAL MEDICINE

## 2022-01-19 PROCEDURE — 85025 COMPLETE CBC W/AUTO DIFF WBC: CPT | Performed by: INTERNAL MEDICINE

## 2022-01-19 PROCEDURE — 99999 PR PBB SHADOW E&M-EST. PATIENT-LVL III: ICD-10-PCS | Mod: PBBFAC,,, | Performed by: INTERNAL MEDICINE

## 2022-01-19 PROCEDURE — 99214 PR OFFICE/OUTPT VISIT, EST, LEVL IV, 30-39 MIN: ICD-10-PCS | Mod: S$GLB,,, | Performed by: INTERNAL MEDICINE

## 2022-01-19 PROCEDURE — 80053 COMPREHEN METABOLIC PANEL: CPT | Performed by: INTERNAL MEDICINE

## 2022-01-19 PROCEDURE — 1160F PR REVIEW ALL MEDS BY PRESCRIBER/CLIN PHARMACIST DOCUMENTED: ICD-10-PCS | Mod: CPTII,S$GLB,, | Performed by: INTERNAL MEDICINE

## 2022-01-19 PROCEDURE — 86140 C-REACTIVE PROTEIN: CPT | Performed by: INTERNAL MEDICINE

## 2022-01-19 PROCEDURE — 1159F PR MEDICATION LIST DOCUMENTED IN MEDICAL RECORD: ICD-10-PCS | Mod: CPTII,S$GLB,, | Performed by: INTERNAL MEDICINE

## 2022-01-19 PROCEDURE — 99999 PR PBB SHADOW E&M-EST. PATIENT-LVL III: CPT | Mod: PBBFAC,,, | Performed by: INTERNAL MEDICINE

## 2022-01-19 PROCEDURE — 85652 RBC SED RATE AUTOMATED: CPT | Performed by: INTERNAL MEDICINE

## 2022-01-19 PROCEDURE — 3078F DIAST BP <80 MM HG: CPT | Mod: CPTII,S$GLB,, | Performed by: INTERNAL MEDICINE

## 2022-01-19 PROCEDURE — 1160F RVW MEDS BY RX/DR IN RCRD: CPT | Mod: CPTII,S$GLB,, | Performed by: INTERNAL MEDICINE

## 2022-01-19 PROCEDURE — 3078F PR MOST RECENT DIASTOLIC BLOOD PRESSURE < 80 MM HG: ICD-10-PCS | Mod: CPTII,S$GLB,, | Performed by: INTERNAL MEDICINE

## 2022-01-19 PROCEDURE — 3074F PR MOST RECENT SYSTOLIC BLOOD PRESSURE < 130 MM HG: ICD-10-PCS | Mod: CPTII,S$GLB,, | Performed by: INTERNAL MEDICINE

## 2022-01-19 PROCEDURE — 1159F MED LIST DOCD IN RCRD: CPT | Mod: CPTII,S$GLB,, | Performed by: INTERNAL MEDICINE

## 2022-01-19 PROCEDURE — 3008F BODY MASS INDEX DOCD: CPT | Mod: CPTII,S$GLB,, | Performed by: INTERNAL MEDICINE

## 2022-01-19 RX ORDER — UPADACITINIB 15 MG/1
15 TABLET, EXTENDED RELEASE ORAL DAILY
Qty: 30 TABLET | Refills: 3 | Status: SHIPPED | OUTPATIENT
Start: 2022-01-19 | End: 2022-05-06

## 2022-01-19 ASSESSMENT — ROUTINE ASSESSMENT OF PATIENT INDEX DATA (RAPID3)
FATIGUE SCORE: 8
TOTAL RAPID3 SCORE: 6
PATIENT GLOBAL ASSESSMENT SCORE: 7
AM STIFFNESS SCORE: 1, YES
PSYCHOLOGICAL DISTRESS SCORE: 3.3
MDHAQ FUNCTION SCORE: 0.9
WHEN YOU AWAKENED IN THE MORNING OVER THE LAST WEEK, PLEASE INDICATE THE AMOUNT OF TIME IT TAKES UNTIL YOU ARE AS LIMBER AS YOU WILL BE FOR THE DAY: 30 MINUTES
PAIN SCORE: 8

## 2022-01-20 ENCOUNTER — PATIENT MESSAGE (OUTPATIENT)
Dept: RHEUMATOLOGY | Facility: CLINIC | Age: 47
End: 2022-01-20
Payer: COMMERCIAL

## 2022-01-20 ENCOUNTER — PATIENT MESSAGE (OUTPATIENT)
Dept: SURGERY | Facility: HOSPITAL | Age: 47
End: 2022-01-20
Payer: COMMERCIAL

## 2022-01-24 ENCOUNTER — SPECIALTY PHARMACY (OUTPATIENT)
Dept: PHARMACY | Facility: CLINIC | Age: 47
End: 2022-01-24
Payer: COMMERCIAL

## 2022-01-25 NOTE — TELEPHONE ENCOUNTER
Incoming call from patient inquiring about the status of Rinvoq. Advised that PA was submitted and has been approved. Plan is out of network. [facility] will complete benefits investigation and once completed, Rx will be forwarded to preferred pharmacy to dispense. Advised that assigned RPh will be in touch to inform of dispensing pharmacy. Patient verbalized understanding.

## 2022-01-25 NOTE — TELEPHONE ENCOUNTER
Rinvoq approved through 1/24/23. Case ID: [id number]. Approval letter imported into media.    Unable to determine co-pay via test billing - O[facility] payer. Forwarding to benefits investigation for urgent review. Patient would like to start therapy ASAP.

## 2022-01-26 ENCOUNTER — PATIENT MESSAGE (OUTPATIENT)
Dept: FAMILY MEDICINE | Facility: CLINIC | Age: 47
End: 2022-01-26
Payer: COMMERCIAL

## 2022-01-26 DIAGNOSIS — E87.6 HYPOKALEMIA: ICD-10-CM

## 2022-01-26 RX ORDER — POTASSIUM CHLORIDE 750 MG/1
20 CAPSULE, EXTENDED RELEASE ORAL DAILY
Qty: 180 CAPSULE | Refills: 0 | Status: SHIPPED | OUTPATIENT
Start: 2022-01-26 | End: 2022-05-23 | Stop reason: SDUPTHER

## 2022-01-26 NOTE — TELEPHONE ENCOUNTER
BI Complete: [facility] [place]  Deductible: $150  Max OOP: $4500 ($201.65 accumulated)  Estimated copay: $225  [facility] Out of network; pt must fill at [facility]

## 2022-01-26 NOTE — TELEPHONE ENCOUNTER
Patient provided [facility] contact information. Rx forwarded to [facility]. Disenrolling patient from [facility] services, pt aware to contact [facility] if any difficulty obtaining med.

## 2022-01-26 NOTE — TELEPHONE ENCOUNTER
Obtained permission to enroll in Rinvoq Co-pay card. Pt had already enrolled but did not receive the email with the processing information. Called [employer] ([phone]) and rep gave me the processing information. Benefit of $6000 per calendar year    BIN: [id number]  MILLICENTN: TENZIN  ID: [id number]  GRP: [id number]

## 2022-01-31 ENCOUNTER — PATIENT MESSAGE (OUTPATIENT)
Dept: RHEUMATOLOGY | Facility: CLINIC | Age: 47
End: 2022-01-31
Payer: COMMERCIAL

## 2022-02-08 ENCOUNTER — ANESTHESIA EVENT (OUTPATIENT)
Dept: SURGERY | Facility: HOSPITAL | Age: 47
End: 2022-02-08
Payer: COMMERCIAL

## 2022-02-08 ENCOUNTER — HOSPITAL ENCOUNTER (OUTPATIENT)
Dept: PREADMISSION TESTING | Facility: HOSPITAL | Age: 47
Discharge: HOME | End: 2022-02-08
Attending: SURGERY
Payer: COMMERCIAL

## 2022-02-08 VITALS
TEMPERATURE: 97 F | HEART RATE: 83 BPM | SYSTOLIC BLOOD PRESSURE: 145 MMHG | DIASTOLIC BLOOD PRESSURE: 95 MMHG | HEIGHT: 66 IN | OXYGEN SATURATION: 95 % | WEIGHT: 232.13 LBS | RESPIRATION RATE: 16 BRPM | BODY MASS INDEX: 37.31 KG/M2

## 2022-02-08 DIAGNOSIS — Z01.818 PREOP TESTING: Primary | ICD-10-CM

## 2022-02-08 DIAGNOSIS — K80.20 SYMPTOMATIC CHOLELITHIASIS: ICD-10-CM

## 2022-02-08 LAB
ALBUMIN SERPL BCP-MCNC: 3.9 G/DL (ref 3.5–5.2)
ALP SERPL-CCNC: 92 U/L (ref 55–135)
ALT SERPL W/O P-5'-P-CCNC: 11 U/L (ref 10–44)
ANION GAP SERPL CALC-SCNC: 8 MMOL/L (ref 8–16)
AST SERPL-CCNC: 14 U/L (ref 10–40)
BASOPHILS # BLD AUTO: 0.06 K/UL (ref 0–0.2)
BASOPHILS NFR BLD: 0.6 % (ref 0–1.9)
BILIRUB SERPL-MCNC: 0.3 MG/DL (ref 0.1–1)
BUN SERPL-MCNC: 15 MG/DL (ref 6–20)
CALCIUM SERPL-MCNC: 9.6 MG/DL (ref 8.7–10.5)
CHLORIDE SERPL-SCNC: 104 MMOL/L (ref 95–110)
CO2 SERPL-SCNC: 29 MMOL/L (ref 23–29)
CREAT SERPL-MCNC: 0.8 MG/DL (ref 0.5–1.4)
DIFFERENTIAL METHOD: ABNORMAL
EOSINOPHIL # BLD AUTO: 0.7 K/UL (ref 0–0.5)
EOSINOPHIL NFR BLD: 6.5 % (ref 0–8)
ERYTHROCYTE [DISTWIDTH] IN BLOOD BY AUTOMATED COUNT: 15.3 % (ref 11.5–14.5)
EST. GFR  (AFRICAN AMERICAN): >60 ML/MIN/1.73 M^2
EST. GFR  (NON AFRICAN AMERICAN): >60 ML/MIN/1.73 M^2
GLUCOSE SERPL-MCNC: 95 MG/DL (ref 70–110)
HCT VFR BLD AUTO: 37.9 % (ref 37–48.5)
HGB BLD-MCNC: 11.9 G/DL (ref 12–16)
IMM GRANULOCYTES # BLD AUTO: 0.03 K/UL (ref 0–0.04)
IMM GRANULOCYTES NFR BLD AUTO: 0.3 % (ref 0–0.5)
LYMPHOCYTES # BLD AUTO: 2.7 K/UL (ref 1–4.8)
LYMPHOCYTES NFR BLD: 24.7 % (ref 18–48)
MCH RBC QN AUTO: 26.6 PG (ref 27–31)
MCHC RBC AUTO-ENTMCNC: 31.4 G/DL (ref 32–36)
MCV RBC AUTO: 85 FL (ref 82–98)
MONOCYTES # BLD AUTO: 0.7 K/UL (ref 0.3–1)
MONOCYTES NFR BLD: 6.6 % (ref 4–15)
NEUTROPHILS # BLD AUTO: 6.7 K/UL (ref 1.8–7.7)
NEUTROPHILS NFR BLD: 61.3 % (ref 38–73)
NRBC BLD-RTO: 0 /100 WBC
PLATELET # BLD AUTO: 346 K/UL (ref 150–450)
PMV BLD AUTO: 8.4 FL (ref 9.2–12.9)
POTASSIUM SERPL-SCNC: 4.2 MMOL/L (ref 3.5–5.1)
PROT SERPL-MCNC: 7.9 G/DL (ref 6–8.4)
RBC # BLD AUTO: 4.48 M/UL (ref 4–5.4)
SODIUM SERPL-SCNC: 141 MMOL/L (ref 136–145)
WBC # BLD AUTO: 10.83 K/UL (ref 3.9–12.7)

## 2022-02-08 PROCEDURE — 85025 COMPLETE CBC W/AUTO DIFF WBC: CPT | Performed by: SURGERY

## 2022-02-08 PROCEDURE — 93010 ELECTROCARDIOGRAM REPORT: CPT | Mod: ,,, | Performed by: INTERNAL MEDICINE

## 2022-02-08 PROCEDURE — 93005 ELECTROCARDIOGRAM TRACING: CPT

## 2022-02-08 PROCEDURE — 80053 COMPREHEN METABOLIC PANEL: CPT | Performed by: SURGERY

## 2022-02-08 PROCEDURE — 93010 EKG 12-LEAD: ICD-10-PCS | Mod: ,,, | Performed by: INTERNAL MEDICINE

## 2022-02-08 NOTE — ANESTHESIA PREPROCEDURE EVALUATION
02/08/2022  Keli Gilbert is a 46 y.o., female scheduled for CHOLECYSTECTOMY, LAPAROSCOPIC (N/A Abdomen) on 2/18/2022.    Past Medical History:   Diagnosis Date    Abnormal Pap smear of vagina     AR (allergic rhinitis)     Demyelinating disorder     Depression     Fever blister     Fibromyalgia     followed by pain management    Generalized osteoarthritis of multiple sites     History of abnormal Pap smear     Hypertension     Hypothyroidism     Insulin resistance     Mixed anxiety and depressive disorder     Morbid obesity     Myelitis, optic neuritis in     treated with high-dose steroids and resolved; positive MRI and spinal fluid for a mass, but on embrel for psoriatic arthritis - she will see a MS specialist at [facility]; MRI normalized off embrel    Obesity     Pre-diabetes     hx diabetes on stereoids /    Psoriasis     Psoriatic arthritis     Vaginal delivery     x1    Vitamin D deficiency disease        Past Surgical History:   Procedure Laterality Date    EYE SURGERY Bilateral 08/2021    Laser surgery for pressure relief    SINUS SURGERY  1998       Anesthesia Evaluation    I have reviewed the Patient Summary Reports.    I have reviewed the Nursing Notes. I have reviewed the NPO Status.   I have reviewed the Medications.     Review of Systems  Anesthesia Hx:  No problems with previous Anesthesia  History of prior surgery of interest to airway management or planning: Denies Family Hx of Anesthesia complications.   Denies Personal Hx of Anesthesia complications.   Social:  Non-Smoker, No Alcohol Use    Hematology/Oncology:  Hematology Normal   Oncology Normal     EENT/Dental:EENT/Dental Normal   Cardiovascular:   Hypertension  Denies Angina.  Functional Capacity good / => 4 METS    Pulmonary:  Pulmonary Normal    Renal/:  Renal/ Normal     Hepatic/GI:   Symptomatic  cholelithiasis    Musculoskeletal:   Arthritis     Neurological:   Neuromuscular Disease,    Endocrine:   Hypothyroidism    Dermatological:  Skin Normal    Psych:   Psychiatric History depression          Physical Exam  General:  Well nourished    Airway/Jaw/Neck:  Airway Findings: Mouth Opening: Normal Tongue: Normal  General Airway Assessment: Adult      Dental:  Dental Findings: In tact   Chest/Lungs:  Chest/Lungs Findings: Normal Respiratory Rate     Heart/Vascular:  Heart Findings: Rate: Normal        Mental Status:  Mental Status Findings:  Cooperative, Alert and Oriented         Anesthesia Plan  Type of Anesthesia, risks & benefits discussed:  Anesthesia Type:  general    Patient's Preference:   Plan Factors:          Intra-op Monitoring Plan: standard ASA monitors  Intra-op Monitoring Plan Comments:   Post Op Pain Control Plan: per primary service following discharge from PACU  Post Op Pain Control Plan Comments:     Induction:   IV  Beta Blocker:  Patient is not currently on a Beta-Blocker (No further documentation required).       Informed Consent: Patient understands risks and agrees with Anesthesia plan.  Questions answered. Anesthesia consent signed with patient.  ASA Score: 2     Day of Surgery Review of History & Physical: I have interviewed and examined the patient. I have reviewed the patient's H&P dated:  There are no significant changes.          Ready For Surgery From Anesthesia Perspective.

## 2022-02-08 NOTE — DISCHARGE INSTRUCTIONS
Your surgery is scheduled for _Friday Feb. 18, 2022_.    Call [phone] between 2 p.m. and 5 p.m. on   _Thursday __ to find out your arrival time for the day of your surgery.      Please report to SAME DAY SURGERY UNIT on the 2nd FLOOR at _______ a.m.  Use front door entrance. The doors open at 0530 am.        INSTRUCTIONS IMPORTANT!!!  ¨ Do not eat  after 12 midnight-. OK to brush teeth, no   gum, candy or mints!    MAY DRINK WATER UNTIL HOSPITAL ARRIVAL TIME    ¨ Take only these medicines with a small swallow of water-morning of surgery.  Take med's checked on MED LIST      _x___  Return to Hospital Lab on _Thursday Feb. 17, 2022 at 7:30 am_for Covid and urine test.    _x___  Prep instructions:   SHOWER     _x___  Please shower using Hibiclens soap the night before AND  the morning of your surgery/procedure. Do not use Hibiclens on your face or genitals      x         If your surgery is around your belly button (Navel) be sure to wash inside your belly button also. Rinse hibiclens off completely.  _x___  No shaving of procedural area at least 4-5 days before surgery due to increased risk of skin irritation and/or possible infection.  _x___  Do not wear makeup, including mascara. WEARING EYE MAKEUP MAY  LEAD TO SERIOUS EYE INJURY during surgery.  x____  No powder, lotions or creams to your body.  _x___  You may wear only deodorant on the day of surgery.  _x___  Please remove all jewelry, including piercings and leave at home.  _x___  No money or valuables needed. Please leave at home.  You may bring your cell phone.    _x___  If going home the same day, arrange for a ride home. You will not be able to   drive if Anesthesia was used.  _x___  Wear loose fitting clothing. Allow for dressings, bandages.  _x___  Stop Aspirin, Ibuprofen, Motrin and Aleve at least 3-5 days before surgery, unless otherwise instructed by your doctor, or the nurse.        x      You MAY use Tylenol/acetaminophen until day  of  surgery.  __x__  Call MD for temperature above 101 degrees.        __x__ Stop taking any Fish Oil supplement or                   Vitamin E at least 5 days prior to surgery.          I have read or had read and explained to me, and understand the above information.  Additional comments or instructions:Please call   [phone] if you have any questions regarding the instructions above.

## 2022-02-10 ENCOUNTER — OFFICE VISIT (OUTPATIENT)
Dept: ENDOCRINOLOGY | Facility: CLINIC | Age: 47
End: 2022-02-10
Payer: COMMERCIAL

## 2022-02-10 ENCOUNTER — PATIENT MESSAGE (OUTPATIENT)
Dept: FAMILY MEDICINE | Facility: CLINIC | Age: 47
End: 2022-02-10
Payer: COMMERCIAL

## 2022-02-10 VITALS
WEIGHT: 230.69 LBS | SYSTOLIC BLOOD PRESSURE: 126 MMHG | DIASTOLIC BLOOD PRESSURE: 88 MMHG | HEIGHT: 66 IN | HEART RATE: 89 BPM | BODY MASS INDEX: 37.07 KG/M2 | OXYGEN SATURATION: 97 %

## 2022-02-10 DIAGNOSIS — E03.9 HYPOTHYROIDISM, UNSPECIFIED TYPE: ICD-10-CM

## 2022-02-10 DIAGNOSIS — L83 ACANTHOSIS NIGRICANS, ACQUIRED: ICD-10-CM

## 2022-02-10 DIAGNOSIS — E55.9 VITAMIN D DEFICIENCY DISEASE: ICD-10-CM

## 2022-02-10 PROCEDURE — 1159F PR MEDICATION LIST DOCUMENTED IN MEDICAL RECORD: ICD-10-PCS | Mod: CPTII,S$GLB,, | Performed by: INTERNAL MEDICINE

## 2022-02-10 PROCEDURE — 3079F PR MOST RECENT DIASTOLIC BLOOD PRESSURE 80-89 MM HG: ICD-10-PCS | Mod: CPTII,S$GLB,, | Performed by: INTERNAL MEDICINE

## 2022-02-10 PROCEDURE — 3008F BODY MASS INDEX DOCD: CPT | Mod: CPTII,S$GLB,, | Performed by: INTERNAL MEDICINE

## 2022-02-10 PROCEDURE — 1159F MED LIST DOCD IN RCRD: CPT | Mod: CPTII,S$GLB,, | Performed by: INTERNAL MEDICINE

## 2022-02-10 PROCEDURE — 3079F DIAST BP 80-89 MM HG: CPT | Mod: CPTII,S$GLB,, | Performed by: INTERNAL MEDICINE

## 2022-02-10 PROCEDURE — 99204 PR OFFICE/OUTPT VISIT, NEW, LEVL IV, 45-59 MIN: ICD-10-PCS | Mod: S$GLB,,, | Performed by: INTERNAL MEDICINE

## 2022-02-10 PROCEDURE — 3074F SYST BP LT 130 MM HG: CPT | Mod: CPTII,S$GLB,, | Performed by: INTERNAL MEDICINE

## 2022-02-10 PROCEDURE — 99999 PR PBB SHADOW E&M-EST. PATIENT-LVL IV: ICD-10-PCS | Mod: PBBFAC,,, | Performed by: INTERNAL MEDICINE

## 2022-02-10 PROCEDURE — 3008F PR BODY MASS INDEX (BMI) DOCUMENTED: ICD-10-PCS | Mod: CPTII,S$GLB,, | Performed by: INTERNAL MEDICINE

## 2022-02-10 PROCEDURE — 99204 OFFICE O/P NEW MOD 45 MIN: CPT | Mod: S$GLB,,, | Performed by: INTERNAL MEDICINE

## 2022-02-10 PROCEDURE — 3074F PR MOST RECENT SYSTOLIC BLOOD PRESSURE < 130 MM HG: ICD-10-PCS | Mod: CPTII,S$GLB,, | Performed by: INTERNAL MEDICINE

## 2022-02-10 PROCEDURE — 99999 PR PBB SHADOW E&M-EST. PATIENT-LVL IV: CPT | Mod: PBBFAC,,, | Performed by: INTERNAL MEDICINE

## 2022-02-10 NOTE — ASSESSMENT & PLAN NOTE
Clinically euthyroid   Plan to check fasting labs including TSH   Adjust medicine if needed    Would like to try generics due to cost

## 2022-02-10 NOTE — PROGRESS NOTES
Subjective:      Patient ID: Keli Gilbert is a 46 y.o. female.    Chief Complaint:  Hypothyroidism      History of Present Illness    Hypothyroidism diagnosed sixteen years ago. Seen by Dr. Galo. Has history of psoriatic arthritis managed on upadacitinib (new medication).    She reports low energy and constipation.    Denies any symptoms of over-treatment including, palpitations, difficulty falling to sleep, restlessness, anxiety or irritability.     No menstrual cycles while on mirena IUD.     Denied neck enlargement, hoarseness, dysphagia    Current medication is   Synthroid 125 mcg daily  Liothyronine 5 mcg two tablets daily    Takes it properly without food first thing in the morning with water. Waits 30 - 45 minutes before breakfast or other pills.    Mother and maternal grandfather with hypothyroidism     Review of Systems  Denies recent illness   Cholecystectomy planned for 2/18/2022    Objective:   Physical Exam  Constitutional:       General: She is not in acute distress.     Appearance: She is well-developed and well-nourished.   Eyes:      General: No scleral icterus.     Extraocular Movements: EOM normal.      Conjunctiva/sclera: Conjunctivae normal.      Pupils: Pupils are equal, round, and reactive to light.      Comments: No proptosis.    Neck:      Thyroid: No thyromegaly.      Trachea: No tracheal deviation.   Cardiovascular:      Rate and Rhythm: Normal rate.      Pulses: Intact distal pulses.   Pulmonary:      Effort: Pulmonary effort is normal.      Breath sounds: Normal breath sounds.   Musculoskeletal:      Cervical back: Normal range of motion and neck supple.   Lymphadenopathy:      Cervical: No cervical adenopathy.   Skin:     General: Skin is warm and dry.      Findings: No rash.      Comments: Skin tags, acanthosis over posterior neck   Neurological:      Mental Status: She is alert and oriented to person, place, and time.      Deep Tendon Reflexes: Reflexes are normal and  "symmetric.      Comments: No tremor.   Psychiatric:         Mood and Affect: Mood and affect normal.       Vitals:    02/10/22 0809   BP: 126/88   BP Location: Right arm   Patient Position: Sitting   BP Method: Medium (Manual)   Pulse: 89   SpO2: 97%   Weight: 104.6 kg (230 lb 11.4 oz)   Height: 5' 6" (1.676 m)       BP Readings from Last 3 Encounters:   02/10/22 126/88   02/08/22 (!) 145/95   01/19/22 128/74     Wt Readings from Last 1 Encounters:   02/10/22 0809 104.6 kg (230 lb 11.4 oz)         Body mass index is 37.24 kg/m².    Lab Review:   Lab Results   Component Value Date    HGBA1C 5.4 03/05/2016     Lab Results   Component Value Date    CHOL 155 07/10/2021    HDL 34 (L) 07/10/2021    LDLCALC 87.8 07/10/2021    TRIG 166 (H) 07/10/2021    CHOLHDL 21.9 07/10/2021     Lab Results   Component Value Date     02/08/2022    K 4.2 02/08/2022     02/08/2022    CO2 29 02/08/2022    GLU 95 02/08/2022    BUN 15 02/08/2022    CREATININE 0.8 02/08/2022    CALCIUM 9.6 02/08/2022    PROT 7.9 02/08/2022    ALBUMIN 3.9 02/08/2022    BILITOT 0.3 02/08/2022    ALKPHOS 92 02/08/2022    AST 14 02/08/2022    ALT 11 02/08/2022    ANIONGAP 8 02/08/2022    ESTGFRAFRICA >60 02/08/2022    EGFRNONAA >60 02/08/2022    TSH 1.745 05/15/2021   Results for HILLIARDMARLEN (MRN [id number]) as of 2/10/2022 08:25   Ref. Range 7/10/2021 08:23   Vit D, 25-Hydroxy Latest Ref Range: 30 - 96 ng/mL 39         Assessment and Plan     Hypothyroidism  Clinically euthyroid   Plan to check fasting labs including TSH   Adjust medicine if needed    Would like to try generics due to cost    Vitamin D deficiency disease  Continue cholecalciferol 2000 IUs daily     Acanthosis nigricans, acquired  Most likely due to IR   Check fasting insulin and glucose             "

## 2022-02-11 ENCOUNTER — PATIENT MESSAGE (OUTPATIENT)
Dept: FAMILY MEDICINE | Facility: CLINIC | Age: 47
End: 2022-02-11
Payer: COMMERCIAL

## 2022-02-11 ENCOUNTER — PATIENT MESSAGE (OUTPATIENT)
Dept: ENDOCRINOLOGY | Facility: CLINIC | Age: 47
End: 2022-02-11
Payer: COMMERCIAL

## 2022-02-12 ENCOUNTER — LAB VISIT (OUTPATIENT)
Dept: LAB | Facility: HOSPITAL | Age: 47
End: 2022-02-12
Attending: INTERNAL MEDICINE
Payer: COMMERCIAL

## 2022-02-12 DIAGNOSIS — L83 ACANTHOSIS NIGRICANS, ACQUIRED: ICD-10-CM

## 2022-02-12 LAB
GLUCOSE SERPL-MCNC: 93 MG/DL (ref 70–110)
TSH SERPL DL<=0.005 MIU/L-ACNC: 0.61 UIU/ML (ref 0.4–4)

## 2022-02-12 PROCEDURE — 84443 ASSAY THYROID STIM HORMONE: CPT | Performed by: INTERNAL MEDICINE

## 2022-02-12 PROCEDURE — 82947 ASSAY GLUCOSE BLOOD QUANT: CPT | Performed by: INTERNAL MEDICINE

## 2022-02-12 PROCEDURE — 83525 ASSAY OF INSULIN: CPT | Performed by: INTERNAL MEDICINE

## 2022-02-14 ENCOUNTER — OFFICE VISIT (OUTPATIENT)
Dept: SURGERY | Facility: CLINIC | Age: 47
End: 2022-02-14
Payer: COMMERCIAL

## 2022-02-14 ENCOUNTER — PATIENT MESSAGE (OUTPATIENT)
Dept: ENDOCRINOLOGY | Facility: CLINIC | Age: 47
End: 2022-02-14
Payer: COMMERCIAL

## 2022-02-14 ENCOUNTER — PATIENT MESSAGE (OUTPATIENT)
Dept: SURGERY | Facility: HOSPITAL | Age: 47
End: 2022-02-14
Payer: COMMERCIAL

## 2022-02-14 VITALS — WEIGHT: 230.63 LBS | HEIGHT: 66 IN | BODY MASS INDEX: 37.06 KG/M2

## 2022-02-14 DIAGNOSIS — K80.20 SYMPTOMATIC CHOLELITHIASIS: Primary | ICD-10-CM

## 2022-02-14 DIAGNOSIS — E03.9 HYPOTHYROIDISM, UNSPECIFIED TYPE: ICD-10-CM

## 2022-02-14 LAB
INSULIN COLLECTION INTERVAL: NORMAL
INSULIN SERPL-ACNC: 9.5 UU/ML

## 2022-02-14 PROCEDURE — 1159F MED LIST DOCD IN RCRD: CPT | Mod: CPTII,S$GLB,, | Performed by: SURGERY

## 2022-02-14 PROCEDURE — 3008F BODY MASS INDEX DOCD: CPT | Mod: CPTII,S$GLB,, | Performed by: SURGERY

## 2022-02-14 PROCEDURE — 99213 OFFICE O/P EST LOW 20 MIN: CPT | Mod: S$GLB,,, | Performed by: SURGERY

## 2022-02-14 PROCEDURE — 1160F RVW MEDS BY RX/DR IN RCRD: CPT | Mod: CPTII,S$GLB,, | Performed by: SURGERY

## 2022-02-14 PROCEDURE — 3008F PR BODY MASS INDEX (BMI) DOCUMENTED: ICD-10-PCS | Mod: CPTII,S$GLB,, | Performed by: SURGERY

## 2022-02-14 PROCEDURE — 99213 PR OFFICE/OUTPT VISIT, EST, LEVL III, 20-29 MIN: ICD-10-PCS | Mod: S$GLB,,, | Performed by: SURGERY

## 2022-02-14 PROCEDURE — 1159F PR MEDICATION LIST DOCUMENTED IN MEDICAL RECORD: ICD-10-PCS | Mod: CPTII,S$GLB,, | Performed by: SURGERY

## 2022-02-14 PROCEDURE — 1160F PR REVIEW ALL MEDS BY PRESCRIBER/CLIN PHARMACIST DOCUMENTED: ICD-10-PCS | Mod: CPTII,S$GLB,, | Performed by: SURGERY

## 2022-02-14 RX ORDER — LEVOTHYROXINE SODIUM 125 UG/1
125 TABLET ORAL
Qty: 90 TABLET | Refills: 3 | Status: SHIPPED | OUTPATIENT
Start: 2022-02-14 | End: 2022-02-14 | Stop reason: SDUPTHER

## 2022-02-14 RX ORDER — LEVOTHYROXINE SODIUM 125 UG/1
125 TABLET ORAL
Qty: 90 TABLET | Refills: 3 | Status: SHIPPED | OUTPATIENT
Start: 2022-02-14 | End: 2023-02-01 | Stop reason: SDUPTHER

## 2022-02-14 NOTE — PROGRESS NOTES
Follow up for nelly gianni to discuss the operation and planning.  All of her questions were answered.

## 2022-02-17 ENCOUNTER — HOSPITAL ENCOUNTER (OUTPATIENT)
Dept: PREADMISSION TESTING | Facility: HOSPITAL | Age: 47
Discharge: HOME | End: 2022-02-17
Attending: SURGERY
Payer: COMMERCIAL

## 2022-02-17 DIAGNOSIS — Z01.812 ENCOUNTER FOR PREOPERATIVE SCREENING LABORATORY TESTING FOR COVID-19 VIRUS: ICD-10-CM

## 2022-02-17 DIAGNOSIS — Z11.52 ENCOUNTER FOR PREOPERATIVE SCREENING LABORATORY TESTING FOR COVID-19 VIRUS: ICD-10-CM

## 2022-02-17 LAB — SARS-COV-2 RDRP RESP QL NAA+PROBE: NEGATIVE

## 2022-02-17 PROCEDURE — U0002 COVID-19 LAB TEST NON-CDC: HCPCS | Performed by: SURGERY

## 2022-02-18 ENCOUNTER — ANESTHESIA (OUTPATIENT)
Dept: SURGERY | Facility: HOSPITAL | Age: 47
End: 2022-02-18
Payer: COMMERCIAL

## 2022-02-18 ENCOUNTER — PATIENT MESSAGE (OUTPATIENT)
Dept: RHEUMATOLOGY | Facility: CLINIC | Age: 47
End: 2022-02-18
Payer: COMMERCIAL

## 2022-02-18 ENCOUNTER — HOSPITAL ENCOUNTER (OUTPATIENT)
Facility: HOSPITAL | Age: 47
Discharge: HOME | End: 2022-02-18
Attending: SURGERY | Admitting: SURGERY
Payer: COMMERCIAL

## 2022-02-18 VITALS
WEIGHT: 230.56 LBS | OXYGEN SATURATION: 96 % | BODY MASS INDEX: 37.21 KG/M2 | DIASTOLIC BLOOD PRESSURE: 93 MMHG | SYSTOLIC BLOOD PRESSURE: 145 MMHG | RESPIRATION RATE: 18 BRPM | TEMPERATURE: 98 F | HEART RATE: 80 BPM

## 2022-02-18 DIAGNOSIS — Z01.818 PREOPERATIVE TESTING: ICD-10-CM

## 2022-02-18 DIAGNOSIS — K80.20 SYMPTOMATIC CHOLELITHIASIS: Primary | ICD-10-CM

## 2022-02-18 DIAGNOSIS — Z01.812 ENCOUNTER FOR PREOPERATIVE SCREENING LABORATORY TESTING FOR COVID-19 VIRUS: ICD-10-CM

## 2022-02-18 DIAGNOSIS — Z11.52 ENCOUNTER FOR PREOPERATIVE SCREENING LABORATORY TESTING FOR COVID-19 VIRUS: ICD-10-CM

## 2022-02-18 LAB
B-HCG UR QL: NEGATIVE
POCT GLUCOSE: 105 MG/DL (ref 70–110)

## 2022-02-18 PROCEDURE — 47562 LAPAROSCOPIC CHOLECYSTECTOMY: CPT | Mod: ,,, | Performed by: SURGERY

## 2022-02-18 PROCEDURE — 63600175 PHARM REV CODE 636 W HCPCS: Performed by: ANESTHESIOLOGY

## 2022-02-18 PROCEDURE — C9290 INJ, BUPIVACAINE LIPOSOME: HCPCS | Performed by: SURGERY

## 2022-02-18 PROCEDURE — 63600175 PHARM REV CODE 636 W HCPCS: Performed by: SURGERY

## 2022-02-18 PROCEDURE — 25000003 PHARM REV CODE 250: Performed by: NURSE ANESTHETIST, CERTIFIED REGISTERED

## 2022-02-18 PROCEDURE — 47562 PR LAP,CHOLECYSTECTOMY: ICD-10-PCS | Mod: ,,, | Performed by: SURGERY

## 2022-02-18 PROCEDURE — 71000033 HC RECOVERY, INTIAL HOUR: Performed by: SURGERY

## 2022-02-18 PROCEDURE — 88304 PR  SURG PATH,LEVEL III: ICD-10-PCS | Mod: 26,,, | Performed by: PATHOLOGY

## 2022-02-18 PROCEDURE — 37000008 HC ANESTHESIA 1ST 15 MINUTES: Performed by: SURGERY

## 2022-02-18 PROCEDURE — 27201423 OPTIME MED/SURG SUP & DEVICES STERILE SUPPLY: Performed by: SURGERY

## 2022-02-18 PROCEDURE — 88304 TISSUE EXAM BY PATHOLOGIST: CPT | Mod: 26,,, | Performed by: PATHOLOGY

## 2022-02-18 PROCEDURE — D9220A PRA ANESTHESIA: ICD-10-PCS | Mod: ,,, | Performed by: ANESTHESIOLOGY

## 2022-02-18 PROCEDURE — 36000708 HC OR TIME LEV III 1ST 15 MIN: Performed by: SURGERY

## 2022-02-18 PROCEDURE — 71000015 HC POSTOP RECOV 1ST HR: Performed by: SURGERY

## 2022-02-18 PROCEDURE — 71000016 HC POSTOP RECOV ADDL HR: Performed by: SURGERY

## 2022-02-18 PROCEDURE — 37000009 HC ANESTHESIA EA ADD 15 MINS: Performed by: SURGERY

## 2022-02-18 PROCEDURE — 63600175 PHARM REV CODE 636 W HCPCS: Performed by: NURSE ANESTHETIST, CERTIFIED REGISTERED

## 2022-02-18 PROCEDURE — D9220A PRA ANESTHESIA: Mod: ,,, | Performed by: ANESTHESIOLOGY

## 2022-02-18 PROCEDURE — 36000709 HC OR TIME LEV III EA ADD 15 MIN: Performed by: SURGERY

## 2022-02-18 PROCEDURE — 81025 URINE PREGNANCY TEST: CPT | Performed by: SURGERY

## 2022-02-18 PROCEDURE — 88304 TISSUE EXAM BY PATHOLOGIST: CPT | Performed by: PATHOLOGY

## 2022-02-18 PROCEDURE — 71000039 HC RECOVERY, EACH ADD'L HOUR: Performed by: SURGERY

## 2022-02-18 PROCEDURE — 25000003 PHARM REV CODE 250: Performed by: SURGERY

## 2022-02-18 RX ORDER — MUPIROCIN 20 MG/G
OINTMENT TOPICAL
Status: DISPENSED | OUTPATIENT
Start: 2022-02-18

## 2022-02-18 RX ORDER — MORPHINE SULFATE 4 MG/ML
3 INJECTION, SOLUTION INTRAMUSCULAR; INTRAVENOUS
Status: DISCONTINUED | OUTPATIENT
Start: 2022-02-18 | End: 2022-02-18 | Stop reason: HOSPADM

## 2022-02-18 RX ORDER — HYDROMORPHONE HYDROCHLORIDE 2 MG/ML
0.2 INJECTION, SOLUTION INTRAMUSCULAR; INTRAVENOUS; SUBCUTANEOUS EVERY 5 MIN PRN
Status: COMPLETED | OUTPATIENT
Start: 2022-02-18 | End: 2022-02-18

## 2022-02-18 RX ORDER — ONDANSETRON 2 MG/ML
INJECTION INTRAMUSCULAR; INTRAVENOUS
Status: DISCONTINUED | OUTPATIENT
Start: 2022-02-18 | End: 2022-02-18

## 2022-02-18 RX ORDER — PROPOFOL 10 MG/ML
VIAL (ML) INTRAVENOUS
Status: DISCONTINUED | OUTPATIENT
Start: 2022-02-18 | End: 2022-02-18

## 2022-02-18 RX ORDER — FENTANYL CITRATE 50 UG/ML
INJECTION, SOLUTION INTRAMUSCULAR; INTRAVENOUS
Status: DISCONTINUED | OUTPATIENT
Start: 2022-02-18 | End: 2022-02-18

## 2022-02-18 RX ORDER — SCOLOPAMINE TRANSDERMAL SYSTEM 1 MG/1
PATCH, EXTENDED RELEASE TRANSDERMAL
Status: DISCONTINUED | OUTPATIENT
Start: 2022-02-18 | End: 2022-02-18

## 2022-02-18 RX ORDER — MIDAZOLAM HYDROCHLORIDE 1 MG/ML
INJECTION, SOLUTION INTRAMUSCULAR; INTRAVENOUS
Status: DISCONTINUED | OUTPATIENT
Start: 2022-02-18 | End: 2022-02-18

## 2022-02-18 RX ORDER — BUPIVACAINE HYDROCHLORIDE 2.5 MG/ML
INJECTION, SOLUTION INFILTRATION; PERINEURAL
Status: DISCONTINUED | OUTPATIENT
Start: 2022-02-18 | End: 2022-02-18 | Stop reason: HOSPADM

## 2022-02-18 RX ORDER — SODIUM CHLORIDE 9 MG/ML
INJECTION, SOLUTION INTRAVENOUS CONTINUOUS
Status: DISCONTINUED | OUTPATIENT
Start: 2022-02-18 | End: 2022-02-18 | Stop reason: HOSPADM

## 2022-02-18 RX ORDER — SODIUM CHLORIDE 0.9 % (FLUSH) 0.9 %
10 SYRINGE (ML) INJECTION
Status: DISCONTINUED | OUTPATIENT
Start: 2022-02-18 | End: 2022-02-18 | Stop reason: HOSPADM

## 2022-02-18 RX ORDER — SODIUM CHLORIDE 9 MG/ML
INJECTION, SOLUTION INTRAVENOUS CONTINUOUS
Status: ACTIVE | OUTPATIENT
Start: 2022-02-18

## 2022-02-18 RX ORDER — ROCURONIUM BROMIDE 10 MG/ML
INJECTION, SOLUTION INTRAVENOUS
Status: DISCONTINUED | OUTPATIENT
Start: 2022-02-18 | End: 2022-02-18

## 2022-02-18 RX ORDER — HYDROCODONE BITARTRATE AND ACETAMINOPHEN 5; 325 MG/1; MG/1
1 TABLET ORAL ONCE
Status: COMPLETED | OUTPATIENT
Start: 2022-02-18 | End: 2022-02-18

## 2022-02-18 RX ORDER — LIDOCAINE HYDROCHLORIDE 20 MG/ML
INJECTION INTRAVENOUS
Status: DISCONTINUED | OUTPATIENT
Start: 2022-02-18 | End: 2022-02-18

## 2022-02-18 RX ORDER — CEFAZOLIN SODIUM 2 G/50ML
2 SOLUTION INTRAVENOUS
Status: COMPLETED | OUTPATIENT
Start: 2022-02-18 | End: 2022-02-18

## 2022-02-18 RX ORDER — SUCCINYLCHOLINE CHLORIDE 20 MG/ML
INJECTION INTRAMUSCULAR; INTRAVENOUS
Status: DISCONTINUED | OUTPATIENT
Start: 2022-02-18 | End: 2022-02-18

## 2022-02-18 RX ORDER — PHENYLEPHRINE HYDROCHLORIDE 10 MG/ML
INJECTION INTRAVENOUS
Status: DISCONTINUED | OUTPATIENT
Start: 2022-02-18 | End: 2022-02-18

## 2022-02-18 RX ORDER — ACETAMINOPHEN 10 MG/ML
1000 INJECTION, SOLUTION INTRAVENOUS ONCE
Status: COMPLETED | OUTPATIENT
Start: 2022-02-18 | End: 2022-02-18

## 2022-02-18 RX ORDER — LABETALOL HYDROCHLORIDE 5 MG/ML
INJECTION, SOLUTION INTRAVENOUS
Status: DISCONTINUED | OUTPATIENT
Start: 2022-02-18 | End: 2022-02-18

## 2022-02-18 RX ORDER — DEXAMETHASONE SODIUM PHOSPHATE 4 MG/ML
INJECTION, SOLUTION INTRA-ARTICULAR; INTRALESIONAL; INTRAMUSCULAR; INTRAVENOUS; SOFT TISSUE
Status: DISCONTINUED | OUTPATIENT
Start: 2022-02-18 | End: 2022-02-18

## 2022-02-18 RX ORDER — HYDROCODONE BITARTRATE AND ACETAMINOPHEN 5; 325 MG/1; MG/1
1 TABLET ORAL EVERY 6 HOURS PRN
Qty: 30 TABLET | Refills: 0 | Status: SHIPPED | OUTPATIENT
Start: 2022-02-18 | End: 2022-07-20

## 2022-02-18 RX ADMIN — DEXAMETHASONE SODIUM PHOSPHATE 4 MG: 4 INJECTION, SOLUTION INTRAMUSCULAR; INTRAVENOUS at 07:02

## 2022-02-18 RX ADMIN — MIDAZOLAM HYDROCHLORIDE 2 MG: 1 INJECTION, SOLUTION INTRAMUSCULAR; INTRAVENOUS at 07:02

## 2022-02-18 RX ADMIN — ONDANSETRON 4 MG: 2 INJECTION, SOLUTION INTRAMUSCULAR; INTRAVENOUS at 07:02

## 2022-02-18 RX ADMIN — LABETALOL HYDROCHLORIDE 5 MG: 5 INJECTION, SOLUTION INTRAVENOUS at 08:02

## 2022-02-18 RX ADMIN — HYDROMORPHONE HYDROCHLORIDE 0.2 MG: 2 INJECTION INTRAMUSCULAR; INTRAVENOUS; SUBCUTANEOUS at 09:02

## 2022-02-18 RX ADMIN — PROPOFOL 200 MG: 10 INJECTION, EMULSION INTRAVENOUS at 07:02

## 2022-02-18 RX ADMIN — MUPIROCIN: 20 OINTMENT TOPICAL at 07:02

## 2022-02-18 RX ADMIN — PHENYLEPHRINE HYDROCHLORIDE 100 MCG: 10 INJECTION INTRAVENOUS at 08:02

## 2022-02-18 RX ADMIN — HYDROCODONE BITARTRATE AND ACETAMINOPHEN 1 TABLET: 5; 325 TABLET ORAL at 11:02

## 2022-02-18 RX ADMIN — CEFAZOLIN SODIUM 2 G: 2 SOLUTION INTRAVENOUS at 07:02

## 2022-02-18 RX ADMIN — PHENYLEPHRINE HYDROCHLORIDE 200 MCG: 10 INJECTION INTRAVENOUS at 07:02

## 2022-02-18 RX ADMIN — SODIUM CHLORIDE, SODIUM LACTATE, POTASSIUM CHLORIDE, AND CALCIUM CHLORIDE: .6; .31; .03; .02 INJECTION, SOLUTION INTRAVENOUS at 07:02

## 2022-02-18 RX ADMIN — LIDOCAINE HYDROCHLORIDE 100 MG: 20 INJECTION, SOLUTION INTRAVENOUS at 07:02

## 2022-02-18 RX ADMIN — ROCURONIUM BROMIDE 30 MG: 10 INJECTION, SOLUTION INTRAVENOUS at 07:02

## 2022-02-18 RX ADMIN — ACETAMINOPHEN 1000 MG: 10 INJECTION INTRAVENOUS at 09:02

## 2022-02-18 RX ADMIN — SCOPOLAMINE 1 PATCH: 1 PATCH, EXTENDED RELEASE TRANSDERMAL at 07:02

## 2022-02-18 RX ADMIN — SODIUM CHLORIDE, SODIUM LACTATE, POTASSIUM CHLORIDE, AND CALCIUM CHLORIDE: .6; .31; .03; .02 INJECTION, SOLUTION INTRAVENOUS at 08:02

## 2022-02-18 RX ADMIN — BUPIVACAINE 133 MG: 13.3 INJECTION, SUSPENSION, LIPOSOMAL INFILTRATION at 08:02

## 2022-02-18 RX ADMIN — FENTANYL CITRATE 100 MCG: 50 INJECTION, SOLUTION INTRAMUSCULAR; INTRAVENOUS at 07:02

## 2022-02-18 RX ADMIN — SUGAMMADEX 200 MG: 100 INJECTION, SOLUTION INTRAVENOUS at 08:02

## 2022-02-18 RX ADMIN — GLYCOPYRROLATE 0.2 MG: 0.2 INJECTION, SOLUTION INTRAMUSCULAR; INTRAVITREAL at 07:02

## 2022-02-18 RX ADMIN — SUCCINYLCHOLINE CHLORIDE 100 MG: 20 INJECTION, SOLUTION INTRAMUSCULAR; INTRAVENOUS at 07:02

## 2022-02-18 RX ADMIN — PHENYLEPHRINE HYDROCHLORIDE 100 MCG: 10 INJECTION INTRAVENOUS at 07:02

## 2022-02-18 NOTE — H&P
History & Physical     SUBJECTIVE:      History of Present Illness:  Patient is a 45 y.o. female presents with symptomatic cholelithiasis.           Chief Complaint   Patient presents with    Gall Bladder Problem               Review of patient's allergies indicates:   Allergen Reactions    Doxycycline hcl Nausea And Vomiting    Enbrel [etanercept] Other (See Comments)       Optic neurtits         Current Medications          Current Outpatient Medications   Medication Sig Dispense Refill    azelastine (ASTELIN) 137 mcg (0.1 %) nasal spray 1 spray (137 mcg total) by Nasal route 2 (two) times daily. (Patient not taking: Reported on 11/18/2021) 30 mL 0    buPROPion (WELLBUTRIN) 100 MG tablet TAKE 1 TABLET(100 MG) BY MOUTH TWICE DAILY 180 tablet 0    cholecalciferol, vitamin D3, 2,000 unit Tab Take 1 tablet by mouth Once a week.        COSENTYX PEN, 2 PENS, 150 mg/mL PnIj RX1: INJECT 300 MG INTO THE SKIN EVERY 7 DAYS FOR LOADING DOSES 10 Syringe 0    furosemide (LASIX) 40 MG tablet TAKE 1 TABLET(40 MG) BY MOUTH EVERY DAY 90 tablet 1    gabapentin (NEURONTIN) 300 MG capsule Take 300 mg by mouth 3 (three) times daily.        leflunomide (ARAVA) 20 MG Tab Take 1 tablet (20 mg total) by mouth once daily. 90 tablet 3    liothyronine (CYTOMEL) 5 MCG Tab TAKE 2 TABLETS(10 MCG) BY MOUTH EVERY  tablet 0    losartan (COZAAR) 50 MG tablet TAKE 1 TABLET BY MOUTH EVERY DAY 90 tablet 3    methadone (DOLOPHINE) 10 MG tablet Take 1 tablet by mouth Three times a day.        mupirocin calcium 2% (BACTROBAN) 2 % cream Apply within nares twice daily for 2 weeks (Patient not taking: Reported on 11/18/2021) 30 g 0    naproxen (NAPROSYN) 500 MG tablet Take 1 tablet (500 mg total) by mouth every meal as needed (pain). 270 tablet 2    ondansetron (ZOFRAN-ODT) 8 MG TbDL Take 1 tablet (8 mg total) by mouth every 6 (six) hours as needed (nausea). 15 tablet 1    pantoprazole (PROTONIX) 20 MG tablet Take 1 tablet (20 mg  total) by mouth once daily. (Patient not taking: Reported on 11/18/2021) 30 tablet 2    potassium chloride (MICRO-K) 10 MEQ CpSR TAKE 2 CAPSULES(20 MEQ) BY MOUTH EVERY  capsule 0    promethazine (PHENERGAN) 25 MG tablet TK 1 T PO BID PRN N        secukinumab (COSENTYX PEN) 150 mg/mL PnIj Inject 300 mg into the skin every 28 days. 6 Syringe 1    SYNTHROID 125 mcg tablet Take 1 tablet (125 mcg total) by mouth before breakfast. 90 tablet 3    traZODone (DESYREL) 50 MG tablet TAKE 1 TO 2 TABLET BY MOUTH EVERY NIGHT          No current facility-administered medications for this visit.                 Past Medical History:   Diagnosis Date    Abnormal Pap smear of vagina      AR (allergic rhinitis)      Demyelinating disorder      Depression      Fever blister      Fibromyalgia       followed by pain management    Generalized osteoarthritis of multiple sites      History of abnormal Pap smear      Hypertension      Hypothyroidism      Insulin resistance      Mixed anxiety and depressive disorder      Morbid obesity      Myelitis, optic neuritis in       treated with high-dose steroids and resolved; positive MRI and spinal fluid for a mass, but on embrel for psoriatic arthritis - [facility]; MRI normalized off embrel    Obesity      Pre-diabetes       hx diabetes on stereoids /    Psoriasis      Psoriatic arthritis      Vitamin D deficiency disease              Past Surgical History:   Procedure Laterality Date    SINUS SURGERY   1998            Family History   Problem Relation Age of Onset    Psoriasis Father      Cancer Father           throat    Thyroid disease Mother      Heart disease Mother      Hypertension Mother      Hyperlipidemia Mother      Coronary artery disease Mother           s/p CABG    Heart attack Mother      Heart disease Sister           MVP    Diabetes Paternal Uncle      Diabetes Maternal Grandfather      Heart disease Maternal  "Grandfather      Thyroid disease Maternal Grandfather      ADD / ADHD Son      Melanoma Neg Hx      Lupus Neg Hx      Eczema Neg Hx      Acne Neg Hx      Breast cancer Neg Hx      Colon cancer Neg Hx      Ovarian cancer Neg Hx        Social History            Tobacco Use    Smoking status: Never Smoker    Smokeless tobacco: Never Used   Substance Use Topics    Alcohol use: No    Drug use: No       Comment: 7/7/15 one weed brownie         Review of Systems:  Review of Systems   Constitutional: Negative.    HENT: Negative.    Eyes: Negative.    Respiratory: Negative.    Cardiovascular: Negative.    Gastrointestinal: Negative.    Endocrine: Negative.    Musculoskeletal: Negative.    Skin: Negative.    Allergic/Immunologic: Negative.    Neurological: Negative.    Hematological: Negative.    Psychiatric/Behavioral: Negative.    All other systems reviewed and are negative.        OBJECTIVE:      Vital Signs (Most Recent)  5' 6" (1.676 m)  109 kg (240 lb 3.1 oz)      Physical Exam:  Physical Exam  Vitals reviewed.   Constitutional:       Appearance: She is well-developed and well-nourished.   HENT:      Head: Normocephalic and atraumatic.      Right Ear: External ear normal.      Left Ear: External ear normal.      Nose: Nose normal.      Mouth/Throat:      Mouth: Oropharynx is clear and moist.   Eyes:      Extraocular Movements: EOM normal.      Conjunctiva/sclera: Conjunctivae normal.      Pupils: Pupils are equal, round, and reactive to light.   Cardiovascular:      Rate and Rhythm: Normal rate and regular rhythm.      Pulses: Intact distal pulses.      Heart sounds: Normal heart sounds.   Pulmonary:      Effort: Pulmonary effort is normal.      Breath sounds: Normal breath sounds.   Abdominal:      General: Bowel sounds are normal.      Palpations: Abdomen is soft.   Musculoskeletal:         General: Normal range of motion.      Cervical back: Normal range of motion and neck supple.   Skin:     General: " Skin is warm and dry.   Neurological:      Mental Status: She is alert and oriented to person, place, and time.      Deep Tendon Reflexes: Reflexes are normal and symmetric.   Psychiatric:         Mood and Affect: Mood and affect normal.         Behavior: Behavior normal.         Thought Content: Thought content normal.            Laboratory  none     Diagnostic Results:  US: Reviewed  gallstones     ASSESSMENT/PLAN:      Symptomatic cholelithiasis     PLAN:Plan      To the OR for lap gianni with the risks explained and she agrees to proceed.

## 2022-02-18 NOTE — DISCHARGE INSTRUCTIONS
[facility] [place]  Office # [phone]    Discharge Instructions for Same Day Surgery     Call the office for and appointment if one has not already been made.     Diet: Drink plenty of fluids the first 48 hours and you may resume your   usual diet.     Activity: No heavy lifting (over 10 pounds), pushing or pulling until your   post op visit. Your doctor's office may have told you to limit your lifting to less weight, or even no weight.  Be sure to follow those instructions.    Note: You may ride in a car and you may drive when comfortable.     Do not drive, drink alcohol, or sign legal documents for 24 hours, or if taking narcotic pain medication.    Dressings: Remove the dressing 24 hours after surgery. You may shower  24 hours after surgery and you may wash your hair.     If you have steri strips ( appears to be strips of white tape) on   your incision, leave them on. In 5-7 days they will begin to fall off.      Medical: Call the doctor for any of the following problems: fever above 101,   severe pain, bleeding, or abdominal distention (swelling).   If constipated you may take any stool softener you choose.     Occasionally small areas of skin numbness or an unpleasant skin sensation can result. Also, you may find that your incision is swollen and tender for a few days.  Some redness around sutures and staples is a normal reaction, but if the discomfort persists or worsens, call you doctor.       Patient Education       Cholecystectomy Discharge Instructions   About this topic   Cholecystectomy is surgery to remove the gallbladder. There are two types of surgeries done for this. You may have had your gallbladder taken out with a laparoscope. This means you have a few small cuts in your belly. Other times, it is taken out through one large cut in your belly. This is called an open procedure.     What care is needed at home?   · Ask your doctor what you need to do when you go home. Make sure you  ask questions if you do not understand what the doctor says. This way you will know what you need to do.  · Talk to your doctor about how to care for your cut site. Ask your doctor about:  ? When you should change your bandages  ? When you may take a bath or shower  ? If you need to be careful with lifting things over 10 pounds (4.5 kg)  ? When you may go back to your normal activities like work, driving, or sex  · Be sure to wash your hands before and after touching your wound or dressing.  · If you had a laparoscopic procedure, you will have a few small cut sites. They may have special tape on them called steri-strips. These will fall off on their own in about 10 days. You can take them off after 10 to 14 days if they have not fallen off. They may also use a skin glue to keep the cut sites together. This will slowly peel off on its own in about 7 to 14 days. Do not pick or peel at them.  · If you had an open procedure, you will have one large cut site. It may have sutures or staples. You may also have a drain to get rid of extra fluid. When the drain is taken out, there will be a small cut site.  · Keep coughing and doing deep breathing exercises for 7 to 10 days after you go home. This will help prevent lung infections.     What follow-up care is needed?   · Your doctor may ask you to make visits to the office to check on your progress. Be sure to keep these visits.  · If you have stitches or staples, you will need to have them taken out. Your doctor will often want to do this in 1 to 2 weeks. If the doctor used skin glue, the glue will fall off on its own.  · If you have a drain, you will need to have that removed in the office. Your doctor will want to do this in 1 to 2 weeks.  What drugs may be needed?   The doctor may order drugs to:  · Help with pain  · Fight an infection  · Soften stools if you are taking drugs for pain control  · Help with upset stomach  · Prevent blood clots  Will physical activity be  limited?   · Try to walk 3 to 4 times each day. Do your best to walk a little farther and longer each day.  · You may need to rest for a while. Talk to your doctor before you run, work out, or play sports.  · Do light household work only, such as washing dishes or helping with meals.  What changes to diet are needed?   · Drink 8 to 10 glasses of fluids each day.  · Avoid eating greasy or spicy foods.  · Your doctor may suggest a high-fiber diet. Ask your doctor if you need to change your diet.  What problems could happen?   · Bleeding  · Infection  · Swelling of the pancreas  · Injury to small bowel  When do I need to call the doctor?   · Signs of infection. These include a fever of 100.4°F (38°C) or higher, chills, very bad sore throat, pain with passing urine, or wound that will not heal.  · Signs of wound infection. These include swelling, redness, warmth around the wound; too much pain when touched; yellowish, greenish, or bloody discharge; foul smell coming from the cut site; cut site opens up.  · Signs of liver problems like upset stomach or throwing up, belly pain, feeling tired, dark urine, yellow skin or eyes, not hungry.  · Bowel movements that are white or gray in color or no bowel movement for 2 to 3 days after surgery  · Sudden onset of chest pain, fast heartbeat, breathing problems, and pain or tenderness in your calf could be a sign that a blood clot has traveled to your lungs. Call for emergency help right away.  Helpful tips   · When you cough, sneeze, or do deep breathing exercises, press a pillow across your cut to support the muscles to protect the wound and ease pain.  · Be active to help healing and prevent blood clots.  Teach Back: Helping You Understand   The Teach Back Method helps you understand the information we are giving you. After you talk with the staff, tell them in your own words what you learned. This helps to make sure the staff has described each thing clearly. It also helps to  explain things that may have been confusing. Before going home, make sure you can do these:  · I can tell you about my procedure.  · I can tell you how to care for my cut site.  · I can tell you what I will do if I have swelling, redness, or warmth around my wound.  Where can I learn more?   [facility]org  http://familydoctor.org/familydoctor/en/drugs-procedures-devices/procedures-devices/gallbladder-removal-laparoscopic-method.html   [facility]  https://www.healthlinkbc.ca/health-topics/uw405810   [facility]  https://www.nhs.[place]/conditions/gallbladder-removal/what-happens/   Last Reviewed Date   2021-05-17  Consumer Information Use and Disclaimer   This information is not specific medical advice and does not replace information you receive from your health care provider. This is only a brief summary of general information. It does NOT include all information about conditions, illnesses, injuries, tests, procedures, treatments, therapies, discharge instructions or life-style choices that may apply to you. You must talk with your health care provider for complete information about your health and treatment options. This information should not be used to decide whether or not to accept your health care providers advice, instructions or recommendations. Only your health care provider has the knowledge and training to provide advice that is right for you.  Copyright   Copyright © 2021 [employer]. and its affiliates and/or licensors. All rights reserved.                                                 -Exparel information-      To help control your pain after surgery, your surgeon injected Exparel (bupicacaine liposome injectable suspension) into your surgical incision just before the end of the procedure.      Exparel is a local analgesic that contains the local anesthetic bupivacaine..  Local anesthetics provide pain relief by numbing the tissue around the surgical site.    Exparel is specifically designed to release  pain medication over time an can control pain for up to 72 hours.    In addition to Exparel, your surgeon may provide other pain medications to control your pain.    Each patient is different and responds differently to pain medication.  Depending on how you respond to Exparel, you may require less additional pain medication during your recovery.    Side effects can occur with any medication and it is important not to ignore anything you may be experiencing.  Some patients who received Exparel experienced nausea, vomiting, or constipation.  Rarely, patients who receive bupivacaine (the active ingredient in Exparel) have experienced numbness and tingling in their mouth or lips, lightheadedness, or anxiety.  Speak with your doctor right away if you think you may be experiencing any of these sensations, or if you have other questions regarding possible side effects.    Products that contain bupivacaine, like Exparel, may cause a temporary loss of sensation or the ability to move in the area where bupivacaine was injected.    Other formulations of bupivacaine should not be administered within 96 hours following administrations of Exparel.      Do not remove the teal colored bracelet that you have on for 96 hours.  (4 DAYS)    This bracelet will let other health care workers know that you have received Exparel, and not to give you bupivacaine during this 96 hours.      Patient Education       Scopolamine (skoe RANDELL a scott)   Brand Names: US Transderm Scop (1.5 MG) [DSC]; Transderm-Scop (1.5 MG)   Brand Names: [place] ACCEL-Hyoscine; Buscopan   What is this drug used for?   · It is used to help motion sickness.  · It is used to treat GI (gastrointestinal) spasms.  · It is used to prevent upset stomach and throwing up from surgery.  · It is used during surgery.  · It may be given to you for other reasons. Talk with the doctor.    What do I need to tell my doctor BEFORE I take this drug?   All products:   · If you are  allergic to this drug; any part of this drug; or any other drugs, foods, or substances. Tell your doctor about the allergy and what signs you had.  · If you have any of these health problems: Chest pain, enlarged colon, glaucoma, a fast heartbeat, heart failure (weak heart), myasthenia gravis, stomach or bowel block or narrowing, or trouble passing urine.  · If you have high blood pressure during pregnancy (preeclampsia).  Tablets:   · If you have rare hereditary health problems like glucose-galactose malabsorption, fructose intolerance, or sucrase-isomaltase deficiency.  · If you are lactose intolerant.  Injection (if given in the muscle):   · If you are taking a blood thinner.  This is not a list of all drugs or health problems that interact with this drug.  Tell your doctor and pharmacist about all of your drugs (prescription or OTC, natural products, vitamins) and health problems. You must check to make sure that it is safe for you to take this drug with all of your drugs and health problems. Do not start, stop, or change the dose of any drug without checking with your doctor.  What are some things I need to know or do while I take this drug?   All products:   · Tell all of your health care providers that you take this drug. This includes your doctors, nurses, pharmacists, and dentists.  · Avoid driving and doing other tasks or actions that call for you to be alert or have clear eyesight until you see how this drug affects you.  · To lower the chance of feeling dizzy or passing out, rise slowly if you have been sitting or lying down. Be careful going up and down stairs.  · This drug may affect certain lab tests. Tell all of your health care providers and lab workers that you take this drug.  · Avoid drinking alcohol while taking this drug.  · Talk with your doctor before you use marijuana, other forms of cannabis, or prescription or OTC drugs that may slow your actions.  · If you will be taking part in  underwater sports, talk with your doctor. This drug may cause you to feel lost or confused.  · This drug may raise the chance of seizures in some people, including people who have had seizures in the past. Talk to your doctor to see if you have a greater chance of seizures while taking this drug.  · Bright lights may bother you. Wear sunglasses.  · Be careful in hot weather or while being active. Drink lots of fluids to stop fluid loss.  · If you have been taking this drug on a regular basis and you stop it all of a sudden, you may have signs of withdrawal. Do not stop taking this drug all of a sudden without calling your doctor. Tell your doctor if you have any bad effects.  · This drug is not approved for use in children. However, the doctor may decide the benefits of taking this drug outweigh the risks. If your child has been given this drug, ask the doctor for information about the benefits and risks. Talk with the doctor if you have questions about giving this drug to your child.  · If you are 65 or older, use this drug with care. You could have more side effects.  · Tell your doctor if you are pregnant, plan on getting pregnant, or are breast-feeding. You will need to talk about the benefits and risks to you and the baby.  Skin patch:   · The patch may have metal. Take off the patch before an MRI.  · Some people may have certain signs 24 hours or more after taking the patch off. Call your doctor right away if you have dizziness, very upset stomach or throwing up, headache, problems with balance or walking, very bad dizziness or passing out, muscle weakness, or slow heartbeat.  What are some side effects that I need to call my doctor about right away?   WARNING/CAUTION: Even though it may be rare, some people may have very bad and sometimes deadly side effects when taking a drug. Tell your doctor or get medical help right away if you have any of the following signs or symptoms that may be related to a very bad  side effect:  · Signs of an allergic reaction, like rash; hives; itching; red, swollen, blistered, or peeling skin with or without fever; wheezing; tightness in the chest or throat; trouble breathing, swallowing, or talking; unusual hoarseness; or swelling of the mouth, face, lips, tongue, or throat.  · Very bad dizziness or passing out.  · A fast heartbeat.  · Feeling confused.  · Trouble passing urine.  · Change in eyesight, eye pain, or very bad eye irritation.  · Seeing halos or bright colors around lights.  · Red eyes.  · Larger pupils.  · Seizures.  · Trouble speaking.  · Mental, mood, or behavior changes that are new or worse.  · Hallucinations (seeing or hearing things that are not there).  · Memory problems or loss.  · Shortness of breath.  · Feeling hot.  · Not sweating during activities or in warm temperatures.  · Call your doctor right away if you have severe stomach pain that lasts or gets worse. Call your doctor right away if stomach pain happens with fever, upset stomach or throwing up, change in bowel movements, tender stomach, very bad dizziness or passing out, or blood in the stool.  What are some other side effects of this drug?   All drugs may cause side effects. However, many people have no side effects or only have minor side effects. Call your doctor or get medical help if any of these side effects or any other side effects bother you or do not go away:  All products:   · Dry mouth.  · Feeling dizzy or sleepy.  · Diarrhea.  · Upset stomach.  · Sore throat.  · Restlessness.  Skin patch:   · Irritation where this drug is used.  These are not all of the side effects that may occur. If you have questions about side effects, call your doctor. Call your doctor for medical advice about side effects.  You may report side effects to your national health agency.  You may report side effects to the FDA at 1-906.440.5434. You may also report side effects at https://www.fda.gov/medwatch.  How is this drug  best taken?   Use this drug as ordered by your doctor. Read all information given to you. Follow all instructions closely.  Skin patch:   · Do not take this drug by mouth. Use on your skin only. Keep out of your mouth and eyes (may burn).  · Do not use patches that are cut or do not look right.  · Wash your hands before and after use.  · Put the patch on dry, hairless skin behind the ear. Be sure you know when to put the patch on. If you are not sure, talk with the doctor.  · Wear only one patch at a time.  · Be careful to not knock loose the patch while bathing or showering.  · If the patch falls off, put a new one on.  · If using for motion sickness and this drug is needed for more than 3 days, throw away the old patch. Put a new one on behind the other ear.  · When patch is taken off, wash site with soap and water.  · After you take off a skin patch, be sure to fold the sticky sides of the patch to each other. Throw away used patches where children and pets cannot get to them.  Tablets:   · Swallow whole with a full glass of water.  Injection:   · It is given as a shot into a muscle, vein, or into the fatty part of the skin.  What do I do if I miss a dose?   Skin patch:   · Put on a missed patch as soon as you think about it after taking off the old one.  · If it is close to the time for your next dose, skip the missed dose and go back to your normal time.  · Do not put on more than 1 patch at a time.  · Many times this drug is used on an as needed basis. Do not use more often than told by the doctor.  Tablets:   · Skip the missed dose and go back to your normal time.  · Do not take 2 doses at the same time or extra doses.  · Many times this drug is taken on an as needed basis. Do not take more often than told by the doctor.  Injection:   · Call your doctor to find out what to do.  How do I store and/or throw out this drug?   Skin patch:   · Store in an upright position at room temperature.  · Do not bend or roll  the foil pouch.  Tablets:   · Store at room temperature in a dry place. Do not store in a bathroom.  · Protect from heat and light.  Injection:   · If you need to store this drug at home, talk with your doctor, nurse, or pharmacist about how to store it.  All products:   · Keep all drugs in a safe place. Keep all drugs out of the reach of children and pets.  · Throw away unused or  drugs. Do not flush down a toilet or pour down a drain unless you are told to do so. Check with your pharmacist if you have questions about the best way to throw out drugs. There may be drug take-back programs in your area.  General drug facts   · If your symptoms or health problems do not get better or if they become worse, call your doctor.  · Do not share your drugs with others and do not take anyone else's drugs.  · Some drugs may have another patient information leaflet. If you have any questions about this drug, please talk with your doctor, nurse, pharmacist, or other health care provider.  · Some drugs may have another patient information leaflet. Check with your pharmacist. If you have any questions about this drug, please talk with your doctor, nurse, pharmacist, or other health care provider.  · If you think there has been an overdose, call your poison control center or get medical care right away. Be ready to tell or show what was taken, how much, and when it happened.    Consumer Information Use and Disclaimer   This generalized information is a limited summary of diagnosis, treatment, and/or medication information. It is not meant to be comprehensive and should be used as a tool to help the user understand and/or assess potential diagnostic and treatment options. It does NOT include all information about conditions, treatments, medications, side effects, or risks that may apply to a specific patient. It is not intended to be medical advice or a substitute for the medical advice, diagnosis, or treatment of a health  care provider based on the health care provider's examination and assessment of a patient's specific and unique circumstances. Patients must speak with a health care provider for complete information about their health, medical questions, and treatment options, including any risks or benefits regarding use of medications. This information does not endorse any treatments or medications as safe, effective, or approved for treating a specific patient. Up[employer], Inc[employer] and its affiliates disclaim any warranty or liability relating to this information or the use thereof. The use of this information is governed by the Terms of Use, available at https://www.[employer]/en/solutions/lexicomp/about/kaleb.  Last Reviewed Date   2021-02-26  Copyright   © 2021 [employer], Inc. and its affiliates and/or licensors. All rights reserved.              Fall Prevention  Millions of people fall every year and injure themselves. You may have had anesthesia or sedation which may increase your risk of falling. You may have health issues that put you at an increased risk of falling.     Here are ways to reduce your risk of falling.  ·   · Make your home safe by keeping walkways clear of objects you may trip over.  · Use non-slip pads under rugs. Do not use area rugs or small throw rugs.  · Use non-slip mats in bathtubs and showers.  · Install handrails and lights on staircases.  · Do not walk in poorly lit areas.  · Do not stand on chairs or wobbly ladders.  · Use caution when reaching overhead or looking upward. This position can cause a loss of balance.  · Be sure your shoes fit properly, have non-slip bottoms and are in good condition.   · Wear shoes both inside and out. Avoid going barefoot or wearing slippers.  · Be cautious when going up and down stairs, curbs, and when walking on uneven sidewalks.  · If your balance is poor, consider using a cane or walker.  · If your fall was related to alcohol use, stop or limit alcohol intake.    · If your fall was related to use of sleeping medicines, talk to your doctor about this. You may need to reduce your dosage at bedtime if you awaken during the night to go to the bathroom.    · To reduce the need for nighttime bathroom trips:  ¨ Avoid drinking fluids for several hours before going to bed  ¨ Empty your bladder before going to bed  ¨ Men can keep a urinal at the bedside  · Stay as active as you can. Balance, flexibility, strength, and endurance all come from exercise. They all play a role in preventing falls. Ask your healthcare provider which types of activity are right for you.  · Get your vision checked on a regular basis.  · If you have pets, know where they are before you stand up or walk so you don't trip over them.  · Use night lights.                                             -Exparel information-      To help control your pain after surgery, your surgeon injected Exparel (bupicacaine liposome injectable suspension) into your surgical incision just before the end of the procedure.      Exparel is a local analgesic that contains the local anesthetic bupivacaine..  Local anesthetics provide pain relief by numbing the tissue around the surgical site.    Exparel is specifically designed to release pain medication over time an can control pain for up to 72 hours.    In addition to Exparel, your surgeon may provide other pain medications to control your pain.    Each patient is different and responds differently to pain medication.  Depending on how you respond to Exparel, you may require less additional pain medication during your recovery.    Side effects can occur with any medication and it is important not to ignore anything you may be experiencing.  Some patients who received Exparel experienced nausea, vomiting, or constipation.  Rarely, patients who receive bupivacaine (the active ingredient in Exparel) have experienced numbness and tingling in their mouth or lips, lightheadedness, or  anxiety.  Speak with your doctor right away if you think you may be experiencing any of these sensations, or if you have other questions regarding possible side effects.    Products that contain bupivacaine, like Exparel, may cause a temporary loss of sensation or the ability to move in the area where bupivacaine was injected.    Other formulations of bupivacaine should not be administered within 96 hours following administrations of Exparel.      Do not remove the teal colored bracelet that you have on for 96 hours.  (4 DAYS)    This bracelet will let other health care workers know that you have received Exparel, and not to give you bupivacaine during this 96 hours.

## 2022-02-18 NOTE — PLAN OF CARE
Report called to Lali khan in [facility]. Pt transported via stretcher awake alert and oriented. Vital signs stable denies any needs at this time. Pain about a 4 on 0-10 scale. Pt states much better and managable. Significant other updated

## 2022-02-18 NOTE — BRIEF OP NOTE
[facility] - Surgery  Brief Operative Note    Surgery Date: 2/18/2022     Surgeon(s) and Role:     * Gabriel Herring MD - Primary    Assisting Surgeon:     Pre-op Diagnosis:  Symptomatic cholelithiasis [K80.20]    Post-op Diagnosis:  Post-Op Diagnosis Codes:     * Symptomatic cholelithiasis [K80.20]    Procedure(s) (LRB):  CHOLECYSTECTOMY, LAPAROSCOPIC (N/A)    Anesthesia: General    Operative Findings: cholecystitis with cholelithiasis    Estimated Blood Loss: * No values recorded between 2/18/2022  7:57 AM and 2/18/2022  8:43 AM *         Specimens:   Specimen (24h ago, onward)             Start     Ordered    02/18/22 0814  Specimen to Pathology, Surgery General Surgery  Once        Comments: Pre-op Diagnosis: Symptomatic cholelithiasis [K80.20]    Procedure(s):  CHOLECYSTECTOMY, LAPAROSCOPIC     Number of specimens: Gallbladder    Name of specimens: 1   References:    Click here for ordering Quick Tip   Question Answer Comment   Procedure Type: General Surgery    Specimen Class: Routine/Screening        02/18/22 0814                  Discharge Note    OUTCOME: Patient tolerated treatment/procedure well without complication and is now ready for discharge.    DISPOSITION: Home or Self Care    FINAL DIAGNOSIS:  Symptomatic cholelithiasis    FOLLOWUP: In clinic    DISCHARGE INSTRUCTIONS:    Discharge Procedure Orders   Comprehensive metabolic panel   Standing Status: Future Number of Occurrences: 1 Standing Exp. Date: 03/08/23     CBC auto differential   Standing Status: Future Number of Occurrences: 1 Standing Exp. Date: 03/08/23     Diet general     Call MD for:  temperature >100.4     Call MD for:  persistent nausea and vomiting     Call MD for:  severe uncontrolled pain     Call MD for:  difficulty breathing, headache or visual disturbances     Call MD for:  redness, tenderness, or signs of infection (pain, swelling, redness, odor or green/yellow discharge around incision site)     Call MD for:  hives      Remove dressing in 24 hours     Shower on day dressing removed (No bath)

## 2022-02-18 NOTE — OP NOTE
Laparoscopic cholecystectomy      DATE OF PROCEDURE: 02/18/2022       PREOPERATIVE DIAGNOSIS: Symptomatic cholelithiasis.     POSTOPERATIVE DIAGNOSIS: Same     PROCEDURE PERFORMED: Laparoscopic cholecystectomy.     SURGEON: Gabriel Herring M.D.     ANESTHESIA: General.     ESTIMATED BLOOD LOSS: minimal     DESCRIPTION OF OPERATION: The patient was brought to the Operating Room, placed  on operating room table in supine position. Under adequate anesthesia, prepped  and draped around her abdomen in the usual sterile fashion. Incision was made   in umbilicus through which a 5-mm port was inserted under direct vision. The   patient had her abdomen insufflated with 3.5 liters of CO2. Two other ports   were placed. An epigastric 11 mm and off right subcostal 5 mm port were placed.  The gallbladder was identified and retracted in cephalad fashion. Dissection   was done around the triangle of Calot. The cystic duct and cystic artery were   both identified and divided. The cystic duct was clipped. The gallbladder was   removed from the gallbladder fossa in antegrade fashion and brought out through   the epigastric port site. The abdomen was desufflated. The ports were removed   and port sites closed in layers with absorbable suture. Steri-Strips were   applied as well as bandage. The patient was awakened and transported to the   Recovery Room in satisfactory condition.

## 2022-02-18 NOTE — ANESTHESIA POSTPROCEDURE EVALUATION
Anesthesia Post Evaluation    Patient: Keli Gilbert    Procedure(s) Performed: Procedure(s) (LRB):  CHOLECYSTECTOMY, LAPAROSCOPIC (N/A)    Final Anesthesia Type: general      Patient location during evaluation: PACU  Patient participation: Yes- Able to Participate  Level of consciousness: awake and alert and oriented  Post-procedure vital signs: reviewed and stable  Pain management: adequate  Airway patency: patent    PONV status at discharge: No PONV  Anesthetic complications: no      Cardiovascular status: blood pressure returned to baseline, hemodynamically stable and stable  Respiratory status: unassisted, spontaneous ventilation and room air  Hydration status: euvolemic  Follow-up not needed.          Vitals Value Taken Time   /83 02/18/22 1011   Temp 37.1 °C (98.8 °F) 02/18/22 0846   Pulse 74 02/18/22 1016   Resp 13 02/18/22 1016   SpO2 92 % 02/18/22 1016   Vitals shown include unvalidated device data.      Event Time   Out of Recovery 10:17:28         Pain/Yulissa Score: Pain Rating Prior to Med Admin: 4 (2/18/2022  9:45 AM)  Yulissa Score: 10 (2/18/2022 10:00 AM)

## 2022-02-18 NOTE — TRANSFER OF CARE
Anesthesia Transfer of Care Note    Patient: Keli Gilbert    Procedure(s) Performed: Procedure(s) (LRB):  CHOLECYSTECTOMY, LAPAROSCOPIC (N/A)    Patient location: PACU    Anesthesia Type: general    Transport from OR: Transported from OR on room air with adequate spontaneous ventilation    Post pain: adequate analgesia    Post assessment: no apparent anesthetic complications and tolerated procedure well    Post vital signs: stable    Level of consciousness: sedated and responds to stimulation    Nausea/Vomiting: no nausea/vomiting    Complications: none    Transfer of care protocol was followed      Last vitals:   Visit Vitals  BP (!) 141/70 (BP Location: Right arm)   Pulse 83   Temp 37.1 °C (98.8 °F) (Oral)   Resp 17   Wt 104.6 kg (230 lb 9 oz)   SpO2 100%   Breastfeeding No   BMI 37.21 kg/m²

## 2022-02-21 LAB
FINAL PATHOLOGIC DIAGNOSIS: NORMAL
GROSS: NORMAL
Lab: NORMAL

## 2022-02-22 ENCOUNTER — PATIENT MESSAGE (OUTPATIENT)
Dept: ENDOCRINOLOGY | Facility: CLINIC | Age: 47
End: 2022-02-22
Payer: COMMERCIAL

## 2022-02-23 ENCOUNTER — PATIENT MESSAGE (OUTPATIENT)
Dept: SURGERY | Facility: CLINIC | Age: 47
End: 2022-02-23
Payer: COMMERCIAL

## 2022-02-25 RX ORDER — SULFAMETHOXAZOLE AND TRIMETHOPRIM 800; 160 MG/1; MG/1
1 TABLET ORAL 2 TIMES DAILY
Qty: 10 TABLET | Refills: 0 | Status: SHIPPED | OUTPATIENT
Start: 2022-02-25 | End: 2022-03-02

## 2022-02-28 ENCOUNTER — OFFICE VISIT (OUTPATIENT)
Dept: SURGERY | Facility: CLINIC | Age: 47
End: 2022-02-28
Payer: COMMERCIAL

## 2022-02-28 ENCOUNTER — PATIENT MESSAGE (OUTPATIENT)
Dept: SURGERY | Facility: CLINIC | Age: 47
End: 2022-02-28

## 2022-02-28 VITALS
SYSTOLIC BLOOD PRESSURE: 165 MMHG | WEIGHT: 233.25 LBS | HEIGHT: 66 IN | DIASTOLIC BLOOD PRESSURE: 83 MMHG | BODY MASS INDEX: 37.49 KG/M2

## 2022-02-28 DIAGNOSIS — K80.20 SYMPTOMATIC CHOLELITHIASIS: Primary | ICD-10-CM

## 2022-02-28 PROCEDURE — 1160F PR REVIEW ALL MEDS BY PRESCRIBER/CLIN PHARMACIST DOCUMENTED: ICD-10-PCS | Mod: CPTII,S$GLB,, | Performed by: SURGERY

## 2022-02-28 PROCEDURE — 3079F PR MOST RECENT DIASTOLIC BLOOD PRESSURE 80-89 MM HG: ICD-10-PCS | Mod: CPTII,S$GLB,, | Performed by: SURGERY

## 2022-02-28 PROCEDURE — 3077F SYST BP >= 140 MM HG: CPT | Mod: CPTII,S$GLB,, | Performed by: SURGERY

## 2022-02-28 PROCEDURE — 3008F PR BODY MASS INDEX (BMI) DOCUMENTED: ICD-10-PCS | Mod: CPTII,S$GLB,, | Performed by: SURGERY

## 2022-02-28 PROCEDURE — 1159F PR MEDICATION LIST DOCUMENTED IN MEDICAL RECORD: ICD-10-PCS | Mod: CPTII,S$GLB,, | Performed by: SURGERY

## 2022-02-28 PROCEDURE — 1160F RVW MEDS BY RX/DR IN RCRD: CPT | Mod: CPTII,S$GLB,, | Performed by: SURGERY

## 2022-02-28 PROCEDURE — 1159F MED LIST DOCD IN RCRD: CPT | Mod: CPTII,S$GLB,, | Performed by: SURGERY

## 2022-02-28 PROCEDURE — 3077F PR MOST RECENT SYSTOLIC BLOOD PRESSURE >= 140 MM HG: ICD-10-PCS | Mod: CPTII,S$GLB,, | Performed by: SURGERY

## 2022-02-28 PROCEDURE — 3079F DIAST BP 80-89 MM HG: CPT | Mod: CPTII,S$GLB,, | Performed by: SURGERY

## 2022-02-28 PROCEDURE — 3008F BODY MASS INDEX DOCD: CPT | Mod: CPTII,S$GLB,, | Performed by: SURGERY

## 2022-02-28 PROCEDURE — 99024 POSTOP FOLLOW-UP VISIT: CPT | Mod: S$GLB,,, | Performed by: SURGERY

## 2022-02-28 PROCEDURE — 99024 PR POST-OP FOLLOW-UP VISIT: ICD-10-PCS | Mod: S$GLB,,, | Performed by: SURGERY

## 2022-02-28 NOTE — PROGRESS NOTES
Subjective:       Patient ID: Keli Gilbert is a 46 y.o. female.    Chief Complaint: Post-op Evaluation    HPI follow up post op lap gianni without complaints  Review of Systems      Objective:      Physical Exam  Vitals reviewed.   Constitutional:       Appearance: She is well-developed.   HENT:      Head: Normocephalic and atraumatic.      Right Ear: External ear normal.      Left Ear: External ear normal.      Nose: Nose normal.   Eyes:      Conjunctiva/sclera: Conjunctivae normal.      Pupils: Pupils are equal, round, and reactive to light.   Cardiovascular:      Rate and Rhythm: Normal rate and regular rhythm.      Heart sounds: Normal heart sounds.   Pulmonary:      Effort: Pulmonary effort is normal.      Breath sounds: Normal breath sounds.   Abdominal:      General: Bowel sounds are normal.      Palpations: Abdomen is soft.       Musculoskeletal:         General: Normal range of motion.      Cervical back: Normal range of motion and neck supple.   Skin:     General: Skin is warm and dry.   Neurological:      Mental Status: She is alert and oriented to person, place, and time.      Deep Tendon Reflexes: Reflexes are normal and symmetric.   Psychiatric:         Behavior: Behavior normal.         Thought Content: Thought content normal.         Assessment:       Problem List Items Addressed This Visit     Symptomatic cholelithiasis - Primary        doing well  Plan:       I discussed her aftercare and what she should expect and I will see her back prn

## 2022-03-07 ENCOUNTER — PATIENT MESSAGE (OUTPATIENT)
Dept: OBSTETRICS AND GYNECOLOGY | Facility: CLINIC | Age: 47
End: 2022-03-07
Payer: COMMERCIAL

## 2022-03-09 ENCOUNTER — PATIENT MESSAGE (OUTPATIENT)
Dept: OBSTETRICS AND GYNECOLOGY | Facility: CLINIC | Age: 47
End: 2022-03-09
Payer: COMMERCIAL

## 2022-03-17 ENCOUNTER — PATIENT MESSAGE (OUTPATIENT)
Dept: FAMILY MEDICINE | Facility: CLINIC | Age: 47
End: 2022-03-17
Payer: COMMERCIAL

## 2022-03-17 DIAGNOSIS — E03.9 HYPOTHYROIDISM, UNSPECIFIED TYPE: ICD-10-CM

## 2022-03-17 RX ORDER — LIOTHYRONINE SODIUM 5 UG/1
5 TABLET ORAL DAILY
Qty: 180 TABLET | Refills: 0 | Status: SHIPPED | OUTPATIENT
Start: 2022-03-17 | End: 2022-05-27 | Stop reason: SDUPTHER

## 2022-03-17 NOTE — TELEPHONE ENCOUNTER
No new care gaps identified.  Powered by [employer]. Reference number: [id number].   3/17/2022 1:42:40 PM CDT

## 2022-03-18 ENCOUNTER — PATIENT MESSAGE (OUTPATIENT)
Dept: FAMILY MEDICINE | Facility: CLINIC | Age: 47
End: 2022-03-18
Payer: COMMERCIAL

## 2022-04-07 DIAGNOSIS — I10 BENIGN ESSENTIAL HTN: ICD-10-CM

## 2022-04-08 RX ORDER — LOSARTAN POTASSIUM 50 MG/1
TABLET ORAL
Qty: 90 TABLET | Refills: 3 | Status: SHIPPED | OUTPATIENT
Start: 2022-04-08 | End: 2022-06-29 | Stop reason: SDUPTHER

## 2022-04-08 NOTE — TELEPHONE ENCOUNTER
No new care gaps identified.  Powered by [employer]. Reference number: [id number].   4/07/2022 8:49:29 PM CDT

## 2022-04-15 NOTE — PROGRESS NOTES
Subjective:       Patient ID: Keli Concha Gilbert is a 46 y.o. female with psoriatic arthritis on Cosentyx & Leflunomide (& naproxen); demyelinating disorder secondary to Enbrel; incomplete effect on chronic pain syndrome on savella & methadone     Chief Complaint: Disease management      Ms. Gilbert is a 45 yo woman with history of PsA last seen last on 1/19/22. She was switched to Rinvoq which she began on 1/29/22. Still on leflunomide 20 mg/d and naproxen 500 mg which she takes up to tid pc without any adverse effects.   Has not been able to get Shingrix as insurance would not pay for it despite her immunosuppressed state.     She returns for f/u and is doing well overall despite lots of social stresses (had to put mom in hospice; sister w breast cancer, etc).  Her dactylitis of R 3rd PIP is improved and now just w flexion contracture. L hand ok.. AM stiffness ~ 30 minutes.  She has no skin lesions.     She had her laparoscopic cholecystectomy on 1/28/22 without any issues.           Current Outpatient Medications   Medication Sig Dispense Refill    buPROPion (WELLBUTRIN) 100 MG tablet TAKE 1 TABLET(100 MG) BY MOUTH TWICE DAILY 180 tablet 0    cholecalciferol, vitamin D3, 2,000 unit Tab Take 1 tablet by mouth Once a week.      furosemide (LASIX) 40 MG tablet TAKE 1 TABLET(40 MG) BY MOUTH EVERY DAY 90 tablet 1    gabapentin (NEURONTIN) 300 MG capsule Take 300 mg by mouth 3 (three) times daily.      HYDROcodone-acetaminophen (NORCO) 5-325 mg per tablet Take 1 tablet by mouth every 6 (six) hours as needed for Pain. 30 tablet 0    leflunomide (ARAVA) 20 MG Tab Take 1 tablet (20 mg total) by mouth once daily. 90 tablet 3    levothyroxine (SYNTHROID) 125 MCG tablet Take 1 tablet (125 mcg total) by mouth before breakfast. 90 tablet 3    liothyronine (CYTOMEL) 5 MCG Tab Take 1 tablet (5 mcg total) by mouth once daily. 180 tablet 0    loratadine-pseudoephedrine  mg (CLARITIN-D 24-HOUR)  mg per 24 hr  tablet Take 1 tablet by mouth once daily.      losartan (COZAAR) 50 MG tablet TAKE 1 TABLET BY MOUTH EVERY DAY 90 tablet 3    methadone (DOLOPHINE) 10 MG tablet Take 1 tablet by mouth Three times a day.      naproxen (NAPROSYN) 500 MG tablet Take 1 tablet (500 mg total) by mouth every meal as needed (pain). 270 tablet 2    potassium chloride (MICRO-K) 10 MEQ CpSR Take 2 capsules (20 mEq total) by mouth once daily. 180 capsule 0    promethazine (PHENERGAN) 25 MG tablet TK 1 T PO BID PRN N      traZODone (DESYREL) 50 MG tablet TAKE 1 TO 2 TABLET BY MOUTH EVERY NIGHT      upadacitinib (RINVOQ) 15 mg 24 hr tablet Take 1 tablet (15 mg total) by mouth once daily. 30 tablet 3     No current facility-administered medications for this visit.     Facility-Administered Medications Ordered in Other Visits   Medication Dose Route Frequency Provider Last Rate Last Admin    0.9%  NaCl infusion   Intravenous Continuous Gabriel Herring MD        mupirocin 2 % ointment   Nasal On Call Procedure Gabriel Herring MD   Given at 02/18/22 0703       Review of patient's allergies indicates:   Allergen Reactions    Doxycycline hcl Nausea And Vomiting    Enbrel [etanercept] Other (See Comments)     Optic neurtits     Past Medical History:   Diagnosis Date    Abnormal Pap smear of vagina     AR (allergic rhinitis)     Demyelinating disorder     Depression     Fever blister     Fibromyalgia     followed by pain management    Generalized osteoarthritis of multiple sites     History of abnormal Pap smear     Hypertension     Hypothyroidism     Insulin resistance     Mixed anxiety and depressive disorder     Morbid obesity     Myelitis, optic neuritis in     treated with high-dose steroids and resolved; positive MRI and spinal fluid for a mass, but on embrel for psoriatic arthritis - [facility]; MRI normalized off embrel    Obesity     Pre-diabetes     hx diabetes on stereoids /    Psoriasis      Psoriatic arthritis     Vaginal delivery     x1    Vitamin D deficiency disease      Past Surgical History:   Procedure Laterality Date    EYE SURGERY Bilateral 08/2021    Laser surgery for pressure relief    LAPAROSCOPIC CHOLECYSTECTOMY N/A 2/18/2022    Procedure: CHOLECYSTECTOMY, LAPAROSCOPIC;  Surgeon: Gabriel Herring MD;  Location: [facility];  Service: General;  Laterality: N/A;  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  C A  NEED H/P-----CLEARED BY PCP    SINUS SURGERY  1998       Review of Systems   Constitutional: Positive for fatigue. Negative for fever and unexpected weight change.   HENT: Negative.  Negative for dental problem, mouth sores and trouble swallowing.         Dry mouth only w antihistamines   Eyes: Negative for redness, itching and visual disturbance.        Dry eyes only with antihistamines   Respiratory: Negative.  Negative for cough and shortness of breath.    Cardiovascular: Negative.  Negative for chest pain, palpitations and leg swelling.   Gastrointestinal: Negative.  Negative for abdominal pain, anal bleeding, blood in stool, constipation, diarrhea and nausea.        Heartburn.   Endocrine: Negative.    Genitourinary: Negative.  Negative for dysuria, frequency, genital sores, menstrual problem, pelvic pain and urgency.   Musculoskeletal: Negative.  Negative for arthralgias, back pain, gait problem and neck pain.   Skin: Negative.  Negative for color change and rash.   Allergic/Immunologic: Positive for immunocompromised state.   Neurological: Positive for headaches. Negative for dizziness, seizures, weakness and numbness.   Hematological: Negative.  Negative for adenopathy. Does not bruise/bleed easily.   Psychiatric/Behavioral: Positive for dysphoric mood (stable) and sleep disturbance (stable). Negative for decreased concentration, self-injury and suicidal ideas. The patient is nervous/anxious.          Family History   Problem Relation Age of Onset     "Psoriasis Father     Cancer Father         throat    Thyroid disease Mother     Heart disease Mother     Hypertension Mother     Hyperlipidemia Mother     Coronary artery disease Mother         s/p CABG    Heart attack Mother     Heart disease Sister         MVP    Diabetes Paternal Uncle     Diabetes Maternal Grandfather     Heart disease Maternal Grandfather     Thyroid disease Maternal Grandfather     ADD / ADHD Son     Melanoma Neg Hx     Lupus Neg Hx     Eczema Neg Hx     Acne Neg Hx     Breast cancer Neg Hx     Colon cancer Neg Hx     Ovarian cancer Neg Hx      Mom w CVAs & in a nursing home. She has cognitive issues.     SH:   Working at a bank   Previously worked at a bar as well.     Expecting her first granddaughter in June.   No alcohol; no cigarettes;   Objective:   /80   Pulse 82   Ht 5' 6" (1.676 m)   Wt 111.7 kg (246 lb 4.1 oz)   BMI 39.75 kg/m²     Was 242 lb 8.1 oz on 1/19/22  Was 254 lb 3.1 oz on 3/16/21  Was 236 lb 12.4 oz on 6/8/19  Was 224 lb 13.9 oz on 2/19/19.    Physical Exam   Constitutional: She is oriented to person, place, and time. No distress.   HENT:   Head: Normocephalic and atraumatic.   Mouth/Throat: Oropharynx is clear and moist. No oropharyngeal exudate.   No facial rashes  Parotids not enlarged  No oral ulcers  Face oliva   Eyes: Pupils are equal, round, and reactive to light. Conjunctivae are normal. Right eye exhibits no discharge. Left eye exhibits no discharge. No scleral icterus.   Neck: No JVD present. No tracheal deviation present. No thyromegaly present.   Cardiovascular: Normal rate, regular rhythm and normal heart sounds. Exam reveals no gallop and no friction rub.   No murmur heard.  Pulmonary/Chest: Effort normal and breath sounds normal. No respiratory distress. She has no wheezes. She has no rales. She exhibits no tenderness.   Abdominal: Soft. Bowel sounds are normal. She exhibits no distension and no mass. There is no splenomegaly or " hepatomegaly. There is no abdominal tenderness. There is no rebound and no guarding.   Musculoskeletal:         General: No tenderness. Normal range of motion.      Cervical back: Neck supple.      Comments: Cspine slight decrease in lateral flexion & rotation; no tenderness  Tspine FROM no tenderness  Lspine FROM no tenderness.  TMJ: unremarkable  Shoulders: FROM; no synovitis  Elbows: FROM; no synovitis; no tophi or nodules  Wrists: no SHT; no tenderness; good ROM  MCPs: no metatarsalgia; no synovitis  PIPs: no more dactylitis 2nd ray R; R 3rd PIP w flexion contracture; no TTP;  cannot fully extend this joint.   DIPs: FROM; no synovitis  HIPS: FROM  Knees: FROM; no synovitis; no instability;  Ankles: FROM: no synovitis   Toes: ok; no metatarsalgia                 Lymphadenopathy:     She has no cervical adenopathy.   Neurological: She is alert and oriented to person, place, and time. She has normal reflexes. No cranial nerve deficit. Gait normal.   Proximal and distal muscle strength 5/5.   Skin: Skin is warm and dry. She is not diaphoretic.   No psoriasis.   Psychiatric: Mood, memory, affect and judgment normal.   Drowsy.   Vitals reviewed.        Labs:   4/16/22: ESR 24 (36); CRP 8.0; CBC Hg 11.3; low indices; CMP ok;  2/8/22: CBC Hg 11.9; CMP ok;   1/19/22: ESR 66; CRP 18.1  10/9/21: ESR 38 (36); CRP 11.7; CBC ok; CMP ok;   5/15/21: CMP ok;  TSH ok; (150)  3/16/21: ESR 72 (36); CRP 25.1; CBC  Hg 11.4; CMP glu 125; alb 3.4;   7/22/20: ESR 34 (36); CRP 16.1; CBC Hg 10.8; Ht 36.8; CMP ok; last labs  12/7/19: ESR 17; CRP CBC Hg 11.8; CMP ok; TFTs ok;   9/14/19: ESR 24 (36); CRP 9.8; CBC Hg 11.5; CP ok;   2/19/19: ESR 13; CRP 5.8; CBC plts 362; CMP ok; HCV/HBV/QG neg;  11/17/18: ESR 8; CRP 3.9; Hg 11.9; low indices; CMP ok;   8/22/18: ESR 10; CRP 10.9; CBC Hg 10.7; Ht 35.7; CMP ok  5/14/18: ESR 13; CRP 13.4;  Hg 11.3; low indices; eos 12.4%; CMP ok;   2/10/18: ESR 30; CRP 10.5; Hg 11.3; plt 366; CMP ok;    10/19/17: HBV neg; HCV neg;   9/2/17: ESR 16; CRp 5.9; Hg 11.6; Ht 36.6; CMP ok;   5/29/17: QG neg  2/20/17: ESR 34; CRP 8.1; CBC Hg 11.4; Ht 35.6; CMP ok;   11/19/16: ESR 20; CRP 4.8; CBC Hg 11.9; CMP K+3.3, otherwise ok;   8/27/16: ESR 22; CRP 7; Hg 11.8; Ht 36.9; CMP ok;   5/26/16: ESR 35; CRP 11.2Hg 11.8; Ht 36.9; CMP ok;   5/21/16: Vit d 37  10/8/15: ESR 45; CRP 14.2; Hg 11.2; Ht 35.8; CMP; ok  7/7/15: CBC plt 380; K+ 3.4; Glu 154  7/6/15: ESR 48; CRP 9.6  4/4/15: ESR 41; CRP 19; Hg 11.0; Ht 34.6; plt 377; CMP ok;  1/10/15: ESR 34; CRP 18; CBC ok; ; Alb. 3.6  10/4/14: ESR 43; CRP 18.5; Hg 11.8; Ht 36.5; plt 378; Alb. 3.4;   3/20/14: ESR 41; CRP 14.3; plt 356; CMP ok;  2/15/13: ESR 30; CRP 9.8; CBC ok; Alb. 3.3  11/2/13: ESR 28; CRP 11.6; plt 363; Alb. 3.3;   5/28/13: ESR 45; CRP 8.8; plt 366; CMP ok;     12/1/18: Bilateral hands: personally viewed: Mild degenerative changes noted at the interphalangeal joints.  Mild-to-moderate degenerative change noted at the bilateral 1st CMC joints.  Mild degenerative changes also present at both radiocarpal joints, triscaphe joints and right distal radioulnar joint.  Minimal degenerative changes also present at the MCP joints bilaterally and greatest at the 1st MCP joint on the left.  6/15/17: Bilateral hands: personally reviewed: mild osteoarthritis w stable periarticular erosion at the left fourth MCP joint; no change since last year.   8/27/16: Bilateral feet: personally reviewed. Mild HV; mild OA shell 1st MTP dali; ? Erosion PIP left small toe;   5/26/16: Bilateral hands: personally reviewed: L 4th MCP (new) erosive lesion; R & L 2nd & 3rd metacarpal head cysts; R mild PIP erosion & STS 2nd.    Assessment:     Psoriatic arthritis-- with mild SHT 2 MCPs bilaterally (R >>L) & with sausage digit of R index finger--currently w  less& flexion deformity of R 3rd PIP--stable at present .    a) History of sausage digits of both toes      PIPs, DIPs, wrists, and knees.,  now w. only one sausage digit.   b) Psoriasis of the abdomen, elbow, and the shins--resolved.    c) Enbrel stopped 10/21/11 due to optic neuritis.     d) New erosion (4th L metacarpal head) on x-ray & possibly L 5th toe PIP     e) Persisting elevation of ESR and CRP    f) inadequate response to leflunomide and Stelara & apremilast (w. Intolerable nausea)   G) could not tolerate SSZ--nausea.   H) MTX x 2 even SC did not help sxs.   I) improved on Cosentyx (+leflunomide + naproxen) initially incompletely-- hand joints still hurt despite normalization of  ESR & CRP   J) Orencia begun 11/17 with incomplete response   K)Taltz begun 4/20/18-2/19   L) Tofacitinib 3/19 - 6/18/19    Chronic SHT 3rd R PIP & base of L thumb with questionable erosive lesion.   M) Back to Cosentyx + leflunomide + celebrex    N) Tremfya begun 4/14/21--failed: stopped w headache & lack of efficacy   O) Back to Cosentyx 10/6/21 with improvement; held since 12/15/21.   P) On Rinvoq 30 mg since 1/29/22--stab;e.    Left optic neuritis with demyelinating lesions secondary to Enbrel--now resolved.    Repeat MRI ok    Chronic pain/fibromyalgia syndrome with fatigue, tender points, withdrawals to palpation in the past, pain all over, responsive to Savella but with some nausea (off it now), followed by Dr. Odom at the [facility] [place],    On methadone    Diabetes mellitus    Degenerative joint disease of the cervical spine, knees, and feet--very mild.     Depression on wellbutrin   Off savella & sertraline (much weight gain)    Hypertriglyceridemia    Hypertension.     Hypothyroidism.     Vitamin D deficiency with regular prescription vitamin D supplementation.     Severe obesity from morbid obesity        Plan:   Reviewed results of the Oral Surveillance study & increased risk of MACE in patients over 50 w CVD risk factors; also reviewed VTE data & increased risk of malignancy again.  Still needs Shingrix.  Gave her recs from [facility] to give to pharmacist.  Rinvoq  30 mg/d.  Stay on leflunomide 20 mg/d  Ok to continue naproxen 500 mg up to tid but less is best.   Labs~7/16/22  Patient will keep us in the loop.   RTC 3 months or prn

## 2022-04-16 ENCOUNTER — LAB VISIT (OUTPATIENT)
Dept: LAB | Facility: HOSPITAL | Age: 47
End: 2022-04-16
Attending: INTERNAL MEDICINE
Payer: COMMERCIAL

## 2022-04-16 DIAGNOSIS — Z79.899 LONG-TERM USE OF HIGH-RISK MEDICATION: ICD-10-CM

## 2022-04-16 LAB
ALBUMIN SERPL BCP-MCNC: 3.6 G/DL (ref 3.5–5.2)
ALP SERPL-CCNC: 82 U/L (ref 55–135)
ALT SERPL W/O P-5'-P-CCNC: 13 U/L (ref 10–44)
ANION GAP SERPL CALC-SCNC: 13 MMOL/L (ref 8–16)
AST SERPL-CCNC: 20 U/L (ref 10–40)
BASOPHILS # BLD AUTO: 0.04 K/UL (ref 0–0.2)
BASOPHILS NFR BLD: 0.5 % (ref 0–1.9)
BILIRUB SERPL-MCNC: 0.2 MG/DL (ref 0.1–1)
BUN SERPL-MCNC: 11 MG/DL (ref 6–20)
CALCIUM SERPL-MCNC: 9.7 MG/DL (ref 8.7–10.5)
CHLORIDE SERPL-SCNC: 102 MMOL/L (ref 95–110)
CO2 SERPL-SCNC: 26 MMOL/L (ref 23–29)
CREAT SERPL-MCNC: 0.7 MG/DL (ref 0.5–1.4)
CRP SERPL-MCNC: 8 MG/L (ref 0–8.2)
DIFFERENTIAL METHOD: ABNORMAL
EOSINOPHIL # BLD AUTO: 0.3 K/UL (ref 0–0.5)
EOSINOPHIL NFR BLD: 3.9 % (ref 0–8)
ERYTHROCYTE [DISTWIDTH] IN BLOOD BY AUTOMATED COUNT: 16.4 % (ref 11.5–14.5)
ERYTHROCYTE [SEDIMENTATION RATE] IN BLOOD BY WESTERGREN METHOD: 24 MM/HR (ref 0–36)
EST. GFR  (AFRICAN AMERICAN): >60 ML/MIN/1.73 M^2
EST. GFR  (NON AFRICAN AMERICAN): >60 ML/MIN/1.73 M^2
GLUCOSE SERPL-MCNC: 99 MG/DL (ref 70–110)
HCT VFR BLD AUTO: 38.3 % (ref 37–48.5)
HGB BLD-MCNC: 11.3 G/DL (ref 12–16)
IMM GRANULOCYTES # BLD AUTO: 0.04 K/UL (ref 0–0.04)
IMM GRANULOCYTES NFR BLD AUTO: 0.5 % (ref 0–0.5)
LYMPHOCYTES # BLD AUTO: 2.3 K/UL (ref 1–4.8)
LYMPHOCYTES NFR BLD: 28.7 % (ref 18–48)
MCH RBC QN AUTO: 26.6 PG (ref 27–31)
MCHC RBC AUTO-ENTMCNC: 29.5 G/DL (ref 32–36)
MCV RBC AUTO: 90 FL (ref 82–98)
MONOCYTES # BLD AUTO: 0.6 K/UL (ref 0.3–1)
MONOCYTES NFR BLD: 7.2 % (ref 4–15)
NEUTROPHILS # BLD AUTO: 4.8 K/UL (ref 1.8–7.7)
NEUTROPHILS NFR BLD: 59.2 % (ref 38–73)
NRBC BLD-RTO: 0 /100 WBC
PLATELET # BLD AUTO: 368 K/UL (ref 150–450)
PMV BLD AUTO: 8.7 FL (ref 9.2–12.9)
POTASSIUM SERPL-SCNC: 4 MMOL/L (ref 3.5–5.1)
PROT SERPL-MCNC: 7.2 G/DL (ref 6–8.4)
RBC # BLD AUTO: 4.25 M/UL (ref 4–5.4)
SODIUM SERPL-SCNC: 141 MMOL/L (ref 136–145)
WBC # BLD AUTO: 8.15 K/UL (ref 3.9–12.7)

## 2022-04-16 PROCEDURE — 86140 C-REACTIVE PROTEIN: CPT | Performed by: INTERNAL MEDICINE

## 2022-04-16 PROCEDURE — 85652 RBC SED RATE AUTOMATED: CPT | Performed by: INTERNAL MEDICINE

## 2022-04-16 PROCEDURE — 36415 COLL VENOUS BLD VENIPUNCTURE: CPT | Mod: PO | Performed by: INTERNAL MEDICINE

## 2022-04-16 PROCEDURE — 85025 COMPLETE CBC W/AUTO DIFF WBC: CPT | Performed by: INTERNAL MEDICINE

## 2022-04-16 PROCEDURE — 80053 COMPREHEN METABOLIC PANEL: CPT | Performed by: INTERNAL MEDICINE

## 2022-04-18 ENCOUNTER — PATIENT OUTREACH (OUTPATIENT)
Dept: ADMINISTRATIVE | Facility: OTHER | Age: 47
End: 2022-04-18
Payer: COMMERCIAL

## 2022-04-18 NOTE — PROGRESS NOTES
Health Maintenance Due   Topic Date Due    COVID-19 Vaccine (1) Never done    HIV Screening  Never done    Hemoglobin A1c  03/05/2019    Colorectal Cancer Screening  Never done    TETANUS VACCINE  11/16/2020    Influenza Vaccine (1) 09/01/2021     Updates were requested from care everywhere.  Chart was reviewed for overdue Proactive [facility] Encounters ([facility]) topics (CRS, Breast Cancer Screening, Eye exam)  Health Maintenance has been updated.  LINKS immunization registry triggered.  Immunizations were reconciled.

## 2022-04-19 ENCOUNTER — OFFICE VISIT (OUTPATIENT)
Dept: RHEUMATOLOGY | Facility: CLINIC | Age: 47
End: 2022-04-19
Payer: COMMERCIAL

## 2022-04-19 VITALS
WEIGHT: 246.25 LBS | BODY MASS INDEX: 39.58 KG/M2 | HEIGHT: 66 IN | DIASTOLIC BLOOD PRESSURE: 80 MMHG | SYSTOLIC BLOOD PRESSURE: 124 MMHG | HEART RATE: 82 BPM

## 2022-04-19 DIAGNOSIS — Z79.899 LONG-TERM USE OF HIGH-RISK MEDICATION: ICD-10-CM

## 2022-04-19 DIAGNOSIS — Z79.899 LONG TERM CURRENT USE OF IMMUNOSUPPRESSIVE DRUG: ICD-10-CM

## 2022-04-19 DIAGNOSIS — L40.50 PSORIATIC ARTHRITIS: Primary | ICD-10-CM

## 2022-04-19 DIAGNOSIS — E66.9 OBESITY (BMI 30-39.9): ICD-10-CM

## 2022-04-19 PROCEDURE — 3079F PR MOST RECENT DIASTOLIC BLOOD PRESSURE 80-89 MM HG: ICD-10-PCS | Mod: CPTII,S$GLB,, | Performed by: INTERNAL MEDICINE

## 2022-04-19 PROCEDURE — 3074F PR MOST RECENT SYSTOLIC BLOOD PRESSURE < 130 MM HG: ICD-10-PCS | Mod: CPTII,S$GLB,, | Performed by: INTERNAL MEDICINE

## 2022-04-19 PROCEDURE — 99999 PR PBB SHADOW E&M-EST. PATIENT-LVL IV: ICD-10-PCS | Mod: PBBFAC,,, | Performed by: INTERNAL MEDICINE

## 2022-04-19 PROCEDURE — 3008F PR BODY MASS INDEX (BMI) DOCUMENTED: ICD-10-PCS | Mod: CPTII,S$GLB,, | Performed by: INTERNAL MEDICINE

## 2022-04-19 PROCEDURE — 3079F DIAST BP 80-89 MM HG: CPT | Mod: CPTII,S$GLB,, | Performed by: INTERNAL MEDICINE

## 2022-04-19 PROCEDURE — 99999 PR PBB SHADOW E&M-EST. PATIENT-LVL IV: CPT | Mod: PBBFAC,,, | Performed by: INTERNAL MEDICINE

## 2022-04-19 PROCEDURE — 3008F BODY MASS INDEX DOCD: CPT | Mod: CPTII,S$GLB,, | Performed by: INTERNAL MEDICINE

## 2022-04-19 PROCEDURE — 1160F RVW MEDS BY RX/DR IN RCRD: CPT | Mod: CPTII,S$GLB,, | Performed by: INTERNAL MEDICINE

## 2022-04-19 PROCEDURE — 4010F ACE/ARB THERAPY RXD/TAKEN: CPT | Mod: CPTII,S$GLB,, | Performed by: INTERNAL MEDICINE

## 2022-04-19 PROCEDURE — 3074F SYST BP LT 130 MM HG: CPT | Mod: CPTII,S$GLB,, | Performed by: INTERNAL MEDICINE

## 2022-04-19 PROCEDURE — 1160F PR REVIEW ALL MEDS BY PRESCRIBER/CLIN PHARMACIST DOCUMENTED: ICD-10-PCS | Mod: CPTII,S$GLB,, | Performed by: INTERNAL MEDICINE

## 2022-04-19 PROCEDURE — 4010F PR ACE/ARB THEARPY RXD/TAKEN: ICD-10-PCS | Mod: CPTII,S$GLB,, | Performed by: INTERNAL MEDICINE

## 2022-04-19 PROCEDURE — 99214 PR OFFICE/OUTPT VISIT, EST, LEVL IV, 30-39 MIN: ICD-10-PCS | Mod: S$GLB,,, | Performed by: INTERNAL MEDICINE

## 2022-04-19 PROCEDURE — 1159F MED LIST DOCD IN RCRD: CPT | Mod: CPTII,S$GLB,, | Performed by: INTERNAL MEDICINE

## 2022-04-19 PROCEDURE — 99214 OFFICE O/P EST MOD 30 MIN: CPT | Mod: S$GLB,,, | Performed by: INTERNAL MEDICINE

## 2022-04-19 PROCEDURE — 1159F PR MEDICATION LIST DOCUMENTED IN MEDICAL RECORD: ICD-10-PCS | Mod: CPTII,S$GLB,, | Performed by: INTERNAL MEDICINE

## 2022-04-19 RX ORDER — NAPROXEN 500 MG/1
500 TABLET ORAL
Qty: 270 TABLET | Refills: 2 | Status: SHIPPED | OUTPATIENT
Start: 2022-04-19 | End: 2022-07-21 | Stop reason: SDUPTHER

## 2022-04-19 ASSESSMENT — ROUTINE ASSESSMENT OF PATIENT INDEX DATA (RAPID3)
PSYCHOLOGICAL DISTRESS SCORE: 3.3
PATIENT GLOBAL ASSESSMENT SCORE: 8
PAIN SCORE: 8
AM STIFFNESS SCORE: 1, YES
TOTAL RAPID3 SCORE: 5.89
MDHAQ FUNCTION SCORE: 0.5
WHEN YOU AWAKENED IN THE MORNING OVER THE LAST WEEK, PLEASE INDICATE THE AMOUNT OF TIME IT TAKES UNTIL YOU ARE AS LIMBER AS YOU WILL BE FOR THE DAY: 30 MINUTES
FATIGUE SCORE: 8

## 2022-05-03 ENCOUNTER — PATIENT MESSAGE (OUTPATIENT)
Dept: RHEUMATOLOGY | Facility: CLINIC | Age: 47
End: 2022-05-03
Payer: COMMERCIAL

## 2022-05-03 ENCOUNTER — PATIENT MESSAGE (OUTPATIENT)
Dept: FAMILY MEDICINE | Facility: CLINIC | Age: 47
End: 2022-05-03
Payer: COMMERCIAL

## 2022-05-03 ENCOUNTER — TELEPHONE (OUTPATIENT)
Dept: FAMILY MEDICINE | Facility: CLINIC | Age: 47
End: 2022-05-03
Payer: COMMERCIAL

## 2022-05-03 DIAGNOSIS — Z79.1 LONG TERM (CURRENT) USE OF NON-STEROIDAL ANTI-INFLAMMATORIES (NSAID): ICD-10-CM

## 2022-05-03 DIAGNOSIS — L40.50 PSORIATIC ARTHRITIS: ICD-10-CM

## 2022-05-03 RX ORDER — LEFLUNOMIDE 20 MG/1
20 TABLET ORAL DAILY
Qty: 90 TABLET | Refills: 3 | Status: SHIPPED | OUTPATIENT
Start: 2022-05-03 | End: 2022-10-25

## 2022-05-06 ENCOUNTER — PATIENT MESSAGE (OUTPATIENT)
Dept: FAMILY MEDICINE | Facility: CLINIC | Age: 47
End: 2022-05-06
Payer: COMMERCIAL

## 2022-05-21 ENCOUNTER — PATIENT MESSAGE (OUTPATIENT)
Dept: FAMILY MEDICINE | Facility: CLINIC | Age: 47
End: 2022-05-21
Payer: COMMERCIAL

## 2022-05-21 DIAGNOSIS — E87.6 HYPOKALEMIA: ICD-10-CM

## 2022-05-23 RX ORDER — POTASSIUM CHLORIDE 750 MG/1
20 CAPSULE, EXTENDED RELEASE ORAL DAILY
Qty: 180 CAPSULE | Refills: 0 | Status: SHIPPED | OUTPATIENT
Start: 2022-05-23 | End: 2022-08-10

## 2022-05-23 NOTE — TELEPHONE ENCOUNTER
No new care gaps identified.  [facility] Embedded Care Gaps. Reference number: [id number]. 5/23/2022   8:01:05 AM MARY KATE

## 2022-05-27 ENCOUNTER — PATIENT MESSAGE (OUTPATIENT)
Dept: FAMILY MEDICINE | Facility: CLINIC | Age: 47
End: 2022-05-27
Payer: COMMERCIAL

## 2022-05-27 ENCOUNTER — PATIENT OUTREACH (OUTPATIENT)
Dept: ADMINISTRATIVE | Facility: OTHER | Age: 47
End: 2022-05-27
Payer: COMMERCIAL

## 2022-05-27 DIAGNOSIS — E03.9 HYPOTHYROIDISM, UNSPECIFIED TYPE: ICD-10-CM

## 2022-05-27 DIAGNOSIS — Z12.11 COLON CANCER SCREENING: Primary | ICD-10-CM

## 2022-05-27 RX ORDER — LIOTHYRONINE SODIUM 5 UG/1
10 TABLET ORAL DAILY
Qty: 180 TABLET | Refills: 0 | Status: SHIPPED | OUTPATIENT
Start: 2022-05-27 | End: 2022-08-10

## 2022-05-30 ENCOUNTER — OFFICE VISIT (OUTPATIENT)
Dept: OBSTETRICS AND GYNECOLOGY | Facility: CLINIC | Age: 47
End: 2022-05-30
Payer: COMMERCIAL

## 2022-05-30 VITALS
BODY MASS INDEX: 39.94 KG/M2 | WEIGHT: 247.44 LBS | DIASTOLIC BLOOD PRESSURE: 84 MMHG | SYSTOLIC BLOOD PRESSURE: 138 MMHG

## 2022-05-30 DIAGNOSIS — Z12.31 SCREENING MAMMOGRAM FOR HIGH-RISK PATIENT: ICD-10-CM

## 2022-05-30 DIAGNOSIS — Z30.433 ENCOUNTER FOR REMOVAL AND REINSERTION OF INTRAUTERINE CONTRACEPTIVE DEVICE (IUD): ICD-10-CM

## 2022-05-30 DIAGNOSIS — Z01.419 VISIT FOR GYNECOLOGIC EXAMINATION: Primary | ICD-10-CM

## 2022-05-30 PROCEDURE — 88300 SURGICAL PATH GROSS: CPT | Mod: 26,,, | Performed by: PATHOLOGY

## 2022-05-30 PROCEDURE — 58300 PR INSERT INTRAUTERINE DEVICE: ICD-10-PCS | Mod: S$GLB,,, | Performed by: OBSTETRICS & GYNECOLOGY

## 2022-05-30 PROCEDURE — 3008F PR BODY MASS INDEX (BMI) DOCUMENTED: ICD-10-PCS | Mod: CPTII,S$GLB,, | Performed by: OBSTETRICS & GYNECOLOGY

## 2022-05-30 PROCEDURE — 88300 SURGICAL PATH GROSS: CPT | Performed by: PATHOLOGY

## 2022-05-30 PROCEDURE — 3079F DIAST BP 80-89 MM HG: CPT | Mod: CPTII,S$GLB,, | Performed by: OBSTETRICS & GYNECOLOGY

## 2022-05-30 PROCEDURE — 1159F MED LIST DOCD IN RCRD: CPT | Mod: CPTII,S$GLB,, | Performed by: OBSTETRICS & GYNECOLOGY

## 2022-05-30 PROCEDURE — 99999 PR PBB SHADOW E&M-EST. PATIENT-LVL III: CPT | Mod: PBBFAC,,, | Performed by: OBSTETRICS & GYNECOLOGY

## 2022-05-30 PROCEDURE — 99396 PREV VISIT EST AGE 40-64: CPT | Mod: 25,S$GLB,, | Performed by: OBSTETRICS & GYNECOLOGY

## 2022-05-30 PROCEDURE — 3079F PR MOST RECENT DIASTOLIC BLOOD PRESSURE 80-89 MM HG: ICD-10-PCS | Mod: CPTII,S$GLB,, | Performed by: OBSTETRICS & GYNECOLOGY

## 2022-05-30 PROCEDURE — 58300 INSERT INTRAUTERINE DEVICE: CPT | Mod: S$GLB,,, | Performed by: OBSTETRICS & GYNECOLOGY

## 2022-05-30 PROCEDURE — 4010F ACE/ARB THERAPY RXD/TAKEN: CPT | Mod: CPTII,S$GLB,, | Performed by: OBSTETRICS & GYNECOLOGY

## 2022-05-30 PROCEDURE — 99396 PR PREVENTIVE VISIT,EST,40-64: ICD-10-PCS | Mod: 25,S$GLB,, | Performed by: OBSTETRICS & GYNECOLOGY

## 2022-05-30 PROCEDURE — 58301 PR REMOVE, INTRAUTERINE DEVICE: ICD-10-PCS | Mod: S$GLB,,, | Performed by: OBSTETRICS & GYNECOLOGY

## 2022-05-30 PROCEDURE — 3075F PR MOST RECENT SYSTOLIC BLOOD PRESS GE 130-139MM HG: ICD-10-PCS | Mod: CPTII,S$GLB,, | Performed by: OBSTETRICS & GYNECOLOGY

## 2022-05-30 PROCEDURE — 4010F PR ACE/ARB THEARPY RXD/TAKEN: ICD-10-PCS | Mod: CPTII,S$GLB,, | Performed by: OBSTETRICS & GYNECOLOGY

## 2022-05-30 PROCEDURE — 3075F SYST BP GE 130 - 139MM HG: CPT | Mod: CPTII,S$GLB,, | Performed by: OBSTETRICS & GYNECOLOGY

## 2022-05-30 PROCEDURE — 88300 PR  SURG PATH,GROSS,LEVEL I: ICD-10-PCS | Mod: 26,,, | Performed by: PATHOLOGY

## 2022-05-30 PROCEDURE — 1159F PR MEDICATION LIST DOCUMENTED IN MEDICAL RECORD: ICD-10-PCS | Mod: CPTII,S$GLB,, | Performed by: OBSTETRICS & GYNECOLOGY

## 2022-05-30 PROCEDURE — 99999 PR PBB SHADOW E&M-EST. PATIENT-LVL III: ICD-10-PCS | Mod: PBBFAC,,, | Performed by: OBSTETRICS & GYNECOLOGY

## 2022-05-30 PROCEDURE — 58301 REMOVE INTRAUTERINE DEVICE: CPT | Mod: S$GLB,,, | Performed by: OBSTETRICS & GYNECOLOGY

## 2022-05-30 PROCEDURE — 3008F BODY MASS INDEX DOCD: CPT | Mod: CPTII,S$GLB,, | Performed by: OBSTETRICS & GYNECOLOGY

## 2022-05-30 NOTE — PROGRESS NOTES
HISTORY OF PRESENT ILLNESS:    Keli Hilliard is a 46 y.o. female, , No LMP recorded. Patient has had an implant.,  presents for a routine exam and mirena removal/replacement.  COTEST PLANNED , MAMMO ORDERED.  FOR MIRENA REMOVAL/REPLACEMENT.  MENSES HAVE BEEN MINIMAL AND NO GYN C/O  SISTER DIAZ HILLIARD RECENT RAGHU MASTECTOMY.  DIL JUST HAD A BABY AT [facility] 2W AGO, NOW LIVING 2 DOORS OVER FROM HER    :  routine exam and has no complaints.  MIRENA DUE REPLACE  AND COTEST PLANNED .  MAMMO ORDERED.  WAS ON A FLIGHT 2 WEEKS AGO AND HAD AN ACUTE PAIN IN HER LOWER LEFT LEG LIKE A POP, RECENTLY NOTED SOME BRUISING IN THE AREA.  ON MY EXAM NO CORDS AND SOME SUPERFICIAL ECCHYMOSIS, BUT SINCE IT OCCURRED WHILE ON A FLIGHT FROM [place] ( VISITING HER BOYFRIEND) WILL CHECK LOWER EXTREMITY DOPPLER.  MINIMAL TO NO MENSES WITH MIRENA IN PLACE  ALSO HAS A BOIL ON THE BACK OF HER NECK, GETS THESE FREQUENTLY AND DERMATOLOGY ADVISES BACTRIM.  DISCUSSED LOCAL CARE WITH HEAT AND WILL PRESCRIBE BACTRIM PLUS A COURSE OF INTRANASAL BACTROBAN  SON IS GETTING  IN APRIL.  AND BUSY WORKING FOR [employer] AT [employer]  2020:  COTEST.  MIRENA SINCE .  MAMMO ORDERED  NO GYN C/O, SON RECENTLY ENGAGED TO GIRL IN [employer]  LAST VISIT 2018:   MAMMO ORDERED AND PAP 2019.  NO MENSES WITH IUD AND NO C/O.  PLANNING [place] NEXT WEEK  Last visit 2017:  DUE MIRENA REPLACEMENT TODAY.  PAP DUE 2019 REF MAMMO; SON NOW 23YO  ON ABX FOR SINUS INFECTION - DIFLUCAN PRN.  [place] LAST YEAR  LAST VISIT 2016:  DUE PAP SUBMITTED AND REF MAMMO.  IUD DUE REPLACEMENT 2017.  MOTHER WITH RECENT MI  LAST VISIT 2014:  IUD IN PLACE SINCE 3/2012 CAN FEEL STRING AND MINIMAL MENSES  LAST VISIT 2013:  PAP SUBMITTED AND DISCUSSED 3YR SCREENING. SHORT, LIGHT PERIODS ON MIRENA. PATIENT IS HAPPY WITH THE PATTERN   OPTIC NEURITIS IS BETTER   IUD SURVEILLANCE    Past Medical History:   Diagnosis Date    Abnormal Pap  smear of vagina     AR (allergic rhinitis)     Demyelinating disorder     Depression     Fever blister     Fibromyalgia     followed by pain management    Generalized osteoarthritis of multiple sites     History of abnormal Pap smear     Hypertension     Hypothyroidism     Insulin resistance     Mixed anxiety and depressive disorder     Morbid obesity     Myelitis, optic neuritis in     treated with high-dose steroids and resolved; positive MRI and spinal fluid for a mass, but on embrel for psoriatic arthritis - she will see a MS specialist at [facility]; MRI normalized off embrel    Obesity     Pre-diabetes     hx diabetes on stereoids /    Psoriasis     Psoriatic arthritis     Vaginal delivery     x1    Vitamin D deficiency disease        Past Surgical History:   Procedure Laterality Date    EYE SURGERY Bilateral 08/2021    Laser surgery for pressure relief    LAPAROSCOPIC CHOLECYSTECTOMY N/A 2/18/2022    Procedure: CHOLECYSTECTOMY, LAPAROSCOPIC;  Surgeon: Gabriel Herring MD;  Location: [facility] OR;  Service: General;  Laterality: N/A;  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  C A  NEED H/P-----CLEARED BY PCP    SINUS SURGERY  1998       MEDICATIONS AND ALLERGIES:      Current Outpatient Medications:     buPROPion (WELLBUTRIN) 100 MG tablet, TAKE 1 TABLET(100 MG) BY MOUTH TWICE DAILY, Disp: 180 tablet, Rfl: 0    cholecalciferol, vitamin D3, 2,000 unit Tab, Take 1 tablet by mouth Once a week., Disp: , Rfl:     furosemide (LASIX) 40 MG tablet, TAKE 1 TABLET(40 MG) BY MOUTH EVERY DAY, Disp: 90 tablet, Rfl: 1    gabapentin (NEURONTIN) 300 MG capsule, Take 300 mg by mouth 3 (three) times daily., Disp: , Rfl:     leflunomide (ARAVA) 20 MG Tab, Take 1 tablet (20 mg total) by mouth once daily., Disp: 90 tablet, Rfl: 3    levothyroxine (SYNTHROID) 125 MCG tablet, Take 1 tablet (125 mcg total) by mouth before breakfast., Disp: 90 tablet, Rfl: 3    liothyronine (CYTOMEL) 5 MCG Tab, Take 2  tablets (10 mcg total) by mouth once daily., Disp: 180 tablet, Rfl: 0    loratadine-pseudoephedrine  mg (CLARITIN-D 24-HOUR)  mg per 24 hr tablet, Take 1 tablet by mouth once daily., Disp: , Rfl:     losartan (COZAAR) 50 MG tablet, TAKE 1 TABLET BY MOUTH EVERY DAY, Disp: 90 tablet, Rfl: 3    methadone (DOLOPHINE) 10 MG tablet, Take 1 tablet by mouth Three times a day., Disp: , Rfl:     naproxen (NAPROSYN) 500 MG tablet, Take 1 tablet (500 mg total) by mouth every meal as needed (pain)., Disp: 270 tablet, Rfl: 2    potassium chloride (MICRO-K) 10 MEQ CpSR, Take 2 capsules (20 mEq total) by mouth once daily., Disp: 180 capsule, Rfl: 0    promethazine (PHENERGAN) 25 MG tablet, TK 1 T PO BID PRN N, Disp: , Rfl:     RINVOQ 15 mg 24 hr tablet, TAKE 1 TABLET BY MOUTH  DAILY, Disp: 30 tablet, Rfl: 3    traZODone (DESYREL) 50 MG tablet, TAKE 1 TO 2 TABLET BY MOUTH EVERY NIGHT, Disp: , Rfl:     HYDROcodone-acetaminophen (NORCO) 5-325 mg per tablet, Take 1 tablet by mouth every 6 (six) hours as needed for Pain. (Patient not taking: Reported on 5/30/2022), Disp: 30 tablet, Rfl: 0    Current Facility-Administered Medications:     levonorgestreL (MIRENA) 20 mcg/24 hours (7 yrs) 52 mg IUD 1 Intra Uterine Device, 1 each, Intrauterine, 1 time in Clinic/HOD, Mckenzie Gutiérrez MD    Facility-Administered Medications Ordered in Other Visits:     0.9%  NaCl infusion, , Intravenous, Continuous, Gabriel Herring MD    mupirocin 2 % ointment, , Nasal, On Call Procedure, Gabriel Herring MD, Given at 02/18/22 0703    Review of patient's allergies indicates:   Allergen Reactions    Doxycycline hcl Nausea And Vomiting    Enbrel [etanercept] Other (See Comments)     Optic neurtits       Family History   Problem Relation Age of Onset    Psoriasis Father     Cancer Father         throat    Thyroid disease Mother     Heart disease Mother     Hypertension Mother     Hyperlipidemia Mother     Coronary artery  disease Mother         s/p CABG    Heart attack Mother     Heart disease Sister         MVP    Diabetes Paternal Uncle     Diabetes Maternal Grandfather     Heart disease Maternal Grandfather     Thyroid disease Maternal Grandfather     ADD / ADHD Son     Melanoma Neg Hx     Lupus Neg Hx     Eczema Neg Hx     Acne Neg Hx     Breast cancer Neg Hx     Colon cancer Neg Hx     Ovarian cancer Neg Hx        Social History     Socioeconomic History    Marital status: Single    Number of children: 1   Occupational History    Occupation: [employer]     Employer: [employer] (MAIN OFFICE)   Tobacco Use    Smoking status: Never Smoker    Smokeless tobacco: Never Used   Substance and Sexual Activity    Alcohol use: No    Drug use: No     Comment: 7/7/15 one weed brownie    Sexual activity: Yes     Partners: Male     Birth control/protection: I.U.D.   Other Topics Concern    Are you pregnant or think you may be? No    Breast-feeding No       COMPREHENSIVE GYN HISTORY:  PAP History: Denies abnormal Paps.  Infection History: Denies STDs. Denies PID.  Benign History: Denies uterine fibroids. Denies ovarian cysts. Denies endometriosis. Denies other conditions.  Cancer History: Denies cervical cancer. Denies uterine cancer or hyperplasia. Denies ovarian cancer. Denies vulvar cancer or pre-cancer. Denies vaginal cancer or pre-cancer. Denies breast cancer. Denies colon cancer.  Sexual Activity History: Reports currently being sexually active  Menstrual History: Monthly, mild-moderate.  Contraception:IUD    ROS:  GENERAL: No weight changes. No swelling. No fatigue. No fever.  CARDIOVASCULAR: No chest pain. No shortness of breath. No leg cramps.   NEUROLOGICAL: No headaches. No vision changes.  BREASTS: No pain. No lumps. No discharge.  ABDOMEN: No pain. No nausea. No vomiting. No diarrhea. No constipation.  REPRODUCTIVE: No abnormal bleeding.   VULVA: No pain. No lesions. No itching.  VAGINA: No relaxation. No  itching. No odor. No discharge. No lesions.  URINARY: No incontinence. No nocturia. No frequency. No dysuria.    /84   Wt 112.2 kg (247 lb 7.5 oz)   BMI 39.94 kg/m²     PE:  APPEARANCE: Well nourished, well developed, in no acute distress.  AFFECT: WNL, alert and oriented x 3.  SKIN: No acne or hirsutism.  NECK: Neck symmetric, without masses or thyromegaly.  NODES: No inguinal, cervical, axillary or femoral lymph node enlargement.  CHEST: Good respiratory effort.   ABDOMEN: Soft. No tenderness or masses. No hepatosplenomegaly. No hernias.  BREASTS: Symmetrical, no skin changes, visible lesions, palpable masses or nipple discharge bilaterally.  PELVIC: External female genitalia without lesions.  Female hair distribution. Adequate perineal body, Normal urethral meatus. Vagina moist and well rugated without lesions or discharge.  No significant cystocele or rectocele present. Cervix pink without lesions, discharge or tenderness, STRING EXTERNAL OS. Uterus is normal size, regular, mobile and nontender. Adnexa without masses or tenderness.  EXTREMITIES: No edema    PROCEDURES:  IUD PLACEMENT:  Pre IUD Placement Counseling:  Contraceptive options were reviewed with the patient , and she chooses Mirena.  Her history was reviewed, and there are no contraindications to an IUD.  The procedure was explained, including minimal risks of pain, bleeding, uterine perforation, infection associated with insertion, and spontaneous expulsion within the first two weeks.  The benefits of contraception without systemic side effects and amenorrhea or hypomenorrhea were discussed.  The patient's questions were answered, and she agrees to proceed.  Hospital and device consent (if provided by ) were signed.    A speculum was placed within the vagina and the cervix was visualized; STRING VISIBLE AT EXTERNAL OS, GRASPED WITH RING FORCEPS AND REMOVED INTACT WITHOUT DIFFICULTY.  The cervix was cleaned with betadine swabs  times three.  The anterior cervical lip was grasped with a single-tooth tenaculum, and the uterus was sounded using sterile technique to a depth of 8cm.  A Mirena IUD was loaded and placed high within the uterine funduswithout dificulty using a sterile technique.  The string was cut 2cm from the external cervical os.  The tenaculum and the speculum was removed.  The patient tolerated the procedure fairly well.    DIAGNOSIS:  1. Visit for gynecologic examination     2. Screening mammogram for high-risk patient  Mammo Digital Screening Bilat w/ Philippe   3. Encounter for removal and reinsertion of intrauterine contraceptive device (IUD)  Specimen to Pathology, Ob/Gyn       COUNSELING:   The patient was counseled today on ACS PAP guidelines, with recommendations for yearly pelvic exams unless their uterus, cervix, and ovaries were removed for benign reasons; in that case, examinations every 3-5 years are recommended.  The patient was also counseled regarding monthly breast self-examination, routine STD screening for at-risk populations, prophylactic immunizations for transmitted infections such as  HPV, Pertussis, or Influenza as appropriate, and yearly mammograms when indicated by ACS guidelines.  She was advised to see her primary care physician for all other health maintenance.  Post IUD Insertion counseling:  The patient was counseled to manage post-IUD placement cramping with NSAIDs, Tylenol, or prescription per the medication card.  She was counseled to report excess bleeding, cramping that does not respond to the above medications, or fever greater than 101.0F.  The patient will maintain pelvic rest for the next week and return for a visit to evaluate her for signs of possible endometritis.  She was instructed to check for IUD presence by feeling for the strings.  She was counseled regarding the need to remove the IUD in 7 years (Mirena) or 10 years (Paragard).  Counseling lasted approximately 15 minutes, and the  patient had no questions at the end of her counseling.       FOLLOW-UP with me annually.

## 2022-05-31 ENCOUNTER — PATIENT MESSAGE (OUTPATIENT)
Dept: FAMILY MEDICINE | Facility: CLINIC | Age: 47
End: 2022-05-31
Payer: COMMERCIAL

## 2022-06-01 ENCOUNTER — PATIENT MESSAGE (OUTPATIENT)
Dept: ADMINISTRATIVE | Facility: HOSPITAL | Age: 47
End: 2022-06-01
Payer: COMMERCIAL

## 2022-06-01 LAB
FINAL PATHOLOGIC DIAGNOSIS: NORMAL
GROSS: NORMAL
Lab: NORMAL

## 2022-06-29 ENCOUNTER — PATIENT MESSAGE (OUTPATIENT)
Dept: ADMINISTRATIVE | Facility: HOSPITAL | Age: 47
End: 2022-06-29
Payer: COMMERCIAL

## 2022-06-29 ENCOUNTER — OFFICE VISIT (OUTPATIENT)
Dept: FAMILY MEDICINE | Facility: CLINIC | Age: 47
End: 2022-06-29
Payer: COMMERCIAL

## 2022-06-29 DIAGNOSIS — G37.9 DEMYELINATING DISORDER: ICD-10-CM

## 2022-06-29 DIAGNOSIS — E66.01 SEVERE OBESITY (BMI 35.0-39.9) WITH COMORBIDITY: ICD-10-CM

## 2022-06-29 DIAGNOSIS — I10 BENIGN ESSENTIAL HTN: ICD-10-CM

## 2022-06-29 DIAGNOSIS — F34.1 DYSTHYMIA: Primary | ICD-10-CM

## 2022-06-29 PROCEDURE — 1159F MED LIST DOCD IN RCRD: CPT | Mod: CPTII,95,, | Performed by: FAMILY MEDICINE

## 2022-06-29 PROCEDURE — 99214 PR OFFICE/OUTPT VISIT, EST, LEVL IV, 30-39 MIN: ICD-10-PCS | Mod: 95,,, | Performed by: FAMILY MEDICINE

## 2022-06-29 PROCEDURE — 4010F ACE/ARB THERAPY RXD/TAKEN: CPT | Mod: CPTII,95,, | Performed by: FAMILY MEDICINE

## 2022-06-29 PROCEDURE — 1160F RVW MEDS BY RX/DR IN RCRD: CPT | Mod: CPTII,95,, | Performed by: FAMILY MEDICINE

## 2022-06-29 PROCEDURE — 99214 OFFICE O/P EST MOD 30 MIN: CPT | Mod: 95,,, | Performed by: FAMILY MEDICINE

## 2022-06-29 PROCEDURE — 1159F PR MEDICATION LIST DOCUMENTED IN MEDICAL RECORD: ICD-10-PCS | Mod: CPTII,95,, | Performed by: FAMILY MEDICINE

## 2022-06-29 PROCEDURE — 4010F PR ACE/ARB THEARPY RXD/TAKEN: ICD-10-PCS | Mod: CPTII,95,, | Performed by: FAMILY MEDICINE

## 2022-06-29 PROCEDURE — 1160F PR REVIEW ALL MEDS BY PRESCRIBER/CLIN PHARMACIST DOCUMENTED: ICD-10-PCS | Mod: CPTII,95,, | Performed by: FAMILY MEDICINE

## 2022-06-29 RX ORDER — BUPROPION HYDROCHLORIDE 300 MG/1
300 TABLET ORAL DAILY
Qty: 30 TABLET | Refills: 2 | Status: SHIPPED | OUTPATIENT
Start: 2022-06-29 | End: 2022-09-19 | Stop reason: SDUPTHER

## 2022-06-29 RX ORDER — LOSARTAN POTASSIUM 50 MG/1
50 TABLET ORAL DAILY
Qty: 90 TABLET | Refills: 3 | Status: SHIPPED | OUTPATIENT
Start: 2022-06-29 | End: 2023-02-01 | Stop reason: SDUPTHER

## 2022-06-29 RX ORDER — PHENTERMINE HYDROCHLORIDE 37.5 MG/1
37.5 TABLET ORAL
Qty: 30 TABLET | Refills: 0 | Status: SHIPPED | OUTPATIENT
Start: 2022-06-29 | End: 2022-07-28

## 2022-06-29 NOTE — PROGRESS NOTES
The patient location is: [place]  The chief complaint leading to consultation is: depression and weight gain    Visit type: audiovisual    Face to Face time with patient: 15 minutes of total time spent on the encounter, which includes face to face time and non-face to face time preparing to see the patient (eg, review of tests), Obtaining and/or reviewing separately obtained history, Documenting clinical information in the electronic or other health record, Independently interpreting results (not separately reported) and communicating results to the patient/family/caregiver, or Care coordination (not separately reported).         Each patient to whom he or she provides medical services by telemedicine is:  (1) informed of the relationship between the physician and patient and the respective role of any other health care provider with respect to management of the patient; and (2) notified that he or she may decline to receive medical services by telemedicine and may withdraw from such care at any time.    Notes:     Routine Office Visit    Patient Name: Keli Gilbert    : 1975  MRN: [id number]    Subjective:  Keli is a 46 y.o. female who presents today for:    1. Follow up  Patient presenting today for follow up of depression.  She states that she has been taking wellbutrin as prescribed, but was under the impression she was on 300mg, but is only on 200mg.  There has been no SI/HI, but states it has been a rough 6 months with the loss of her mom and a breakup.  She has not been feeling herself.  She tates she has also been gaining weight and doesn't know why.  She has no recent illnesses and has been taking all medications as prescribed including her medication for psoriatic arthritis.    Past Medical History  Past Medical History:   Diagnosis Date    Abnormal Pap smear of vagina     AR (allergic rhinitis)     Demyelinating disorder     Depression     Fever blister     Fibromyalgia      followed by pain management    Generalized osteoarthritis of multiple sites     History of abnormal Pap smear     Hypertension     Hypothyroidism     Insulin resistance     Mixed anxiety and depressive disorder     Morbid obesity     Myelitis, optic neuritis in     treated with high-dose steroids and resolved; positive MRI and spinal fluid for a mass, but on embrel for psoriatic arthritis - she will see a MS specialist at [facility]; MRI normalized off embrel    Obesity     Pre-diabetes     hx diabetes on stereoids /    Psoriasis     Psoriatic arthritis     Vaginal delivery     x1    Vitamin D deficiency disease        Past Surgical History  Past Surgical History:   Procedure Laterality Date    EYE SURGERY Bilateral 08/2021    Laser surgery for pressure relief    LAPAROSCOPIC CHOLECYSTECTOMY N/A 2/18/2022    Procedure: CHOLECYSTECTOMY, LAPAROSCOPIC;  Surgeon: Gabriel Herring MD;  Location: [facility] OR;  Service: General;  Laterality: N/A;  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  C A  NEED H/P-----CLEARED BY PCP    SINUS SURGERY  1998       Family History  Family History   Problem Relation Age of Onset    Psoriasis Father     Cancer Father         throat    Thyroid disease Mother     Heart disease Mother     Hypertension Mother     Hyperlipidemia Mother     Coronary artery disease Mother         s/p CABG    Heart attack Mother     Heart disease Sister         MVP    Diabetes Paternal Uncle     Diabetes Maternal Grandfather     Heart disease Maternal Grandfather     Thyroid disease Maternal Grandfather     ADD / ADHD Son     Melanoma Neg Hx     Lupus Neg Hx     Eczema Neg Hx     Acne Neg Hx     Breast cancer Neg Hx     Colon cancer Neg Hx     Ovarian cancer Neg Hx        Social History  Social History     Socioeconomic History    Marital status: Single    Number of children: 1   Occupational History    Occupation: [employer]     Employer: [employer] (MAIN OFFICE)    Tobacco Use    Smoking status: Never Smoker    Smokeless tobacco: Never Used   Substance and Sexual Activity    Alcohol use: No    Drug use: No     Comment: 7/7/15 one weed brownie    Sexual activity: Yes     Partners: Male     Birth control/protection: I.U.D.   Other Topics Concern    Are you pregnant or think you may be? No    Breast-feeding No       Current Medications  Current Outpatient Medications on File Prior to Visit   Medication Sig Dispense Refill    cholecalciferol, vitamin D3, 2,000 unit Tab Take 1 tablet by mouth Once a week.      furosemide (LASIX) 40 MG tablet TAKE 1 TABLET(40 MG) BY MOUTH EVERY DAY 90 tablet 1    gabapentin (NEURONTIN) 300 MG capsule Take 300 mg by mouth 3 (three) times daily.      HYDROcodone-acetaminophen (NORCO) 5-325 mg per tablet Take 1 tablet by mouth every 6 (six) hours as needed for Pain. (Patient not taking: Reported on 5/30/2022) 30 tablet 0    leflunomide (ARAVA) 20 MG Tab Take 1 tablet (20 mg total) by mouth once daily. 90 tablet 3    levothyroxine (SYNTHROID) 125 MCG tablet Take 1 tablet (125 mcg total) by mouth before breakfast. 90 tablet 3    liothyronine (CYTOMEL) 5 MCG Tab Take 2 tablets (10 mcg total) by mouth once daily. 180 tablet 0    loratadine-pseudoephedrine  mg (CLARITIN-D 24-HOUR)  mg per 24 hr tablet Take 1 tablet by mouth once daily.      methadone (DOLOPHINE) 10 MG tablet Take 1 tablet by mouth Three times a day.      naproxen (NAPROSYN) 500 MG tablet Take 1 tablet (500 mg total) by mouth every meal as needed (pain). 270 tablet 2    potassium chloride (MICRO-K) 10 MEQ CpSR Take 2 capsules (20 mEq total) by mouth once daily. 180 capsule 0    promethazine (PHENERGAN) 25 MG tablet TK 1 T PO BID PRN N      RINVOQ 15 mg 24 hr tablet TAKE 1 TABLET BY MOUTH  DAILY 30 tablet 3    traZODone (DESYREL) 50 MG tablet TAKE 1 TO 2 TABLET BY MOUTH EVERY NIGHT      [DISCONTINUED] buPROPion (WELLBUTRIN) 100 MG tablet TAKE 1 TABLET(100  MG) BY MOUTH TWICE DAILY 180 tablet 0    [DISCONTINUED] losartan (COZAAR) 50 MG tablet TAKE 1 TABLET BY MOUTH EVERY DAY 90 tablet 3    [DISCONTINUED] secukinumab (COSENTYX PEN) 150 mg/mL PnIj Inject 300 mg into the skin every 28 days. 6 each 1     Current Facility-Administered Medications on File Prior to Visit   Medication Dose Route Frequency Provider Last Rate Last Admin    0.9%  NaCl infusion   Intravenous Continuous Gabriel Herring MD        mupirocin 2 % ointment   Nasal On Call Procedure Gabriel Herring MD   Given at 02/18/22 0703       Allergies   Review of patient's allergies indicates:   Allergen Reactions    Doxycycline hcl Nausea And Vomiting    Enbrel [etanercept] Other (See Comments)     Optic neurtits       Review of Systems (Pertinent positives)  Review of Systems   Constitutional: Negative.    HENT: Negative for hearing loss.    Eyes: Negative for discharge.   Respiratory: Negative for wheezing.    Cardiovascular: Negative for chest pain and palpitations.   Gastrointestinal: Negative for blood in stool, constipation, diarrhea and vomiting.   Genitourinary: Negative for dysuria and hematuria.   Musculoskeletal: Negative for neck pain.   Neurological: Negative for weakness and headaches.   Endo/Heme/Allergies: Negative for polydipsia.         There were no vitals taken for this visit.    GENERAL APPEARANCE: in no apparent distress and well developed and well nourished  HEENT: PERRL, EOMI, Sclera clear, anicteric, Oropharynx clear, no lesions  RESPIRATORY: appears well, vitals normal, no respiratory distress, acyanotic, normal RR,  HEART: spontaneously beating    ABDOMEN: abdomen is soft without tenderness, no masses, no hernias, no organomegaly, no rebound, no guarding.   SKIN: no rashes, no wounds, no other lesions  PSYCH: Alert, oriented x 3, thought content appropriate, speech normal, pleasant and cooperative, good eye contact, well groomed    Assessment/Plan:  Keli Gilbert is a  46 y.o. female who presents today for :    Diagnoses and all orders for this visit:    Dysthymia  -     buPROPion (WELLBUTRIN XL) 300 MG 24 hr tablet; Take 1 tablet (300 mg total) by mouth once daily.    Benign essential HTN  -     losartan (COZAAR) 50 MG tablet; Take 1 tablet (50 mg total) by mouth once daily.    Severe obesity (BMI 35.0-39.9) with comorbidity    Demyelinating disorder    Other orders  -     phentermine (ADIPEX-P) 37.5 mg tablet; Take 1 tablet (37.5 mg total) by mouth before breakfast.      1.  Wellbutrin refilled and changed to 300mg XR  2.  Start adipex daily for weight loss  3.  Refilled HTN medication  4.  Follow up 2 weeks to see how she is tolerating medications      Andriy Moran MD

## 2022-06-30 ENCOUNTER — PATIENT MESSAGE (OUTPATIENT)
Dept: ADMINISTRATIVE | Facility: HOSPITAL | Age: 47
End: 2022-06-30
Payer: COMMERCIAL

## 2022-06-30 ENCOUNTER — PATIENT OUTREACH (OUTPATIENT)
Dept: ADMINISTRATIVE | Facility: HOSPITAL | Age: 47
End: 2022-06-30
Payer: COMMERCIAL

## 2022-06-30 NOTE — PROGRESS NOTES
Overdue Colorectal Screening - a kit will be mailed to the patient's home.    [employer] was given to patient on 6/30/2022 10:14 AM

## 2022-07-04 ENCOUNTER — PATIENT MESSAGE (OUTPATIENT)
Dept: FAMILY MEDICINE | Facility: CLINIC | Age: 47
End: 2022-07-04
Payer: COMMERCIAL

## 2022-07-05 ENCOUNTER — PATIENT MESSAGE (OUTPATIENT)
Dept: FAMILY MEDICINE | Facility: CLINIC | Age: 47
End: 2022-07-05
Payer: COMMERCIAL

## 2022-07-05 ENCOUNTER — NURSE TRIAGE (OUTPATIENT)
Dept: ADMINISTRATIVE | Facility: CLINIC | Age: 47
End: 2022-07-05
Payer: COMMERCIAL

## 2022-07-05 ENCOUNTER — TELEPHONE (OUTPATIENT)
Dept: FAMILY MEDICINE | Facility: CLINIC | Age: 47
End: 2022-07-05
Payer: COMMERCIAL

## 2022-07-05 ENCOUNTER — PATIENT MESSAGE (OUTPATIENT)
Dept: RHEUMATOLOGY | Facility: CLINIC | Age: 47
End: 2022-07-05
Payer: COMMERCIAL

## 2022-07-05 DIAGNOSIS — U07.1 COVID: ICD-10-CM

## 2022-07-05 DIAGNOSIS — U07.1 COVID-19: Primary | ICD-10-CM

## 2022-07-05 NOTE — TELEPHONE ENCOUNTER
Pt states  that she tested + for covid informed the pt of the covid hotline # [phone] give them a call and they will be able to answer all question vs

## 2022-07-05 NOTE — TELEPHONE ENCOUNTER
----- Message from Silvia Egan sent at 7/5/2022  8:18 AM CDT -----  Type: Patient Call Back    Who called: self     What is the request in detail: Patient requesting to speak with a nurse regarding her Covid diagnosis. States she called main campus for the infusion and was told she needed to try the pills first.     Can the clinic reply by [facility]? No     Would the patient rather a call back or a response via [facility]? Call back     Best call back number: [phone]

## 2022-07-05 NOTE — TELEPHONE ENCOUNTER
Mrs. Gilbert tested positive for COVID with two home tests, she called her physician's office and they gave her the phone # to O for Triage.  She is asking about the monoclonal antibody infusion.  She has fever 101, nasal congestion, sore throat, intermittent severe headache, nausea, fatigue and widespread myalgia.  I advised I would send a high priority message to Dr. Moran to make him aware, she is at high risk, as she has Psoriatic arthritis and is on an immunosuppressant medication.  I also gave her home care advice, and she verbalized understanding.    Reason for Disposition   HIGH RISK for severe COVID complications (e.g., weak immune system, age > 64 years, obesity with BMI > 25, pregnant, chronic lung disease or other chronic medical condition) (Exception: Already seen by PCP and no new or worsening symptoms.)    Additional Information   Negative: SEVERE difficulty breathing (e.g., struggling for each breath, speaks in single words)   Negative: Difficult to awaken or acting confused (e.g., disoriented, slurred speech)   Negative: Bluish (or gray) lips or face now   Negative: Shock suspected (e.g., cold/pale/clammy skin, too weak to stand, low BP, rapid pulse)   Negative: Sounds like a life-threatening emergency to the triager   Negative: SEVERE or constant chest pain or pressure  (Exception: Mild central chest pain, present only when coughing.)   Negative: MODERATE difficulty breathing (e.g., speaks in phrases, SOB even at rest, pulse 100-120)   Negative: Headache and stiff neck (can't touch chin to chest)     Severe headache, no neck stiffness   Negative: Oxygen level (e.g., pulse oximetry) 90 percent or lower   Negative: Chest pain or pressure   Negative: Patient sounds very sick or weak to the triager   Negative: MILD difficulty breathing (e.g., minimal/no SOB at rest, SOB with walking, pulse <100)   Negative: Fever > 103 F (39.4 C)   Negative: [1] Fever > 101 F (38.3 C) AND [2] over 60  years of age   Negative: [1] Fever > 100.0 F (37.8 C) AND [2] bedridden (e.g., nursing home patient, CVA, chronic illness, recovering from surgery)    Protocols used: CORONAVIRUS (COVID-19) DIAGNOSED OR [id number]

## 2022-07-06 ENCOUNTER — TELEPHONE (OUTPATIENT)
Dept: FAMILY MEDICINE | Facility: CLINIC | Age: 47
End: 2022-07-06
Payer: COMMERCIAL

## 2022-07-06 DIAGNOSIS — U07.1 COVID-19: Primary | ICD-10-CM

## 2022-07-06 NOTE — TELEPHONE ENCOUNTER
Per Dr. Moran to phone pt and and informed her to stop taking the covid meds and he will send in something else for the covid

## 2022-07-11 ENCOUNTER — PATIENT MESSAGE (OUTPATIENT)
Dept: RHEUMATOLOGY | Facility: CLINIC | Age: 47
End: 2022-07-11
Payer: COMMERCIAL

## 2022-07-16 ENCOUNTER — LAB VISIT (OUTPATIENT)
Dept: LAB | Facility: HOSPITAL | Age: 47
End: 2022-07-16
Attending: INTERNAL MEDICINE
Payer: COMMERCIAL

## 2022-07-16 DIAGNOSIS — L40.50 PSORIATIC ARTHRITIS: ICD-10-CM

## 2022-07-16 DIAGNOSIS — Z79.899 LONG-TERM USE OF HIGH-RISK MEDICATION: ICD-10-CM

## 2022-07-16 DIAGNOSIS — Z79.899 LONG TERM CURRENT USE OF IMMUNOSUPPRESSIVE DRUG: ICD-10-CM

## 2022-07-16 LAB
ALBUMIN SERPL BCP-MCNC: 3.5 G/DL (ref 3.5–5.2)
ALP SERPL-CCNC: 83 U/L (ref 55–135)
ALT SERPL W/O P-5'-P-CCNC: 29 U/L (ref 10–44)
ANION GAP SERPL CALC-SCNC: 9 MMOL/L (ref 8–16)
AST SERPL-CCNC: 23 U/L (ref 10–40)
BILIRUB SERPL-MCNC: 0.2 MG/DL (ref 0.1–1)
BUN SERPL-MCNC: 12 MG/DL (ref 6–20)
CALCIUM SERPL-MCNC: 9.4 MG/DL (ref 8.7–10.5)
CHLORIDE SERPL-SCNC: 104 MMOL/L (ref 95–110)
CHOLEST SERPL-MCNC: 186 MG/DL (ref 120–199)
CHOLEST/HDLC SERPL: 4.9 {RATIO} (ref 2–5)
CO2 SERPL-SCNC: 27 MMOL/L (ref 23–29)
CREAT SERPL-MCNC: 0.8 MG/DL (ref 0.5–1.4)
CRP SERPL-MCNC: 5.5 MG/L (ref 0–8.2)
ERYTHROCYTE [SEDIMENTATION RATE] IN BLOOD BY PHOTOMETRIC METHOD: 26 MM/HR (ref 0–36)
EST. GFR  (AFRICAN AMERICAN): >60 ML/MIN/1.73 M^2
EST. GFR  (NON AFRICAN AMERICAN): >60 ML/MIN/1.73 M^2
GLUCOSE SERPL-MCNC: 167 MG/DL (ref 70–110)
HDLC SERPL-MCNC: 38 MG/DL (ref 40–75)
HDLC SERPL: 20.4 % (ref 20–50)
LDLC SERPL CALC-MCNC: 101 MG/DL (ref 63–159)
NONHDLC SERPL-MCNC: 148 MG/DL
POTASSIUM SERPL-SCNC: 4 MMOL/L (ref 3.5–5.1)
PROT SERPL-MCNC: 7 G/DL (ref 6–8.4)
SODIUM SERPL-SCNC: 140 MMOL/L (ref 136–145)
TRIGL SERPL-MCNC: 235 MG/DL (ref 30–150)

## 2022-07-16 PROCEDURE — 86140 C-REACTIVE PROTEIN: CPT | Performed by: INTERNAL MEDICINE

## 2022-07-16 PROCEDURE — 80061 LIPID PANEL: CPT | Performed by: INTERNAL MEDICINE

## 2022-07-16 PROCEDURE — 36415 COLL VENOUS BLD VENIPUNCTURE: CPT | Mod: PO | Performed by: INTERNAL MEDICINE

## 2022-07-16 PROCEDURE — 85652 RBC SED RATE AUTOMATED: CPT | Performed by: INTERNAL MEDICINE

## 2022-07-16 PROCEDURE — 80053 COMPREHEN METABOLIC PANEL: CPT | Performed by: INTERNAL MEDICINE

## 2022-07-17 NOTE — PROGRESS NOTES
Subjective:       Patient ID: Keli Concha Gilbert is a 46 y.o. female with psoriatic arthritis on Rinvoq & Leflunomide (& naproxen); demyelinating disorder secondary to Enbrel; incomplete effect on chronic pain syndrome on savella & methadone     Chief Complaint: Disease management      Ms. Gilbert is a 47 yo woman with history of PsA last seen last on 22.  She is on Rinvoq which she began on 22, leflunomide 20 mg/d and naproxen 500 mg which she takes up to tid pc without any adverse effects.   Has not been able to get Shingrix as insurance would not pay for it despite her immunosuppressed state & recommendations of CDC.    She returns for f/u and is pleased with Rinvoq. She had further improvement of her R 3rd PIP dactylitis and labs have improved.  Still has AM stiffness (1/2 hr to 1 hr) but no skin lesions.    She's had some social stressors--mom ; broke up w boyfriend; but also had her first grandbaby.      Current Outpatient Medications   Medication Sig Dispense Refill    buPROPion (WELLBUTRIN XL) 300 MG 24 hr tablet Take 1 tablet (300 mg total) by mouth once daily. 30 tablet 2    cholecalciferol, vitamin D3, 2,000 unit Tab Take 1 tablet by mouth Once a week.      furosemide (LASIX) 40 MG tablet TAKE 1 TABLET(40 MG) BY MOUTH EVERY DAY 90 tablet 1    gabapentin (NEURONTIN) 300 MG capsule Take 300 mg by mouth 3 (three) times daily.      leflunomide (ARAVA) 20 MG Tab Take 1 tablet (20 mg total) by mouth once daily. 90 tablet 3    levothyroxine (SYNTHROID) 125 MCG tablet Take 1 tablet (125 mcg total) by mouth before breakfast. 90 tablet 3    liothyronine (CYTOMEL) 5 MCG Tab Take 2 tablets (10 mcg total) by mouth once daily. 180 tablet 0    loratadine-pseudoephedrine  mg (CLARITIN-D 24-HOUR)  mg per 24 hr tablet Take 1 tablet by mouth once daily.      losartan (COZAAR) 50 MG tablet Take 1 tablet (50 mg total) by mouth once daily. 90 tablet 3    methadone (DOLOPHINE) 10 MG tablet  Take 1 tablet by mouth Three times a day.      naproxen (NAPROSYN) 500 MG tablet Take 1 tablet (500 mg total) by mouth every meal as needed (pain). 270 tablet 2    phentermine (ADIPEX-P) 37.5 mg tablet Take 1 tablet (37.5 mg total) by mouth before breakfast. 30 tablet 0    potassium chloride (MICRO-K) 10 MEQ CpSR Take 2 capsules (20 mEq total) by mouth once daily. 180 capsule 0    promethazine (PHENERGAN) 25 MG tablet TK 1 T PO BID PRN N      RINVOQ 15 mg 24 hr tablet TAKE 1 TABLET BY MOUTH  DAILY 30 tablet 3    traZODone (DESYREL) 50 MG tablet TAKE 1 TO 2 TABLET BY MOUTH EVERY NIGHT       No current facility-administered medications for this visit.     Facility-Administered Medications Ordered in Other Visits   Medication Dose Route Frequency Provider Last Rate Last Admin    0.9%  NaCl infusion   Intravenous Continuous Gabriel Herring MD        mupirocin 2 % ointment   Nasal On Call Procedure Gabriel Herring MD   Given at 02/18/22 0703       Review of patient's allergies indicates:   Allergen Reactions    Doxycycline hcl Nausea And Vomiting    Enbrel [etanercept] Other (See Comments)     Optic neurtits     Past Medical History:   Diagnosis Date    Abnormal Pap smear of vagina     AR (allergic rhinitis)     Demyelinating disorder     Depression     Fever blister     Fibromyalgia     followed by pain management    Generalized osteoarthritis of multiple sites     History of abnormal Pap smear     Hypertension     Hypothyroidism     Insulin resistance     Mixed anxiety and depressive disorder     Morbid obesity     Myelitis, optic neuritis in     treated with high-dose steroids and resolved; positive MRI and spinal fluid for a mass, but on embrel for psoriatic arthritis - [facility]; MRI normalized off embrel    Obesity     Pre-diabetes     hx diabetes on stereoids /    Psoriasis     Psoriatic arthritis     Vaginal delivery     x1    Vitamin D deficiency  disease      Past Surgical History:   Procedure Laterality Date    EYE SURGERY Bilateral 08/2021    Laser surgery for pressure relief    LAPAROSCOPIC CHOLECYSTECTOMY N/A 2/18/2022    Procedure: CHOLECYSTECTOMY, LAPAROSCOPIC;  Surgeon: Gabriel Herring MD;  Location: [facility];  Service: General;  Laterality: N/A;  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  C A  NEED H/P-----CLEARED BY PCP    SINUS SURGERY  1998       Review of Systems   Constitutional: Positive for fatigue. Negative for fever and unexpected weight change.   HENT: Negative.  Negative for dental problem, mouth sores and trouble swallowing.         Dry mouth only w antihistamines   Eyes: Negative for redness, itching and visual disturbance.        Dry eyes only with antihistamines   Respiratory: Negative.  Negative for cough and shortness of breath.    Cardiovascular: Negative.  Negative for chest pain, palpitations and leg swelling.   Gastrointestinal: Negative.  Negative for abdominal pain, anal bleeding, blood in stool, constipation, diarrhea and nausea.        Heartburn.   Endocrine: Negative.    Genitourinary: Negative.  Negative for dysuria, frequency, genital sores, menstrual problem, pelvic pain and urgency.   Musculoskeletal: Negative.  Negative for arthralgias, back pain, gait problem and neck pain.   Skin: Negative.  Negative for color change and rash.   Allergic/Immunologic: Positive for immunocompromised state.   Neurological: Positive for headaches. Negative for dizziness, seizures, weakness and numbness.   Hematological: Negative.  Negative for adenopathy. Does not bruise/bleed easily.   Psychiatric/Behavioral: Positive for dysphoric mood (stable) and sleep disturbance (stable). Negative for decreased concentration, self-injury and suicidal ideas. The patient is nervous/anxious.          Family History   Problem Relation Age of Onset    Psoriasis Father     Cancer Father         throat    Thyroid disease Mother      "Heart disease Mother     Hypertension Mother     Hyperlipidemia Mother     Coronary artery disease Mother         s/p CABG    Heart attack Mother     Heart disease Sister         MVP    Diabetes Paternal Uncle     Diabetes Maternal Grandfather     Heart disease Maternal Grandfather     Thyroid disease Maternal Grandfather     ADD / ADHD Son     Melanoma Neg Hx     Lupus Neg Hx     Eczema Neg Hx     Acne Neg Hx     Breast cancer Neg Hx     Colon cancer Neg Hx     Ovarian cancer Neg Hx      Mom w CVAs & in a nursing home  22.    SH:   Working at a bank   Previously worked at a bar as well.     Has a new granddaughter May, 2022--Lo  No alcohol; no cigarettes;   Objective:   BP (!) 136/93   Pulse 103   Ht 5' 6" (1.676 m)   Wt 115.3 kg (254 lb 3.1 oz)   BMI 41.03 kg/m²     Was 242 lb 8.1 oz on 22  Was 254 lb 3.1 oz on 3/16/21  Was 236 lb 12.4 oz on 19  Was 224 lb 13.9 oz on 19.    Physical Exam   Constitutional: She is oriented to person, place, and time. No distress.   HENT:   Head: Normocephalic and atraumatic.   Mouth/Throat: Oropharynx is clear and moist. No oropharyngeal exudate.   No facial rashes  Parotids not enlarged  No oral ulcers  Face oliva   Eyes: Pupils are equal, round, and reactive to light. Conjunctivae are normal. Right eye exhibits no discharge. Left eye exhibits no discharge. No scleral icterus.   Neck: No JVD present. No tracheal deviation present. No thyromegaly present.   Cardiovascular: Normal rate, regular rhythm and normal heart sounds. Exam reveals no gallop and no friction rub.   No murmur heard.  Pulmonary/Chest: Effort normal and breath sounds normal. No respiratory distress. She has no wheezes. She has no rales. She exhibits no tenderness.   Abdominal: Soft. Bowel sounds are normal. She exhibits no distension and no mass. There is no splenomegaly or hepatomegaly. There is no abdominal tenderness. There is no rebound and no guarding. "   Musculoskeletal:         General: No tenderness. Normal range of motion.      Cervical back: Neck supple.      Comments: Cspine slight decrease in lateral flexion & rotation; no tenderness  Tspine FROM no tenderness  Lspine FROM no tenderness.  TMJ: unremarkable  Shoulders: FROM; no synovitis  Elbows: FROM; no synovitis; no tophi or nodules  Wrists: no SHT; no tenderness; good ROM  MCPs: no metatarsalgia; no synovitis  PIPs: no more dactylitis 2nd ray R; R 3rd PIP w flexion contracture; no TTP;  cannot fully extend this joint.   DIPs: FROM; no synovitis  HIPS: FROM  Knees: FROM; no synovitis; no instability;  Ankles: FROM: no synovitis   Toes: ok; no metatarsalgia                 Lymphadenopathy:     She has no cervical adenopathy.   Neurological: She is alert and oriented to person, place, and time. She has normal reflexes. No cranial nerve deficit. Gait normal.   Proximal and distal muscle strength 5/5.   Skin: Skin is warm and dry. She is not diaphoretic.   No psoriasis.   Psychiatric: Mood, memory, affect and judgment normal.   Drowsy.   Vitals reviewed.        Labs:   7/16/22: ESR 26 (36); CRP 5.5; CMP glu 167  4/16/22: ESR 24 (36); CRP 8.0; CBC Hg 11.3; low indices; CMP ok;  2/8/22: CBC Hg 11.9; CMP ok;   1/19/22: ESR 66; CRP 18.1  10/9/21: ESR 38 (36); CRP 11.7; CBC ok; CMP ok;   5/15/21: CMP ok;  TSH ok; (150)  3/16/21: ESR 72 (36); CRP 25.1; CBC  Hg 11.4; CMP glu 125; alb 3.4;   7/22/20: ESR 34 (36); CRP 16.1; CBC Hg 10.8; Ht 36.8; CMP ok; last labs  12/7/19: ESR 17; CRP CBC Hg 11.8; CMP ok; TFTs ok;   9/14/19: ESR 24 (36); CRP 9.8; CBC Hg 11.5; CP ok;   2/19/19: ESR 13; CRP 5.8; CBC plts 362; CMP ok; HCV/HBV/QG neg;  11/17/18: ESR 8; CRP 3.9; Hg 11.9; low indices; CMP ok;   8/22/18: ESR 10; CRP 10.9; CBC Hg 10.7; Ht 35.7; CMP ok  5/14/18: ESR 13; CRP 13.4;  Hg 11.3; low indices; eos 12.4%; CMP ok;   2/10/18: ESR 30; CRP 10.5; Hg 11.3; plt 366; CMP ok;   10/19/17: HBV neg; HCV neg;   9/2/17: ESR 16;  CRp 5.9; Hg 11.6; Ht 36.6; CMP ok;   5/29/17: QG neg  2/20/17: ESR 34; CRP 8.1; CBC Hg 11.4; Ht 35.6; CMP ok;   11/19/16: ESR 20; CRP 4.8; CBC Hg 11.9; CMP K+3.3, otherwise ok;   8/27/16: ESR 22; CRP 7; Hg 11.8; Ht 36.9; CMP ok;   5/26/16: ESR 35; CRP 11.2Hg 11.8; Ht 36.9; CMP ok;   5/21/16: Vit d 37  10/8/15: ESR 45; CRP 14.2; Hg 11.2; Ht 35.8; CMP; ok  7/7/15: CBC plt 380; K+ 3.4; Glu 154  7/6/15: ESR 48; CRP 9.6  4/4/15: ESR 41; CRP 19; Hg 11.0; Ht 34.6; plt 377; CMP ok;  1/10/15: ESR 34; CRP 18; CBC ok; ; Alb. 3.6  10/4/14: ESR 43; CRP 18.5; Hg 11.8; Ht 36.5; plt 378; Alb. 3.4;   3/20/14: ESR 41; CRP 14.3; plt 356; CMP ok;  2/15/13: ESR 30; CRP 9.8; CBC ok; Alb. 3.3  11/2/13: ESR 28; CRP 11.6; plt 363; Alb. 3.3;   5/28/13: ESR 45; CRP 8.8; plt 366; CMP ok;     12/1/18: Bilateral hands: personally viewed: Mild degenerative changes noted at the interphalangeal joints.  Mild-to-moderate degenerative change noted at the bilateral 1st CMC joints.  Mild degenerative changes also present at both radiocarpal joints, triscaphe joints and right distal radioulnar joint.  Minimal degenerative changes also present at the MCP joints bilaterally and greatest at the 1st MCP joint on the left.  6/15/17: Bilateral hands: personally reviewed: mild osteoarthritis w stable periarticular erosion at the left fourth MCP joint; no change since last year.   8/27/16: Bilateral feet: personally reviewed. Mild HV; mild OA shell 1st MTP dali; ? Erosion PIP left small toe;   5/26/16: Bilateral hands: personally reviewed: L 4th MCP (new) erosive lesion; R & L 2nd & 3rd metacarpal head cysts; R mild PIP erosion & STS 2nd.    Assessment:     Psoriatic arthritis-- with mild SHT 2 MCPs bilaterally (R >>L) & with sausage digit of R index finger--currently w  less& flexion deformity of R 3rd PIP--stable at present .    a) History of sausage digits of both toes      PIPs, DIPs, wrists, and knees., now w. only one sausage digit.   b) Psoriasis of  the abdomen, elbow, and the shins--resolved.    c) Enbrel stopped 10/21/11 due to optic neuritis.     d) New erosion (4th L metacarpal head) on x-ray & possibly L 5th toe PIP     e) Persisting elevation of ESR and CRP    f) inadequate response to leflunomide and Stelara & apremilast (w. Intolerable nausea)   G) could not tolerate SSZ--nausea.   H) MTX x 2 even SC did not help sxs.   I) improved on Cosentyx (+leflunomide + naproxen) initially incompletely-- hand joints still hurt despite normalization of  ESR & CRP   J) Orencia begun 11/17 with incomplete response   K)Taltz begun 4/20/18-2/19   L) Tofacitinib 3/19 - 6/18/19    Chronic SHT 3rd R PIP & base of L thumb with questionable erosive lesion.   M) Back to Cosentyx + leflunomide + celebrex    N) Tremfya begun 4/14/21--failed: stopped w headache & lack of efficacy   O) Back to Cosentyx 10/6/21 with improvement; held since 12/15/21.   P) On Rinvoq 30 mg since 1/29/22--stable to improved.    Left optic neuritis with demyelinating lesions secondary to Enbrel--now resolved.    Repeat MRI ok    Chronic pain/fibromyalgia syndrome with fatigue, tender points, withdrawals to palpation in the past, pain all over, responsive to Savella but with some nausea (off it now), [place] [facility] [place]    On methadone    Degenerative joint disease of the cervical spine, knees, and feet--very mild.     Depression on wellbutrin   Off savella & sertraline (much weight gain)    Hypertriglyceridemia    Hypertension.     Hypothyroidism.     Vitamin D deficiency with regular prescription vitamin D supplementation.     S/P covid 7/3/22    Morbid obesity        Plan:   Again reviewed results of the Oral Surveillance study & increased risk of MACE in patients over 50 w CVD risk factors; also reviewed VTE data & increased risk of malignancy again.  Still needs Shingrix.  Discussed stopping leflunomide 20 mg/d  Ok to continue naproxen 500 mg up to tid but less is best.    Labs~in 3 months  Discussed semaglutide for weight loss at the request of patient.  Gave her some info. She will discuss w PCP and/or get a bariatric medicine consult.  Discussed tachycardia; attributed to decongestant.    RTC 3 months or prn

## 2022-07-19 ENCOUNTER — PATIENT MESSAGE (OUTPATIENT)
Dept: ADMINISTRATIVE | Facility: HOSPITAL | Age: 47
End: 2022-07-19
Payer: COMMERCIAL

## 2022-07-19 ENCOUNTER — PATIENT MESSAGE (OUTPATIENT)
Dept: DERMATOLOGY | Facility: CLINIC | Age: 47
End: 2022-07-19
Payer: COMMERCIAL

## 2022-07-20 ENCOUNTER — OFFICE VISIT (OUTPATIENT)
Dept: RHEUMATOLOGY | Facility: CLINIC | Age: 47
End: 2022-07-20
Payer: COMMERCIAL

## 2022-07-20 ENCOUNTER — PATIENT MESSAGE (OUTPATIENT)
Dept: RHEUMATOLOGY | Facility: CLINIC | Age: 47
End: 2022-07-20

## 2022-07-20 VITALS
BODY MASS INDEX: 40.85 KG/M2 | HEART RATE: 103 BPM | WEIGHT: 254.19 LBS | HEIGHT: 66 IN | SYSTOLIC BLOOD PRESSURE: 136 MMHG | DIASTOLIC BLOOD PRESSURE: 93 MMHG

## 2022-07-20 DIAGNOSIS — Z55.9 EDUCATIONAL CIRCUMSTANCE: ICD-10-CM

## 2022-07-20 DIAGNOSIS — Z79.1 LONG TERM (CURRENT) USE OF NON-STEROIDAL ANTI-INFLAMMATORIES (NSAID): ICD-10-CM

## 2022-07-20 DIAGNOSIS — Z79.899 LONG TERM CURRENT USE OF IMMUNOSUPPRESSIVE DRUG: ICD-10-CM

## 2022-07-20 DIAGNOSIS — M15.9 GENERALIZED OSTEOARTHRITIS OF MULTIPLE SITES: ICD-10-CM

## 2022-07-20 DIAGNOSIS — L40.50 PSORIATIC ARTHRITIS: Primary | ICD-10-CM

## 2022-07-20 DIAGNOSIS — E66.01 MORBID OBESITY WITH BMI OF 40.0-44.9, ADULT: ICD-10-CM

## 2022-07-20 DIAGNOSIS — Z79.899 LONG-TERM USE OF HIGH-RISK MEDICATION: ICD-10-CM

## 2022-07-20 PROCEDURE — 1160F PR REVIEW ALL MEDS BY PRESCRIBER/CLIN PHARMACIST DOCUMENTED: ICD-10-PCS | Mod: CPTII,S$GLB,, | Performed by: INTERNAL MEDICINE

## 2022-07-20 PROCEDURE — 3080F DIAST BP >= 90 MM HG: CPT | Mod: CPTII,S$GLB,, | Performed by: INTERNAL MEDICINE

## 2022-07-20 PROCEDURE — 1160F RVW MEDS BY RX/DR IN RCRD: CPT | Mod: CPTII,S$GLB,, | Performed by: INTERNAL MEDICINE

## 2022-07-20 PROCEDURE — 4010F PR ACE/ARB THEARPY RXD/TAKEN: ICD-10-PCS | Mod: CPTII,S$GLB,, | Performed by: INTERNAL MEDICINE

## 2022-07-20 PROCEDURE — 99214 PR OFFICE/OUTPT VISIT, EST, LEVL IV, 30-39 MIN: ICD-10-PCS | Mod: S$GLB,,, | Performed by: INTERNAL MEDICINE

## 2022-07-20 PROCEDURE — 3075F PR MOST RECENT SYSTOLIC BLOOD PRESS GE 130-139MM HG: ICD-10-PCS | Mod: CPTII,S$GLB,, | Performed by: INTERNAL MEDICINE

## 2022-07-20 PROCEDURE — 4010F ACE/ARB THERAPY RXD/TAKEN: CPT | Mod: CPTII,S$GLB,, | Performed by: INTERNAL MEDICINE

## 2022-07-20 PROCEDURE — 99214 OFFICE O/P EST MOD 30 MIN: CPT | Mod: S$GLB,,, | Performed by: INTERNAL MEDICINE

## 2022-07-20 PROCEDURE — 3080F PR MOST RECENT DIASTOLIC BLOOD PRESSURE >= 90 MM HG: ICD-10-PCS | Mod: CPTII,S$GLB,, | Performed by: INTERNAL MEDICINE

## 2022-07-20 PROCEDURE — 1159F MED LIST DOCD IN RCRD: CPT | Mod: CPTII,S$GLB,, | Performed by: INTERNAL MEDICINE

## 2022-07-20 PROCEDURE — 3075F SYST BP GE 130 - 139MM HG: CPT | Mod: CPTII,S$GLB,, | Performed by: INTERNAL MEDICINE

## 2022-07-20 PROCEDURE — 99999 PR PBB SHADOW E&M-EST. PATIENT-LVL IV: ICD-10-PCS | Mod: PBBFAC,,, | Performed by: INTERNAL MEDICINE

## 2022-07-20 PROCEDURE — 99999 PR PBB SHADOW E&M-EST. PATIENT-LVL IV: CPT | Mod: PBBFAC,,, | Performed by: INTERNAL MEDICINE

## 2022-07-20 PROCEDURE — 3008F BODY MASS INDEX DOCD: CPT | Mod: CPTII,S$GLB,, | Performed by: INTERNAL MEDICINE

## 2022-07-20 PROCEDURE — 3008F PR BODY MASS INDEX (BMI) DOCUMENTED: ICD-10-PCS | Mod: CPTII,S$GLB,, | Performed by: INTERNAL MEDICINE

## 2022-07-20 PROCEDURE — 1159F PR MEDICATION LIST DOCUMENTED IN MEDICAL RECORD: ICD-10-PCS | Mod: CPTII,S$GLB,, | Performed by: INTERNAL MEDICINE

## 2022-07-20 SDOH — SOCIAL DETERMINANTS OF HEALTH (SDOH): PROBLEMS RELATED TO EDUCATION AND LITERACY, UNSPECIFIED: Z55.9

## 2022-07-20 ASSESSMENT — ROUTINE ASSESSMENT OF PATIENT INDEX DATA (RAPID3)
PATIENT GLOBAL ASSESSMENT SCORE: 8
PSYCHOLOGICAL DISTRESS SCORE: 3.3
AM STIFFNESS SCORE: 1, YES
FATIGUE SCORE: 8
PAIN SCORE: 7.5
TOTAL RAPID3 SCORE: 5.72
MDHAQ FUNCTION SCORE: 0.5

## 2022-07-20 NOTE — PATIENT INSTRUCTIONS
Hold leflunomide and let us know if you have any problems.     The drug for weight loss we discussed is semaglutide.

## 2022-07-21 DIAGNOSIS — L40.50 PSORIATIC ARTHRITIS: ICD-10-CM

## 2022-07-21 RX ORDER — NAPROXEN 500 MG/1
500 TABLET ORAL
Qty: 270 TABLET | Refills: 2 | Status: SHIPPED | OUTPATIENT
Start: 2022-07-21 | End: 2023-04-27 | Stop reason: SDUPTHER

## 2022-07-28 ENCOUNTER — PATIENT MESSAGE (OUTPATIENT)
Dept: FAMILY MEDICINE | Facility: CLINIC | Age: 47
End: 2022-07-28
Payer: COMMERCIAL

## 2022-07-28 RX ORDER — PHENTERMINE HYDROCHLORIDE 37.5 MG/1
TABLET ORAL
Qty: 30 TABLET | Refills: 0 | Status: SHIPPED | OUTPATIENT
Start: 2022-07-28 | End: 2022-08-24

## 2022-08-10 ENCOUNTER — PATIENT MESSAGE (OUTPATIENT)
Dept: FAMILY MEDICINE | Facility: CLINIC | Age: 47
End: 2022-08-10
Payer: COMMERCIAL

## 2022-08-10 ENCOUNTER — PATIENT MESSAGE (OUTPATIENT)
Dept: ADMINISTRATIVE | Facility: HOSPITAL | Age: 47
End: 2022-08-10
Payer: COMMERCIAL

## 2022-08-10 RX ORDER — TIRZEPATIDE 2.5 MG/.5ML
2.5 INJECTION, SOLUTION SUBCUTANEOUS
Qty: 2 ML | Refills: 0 | Status: SHIPPED | OUTPATIENT
Start: 2022-08-10 | End: 2022-08-30

## 2022-08-11 ENCOUNTER — PATIENT MESSAGE (OUTPATIENT)
Dept: RHEUMATOLOGY | Facility: CLINIC | Age: 47
End: 2022-08-11
Payer: COMMERCIAL

## 2022-08-11 ENCOUNTER — PATIENT MESSAGE (OUTPATIENT)
Dept: FAMILY MEDICINE | Facility: CLINIC | Age: 47
End: 2022-08-11
Payer: COMMERCIAL

## 2022-08-12 ENCOUNTER — PATIENT MESSAGE (OUTPATIENT)
Dept: FAMILY MEDICINE | Facility: CLINIC | Age: 47
End: 2022-08-12
Payer: COMMERCIAL

## 2022-08-12 NOTE — TELEPHONE ENCOUNTER
Please let patient know we are no longer able to prescribe antibiotics without an appointment due to documentation issues per [facility]. Please get her scheduled for a virtual visit with whomever has an availability    Thanks  Dr. Moran

## 2022-08-24 RX ORDER — PHENTERMINE HYDROCHLORIDE 37.5 MG/1
TABLET ORAL
Qty: 30 TABLET | Refills: 0 | Status: SHIPPED | OUTPATIENT
Start: 2022-08-24 | End: 2022-09-27

## 2022-08-24 NOTE — TELEPHONE ENCOUNTER
Phone pt informed that medication has been sent to the pharmacy and ready for   vs    Phone pt informed her the message was forward to doctor for refill on medication review. Vs

## 2022-08-30 ENCOUNTER — PATIENT MESSAGE (OUTPATIENT)
Dept: FAMILY MEDICINE | Facility: CLINIC | Age: 47
End: 2022-08-30
Payer: COMMERCIAL

## 2022-08-30 RX ORDER — TIRZEPATIDE 5 MG/.5ML
5 INJECTION, SOLUTION SUBCUTANEOUS
Qty: 4 PEN | Refills: 1 | Status: SHIPPED | OUTPATIENT
Start: 2022-08-30 | End: 2022-09-30

## 2022-08-30 RX ORDER — TIRZEPATIDE 2.5 MG/.5ML
2.5 INJECTION, SOLUTION SUBCUTANEOUS
Status: CANCELLED | OUTPATIENT
Start: 2022-08-30

## 2022-09-12 ENCOUNTER — OFFICE VISIT (OUTPATIENT)
Dept: DERMATOLOGY | Facility: CLINIC | Age: 47
End: 2022-09-12
Payer: COMMERCIAL

## 2022-09-12 DIAGNOSIS — L73.2 HIDRADENITIS SUPPURATIVA: Primary | ICD-10-CM

## 2022-09-12 PROCEDURE — 99999 PR PBB SHADOW E&M-EST. PATIENT-LVL IV: ICD-10-PCS | Mod: PBBFAC,,, | Performed by: DERMATOLOGY

## 2022-09-12 PROCEDURE — 87070 CULTURE OTHR SPECIMN AEROBIC: CPT | Performed by: DERMATOLOGY

## 2022-09-12 PROCEDURE — 99214 OFFICE O/P EST MOD 30 MIN: CPT | Mod: 25,S$GLB,, | Performed by: DERMATOLOGY

## 2022-09-12 PROCEDURE — 1160F RVW MEDS BY RX/DR IN RCRD: CPT | Mod: CPTII,S$GLB,, | Performed by: DERMATOLOGY

## 2022-09-12 PROCEDURE — 1159F PR MEDICATION LIST DOCUMENTED IN MEDICAL RECORD: ICD-10-PCS | Mod: CPTII,S$GLB,, | Performed by: DERMATOLOGY

## 2022-09-12 PROCEDURE — 11900 PR INJECTION INTO SKIN LESIONS, UP TO 7: ICD-10-PCS | Mod: S$GLB,,, | Performed by: DERMATOLOGY

## 2022-09-12 PROCEDURE — 11900 INJECT SKIN LESIONS </W 7: CPT | Mod: S$GLB,,, | Performed by: DERMATOLOGY

## 2022-09-12 PROCEDURE — 4010F PR ACE/ARB THEARPY RXD/TAKEN: ICD-10-PCS | Mod: CPTII,S$GLB,, | Performed by: DERMATOLOGY

## 2022-09-12 PROCEDURE — 4010F ACE/ARB THERAPY RXD/TAKEN: CPT | Mod: CPTII,S$GLB,, | Performed by: DERMATOLOGY

## 2022-09-12 PROCEDURE — 1159F MED LIST DOCD IN RCRD: CPT | Mod: CPTII,S$GLB,, | Performed by: DERMATOLOGY

## 2022-09-12 PROCEDURE — 99214 PR OFFICE/OUTPT VISIT, EST, LEVL IV, 30-39 MIN: ICD-10-PCS | Mod: 25,S$GLB,, | Performed by: DERMATOLOGY

## 2022-09-12 PROCEDURE — 99999 PR PBB SHADOW E&M-EST. PATIENT-LVL IV: CPT | Mod: PBBFAC,,, | Performed by: DERMATOLOGY

## 2022-09-12 PROCEDURE — 1160F PR REVIEW ALL MEDS BY PRESCRIBER/CLIN PHARMACIST DOCUMENTED: ICD-10-PCS | Mod: CPTII,S$GLB,, | Performed by: DERMATOLOGY

## 2022-09-12 RX ORDER — CLINDAMYCIN PHOSPHATE 11.9 MG/ML
SOLUTION TOPICAL
Qty: 60 ML | Refills: 3 | Status: SHIPPED | OUTPATIENT
Start: 2022-09-12

## 2022-09-12 RX ORDER — CEPHALEXIN 500 MG/1
CAPSULE ORAL
Qty: 60 CAPSULE | Refills: 0 | Status: SHIPPED | OUTPATIENT
Start: 2022-09-12 | End: 2022-10-25 | Stop reason: ALTCHOICE

## 2022-09-12 RX ORDER — B3/AZEL/QUER/TUR/FA/B6/ZN/COPP 700 MG-500
TABLET ORAL
Qty: 30 TABLET | Refills: 5 | Status: SHIPPED | OUTPATIENT
Start: 2022-09-12 | End: 2023-04-27

## 2022-09-12 RX ORDER — METFORMIN HYDROCHLORIDE EXTENDED-RELEASE TABLETS 500 MG/1
TABLET, FILM COATED, EXTENDED RELEASE ORAL
Qty: 30 TABLET | Refills: 5 | Status: SHIPPED | OUTPATIENT
Start: 2022-09-12 | End: 2023-03-03 | Stop reason: SDUPTHER

## 2022-09-12 NOTE — ASSESSMENT & PLAN NOTE
"Today's Plan:    LIFESTYLE:    Weight loss and dietary modifications   Avoid sugars and processed foods  Limit "white" foods ie. Bread, rice, pasta, potatoes  - recommend limiting to 1/2 cup per serving  Limit dairy     Keep skin cool as overheating can flare HS    Avoid shaving affected areas and wearing tight fitting clothing as friction exacerbates disease    TREATMENT:    Dilute bleach to affected areas: compresses/spray/baths 2x/week or use CLn wash 2x/week    Wash affected areas with Hibiclens +/- Benzoyl peroxide every day    Take Nicazel forte every day (This is a prescription that I will send to [facility] pharmacy; your cost $40 for 30 tablets).    Apply Clindamycin solution 1x/day to "quiet affected areas" and 2x/day to flared affected areas    Take Keflex (Cephalexin) 2x/day x 1 month    Take Metformin ER/XR at 500mg every night with dinner -- can decrease # and severity of lesions   SE: diarrhea      FOR FLARES (new painful bump):  Message office through [facility] for injection which will often hasten resolution of tender, red bump    Can apply warm/hot compresses on a painful, deep lump    If notice a foul-smelling drainage, can use Hydrogen Peroxide to cleanse area followed by application of Medi-honey to open area (apply Medi-honey 1x/day)        ramsey cx today  "

## 2022-09-12 NOTE — PROGRESS NOTES
Subjective:       Patient ID:  Keli Gilbert is a 46 y.o. female who presents for   Chief Complaint   Patient presents with    Cyst     Under arms, abdomen, groin     Pt with long h/o PsO and PsA - managed by dr. ozuna with cosentyx - fairly stable    This is patient's 1st visit to [facility] for Hidradenitis Suppurativa.     Year started: 3rd grade  Location started: groin  Family history of HS: yes - mom  Smokes: no  Current weight: 254# (10# gain in past year)      Past treatments (including topicals, orals, injectables, laser, surgery):  none    Current treatments:   Hibiclens wash x 2 years    In the past, pt has been diagnosed with:    PCOS: no  Diabetes: no  HTN: yes - controlled on   Arthritis: PsO and PsA - on Rinvoq since January 2022 for arthritis. 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   HENT:  Negative for nosebleeds and headaches.    Gastrointestinal:  Negative for diarrhea and Sensitivity to oral antibiotics.   Genitourinary:  Negative for irregular periods (has mirena).   Musculoskeletal:  Positive for arthralgias (PsA - on RInvoq - controlled).   Skin:  Positive for activity-related sunscreen use and abscesses. Negative for daily sunscreen use and recent sunburn.   Neurological:  Negative for headaches.   Psychiatric/Behavioral:  Positive for depressed mood (both parents passed in past year and sister dx with breast cancer).    Hematologic/Lymphatic: Does not bruise/bleed easily.      Objective:    Physical Exam   Constitutional: She appears well-developed and well-nourished. She is obese.  No distress.   Neurological: She is alert and oriented to person, place, and time. She is not disoriented.   Psychiatric: She has a normal mood and affect.   Skin:   Areas Examined (abnormalities noted in diagram):   Head / Face Inspection  Performed  Neck Inspection Performed  Chest / Axilla Inspection Performed  Abdomen Inspection Performed  Genitals / Buttocks / Groin Inspection Performed  Back Inspection Performed  RUE Inspected  LUE Inspection Performed  RLE Inspected  LLE Inspection Performed            Diagram Legend     Erythematous scaling macule/papule c/w actinic keratosis       Vascular papule c/w angioma      Pigmented verrucoid papule/plaque c/w seborrheic keratosis      Yellow umbilicated papule c/w sebaceous hyperplasia      Irregularly shaped tan macule c/w lentigo     1-2 mm smooth white papules consistent with Milia      Movable subcutaneous cyst with punctum c/w epidermal inclusion cyst      Subcutaneous movable cyst c/w pilar cyst      Firm pink to brown papule c/w dermatofibroma      Pedunculated fleshy papule(s) c/w skin tag(s)      Evenly pigmented macule c/w junctional nevus     Mildly variegated pigmented, slightly irregular-bordered macule c/w mildly atypical nevus      Flesh colored to evenly pigmented papule c/w intradermal nevus       Pink pearly papule/plaque c/w basal cell carcinoma      Erythematous hyperkeratotic cursted plaque c/w SCC      Surgical scar with no sign of skin cancer recurrence      Open and closed comedones      Inflammatory papules and pustules      Verrucoid papule consistent consistent with wart     Erythematous eczematous patches and plaques     Dystrophic onycholytic nail with subungual debris c/w onychomycosis     Umbilicated papule    Erythematous-base heme-crusted tan verrucoid plaque consistent with inflamed seborrheic keratosis     Erythematous Silvery Scaling Plaque c/w Psoriasis     See annotation      Assessment / Plan:        Hidradenitis suppurativa  -     [id number] (NICAZEL FORTE) 815-875-6-12 mg-mcg-mg-mg Tab; Take 1 po qday  Dispense: 30 tablet; Refill: 5  -     clindamycin (CLEOCIN T) 1 % external solution; AAA bid  Dispense: 60 mL; Refill: 3  -     cephALEXin  "(KEFLEX) 500 MG capsule; Take 1 po bid x 1 month  Dispense: 60 capsule; Refill: 0  -     metFORMIN (FORTAMET) 500 mg 24hr tablet; 1 po qam with breakfast  Dispense: 30 tablet; Refill: 5  -     triamcinolone acetonide injection 10 mg  -     PA [id number] ACETONIDE INJ PER 10MG  -     PA INJECTION INTO SKIN LESIONS, UP TO 7  -     Aerobic culture    Intralesional Kenalog 10mg/cc (2.0 cc total) injected into 7 lesions on the axilla, lower abdomen and thighs today after obtaining verbal consent including risk of surrounding hypopigmentation. Patient tolerated procedure well.    Units: 2  NDC for Kenalog 10mg/cc:  0619-0917-97      Hidradenitis suppurativa  Today's Plan:    LIFESTYLE:    Weight loss and dietary modifications   Avoid sugars and processed foods  Limit "white" foods ie. Bread, rice, pasta, potatoes  - recommend limiting to 1/2 cup per serving  Limit dairy     Keep skin cool as overheating can flare HS    Avoid shaving affected areas and wearing tight fitting clothing as friction exacerbates disease    TREATMENT:    Dilute bleach to affected areas: compresses/spray/baths 2x/week or use CLn wash 2x/week    Wash affected areas with Hibiclens +/- Benzoyl peroxide every day    Take Nicazel forte every day (This is a prescription that I will send to [facility] pharmacy; your cost $40 for 30 tablets).    Apply Clindamycin solution 1x/day to "quiet affected areas" and 2x/day to flared affected areas    Take Keflex (Cephalexin) 2x/day x 1 month    Take Metformin ER/XR at 500mg every night with dinner -- can decrease # and severity of lesions   SE: diarrhea      FOR FLARES (new painful bump):  Message office through [facility] for injection which will often hasten resolution of tender, red bump    Can apply warm/hot compresses on a painful, deep lump    If notice a foul-smelling drainage, can use Hydrogen Peroxide to cleanse area followed by application of Medi-honey to open area (apply Medi-honey 1x/day)        ramsey cx " today      Follow up in about 4 weeks (around 10/10/2022).

## 2022-09-12 NOTE — PATIENT INSTRUCTIONS
"LIFESTYLE:    Weight loss and dietary modifications   Avoid sugars and processed foods  Limit "white" foods ie. Bread, rice, pasta, potatoes  - recommend limiting to 1/2 cup per serving  Limit dairy     Keep skin cool as overheating can flare HS    Avoid shaving affected areas and wearing tight fitting clothing as friction exacerbates disease    TREATMENT:    Dilute bleach to affected areas: compresses/spray/baths 2x/week or use CLn wash 2x/week    Wash affected areas with Hibiclens +/- Benzoyl peroxide every day    Take Nicazel forte every day (This is a prescription that I will send to [facility] pharmacy; your cost $40 for 30 tablets).    Apply Clindamycin solution 1x/day to "quiet affected areas" and 2x/day to flared affected areas    Take Keflex (Cephalexin) 2x/day x 1 month    Take Metformin ER/XR at 500mg every night with dinner -- can decrease # and severity of lesions   SE: diarrhea      FOR FLARES (new painful bump):  [facility] which will often hasten resolution of tender, red bump    Can apply warm/hot compresses on a painful, deep lump    If notice a foul-smelling drainage, can use Hydrogen Peroxide to cleanse area followed by application of Medi-honey to open area (apply Medi-honey 1x/day)              "

## 2022-09-14 ENCOUNTER — PATIENT MESSAGE (OUTPATIENT)
Dept: DERMATOLOGY | Facility: CLINIC | Age: 47
End: 2022-09-14
Payer: COMMERCIAL

## 2022-09-15 LAB — BACTERIA SPEC AEROBE CULT: NORMAL

## 2022-09-18 ENCOUNTER — PATIENT MESSAGE (OUTPATIENT)
Dept: FAMILY MEDICINE | Facility: CLINIC | Age: 47
End: 2022-09-18
Payer: COMMERCIAL

## 2022-09-18 DIAGNOSIS — F34.1 DYSTHYMIA: ICD-10-CM

## 2022-09-19 RX ORDER — BUPROPION HYDROCHLORIDE 300 MG/1
300 TABLET ORAL DAILY
Qty: 90 TABLET | Refills: 1 | Status: SHIPPED | OUTPATIENT
Start: 2022-09-19 | End: 2023-02-01 | Stop reason: SDUPTHER

## 2022-09-19 NOTE — TELEPHONE ENCOUNTER
No new care gaps identified.  [facility] Embedded Care Gaps. Reference number: [id number]. 9/19/2022   7:42:29 AM CHARLENET

## 2022-09-27 ENCOUNTER — PATIENT MESSAGE (OUTPATIENT)
Dept: FAMILY MEDICINE | Facility: CLINIC | Age: 47
End: 2022-09-27
Payer: COMMERCIAL

## 2022-09-29 NOTE — TELEPHONE ENCOUNTER
No new care gaps identified.  [facility] Embedded Care Gaps. Reference number: [id number]. 9/29/2022   3:55:22 PM CDT

## 2022-09-30 RX ORDER — TIRZEPATIDE 5 MG/.5ML
5 INJECTION, SOLUTION SUBCUTANEOUS
Qty: 4 PEN | Refills: 1 | Status: CANCELLED | OUTPATIENT
Start: 2022-09-30

## 2022-09-30 RX ORDER — TIRZEPATIDE 7.5 MG/.5ML
7.5 INJECTION, SOLUTION SUBCUTANEOUS
Qty: 4 PEN | Refills: 0 | Status: SHIPPED | OUTPATIENT
Start: 2022-09-30 | End: 2022-10-28

## 2022-10-03 ENCOUNTER — HOSPITAL ENCOUNTER (OUTPATIENT)
Dept: RADIOLOGY | Facility: HOSPITAL | Age: 47
Discharge: HOME | End: 2022-10-03
Attending: OBSTETRICS & GYNECOLOGY
Payer: COMMERCIAL

## 2022-10-03 DIAGNOSIS — Z12.31 SCREENING MAMMOGRAM FOR HIGH-RISK PATIENT: ICD-10-CM

## 2022-10-03 PROCEDURE — 77063 MAMMO DIGITAL SCREENING BILAT WITH TOMO: ICD-10-PCS | Mod: 26,,, | Performed by: RADIOLOGY

## 2022-10-03 PROCEDURE — 77067 SCR MAMMO BI INCL CAD: CPT | Mod: TC

## 2022-10-03 PROCEDURE — 77063 BREAST TOMOSYNTHESIS BI: CPT | Mod: TC

## 2022-10-03 PROCEDURE — 77063 BREAST TOMOSYNTHESIS BI: CPT | Mod: 26,,, | Performed by: RADIOLOGY

## 2022-10-03 PROCEDURE — 77067 MAMMO DIGITAL SCREENING BILAT WITH TOMO: ICD-10-PCS | Mod: 26,,, | Performed by: RADIOLOGY

## 2022-10-03 PROCEDURE — 77067 SCR MAMMO BI INCL CAD: CPT | Mod: 26,,, | Performed by: RADIOLOGY

## 2022-10-11 ENCOUNTER — PATIENT MESSAGE (OUTPATIENT)
Dept: FAMILY MEDICINE | Facility: CLINIC | Age: 47
End: 2022-10-11
Payer: COMMERCIAL

## 2022-10-11 ENCOUNTER — OFFICE VISIT (OUTPATIENT)
Dept: DERMATOLOGY | Facility: CLINIC | Age: 47
End: 2022-10-11
Payer: COMMERCIAL

## 2022-10-11 DIAGNOSIS — L73.2 HIDRADENITIS SUPPURATIVA: Primary | ICD-10-CM

## 2022-10-11 PROCEDURE — 1160F RVW MEDS BY RX/DR IN RCRD: CPT | Mod: CPTII,S$GLB,, | Performed by: DERMATOLOGY

## 2022-10-11 PROCEDURE — 99214 OFFICE O/P EST MOD 30 MIN: CPT | Mod: 25,S$GLB,, | Performed by: DERMATOLOGY

## 2022-10-11 PROCEDURE — 11900 INJECT SKIN LESIONS </W 7: CPT | Mod: S$GLB,,, | Performed by: DERMATOLOGY

## 2022-10-11 PROCEDURE — 1160F PR REVIEW ALL MEDS BY PRESCRIBER/CLIN PHARMACIST DOCUMENTED: ICD-10-PCS | Mod: CPTII,S$GLB,, | Performed by: DERMATOLOGY

## 2022-10-11 PROCEDURE — 4010F PR ACE/ARB THEARPY RXD/TAKEN: ICD-10-PCS | Mod: CPTII,S$GLB,, | Performed by: DERMATOLOGY

## 2022-10-11 PROCEDURE — 11900 PR INJECTION INTO SKIN LESIONS, UP TO 7: ICD-10-PCS | Mod: S$GLB,,, | Performed by: DERMATOLOGY

## 2022-10-11 PROCEDURE — 99214 PR OFFICE/OUTPT VISIT, EST, LEVL IV, 30-39 MIN: ICD-10-PCS | Mod: 25,S$GLB,, | Performed by: DERMATOLOGY

## 2022-10-11 PROCEDURE — 99999 PR PBB SHADOW E&M-EST. PATIENT-LVL IV: ICD-10-PCS | Mod: PBBFAC,,, | Performed by: DERMATOLOGY

## 2022-10-11 PROCEDURE — 4010F ACE/ARB THERAPY RXD/TAKEN: CPT | Mod: CPTII,S$GLB,, | Performed by: DERMATOLOGY

## 2022-10-11 PROCEDURE — 1159F MED LIST DOCD IN RCRD: CPT | Mod: CPTII,S$GLB,, | Performed by: DERMATOLOGY

## 2022-10-11 PROCEDURE — 1159F PR MEDICATION LIST DOCUMENTED IN MEDICAL RECORD: ICD-10-PCS | Mod: CPTII,S$GLB,, | Performed by: DERMATOLOGY

## 2022-10-11 PROCEDURE — 99999 PR PBB SHADOW E&M-EST. PATIENT-LVL IV: CPT | Mod: PBBFAC,,, | Performed by: DERMATOLOGY

## 2022-10-11 RX ORDER — SPIRONOLACTONE 50 MG/1
TABLET, FILM COATED ORAL
Qty: 60 TABLET | Refills: 3 | Status: SHIPPED | OUTPATIENT
Start: 2022-10-11

## 2022-10-11 NOTE — PROGRESS NOTES
Subjective:       Patient ID:  Keli Gilbert is a 46 y.o. female who presents for   Chief Complaint   Patient presents with    Hidradenitis Suppurativa     Pt with long h/o PsO and PsA - managed by dr. ozuna on Rinvoq since January 2022 for arthritis. No effect on boils - fairly stable    Patient with Hidradenitis suppurativa. Last seen on 9/12/2022 for IL K 10 to abscesses - improved    Condition overall - better    C/o new lesion(s) - no flares today    Current treatment regimen:  Nicazel forte qday  Clindamycin 1% solution bid  Keflex 500 mg bid x 1 month (day 27/30)  Metformin 500 mg qhs  Hibiclens wash qday    Has not yet started:  Bleach baths    Lifestyle modifications - diet, smoking, shaving etc  Avoiding white foods/dairy  Not smoking  Not shaving      Review of Systems   Constitutional:  Positive for weight loss. Negative for weight gain.   HENT:  Negative for nosebleeds and headaches.    Gastrointestinal:  Negative for diarrhea and Sensitivity to oral antibiotics.   Genitourinary:  Negative for irregular periods (has mirena).   Musculoskeletal:  Positive for arthralgias (PsA - on RInvoq - controlled).   Skin:  Positive for activity-related sunscreen use and abscesses. Negative for daily sunscreen use and recent sunburn.   Neurological:  Negative for headaches.   Psychiatric/Behavioral:  Positive for depressed mood (both parents passed in past year and sister dx with breast cancer - on wellbutrin).    Hematologic/Lymphatic: Does not bruise/bleed easily.      Objective:    Physical Exam   Constitutional: She appears well-developed and well-nourished. She is obese.  No distress.   Neurological: She is alert and oriented to person, place, and time. She is not disoriented.   Psychiatric: She has a normal mood and affect.   Skin:   Areas Examined (abnormalities noted in diagram):   Head / Face Inspection Performed  Neck Inspection Performed  Chest / Axilla Inspection Performed  Abdomen Inspection  Performed  Genitals / Buttocks / Groin Inspection Performed  Back Inspection Performed  RUE Inspected  LUE Inspection Performed  RLE Inspected  LLE Inspection Performed            Diagram Legend     Erythematous scaling macule/papule c/w actinic keratosis       Vascular papule c/w angioma      Pigmented verrucoid papule/plaque c/w seborrheic keratosis      Yellow umbilicated papule c/w sebaceous hyperplasia      Irregularly shaped tan macule c/w lentigo     1-2 mm smooth white papules consistent with Milia      Movable subcutaneous cyst with punctum c/w epidermal inclusion cyst      Subcutaneous movable cyst c/w pilar cyst      Firm pink to brown papule c/w dermatofibroma      Pedunculated fleshy papule(s) c/w skin tag(s)      Evenly pigmented macule c/w junctional nevus     Mildly variegated pigmented, slightly irregular-bordered macule c/w mildly atypical nevus      Flesh colored to evenly pigmented papule c/w intradermal nevus       Pink pearly papule/plaque c/w basal cell carcinoma      Erythematous hyperkeratotic cursted plaque c/w SCC      Surgical scar with no sign of skin cancer recurrence      Open and closed comedones      Inflammatory papules and pustules      Verrucoid papule consistent consistent with wart     Erythematous eczematous patches and plaques     Dystrophic onycholytic nail with subungual debris c/w onychomycosis     Umbilicated papule    Erythematous-base heme-crusted tan verrucoid plaque consistent with inflamed seborrheic keratosis     Erythematous Silvery Scaling Plaque c/w Psoriasis     See annotation      Assessment / Plan:        Hidradenitis suppurativa  -     triamcinolone acetonide injection 10 mg  -     DE [id number] ACETONIDE INJ PER 10MG  -     DE INJECTION INTO SKIN LESIONS, UP TO 7  -     spironolactone (ALDACTONE) 50 MG tablet; Start with 1 po qday, increase to 2 po qday as tolerated  Dispense: 60 tablet; Refill: 3    Intralesional Kenalog 10mg/cc (0.5 cc total) injected  "into 1 lesions on the left buttock today after obtaining verbal consent including risk of surrounding hypopigmentation. Patient tolerated procedure well.    Units: 1  NDC for Kenalog 10mg/cc:  6991-5936-34    Hidradenitis suppurativa  Today's Plan:      LIFESTYLE:    Continue weight loss and dietary modifications   Avoid sugars and processed foods  Limit "white" foods ie. Bread, rice, pasta, potatoes  - recommend limiting to 1/2 cup per serving  Limit dairy     Keep skin cool as overheating can flare HS    Avoid shaving affected areas and wearing tight fitting clothing as friction exacerbates disease    TREATMENT:    Start:  Dilute bleach to affected areas:  CLn wash as compresses every day when flared   Spironolactone at 50mg qday - increase to 100mg qday as tolerated - pt to discuss with PCP prior to starting as takes lasix for HTN 2 (med-induce)    Cont:  Wash affected areas with Hibiclens +/- Benzoyl peroxide every day    Take Nicazel forte every day (This is a prescription that I will send to [facility] pharmacy; your cost $40 for 30 tablets).    Apply Clindamycin solution 1x/day to "quiet affected areas" and 2x/day to flared affected areas    Take Metformin ER at 500mg every night with dinner -- can decrease # and severity of lesions   SE: diarrhea      CONSIDER:  Adding 3 small cans of V8 juice per week          FOR FLARES (new painful bump):  Message office through [facility] for injection which will often hasten resolution of tender, red bump    Can apply warm/hot compresses on a painful, deep lump    If notice a foul-smelling drainage, can use Hydrogen Peroxide to cleanse area followed by application of Medi-honey to open area (apply Medi-honey 1x/day)        No follow-ups on file.    "

## 2022-10-11 NOTE — PATIENT INSTRUCTIONS
"Today's Plan:      LIFESTYLE:    Continue weight loss and dietary modifications   Avoid sugars and processed foods  Limit "white" foods ie. Bread, rice, pasta, potatoes  - recommend limiting to 1/2 cup per serving  Limit dairy     Keep skin cool as overheating can flare HS    Avoid shaving affected areas and wearing tight fitting clothing as friction exacerbates disease    TREATMENT:    Start:  Dilute bleach to affected areas:  CLn wash as compresses every day when flared   Spironolactone at 50mg qday - increase to 100mg qday as tolerated    Cont:  Wash affected areas with Hibiclens +/- Benzoyl peroxide every day    Take Nicazel forte every day (This is a prescription that I will send to [facility] pharmacy; your cost $40 for 30 tablets).    Apply Clindamycin solution 1x/day to "quiet affected areas" and 2x/day to flared affected areas    Take Metformin ER at 500mg every night with dinner -- can decrease # and severity of lesions   SE: diarrhea      CONSIDER:  Adding 3 small cans of V8 juice per week          FOR FLARES (new painful bump):  Message office through [facility] for injection which will often hasten resolution of tender, red bump    Can apply warm/hot compresses on a painful, deep lump    If notice a foul-smelling drainage, can use Hydrogen Peroxide to cleanse area followed by application of Medi-honey to open area (apply Medi-honey 1x/day)        "

## 2022-10-11 NOTE — ASSESSMENT & PLAN NOTE
"Today's Plan:      LIFESTYLE:    Continue weight loss and dietary modifications   Avoid sugars and processed foods  Limit "white" foods ie. Bread, rice, pasta, potatoes  - recommend limiting to 1/2 cup per serving  Limit dairy     Keep skin cool as overheating can flare HS    Avoid shaving affected areas and wearing tight fitting clothing as friction exacerbates disease    TREATMENT:    Start:  Dilute bleach to affected areas:  CLn wash as compresses every day when flared   Spironolactone at 50mg qday - increase to 100mg qday as tolerated    Cont:  Wash affected areas with Hibiclens +/- Benzoyl peroxide every day    Take Nicazel forte every day (This is a prescription that I will send to [facility] pharmacy; your cost $40 for 30 tablets).    Apply Clindamycin solution 1x/day to "quiet affected areas" and 2x/day to flared affected areas    Take Metformin ER at 500mg every night with dinner -- can decrease # and severity of lesions   SE: diarrhea      CONSIDER:  Adding 3 small cans of V8 juice per week          FOR FLARES (new painful bump):  Message office through [facility] for injection which will often hasten resolution of tender, red bump    Can apply warm/hot compresses on a painful, deep lump    If notice a foul-smelling drainage, can use Hydrogen Peroxide to cleanse area followed by application of Medi-honey to open area (apply Medi-honey 1x/day)        "

## 2022-10-12 ENCOUNTER — PATIENT MESSAGE (OUTPATIENT)
Dept: FAMILY MEDICINE | Facility: CLINIC | Age: 47
End: 2022-10-12
Payer: COMMERCIAL

## 2022-10-12 DIAGNOSIS — I10 BENIGN ESSENTIAL HTN: Primary | ICD-10-CM

## 2022-10-17 ENCOUNTER — TELEPHONE (OUTPATIENT)
Dept: FAMILY MEDICINE | Facility: CLINIC | Age: 47
End: 2022-10-17
Payer: COMMERCIAL

## 2022-10-17 ENCOUNTER — TELEPHONE (OUTPATIENT)
Dept: RHEUMATOLOGY | Facility: CLINIC | Age: 47
End: 2022-10-17
Payer: COMMERCIAL

## 2022-10-17 ENCOUNTER — PATIENT MESSAGE (OUTPATIENT)
Dept: RHEUMATOLOGY | Facility: CLINIC | Age: 47
End: 2022-10-17
Payer: COMMERCIAL

## 2022-10-17 ENCOUNTER — LAB VISIT (OUTPATIENT)
Dept: LAB | Facility: HOSPITAL | Age: 47
End: 2022-10-17
Attending: FAMILY MEDICINE
Payer: COMMERCIAL

## 2022-10-17 DIAGNOSIS — M79.7 FIBROMYALGIA: ICD-10-CM

## 2022-10-17 DIAGNOSIS — Z79.899 LONG TERM CURRENT USE OF IMMUNOSUPPRESSIVE DRUG: ICD-10-CM

## 2022-10-17 DIAGNOSIS — E03.9 HYPOTHYROIDISM, UNSPECIFIED TYPE: ICD-10-CM

## 2022-10-17 DIAGNOSIS — Z79.899 LONG-TERM USE OF HIGH-RISK MEDICATION: ICD-10-CM

## 2022-10-17 DIAGNOSIS — L40.50 PSORIATIC ARTHRITIS: ICD-10-CM

## 2022-10-17 DIAGNOSIS — G37.9 DEMYELINATING DISORDER: ICD-10-CM

## 2022-10-17 DIAGNOSIS — E87.6 HYPOKALEMIA: Primary | ICD-10-CM

## 2022-10-17 DIAGNOSIS — Z79.1 LONG TERM (CURRENT) USE OF NON-STEROIDAL ANTI-INFLAMMATORIES (NSAID): ICD-10-CM

## 2022-10-17 DIAGNOSIS — E87.6 HYPOKALEMIA: ICD-10-CM

## 2022-10-17 DIAGNOSIS — I10 BENIGN ESSENTIAL HTN: ICD-10-CM

## 2022-10-17 DIAGNOSIS — L40.50 PSORIATIC ARTHRITIS: Primary | ICD-10-CM

## 2022-10-17 LAB
ALBUMIN SERPL BCP-MCNC: 4.1 G/DL (ref 3.5–5.2)
ALP SERPL-CCNC: 98 U/L (ref 55–135)
ALT SERPL W/O P-5'-P-CCNC: 28 U/L (ref 10–44)
ANION GAP SERPL CALC-SCNC: 6 MMOL/L (ref 8–16)
ANION GAP SERPL CALC-SCNC: 6 MMOL/L (ref 8–16)
AST SERPL-CCNC: 29 U/L (ref 10–40)
BASOPHILS # BLD AUTO: 0.07 K/UL (ref 0–0.2)
BASOPHILS NFR BLD: 0.6 % (ref 0–1.9)
BILIRUB SERPL-MCNC: 0.2 MG/DL (ref 0.1–1)
BUN SERPL-MCNC: 27 MG/DL (ref 6–20)
BUN SERPL-MCNC: 27 MG/DL (ref 6–20)
CALCIUM SERPL-MCNC: 9.8 MG/DL (ref 8.7–10.5)
CALCIUM SERPL-MCNC: 9.8 MG/DL (ref 8.7–10.5)
CHLORIDE SERPL-SCNC: 98 MMOL/L (ref 95–110)
CHLORIDE SERPL-SCNC: 98 MMOL/L (ref 95–110)
CHOLEST SERPL-MCNC: 174 MG/DL (ref 120–199)
CHOLEST/HDLC SERPL: 4 {RATIO} (ref 2–5)
CO2 SERPL-SCNC: 27 MMOL/L (ref 23–29)
CO2 SERPL-SCNC: 27 MMOL/L (ref 23–29)
CREAT SERPL-MCNC: 0.7 MG/DL (ref 0.5–1.4)
CREAT SERPL-MCNC: 0.7 MG/DL (ref 0.5–1.4)
CRP SERPL-MCNC: 6.7 MG/L (ref 0–8.2)
DIFFERENTIAL METHOD: ABNORMAL
EOSINOPHIL # BLD AUTO: 0.1 K/UL (ref 0–0.5)
EOSINOPHIL NFR BLD: 1.1 % (ref 0–8)
ERYTHROCYTE [DISTWIDTH] IN BLOOD BY AUTOMATED COUNT: 14.7 % (ref 11.5–14.5)
ERYTHROCYTE [SEDIMENTATION RATE] IN BLOOD BY PHOTOMETRIC METHOD: 30 MM/HR (ref 0–36)
EST. GFR  (NO RACE VARIABLE): >60 ML/MIN/1.73 M^2
EST. GFR  (NO RACE VARIABLE): >60 ML/MIN/1.73 M^2
GLUCOSE SERPL-MCNC: 75 MG/DL (ref 70–110)
GLUCOSE SERPL-MCNC: 75 MG/DL (ref 70–110)
HCT VFR BLD AUTO: 41.8 % (ref 37–48.5)
HDLC SERPL-MCNC: 43 MG/DL (ref 40–75)
HDLC SERPL: 24.7 % (ref 20–50)
HGB BLD-MCNC: 12.8 G/DL (ref 12–16)
IMM GRANULOCYTES # BLD AUTO: 0.17 K/UL (ref 0–0.04)
IMM GRANULOCYTES NFR BLD AUTO: 1.4 % (ref 0–0.5)
LDLC SERPL CALC-MCNC: 88.8 MG/DL (ref 63–159)
LYMPHOCYTES # BLD AUTO: 2.9 K/UL (ref 1–4.8)
LYMPHOCYTES NFR BLD: 23.2 % (ref 18–48)
MCH RBC QN AUTO: 29 PG (ref 27–31)
MCHC RBC AUTO-ENTMCNC: 30.6 G/DL (ref 32–36)
MCV RBC AUTO: 95 FL (ref 82–98)
MONOCYTES # BLD AUTO: 0.8 K/UL (ref 0.3–1)
MONOCYTES NFR BLD: 6.5 % (ref 4–15)
NEUTROPHILS # BLD AUTO: 8.5 K/UL (ref 1.8–7.7)
NEUTROPHILS NFR BLD: 67.2 % (ref 38–73)
NONHDLC SERPL-MCNC: 131 MG/DL
NRBC BLD-RTO: 0 /100 WBC
PLATELET # BLD AUTO: 466 K/UL (ref 150–450)
PMV BLD AUTO: 8.8 FL (ref 9.2–12.9)
POTASSIUM SERPL-SCNC: 4.2 MMOL/L (ref 3.5–5.1)
POTASSIUM SERPL-SCNC: 4.2 MMOL/L (ref 3.5–5.1)
PROT SERPL-MCNC: 7.9 G/DL (ref 6–8.4)
RBC # BLD AUTO: 4.41 M/UL (ref 4–5.4)
SODIUM SERPL-SCNC: 131 MMOL/L (ref 136–145)
SODIUM SERPL-SCNC: 131 MMOL/L (ref 136–145)
TRIGL SERPL-MCNC: 211 MG/DL (ref 30–150)
WBC # BLD AUTO: 12.55 K/UL (ref 3.9–12.7)

## 2022-10-17 PROCEDURE — 86140 C-REACTIVE PROTEIN: CPT | Performed by: FAMILY MEDICINE

## 2022-10-17 PROCEDURE — 85652 RBC SED RATE AUTOMATED: CPT | Performed by: FAMILY MEDICINE

## 2022-10-17 PROCEDURE — 80053 COMPREHEN METABOLIC PANEL: CPT | Performed by: FAMILY MEDICINE

## 2022-10-17 PROCEDURE — 80061 LIPID PANEL: CPT | Performed by: INTERNAL MEDICINE

## 2022-10-17 PROCEDURE — 36415 COLL VENOUS BLD VENIPUNCTURE: CPT | Mod: PO | Performed by: INTERNAL MEDICINE

## 2022-10-17 PROCEDURE — 85025 COMPLETE CBC W/AUTO DIFF WBC: CPT | Performed by: FAMILY MEDICINE

## 2022-10-17 NOTE — TELEPHONE ENCOUNTER
Call placed to patient and notified labs were rescheduled for today. Pt verbalized understanding and confirmed appt for this day.

## 2022-10-18 ENCOUNTER — TELEPHONE (OUTPATIENT)
Dept: FAMILY MEDICINE | Facility: CLINIC | Age: 47
End: 2022-10-18
Payer: COMMERCIAL

## 2022-10-18 ENCOUNTER — PATIENT MESSAGE (OUTPATIENT)
Dept: FAMILY MEDICINE | Facility: CLINIC | Age: 47
End: 2022-10-18
Payer: COMMERCIAL

## 2022-10-18 ENCOUNTER — PATIENT MESSAGE (OUTPATIENT)
Dept: RHEUMATOLOGY | Facility: CLINIC | Age: 47
End: 2022-10-18
Payer: COMMERCIAL

## 2022-10-18 DIAGNOSIS — E87.1 HYPONATREMIA: Primary | ICD-10-CM

## 2022-10-18 NOTE — TELEPHONE ENCOUNTER
Patient informed of Dr. Moran recommendation and is unable to have lab drawn on Friday due to work schedule patient scheduled on Saturday @ 10 am.

## 2022-10-18 NOTE — TELEPHONE ENCOUNTER
Please let patient know that her BUN is elevated, but not terrible.  She likely needs some more water in her diet, but also needs to mildly increase sodium in her diet.  I want her to repeat labs Friday    Thanks  Dr. Moran

## 2022-10-18 NOTE — TELEPHONE ENCOUNTER
----- Message from Isabel Begum RN sent at 10/17/2022  1:55 PM CDT -----  Please put lab orders in epic

## 2022-10-21 ENCOUNTER — PATIENT MESSAGE (OUTPATIENT)
Dept: FAMILY MEDICINE | Facility: CLINIC | Age: 47
End: 2022-10-21
Payer: COMMERCIAL

## 2022-10-22 ENCOUNTER — LAB VISIT (OUTPATIENT)
Dept: LAB | Facility: HOSPITAL | Age: 47
End: 2022-10-22
Attending: FAMILY MEDICINE
Payer: COMMERCIAL

## 2022-10-22 DIAGNOSIS — E87.1 HYPONATREMIA: ICD-10-CM

## 2022-10-22 LAB
ANION GAP SERPL CALC-SCNC: 10 MMOL/L (ref 8–16)
BUN SERPL-MCNC: 24 MG/DL (ref 6–20)
CALCIUM SERPL-MCNC: 9.7 MG/DL (ref 8.7–10.5)
CHLORIDE SERPL-SCNC: 98 MMOL/L (ref 95–110)
CO2 SERPL-SCNC: 26 MMOL/L (ref 23–29)
CREAT SERPL-MCNC: 0.8 MG/DL (ref 0.5–1.4)
EST. GFR  (NO RACE VARIABLE): >60 ML/MIN/1.73 M^2
GLUCOSE SERPL-MCNC: 94 MG/DL (ref 70–110)
POTASSIUM SERPL-SCNC: 4.2 MMOL/L (ref 3.5–5.1)
SODIUM SERPL-SCNC: 134 MMOL/L (ref 136–145)

## 2022-10-22 PROCEDURE — 80048 BASIC METABOLIC PNL TOTAL CA: CPT | Performed by: FAMILY MEDICINE

## 2022-10-22 PROCEDURE — 36415 COLL VENOUS BLD VENIPUNCTURE: CPT | Mod: PO | Performed by: FAMILY MEDICINE

## 2022-10-22 NOTE — PROGRESS NOTES
Subjective:       Patient ID: Keli Concha Gilbert is a 46 y.o. female with psoriatic arthritis on Rinvoq & Leflunomide (& naproxen); demyelinating disorder secondary to Enbrel; incomplete effect on chronic pain syndrome on savella & methadone     Chief Complaint: Disease management      Ms. Gilbert is a 47 yo woman with history of PsA last seen last on 7/20/22.  She is on Rinvoq which she began on 1/29/22 and naproxen 500 mg which she takes up to tid pc without any adverse effects.   Leflunomide was stopped on 7/2022 without any change in sxs.    Has not been able to get Shingrix as insurance would not pay for it despite her immunosuppressed state & recommendations of CDC.    She returns for f/u. She is doing well joint & PsO wise without much joint pain and stabilization of her R 3rd PIP dactylitis with AM stiffness ~ 1/2 hr.  However, she was seen by Dermatology & dx w HS & has some neck and facial lesions which apparently are part of HS.  She was prescribed antibiotics & spironolactone.    Additionally, she was begun on Mounjaro for DM & weight loss.  She also takes intermittent phentermine.  She has lost weight.       Current Outpatient Medications   Medication Sig Dispense Refill    G4-gfud-[id number]-tms-[id number]-T3-Zn-robert (NICAZEL FORTE) [id number] mg-mcg-mg-mg Tab Take 1 po qday 30 tablet 5    buPROPion (WELLBUTRIN XL) 300 MG 24 hr tablet Take 1 tablet (300 mg total) by mouth once daily. 90 tablet 1    cholecalciferol, vitamin D3, 2,000 unit Tab Take 1 tablet by mouth Once a week.      clindamycin (CLEOCIN T) 1 % external solution AAA bid 60 mL 3    furosemide (LASIX) 40 MG tablet TAKE 1 TABLET(40 MG) BY MOUTH EVERY DAY 90 tablet 1    gabapentin (NEURONTIN) 300 MG capsule Take 300 mg by mouth 3 (three) times daily.      levothyroxine (SYNTHROID) 125 MCG tablet Take 1 tablet (125 mcg total) by mouth before breakfast. 90 tablet 3    liothyronine (CYTOMEL) 5 MCG Tab TAKE 2 TABLETS(10 MCG) BY MOUTH EVERY  tablet  1    loratadine-pseudoephedrine  mg (CLARITIN-D 24-HOUR)  mg per 24 hr tablet Take 1 tablet by mouth once daily.      losartan (COZAAR) 50 MG tablet Take 1 tablet (50 mg total) by mouth once daily. 90 tablet 3    metFORMIN (FORTAMET) 500 mg 24hr tablet 1 po qam with breakfast 30 tablet 5    methadone (DOLOPHINE) 10 MG tablet Take 1 tablet by mouth Three times a day.      naproxen (NAPROSYN) 500 MG tablet Take 1 tablet (500 mg total) by mouth every meal as needed (pain). 270 tablet 2    phentermine (ADIPEX-P) 37.5 mg tablet TAKE 1 TABLET(37.5 MG) BY MOUTH BEFORE BREAKFAST 30 tablet 0    potassium chloride (MICRO-K) 10 MEQ CpSR TAKE 2 CAPSULES(20 MEQ) BY MOUTH EVERY  capsule 3    promethazine (PHENERGAN) 25 MG tablet TK 1 T PO BID PRN N      RINVOQ 15 mg 24 hr tablet TAKE 1 TABLET BY MOUTH  DAILY 30 tablet 3    spironolactone (ALDACTONE) 50 MG tablet Start with 1 po qday, increase to 2 po qday as tolerated 60 tablet 3    tirzepatide (MOUNJARO) 7.5 mg/0.5 mL PnIj Inject 7.5 mg (one pen) into the skin every 7 days. 4 pen 0    traZODone (DESYREL) 50 MG tablet TAKE 1 TO 2 TABLET BY MOUTH EVERY NIGHT       Current Facility-Administered Medications   Medication Dose Route Frequency Provider Last Rate Last Admin    triamcinolone acetonide injection 10 mg  10 mg Intradermal Once Marie Fam MD         Facility-Administered Medications Ordered in Other Visits   Medication Dose Route Frequency Provider Last Rate Last Admin    0.9%  NaCl infusion   Intravenous Continuous Gabriel Herring MD        mupirocin 2 % ointment   Nasal On Call Procedure Gabriel Herring MD   Given at 02/18/22 0703             Review of patient's allergies indicates:   Allergen Reactions    Doxycycline hcl Nausea And Vomiting    Enbrel [etanercept] Other (See Comments)     Optic neurtits     Past Medical History:   Diagnosis Date    Abnormal Pap smear of vagina     AR (allergic rhinitis)     Demyelinating disorder      Depression     Fever blister     Fibromyalgia     followed by pain management    Generalized osteoarthritis of multiple sites     History of abnormal Pap smear     Hypertension     Hypothyroidism     Insulin resistance     Mixed anxiety and depressive disorder     Morbid obesity     Myelitis, optic neuritis in     treated with high-dose steroids and resolved; positive MRI and spinal fluid for a mass, but on embrel for psoriatic arthritis - she will see a MS specialist at [facility]; MRI normalized off embrel    Obesity     Pre-diabetes     hx diabetes on stereoids /    Psoriasis     Psoriatic arthritis     Vaginal delivery     x1    Vitamin D deficiency disease      Past Surgical History:   Procedure Laterality Date    EYE SURGERY Bilateral 08/2021    Laser surgery for pressure relief    LAPAROSCOPIC CHOLECYSTECTOMY N/A 2/18/2022    Procedure: CHOLECYSTECTOMY, LAPAROSCOPIC;  Surgeon: Gabriel Herring MD;  Location: [facility];  Service: General;  Laterality: N/A;  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  C A  NEED H/P-----CLEARED BY PCP    SINUS SURGERY  1998       Review of Systems   Constitutional:  Positive for fatigue. Negative for fever and unexpected weight change.   HENT: Negative.  Negative for dental problem, mouth sores and trouble swallowing.         Dry mouth only w antihistamines   Eyes:  Negative for redness, itching and visual disturbance.        Dry eyes only with antihistamines   Respiratory: Negative.  Negative for cough and shortness of breath.    Cardiovascular: Negative.  Negative for chest pain, palpitations and leg swelling.   Gastrointestinal: Negative.  Negative for abdominal pain, anal bleeding, blood in stool, constipation, diarrhea and nausea.        Heartburn.   Endocrine: Negative.    Genitourinary: Negative.  Negative for dysuria, frequency, genital sores, menstrual problem, pelvic pain and urgency.   Musculoskeletal: Negative.  Negative for arthralgias, back pain, gait problem  "and neck pain.   Skin: Negative.  Negative for color change and rash.   Allergic/Immunologic: Positive for immunocompromised state.   Neurological:  Positive for headaches. Negative for dizziness, seizures, weakness and numbness.   Hematological: Negative.  Negative for adenopathy. Does not bruise/bleed easily.   Psychiatric/Behavioral:  Positive for dysphoric mood (stable) and sleep disturbance (stable). Negative for decreased concentration, self-injury and suicidal ideas. The patient is nervous/anxious.        Family History   Problem Relation Age of Onset    Thyroid disease Mother     Heart disease Mother     Hypertension Mother     Hyperlipidemia Mother     Coronary artery disease Mother         s/p CABG    Heart attack Mother     Psoriasis Father     Cancer Father         throat    Breast cancer Sister     Heart disease Sister         MVP    Diabetes Paternal Uncle     Diabetes Maternal Grandfather     Heart disease Maternal Grandfather     Thyroid disease Maternal Grandfather     ADD / ADHD Son     Melanoma Neg Hx     Lupus Neg Hx     Eczema Neg Hx     Acne Neg Hx     Colon cancer Neg Hx     Ovarian cancer Neg Hx      Mom w CVAs & in a nursing home  22.    SH:   Working at a bank   Previously worked at a bar as well.     Has a new granddaughter May, 2022--Lo  No alcohol; no cigarettes;   Objective:     /80   Pulse 98   Ht 5' 6" (1.676 m)   Wt 106 kg (233 lb 11 oz)   BMI 37.72 kg/m²     Was on 254 lb 3.1 oz on 22  Was 242 lb 8.1 oz on 22  Was 254 lb 3.1 oz on 3/16/21  Was 236 lb 12.4 oz on 19  Was 224 lb 13.9 oz on 19.    Physical Exam   Constitutional: She is oriented to person, place, and time. She appears obese. No distress.   HENT:   Head: Normocephalic and atraumatic.   Mouth/Throat: Oropharynx is clear and moist. No oropharyngeal exudate.   No facial rashes  Parotids not enlarged     Eyes: Pupils are equal, round, and reactive to light. Conjunctivae are normal. " Right eye exhibits no discharge. Left eye exhibits no discharge. No scleral icterus.   Neck: No JVD present. No tracheal deviation present. No thyromegaly present.   Cardiovascular: Normal rate, regular rhythm and normal heart sounds. Exam reveals no gallop and no friction rub.   No murmur heard.  Pulmonary/Chest: Effort normal and breath sounds normal. No respiratory distress. She has no wheezes. She has no rales. She exhibits no tenderness.   Abdominal: Soft. Bowel sounds are normal. She exhibits no distension and no mass. There is no splenomegaly or hepatomegaly. There is no abdominal tenderness. There is no rebound and no guarding.   Musculoskeletal:         General: No tenderness. Normal range of motion.      Cervical back: Neck supple.      Comments: Cspine slight decrease in lateral flexion & rotation; no tenderness  Tspine FROM no tenderness  Lspine FROM no tenderness.  TMJ: unremarkable  Shoulders: FROM; no synovitis  Elbows: FROM; no synovitis; no tophi or nodules  Wrists: no SHT; no tenderness; good ROM  MCPs: no metatarsalgia; no synovitis  PIPs: no more dactylitis 2nd ray R; R 3rd PIP w flexion contracture; no TTP;  cannot fully extend this joint.   DIPs: FROM; no synovitis  HIPS: FROM  Knees: FROM; no synovitis; no instability;  Ankles: FROM: no synovitis   Toes: ok; no metatarsalgia                 Lymphadenopathy:     She has no cervical adenopathy.   Neurological: She is alert and oriented to person, place, and time. She has normal reflexes. No cranial nerve deficit. Gait normal.   Proximal and distal muscle strength 5/5.   Skin: Skin is warm and dry. Rash noted. No lesion noted. She is not diaphoretic.   No psoriasis but has acne like facial & neck lesions..   Psychiatric: Her behavior is normal. Mood, memory, affect and judgment normal.   Vitals reviewed.      Labs:     10/17/22: ESR 30 (36); CRP 6.7; CBC plt 466; CMP Na 131; BUN 27;   7/16/22: ESR 26 (36); CRP 5.5; CMP glu 167  4/16/22: ESR 24  (36); CRP 8.0; CBC Hg 11.3; low indices; CMP ok;  2/8/22: CBC Hg 11.9; CMP ok;   1/19/22: ESR 66; CRP 18.1  10/9/21: ESR 38 (36); CRP 11.7; CBC ok; CMP ok;   5/15/21: CMP ok;  TSH ok; (150)  3/16/21: ESR 72 (36); CRP 25.1; CBC  Hg 11.4; CMP glu 125; alb 3.4;   7/22/20: ESR 34 (36); CRP 16.1; CBC Hg 10.8; Ht 36.8; CMP ok; last labs  12/7/19: ESR 17; CRP CBC Hg 11.8; CMP ok; TFTs ok;   9/14/19: ESR 24 (36); CRP 9.8; CBC Hg 11.5; CP ok;   2/19/19: ESR 13; CRP 5.8; CBC plts 362; CMP ok; HCV/HBV/QG neg;  11/17/18: ESR 8; CRP 3.9; Hg 11.9; low indices; CMP ok;   8/22/18: ESR 10; CRP 10.9; CBC Hg 10.7; Ht 35.7; CMP ok  5/14/18: ESR 13; CRP 13.4;  Hg 11.3; low indices; eos 12.4%; CMP ok;   2/10/18: ESR 30; CRP 10.5; Hg 11.3; plt 366; CMP ok;   10/19/17: HBV neg; HCV neg;   9/2/17: ESR 16; CRp 5.9; Hg 11.6; Ht 36.6; CMP ok;   5/29/17: QG neg  2/20/17: ESR 34; CRP 8.1; CBC Hg 11.4; Ht 35.6; CMP ok;   11/19/16: ESR 20; CRP 4.8; CBC Hg 11.9; CMP K+3.3, otherwise ok;   8/27/16: ESR 22; CRP 7; Hg 11.8; Ht 36.9; CMP ok;   5/26/16: ESR 35; CRP 11.2Hg 11.8; Ht 36.9; CMP ok;   5/21/16: Vit d 37  10/8/15: ESR 45; CRP 14.2; Hg 11.2; Ht 35.8; CMP; ok  7/7/15: CBC plt 380; K+ 3.4; Glu 154  7/6/15: ESR 48; CRP 9.6  4/4/15: ESR 41; CRP 19; Hg 11.0; Ht 34.6; plt 377; CMP ok;  1/10/15: ESR 34; CRP 18; CBC ok; ; Alb. 3.6  10/4/14: ESR 43; CRP 18.5; Hg 11.8; Ht 36.5; plt 378; Alb. 3.4;   3/20/14: ESR 41; CRP 14.3; plt 356; CMP ok;  2/15/13: ESR 30; CRP 9.8; CBC ok; Alb. 3.3  11/2/13: ESR 28; CRP 11.6; plt 363; Alb. 3.3;   5/28/13: ESR 45; CRP 8.8; plt 366; CMP ok;     12/1/18: Bilateral hands: personally viewed: Mild degenerative changes noted at the interphalangeal joints.  Mild-to-moderate degenerative change noted at the bilateral 1st CMC joints.  Mild degenerative changes also present at both radiocarpal joints, triscaphe joints and right distal radioulnar joint.  Minimal degenerative changes also present at the MCP joints  bilaterally and greatest at the 1st MCP joint on the left.  6/15/17: Bilateral hands: personally reviewed: mild osteoarthritis w stable periarticular erosion at the left fourth MCP joint; no change since last year.   8/27/16: Bilateral feet: personally reviewed. Mild HV; mild OA shell 1st MTP dali; ? Erosion PIP left small toe;   5/26/16: Bilateral hands: personally reviewed: L 4th MCP (new) erosive lesion; R & L 2nd & 3rd metacarpal head cysts; R mild PIP erosion & STS 2nd.    Assessment:     Psoriatic arthritis-- with mild SHT 2 MCPs bilaterally (R >>L) & with sausage digit of R index finger--currently w  less& flexion deformity of R 3rd PIP--stable at present .    a) History of sausage digits of both toes      PIPs, DIPs, wrists, and knees., now w. only one sausage digit.   b) Psoriasis of the abdomen, elbow, and the shins--resolved.    c) Enbrel stopped 10/21/11 due to optic neuritis.     d) New erosion (4th L metacarpal head) on x-ray & possibly L 5th toe PIP     e) Persisting elevation of ESR and CRP    f) inadequate response to leflunomide and Stelara & apremilast (w. Intolerable nausea)   G) could not tolerate SSZ--nausea.   H) MTX x 2 even SC did not help sxs.   I) improved on Cosentyx (+leflunomide + naproxen) initially incompletely-- hand joints still hurt despite normalization of  ESR & CRP   J) Orencia begun 11/17 with incomplete response   K)Taltz begun 4/20/18-2/19   L) Tofacitinib 3/19 - 6/18/19    Chronic SHT 3rd R PIP & base of L thumb with questionable erosive lesion.   M) Back to Cosentyx + leflunomide + celebrex    N) Tremfya begun 4/14/21--failed: stopped w headache & lack of efficacy   O) Back to Cosentyx 10/6/21 with improvement; held since 12/15/21.   P) On Rinvoq 30 mg since 1/29/22--stable to improved.    Left optic neuritis with demyelinating lesions secondary to Enbrel--now resolved.    Repeat MRI ok    Chronic pain/fibromyalgia syndrome with fatigue, tender points, withdrawals to palpation  in the past, pain all over, responsive to Savella but with some nausea (off it now), [facility],    On methadone    Degenerative joint disease of the cervical spine, knees, and feet--very mild.     Depression on wellbutrin   Off savella & sertraline (much weight gain)    Hydradenitis suppurativa    Hypertriglyceridemia    Hypertension.     Hypothyroidism.     Vitamin D deficiency with regular prescription vitamin D supplementation.     S/P covid 7/3/22    Obesity from morbid obesity        Plan:   Again reviewed results of the Oral Surveillance study & increased risk of MACE in patients over 50 w CVD risk factors; also reviewed VTE data & increased risk of malignancy again.  Still needs Shingrix.  Ok to continue Rinvoq  Ok to continue naproxen 500 mg up to tid but less is best.   Ok to stay off leflunomide.  Reviewed biologics for HS: besides TNFi, Anakinra & Stelara have been used.  Cannot use in conjunction w Rinvoq  Labs~in 3 months  Add exercise (rapid walking) to weight loss regimen      RTC 3 months or prn                        2 seconds or less

## 2022-10-24 ENCOUNTER — PATIENT MESSAGE (OUTPATIENT)
Dept: FAMILY MEDICINE | Facility: CLINIC | Age: 47
End: 2022-10-24
Payer: COMMERCIAL

## 2022-10-25 ENCOUNTER — OFFICE VISIT (OUTPATIENT)
Dept: RHEUMATOLOGY | Facility: CLINIC | Age: 47
End: 2022-10-25
Payer: COMMERCIAL

## 2022-10-25 VITALS
BODY MASS INDEX: 37.56 KG/M2 | HEIGHT: 66 IN | HEART RATE: 98 BPM | SYSTOLIC BLOOD PRESSURE: 130 MMHG | WEIGHT: 233.69 LBS | DIASTOLIC BLOOD PRESSURE: 80 MMHG

## 2022-10-25 DIAGNOSIS — Z79.899 LONG TERM CURRENT USE OF IMMUNOSUPPRESSIVE DRUG: ICD-10-CM

## 2022-10-25 DIAGNOSIS — L40.50 PSORIATIC ARTHRITIS: Primary | ICD-10-CM

## 2022-10-25 DIAGNOSIS — E66.9 OBESITY (BMI 30-39.9): ICD-10-CM

## 2022-10-25 DIAGNOSIS — Z79.1 LONG TERM (CURRENT) USE OF NON-STEROIDAL ANTI-INFLAMMATORIES (NSAID): ICD-10-CM

## 2022-10-25 DIAGNOSIS — M15.9 GENERALIZED OSTEOARTHRITIS OF MULTIPLE SITES: ICD-10-CM

## 2022-10-25 PROCEDURE — 99214 PR OFFICE/OUTPT VISIT, EST, LEVL IV, 30-39 MIN: ICD-10-PCS | Mod: S$GLB,,, | Performed by: INTERNAL MEDICINE

## 2022-10-25 PROCEDURE — 4010F PR ACE/ARB THEARPY RXD/TAKEN: ICD-10-PCS | Mod: CPTII,S$GLB,, | Performed by: INTERNAL MEDICINE

## 2022-10-25 PROCEDURE — 3079F PR MOST RECENT DIASTOLIC BLOOD PRESSURE 80-89 MM HG: ICD-10-PCS | Mod: CPTII,S$GLB,, | Performed by: INTERNAL MEDICINE

## 2022-10-25 PROCEDURE — 99999 PR PBB SHADOW E&M-EST. PATIENT-LVL V: CPT | Mod: PBBFAC,,, | Performed by: INTERNAL MEDICINE

## 2022-10-25 PROCEDURE — 3075F SYST BP GE 130 - 139MM HG: CPT | Mod: CPTII,S$GLB,, | Performed by: INTERNAL MEDICINE

## 2022-10-25 PROCEDURE — 99999 PR PBB SHADOW E&M-EST. PATIENT-LVL V: ICD-10-PCS | Mod: PBBFAC,,, | Performed by: INTERNAL MEDICINE

## 2022-10-25 PROCEDURE — 1159F MED LIST DOCD IN RCRD: CPT | Mod: CPTII,S$GLB,, | Performed by: INTERNAL MEDICINE

## 2022-10-25 PROCEDURE — 1160F PR REVIEW ALL MEDS BY PRESCRIBER/CLIN PHARMACIST DOCUMENTED: ICD-10-PCS | Mod: CPTII,S$GLB,, | Performed by: INTERNAL MEDICINE

## 2022-10-25 PROCEDURE — 1159F PR MEDICATION LIST DOCUMENTED IN MEDICAL RECORD: ICD-10-PCS | Mod: CPTII,S$GLB,, | Performed by: INTERNAL MEDICINE

## 2022-10-25 PROCEDURE — 99214 OFFICE O/P EST MOD 30 MIN: CPT | Mod: S$GLB,,, | Performed by: INTERNAL MEDICINE

## 2022-10-25 PROCEDURE — 1160F RVW MEDS BY RX/DR IN RCRD: CPT | Mod: CPTII,S$GLB,, | Performed by: INTERNAL MEDICINE

## 2022-10-25 PROCEDURE — 3079F DIAST BP 80-89 MM HG: CPT | Mod: CPTII,S$GLB,, | Performed by: INTERNAL MEDICINE

## 2022-10-25 PROCEDURE — 4010F ACE/ARB THERAPY RXD/TAKEN: CPT | Mod: CPTII,S$GLB,, | Performed by: INTERNAL MEDICINE

## 2022-10-25 PROCEDURE — 3075F PR MOST RECENT SYSTOLIC BLOOD PRESS GE 130-139MM HG: ICD-10-PCS | Mod: CPTII,S$GLB,, | Performed by: INTERNAL MEDICINE

## 2022-10-25 ASSESSMENT — ROUTINE ASSESSMENT OF PATIENT INDEX DATA (RAPID3)
TOTAL RAPID3 SCORE: 4
PATIENT GLOBAL ASSESSMENT SCORE: 5
FATIGUE SCORE: 4.5
WHEN YOU AWAKENED IN THE MORNING OVER THE LAST WEEK, PLEASE INDICATE THE AMOUNT OF TIME IT TAKES UNTIL YOU ARE AS LIMBER AS YOU WILL BE FOR THE DAY: 30 MINUTES
PSYCHOLOGICAL DISTRESS SCORE: 3.3
MDHAQ FUNCTION SCORE: 0.9
AM STIFFNESS SCORE: 1, YES
PAIN SCORE: 4

## 2022-10-25 NOTE — PATIENT INSTRUCTIONS
Daily walking.    You need a CBC & CMP every ~ 3 months.  We also add an ESR & CRP as well, but not mandatory.

## 2022-10-28 ENCOUNTER — PATIENT MESSAGE (OUTPATIENT)
Dept: FAMILY MEDICINE | Facility: CLINIC | Age: 47
End: 2022-10-28
Payer: COMMERCIAL

## 2022-10-28 RX ORDER — TIRZEPATIDE 10 MG/.5ML
10 INJECTION, SOLUTION SUBCUTANEOUS
Qty: 4 PEN | Refills: 1 | Status: SHIPPED | OUTPATIENT
Start: 2022-10-28 | End: 2022-11-25 | Stop reason: SDUPTHER

## 2022-11-01 ENCOUNTER — PATIENT MESSAGE (OUTPATIENT)
Dept: DERMATOLOGY | Facility: CLINIC | Age: 47
End: 2022-11-01
Payer: COMMERCIAL

## 2022-11-04 ENCOUNTER — PATIENT MESSAGE (OUTPATIENT)
Dept: DERMATOLOGY | Facility: CLINIC | Age: 47
End: 2022-11-04
Payer: COMMERCIAL

## 2022-11-08 ENCOUNTER — PATIENT MESSAGE (OUTPATIENT)
Dept: DERMATOLOGY | Facility: CLINIC | Age: 47
End: 2022-11-08

## 2022-11-08 ENCOUNTER — OFFICE VISIT (OUTPATIENT)
Dept: DERMATOLOGY | Facility: CLINIC | Age: 47
End: 2022-11-08
Payer: COMMERCIAL

## 2022-11-08 DIAGNOSIS — L73.9 FOLLICULITIS: ICD-10-CM

## 2022-11-08 DIAGNOSIS — L73.2 HIDRADENITIS SUPPURATIVA: Primary | ICD-10-CM

## 2022-11-08 PROCEDURE — 1159F PR MEDICATION LIST DOCUMENTED IN MEDICAL RECORD: ICD-10-PCS | Mod: CPTII,S$GLB,, | Performed by: DERMATOLOGY

## 2022-11-08 PROCEDURE — 1160F RVW MEDS BY RX/DR IN RCRD: CPT | Mod: CPTII,S$GLB,, | Performed by: DERMATOLOGY

## 2022-11-08 PROCEDURE — 1160F PR REVIEW ALL MEDS BY PRESCRIBER/CLIN PHARMACIST DOCUMENTED: ICD-10-PCS | Mod: CPTII,S$GLB,, | Performed by: DERMATOLOGY

## 2022-11-08 PROCEDURE — 87070 CULTURE OTHR SPECIMN AEROBIC: CPT | Performed by: DERMATOLOGY

## 2022-11-08 PROCEDURE — 11900 INJECT SKIN LESIONS </W 7: CPT | Mod: S$GLB,,, | Performed by: DERMATOLOGY

## 2022-11-08 PROCEDURE — 99999 PR PBB SHADOW E&M-EST. PATIENT-LVL III: ICD-10-PCS | Mod: PBBFAC,,, | Performed by: DERMATOLOGY

## 2022-11-08 PROCEDURE — 99999 PR PBB SHADOW E&M-EST. PATIENT-LVL III: CPT | Mod: PBBFAC,,, | Performed by: DERMATOLOGY

## 2022-11-08 PROCEDURE — 1159F MED LIST DOCD IN RCRD: CPT | Mod: CPTII,S$GLB,, | Performed by: DERMATOLOGY

## 2022-11-08 PROCEDURE — 11900 PR INJECTION INTO SKIN LESIONS, UP TO 7: ICD-10-PCS | Mod: S$GLB,,, | Performed by: DERMATOLOGY

## 2022-11-08 PROCEDURE — 99214 OFFICE O/P EST MOD 30 MIN: CPT | Mod: 25,S$GLB,, | Performed by: DERMATOLOGY

## 2022-11-08 PROCEDURE — 4010F ACE/ARB THERAPY RXD/TAKEN: CPT | Mod: CPTII,S$GLB,, | Performed by: DERMATOLOGY

## 2022-11-08 PROCEDURE — 4010F PR ACE/ARB THEARPY RXD/TAKEN: ICD-10-PCS | Mod: CPTII,S$GLB,, | Performed by: DERMATOLOGY

## 2022-11-08 PROCEDURE — 99214 PR OFFICE/OUTPT VISIT, EST, LEVL IV, 30-39 MIN: ICD-10-PCS | Mod: 25,S$GLB,, | Performed by: DERMATOLOGY

## 2022-11-08 RX ORDER — SULFAMETHOXAZOLE AND TRIMETHOPRIM 800; 160 MG/1; MG/1
TABLET ORAL
Qty: 60 TABLET | Refills: 0 | Status: SHIPPED | OUTPATIENT
Start: 2022-11-08 | End: 2023-01-13

## 2022-11-08 NOTE — PROGRESS NOTES
Subjective:       Patient ID:  Keli Gilbert is a 46 y.o. female who presents for   Chief Complaint   Patient presents with    Hidradenitis Suppurativa     Follow up     Pt with long h/o PsO and PsA - managed by dr. ozuna on Rinvoq since January 2022 for arthritis. No effect on boils - fairly stable    Patient with Hidradenitis suppurativa. Last seen on10/11/2022 for IL K 10 to abscesses - improved    Condition overall - worse    C/o new lesion(s) - neck, left breast and right upper leg    Current treatment regimen:  Nicazel forte qday  Clindamycin 1% solution qday and bid when flared  Metformin 500 mg qhs  Hibiclens wash qday  Spironolactone 100mg qday - BP controlled  Dilute bleach compresses prn    Has not yet started:  Bleach baths    Lifestyle modifications - diet, smoking, shaving etc  Avoiding white foods/dairy  Not smoking  Not shaving      Review of Systems   Constitutional:  Positive for weight loss (224#). Negative for weight gain.   HENT:  Negative for nosebleeds and headaches.    Gastrointestinal:  Negative for diarrhea and Sensitivity to oral antibiotics.   Genitourinary:  Negative for irregular periods (has mirena).   Musculoskeletal:  Positive for arthralgias (PsA - on RInvoq - controlled).   Skin:  Positive for activity-related sunscreen use and abscesses. Negative for daily sunscreen use and recent sunburn.   Neurological:  Negative for headaches.   Psychiatric/Behavioral:  Positive for depressed mood (both parents passed in past year and sister dx with breast cancer - on wellbutrin).    Hematologic/Lymphatic: Does not bruise/bleed easily.      Objective:    Physical Exam   Constitutional: She appears well-developed and well-nourished. She is obese.  No distress.   Neurological: She is alert and oriented to person, place, and time. She is not disoriented.   Psychiatric: She has a normal mood and affect.   Skin:   Areas Examined (abnormalities noted in diagram):   Neck Inspection  Performed  Chest / Axilla Inspection Performed  Abdomen Inspection Performed  Genitals / Buttocks / Groin Inspection Performed  RLE Inspected  LLE Inspection Performed                     Diagram Legend     Erythematous scaling macule/papule c/w actinic keratosis       Vascular papule c/w angioma      Pigmented verrucoid papule/plaque c/w seborrheic keratosis      Yellow umbilicated papule c/w sebaceous hyperplasia      Irregularly shaped tan macule c/w lentigo     1-2 mm smooth white papules consistent with Milia      Movable subcutaneous cyst with punctum c/w epidermal inclusion cyst      Subcutaneous movable cyst c/w pilar cyst      Firm pink to brown papule c/w dermatofibroma      Pedunculated fleshy papule(s) c/w skin tag(s)      Evenly pigmented macule c/w junctional nevus     Mildly variegated pigmented, slightly irregular-bordered macule c/w mildly atypical nevus      Flesh colored to evenly pigmented papule c/w intradermal nevus       Pink pearly papule/plaque c/w basal cell carcinoma      Erythematous hyperkeratotic cursted plaque c/w SCC      Surgical scar with no sign of skin cancer recurrence      Open and closed comedones      Inflammatory papules and pustules      Verrucoid papule consistent consistent with wart     Erythematous eczematous patches and plaques     Dystrophic onycholytic nail with subungual debris c/w onychomycosis     Umbilicated papule    Erythematous-base heme-crusted tan verrucoid plaque consistent with inflamed seborrheic keratosis     Erythematous Silvery Scaling Plaque c/w Psoriasis     See annotation      Assessment / Plan:        Hidradenitis suppurativa  -     triamcinolone acetonide injection 10 mg  -     AL [id number] ACETONIDE INJ PER 10MG  -     AL INJECTION INTO SKIN LESIONS, UP TO 7    Folliculitis  -     sulfamethoxazole-trimethoprim 800-160mg (BACTRIM DS) 800-160 mg Tab; Take 1 po bid x 1 month  Dispense: 60 tablet; Refill: 0  -     Aerobic culture    Hidradenitis  suppurativa  Today's Plan:      Continue current treatment regimen with   Nicazel forte qday  Clindamycin 1% solution qday and bid prn flare  Metformin 500 mg qhs  Hibiclens wash qday  Dilute bleach compress prn flare - encourage dilute bleach baths daily  Spironolactone at 100mg qday  Weight loss    Intralesional Kenalog 10mg/cc (2.0 cc total) injected into 4 lesions on the ant neck, left breast x 2 and left post thigh today after obtaining verbal consent including risk of surrounding hypopigmentation. Patient tolerated procedure well.    Units: 2  NDC for Kenalog 10mg/cc:  7439-8055-95    Intralesional Kenalog 5mg/cc (0.6 cc total) injected into 3 lesions on the ant neck today after obtaining verbal consent including risk of surrounding hypopigmentation. Patient tolerated procedure well.    Units: 1  NDC for Kenalog 10mg/cc:  1559-2524-19        No follow-ups on file.

## 2022-11-08 NOTE — PATIENT INSTRUCTIONS
Continue current treatment regimen with     Nicazel forte qday  Clindamycin 1% solution qday and bid prn flare  Metformin 500 mg qhs  Hibiclens wash qday  Dilute bleach compress prn flare - encourage dilute bleach baths daily  Spironolactone at 100mg qday  Weight loss    Add bactrim DS 2x/day x 1 month

## 2022-11-08 NOTE — ASSESSMENT & PLAN NOTE
Today's Plan:      Continue current treatment regimen with   Nicazel forte qday  Clindamycin 1% solution qday and bid prn flare  Metformin 500 mg qhs  Hibiclens wash qday  Dilute bleach compress prn flare - encourage dilute bleach baths daily  Spironolactone at 100mg qday  Weight loss    Intralesional Kenalog 10mg/cc (2.0 cc total) injected into 4 lesions on the ant neck, left breast x 2 and left post thigh today after obtaining verbal consent including risk of surrounding hypopigmentation. Patient tolerated procedure well.    Units: 2  NDC for Kenalog 10mg/cc:  5581-6396-53    Intralesional Kenalog 5mg/cc (0.6 cc total) injected into 3 lesions on the ant neck today after obtaining verbal consent including risk of surrounding hypopigmentation. Patient tolerated procedure well.    Units: 1  NDC for Kenalog 10mg/cc:  2957-8900-27

## 2022-11-10 ENCOUNTER — PATIENT MESSAGE (OUTPATIENT)
Dept: FAMILY MEDICINE | Facility: CLINIC | Age: 47
End: 2022-11-10
Payer: COMMERCIAL

## 2022-11-12 LAB — BACTERIA SPEC AEROBE CULT: NORMAL

## 2022-11-18 ENCOUNTER — PATIENT MESSAGE (OUTPATIENT)
Dept: FAMILY MEDICINE | Facility: CLINIC | Age: 47
End: 2022-11-18
Payer: COMMERCIAL

## 2022-11-21 ENCOUNTER — PATIENT MESSAGE (OUTPATIENT)
Dept: FAMILY MEDICINE | Facility: CLINIC | Age: 47
End: 2022-11-21
Payer: COMMERCIAL

## 2022-11-25 ENCOUNTER — CLINICAL SUPPORT (OUTPATIENT)
Dept: ENDOSCOPY | Facility: HOSPITAL | Age: 47
End: 2022-11-25
Attending: FAMILY MEDICINE
Payer: COMMERCIAL

## 2022-11-25 ENCOUNTER — PATIENT MESSAGE (OUTPATIENT)
Dept: FAMILY MEDICINE | Facility: CLINIC | Age: 47
End: 2022-11-25
Payer: COMMERCIAL

## 2022-11-25 ENCOUNTER — PATIENT MESSAGE (OUTPATIENT)
Dept: ENDOSCOPY | Facility: HOSPITAL | Age: 47
End: 2022-11-25

## 2022-11-25 ENCOUNTER — TELEPHONE (OUTPATIENT)
Dept: FAMILY MEDICINE | Facility: CLINIC | Age: 47
End: 2022-11-25
Payer: COMMERCIAL

## 2022-11-25 VITALS — HEIGHT: 66 IN | BODY MASS INDEX: 35.2 KG/M2 | WEIGHT: 219 LBS

## 2022-11-25 DIAGNOSIS — Z12.11 COLON CANCER SCREENING: ICD-10-CM

## 2022-11-25 RX ORDER — SOD SULF/POT CHLORIDE/MAG SULF 1.479 G
12 TABLET ORAL DAILY
Qty: 24 TABLET | Refills: 0 | Status: SHIPPED | OUTPATIENT
Start: 2022-11-25 | End: 2023-04-27

## 2022-11-25 RX ORDER — TIRZEPATIDE 10 MG/.5ML
10 INJECTION, SOLUTION SUBCUTANEOUS
Qty: 12 PEN | Refills: 0 | Status: SHIPPED | OUTPATIENT
Start: 2022-11-25 | End: 2023-03-03

## 2022-12-06 ENCOUNTER — PATIENT MESSAGE (OUTPATIENT)
Dept: FAMILY MEDICINE | Facility: CLINIC | Age: 47
End: 2022-12-06
Payer: COMMERCIAL

## 2022-12-15 ENCOUNTER — ANESTHESIA EVENT (OUTPATIENT)
Dept: ENDOSCOPY | Facility: HOSPITAL | Age: 47
End: 2022-12-15
Payer: COMMERCIAL

## 2022-12-15 RX ORDER — LIDOCAINE HYDROCHLORIDE 10 MG/ML
1 INJECTION, SOLUTION EPIDURAL; INFILTRATION; INTRACAUDAL; PERINEURAL ONCE
Status: CANCELLED | OUTPATIENT
Start: 2022-12-16 | End: 2022-12-16

## 2022-12-16 ENCOUNTER — ANESTHESIA (OUTPATIENT)
Dept: ENDOSCOPY | Facility: HOSPITAL | Age: 47
End: 2022-12-16
Payer: COMMERCIAL

## 2022-12-16 ENCOUNTER — HOSPITAL ENCOUNTER (OUTPATIENT)
Facility: HOSPITAL | Age: 47
Discharge: HOME | End: 2022-12-16
Attending: STUDENT IN AN ORGANIZED HEALTH CARE EDUCATION/TRAINING PROGRAM | Admitting: STUDENT IN AN ORGANIZED HEALTH CARE EDUCATION/TRAINING PROGRAM
Payer: COMMERCIAL

## 2022-12-16 VITALS
DIASTOLIC BLOOD PRESSURE: 87 MMHG | TEMPERATURE: 98 F | SYSTOLIC BLOOD PRESSURE: 133 MMHG | RESPIRATION RATE: 17 BRPM | HEART RATE: 90 BPM | OXYGEN SATURATION: 100 %

## 2022-12-16 DIAGNOSIS — Z12.11 COLON CANCER SCREENING: ICD-10-CM

## 2022-12-16 LAB
B-HCG UR QL: NORMAL
CTP QC/QA: YES

## 2022-12-16 PROCEDURE — 25000003 PHARM REV CODE 250: Performed by: STUDENT IN AN ORGANIZED HEALTH CARE EDUCATION/TRAINING PROGRAM

## 2022-12-16 PROCEDURE — G0121 COLON CA SCRN NOT HI RSK IND: ICD-10-PCS | Mod: ,,, | Performed by: STUDENT IN AN ORGANIZED HEALTH CARE EDUCATION/TRAINING PROGRAM

## 2022-12-16 PROCEDURE — G0121 COLON CA SCRN NOT HI RSK IND: HCPCS | Mod: ,,, | Performed by: STUDENT IN AN ORGANIZED HEALTH CARE EDUCATION/TRAINING PROGRAM

## 2022-12-16 PROCEDURE — 81025 URINE PREGNANCY TEST: CPT | Performed by: STUDENT IN AN ORGANIZED HEALTH CARE EDUCATION/TRAINING PROGRAM

## 2022-12-16 PROCEDURE — 37000009 HC ANESTHESIA EA ADD 15 MINS: Performed by: STUDENT IN AN ORGANIZED HEALTH CARE EDUCATION/TRAINING PROGRAM

## 2022-12-16 PROCEDURE — D9220A PRA ANESTHESIA: Mod: CRNA,,, | Performed by: NURSE ANESTHETIST, CERTIFIED REGISTERED

## 2022-12-16 PROCEDURE — D9220A PRA ANESTHESIA: ICD-10-PCS | Mod: ANES,,, | Performed by: ANESTHESIOLOGY

## 2022-12-16 PROCEDURE — D9220A PRA ANESTHESIA: ICD-10-PCS | Mod: CRNA,,, | Performed by: NURSE ANESTHETIST, CERTIFIED REGISTERED

## 2022-12-16 PROCEDURE — D9220A PRA ANESTHESIA: Mod: ANES,,, | Performed by: ANESTHESIOLOGY

## 2022-12-16 PROCEDURE — G0121 COLON CA SCRN NOT HI RSK IND: HCPCS | Performed by: STUDENT IN AN ORGANIZED HEALTH CARE EDUCATION/TRAINING PROGRAM

## 2022-12-16 PROCEDURE — 37000008 HC ANESTHESIA 1ST 15 MINUTES: Performed by: STUDENT IN AN ORGANIZED HEALTH CARE EDUCATION/TRAINING PROGRAM

## 2022-12-16 PROCEDURE — 63600175 PHARM REV CODE 636 W HCPCS: Performed by: STUDENT IN AN ORGANIZED HEALTH CARE EDUCATION/TRAINING PROGRAM

## 2022-12-16 RX ORDER — PROPOFOL 10 MG/ML
VIAL (ML) INTRAVENOUS
Status: DISCONTINUED | OUTPATIENT
Start: 2022-12-16 | End: 2022-12-16

## 2022-12-16 RX ORDER — SODIUM CHLORIDE 9 MG/ML
INJECTION, SOLUTION INTRAVENOUS CONTINUOUS
Status: DISCONTINUED | OUTPATIENT
Start: 2022-12-16 | End: 2022-12-16 | Stop reason: HOSPADM

## 2022-12-16 RX ORDER — LIDOCAINE HYDROCHLORIDE 20 MG/ML
INJECTION INTRAVENOUS
Status: DISCONTINUED | OUTPATIENT
Start: 2022-12-16 | End: 2022-12-16

## 2022-12-16 RX ORDER — LIDOCAINE HYDROCHLORIDE 20 MG/ML
INJECTION, SOLUTION EPIDURAL; INFILTRATION; INTRACAUDAL; PERINEURAL
Status: DISCONTINUED
Start: 2022-12-16 | End: 2022-12-16 | Stop reason: HOSPADM

## 2022-12-16 RX ORDER — PROPOFOL 10 MG/ML
INJECTION, EMULSION INTRAVENOUS
Status: DISCONTINUED
Start: 2022-12-16 | End: 2022-12-16 | Stop reason: HOSPADM

## 2022-12-16 RX ADMIN — PROPOFOL 20 MG: 10 INJECTION, EMULSION INTRAVENOUS at 08:12

## 2022-12-16 RX ADMIN — PROPOFOL 20 MG: 10 INJECTION, EMULSION INTRAVENOUS at 09:12

## 2022-12-16 RX ADMIN — PROPOFOL 40 MG: 10 INJECTION, EMULSION INTRAVENOUS at 08:12

## 2022-12-16 RX ADMIN — SODIUM CHLORIDE: 0.9 INJECTION, SOLUTION INTRAVENOUS at 08:12

## 2022-12-16 RX ADMIN — PROPOFOL 60 MG: 10 INJECTION, EMULSION INTRAVENOUS at 08:12

## 2022-12-16 RX ADMIN — PROPOFOL 40 MG: 10 INJECTION, EMULSION INTRAVENOUS at 09:12

## 2022-12-16 RX ADMIN — LIDOCAINE HYDROCHLORIDE 100 MG: 20 INJECTION, SOLUTION INTRAVENOUS at 08:12

## 2022-12-16 RX ADMIN — PROPOFOL 30 MG: 10 INJECTION, EMULSION INTRAVENOUS at 09:12

## 2022-12-16 NOTE — ANESTHESIA PREPROCEDURE EVALUATION
12/16/2022  Keli Gilbert is a 46 y.o., female.      Pre-op Assessment     I have reviewed the Nursing Notes.       Review of Systems  Anesthesia Hx:  No problems with previous Anesthesia    Social:  Non-Smoker    Cardiovascular:   Exercise tolerance: good Denies Pacemaker. Hypertension  Denies CABG/stent.     Pulmonary:  Pulmonary Normal    Renal/:  Renal/ Normal     Hepatic/GI:   Bowel Prep. Denies PUD. Denies Hiatal Hernia.  Denies GERD. Denies Liver Disease.  Denies Hepatitis.    Musculoskeletal:   Arthritis     Neurological:   Denies CVA. Denies Seizures.    Endocrine:   Hypothyroidism  Obesity / BMI > 30Denies Morbid Obesity / BMI > 40  Psych:   Psychiatric History          Physical Exam  General: Well nourished, Cooperative, Alert and Oriented    Airway:  Mallampati: III   Mouth Opening: Small, but > 3cm  TM Distance: Normal  Tongue: Normal  Neck ROM: Normal ROM    Dental:  Intact        Anesthesia Plan  Type of Anesthesia, risks & benefits discussed:    Anesthesia Type: Gen Natural Airway  Intra-op Monitoring Plan: Standard ASA Monitors  Induction:  IV  Informed Consent: Informed consent signed with the Patient and all parties understand the risks and agree with anesthesia plan.  All questions answered.   ASA Score: 2  Day of Surgery Review of History & Physical: H&P Update referred to the surgeon/provider.    Ready For Surgery From Anesthesia Perspective.     .

## 2022-12-16 NOTE — H&P
Short Stay Endoscopy History and Physical    PCP - Andriy Moran MD  Referring Physician - PRE-ADMIT, ENDO -[facility]  No address on file    Procedure - Colonoscopy  ASA - per anesthesia  Mallampati - per anesthesia  History of Anesthesia problems - no  Family history Anesthesia problems -  no   Plan of anesthesia - General    HPI  46 y.o. female  Reason for procedure:   Colon cancer screening [Z12.11]        ROS:  Constitutional: No fevers, chills, No weight loss  CV: No chest pain  Pulm: No cough, No shortness of breath  GI: see HPI    Medical History:  has a past medical history of Abnormal Pap smear of vagina, AR (allergic rhinitis), Demyelinating disorder, Depression, Fever blister, Fibromyalgia, Generalized osteoarthritis of multiple sites, History of abnormal Pap smear, Hypertension, Hypothyroidism, Insulin resistance, Mixed anxiety and depressive disorder, Morbid obesity, Myelitis, optic neuritis in, Obesity, Pre-diabetes, Psoriasis, Psoriatic arthritis, Vaginal delivery, and Vitamin D deficiency disease.    Surgical History:  has a past surgical history that includes Sinus surgery (1998); Eye surgery (Bilateral, 08/2021); and Laparoscopic cholecystectomy (N/A, 2/18/2022).    Family History: family history includes ADD / ADHD in her son; Breast cancer in her sister; Cancer in her father; Coronary artery disease in her mother; Diabetes in her maternal grandfather and paternal uncle; Heart attack in her mother; Heart disease in her maternal grandfather, mother, and sister; Hyperlipidemia in her mother; Hypertension in her mother; Psoriasis in her father; Thyroid disease in her maternal grandfather and mother..    Social History:  reports that she has never smoked. She has never used smokeless tobacco. She reports that she does not drink alcohol and does not use drugs.    Review of patient's allergies indicates:   Allergen Reactions    Doxycycline hcl Nausea And Vomiting    Enbrel [etanercept]  Other (See Comments)     Optic neurtits       Medications:   Facility-Administered Medications Prior to Admission   Medication Dose Route Frequency Provider Last Rate Last Admin    triamcinolone acetonide injection 10 mg  10 mg Intradermal Once Marie Fam MD        triamcinolone acetonide injection 10 mg  10 mg Intradermal Once Marie Fam MD         Medications Prior to Admission   Medication Sig Dispense Refill Last Dose    buPROPion (WELLBUTRIN XL) 300 MG 24 hr tablet Take 1 tablet (300 mg total) by mouth once daily. 90 tablet 1 Past Week    cholecalciferol, vitamin D3, 2,000 unit Tab Take 1 tablet by mouth Once a week.   Past Week    furosemide (LASIX) 40 MG tablet TAKE 1 TABLET(40 MG) BY MOUTH EVERY DAY 90 tablet 1 Past Week    gabapentin (NEURONTIN) 300 MG capsule Take 300 mg by mouth 3 (three) times daily.   Past Week    levothyroxine (SYNTHROID) 125 MCG tablet Take 1 tablet (125 mcg total) by mouth before breakfast. 90 tablet 3 12/15/2022    losartan (COZAAR) 50 MG tablet Take 1 tablet (50 mg total) by mouth once daily. 90 tablet 3 Past Week    metFORMIN (FORTAMET) 500 mg 24hr tablet 1 po qam with breakfast 30 tablet 5 Past Week    methadone (DOLOPHINE) 10 MG tablet Take 1 tablet by mouth Three times a day.   12/15/2022    promethazine (PHENERGAN) 25 MG tablet TK 1 T PO BID PRN N   12/15/2022    spironolactone (ALDACTONE) 50 MG tablet Start with 1 po qday, increase to 2 po qday as tolerated 60 tablet 3 Past Week    G2-qnrk-behr-zsa-PB-W6-Zn-robert (NICAZEL FORTE) 425-086-5-12 mg-mcg-mg-mg Tab Take 1 po qday 30 tablet 5     clindamycin (CLEOCIN T) 1 % external solution AAA bid 60 mL 3     liothyronine (CYTOMEL) 5 MCG Tab TAKE 2 TABLETS(10 MCG) BY MOUTH EVERY  tablet 1     loratadine-pseudoephedrine  mg (CLARITIN-D 24-HOUR)  mg per 24 hr tablet Take 1 tablet by mouth once daily.       naproxen (NAPROSYN) 500 MG tablet Take 1 tablet (500 mg total) by mouth every meal as needed  (pain). 270 tablet 2     phentermine (ADIPEX-P) 37.5 mg tablet TAKE 1 TABLET(37.5 MG) BY MOUTH BEFORE BREAKFAST 30 tablet 0     potassium chloride (MICRO-K) 10 MEQ CpSR TAKE 2 CAPSULES(20 MEQ) BY MOUTH EVERY  capsule 3     RINVOQ 15 mg 24 hr tablet TAKE 1 TABLET BY MOUTH  DAILY 30 tablet 3     sod sulf-pot chloride-mag sulf (SUTAB) 1.479-0.188- 0.225 gram tablet Take 12 tablets by mouth once daily. Take according to package instructions with indicated amount of water. 24 tablet 0     sulfamethoxazole-trimethoprim 800-160mg (BACTRIM DS) 800-160 mg Tab Take 1 po bid x 1 month 60 tablet 0     tirzepatide (MOUNJARO) 10 mg/0.5 mL PnIj Inject 10 mg into the skin every 7 days. 12 pen 0     traZODone (DESYREL) 50 MG tablet TAKE 1 TO 2 TABLET BY MOUTH EVERY NIGHT          Physical Exam:    Vital Signs:   Vitals:    12/16/22 0927   BP: 130/78   Pulse: 96   Resp: 19   Temp:        General Appearance: Well appearing in no acute distress  Abdomen: Soft, non tender, non distended with normal bowel sounds, no masses    Labs:  Lab Results   Component Value Date    WBC 12.55 10/17/2022    HGB 12.8 10/17/2022    HCT 41.8 10/17/2022     (H) 10/17/2022    CHOL 174 10/17/2022    TRIG 211 (H) 10/17/2022    HDL 43 10/17/2022    ALT 28 10/17/2022    AST 29 10/17/2022     (L) 10/22/2022    K 4.2 10/22/2022    CL 98 10/22/2022    CREATININE 0.8 10/22/2022    BUN 24 (H) 10/22/2022    CO2 26 10/22/2022    TSH 0.611 02/12/2022    HGBA1C 5.4 03/05/2016       I have explained the risks and benefits of this endoscopic procedure to the patient including but not limited to bleeding, inflammation, infection, perforation, and death.      Jaylen Pelletier MD

## 2022-12-16 NOTE — PLAN OF CARE
Procedure and recovery complete. MD and sister at bedside. Procedure discussed. Discharge instructions given. Patient and sister verbalized understanding. Gait steady. Able to ambulate without assistance. Walked out accompanied by sister.

## 2022-12-16 NOTE — PROVATION PATIENT INSTRUCTIONS
Discharge Summary/Instructions after an Endoscopic Procedure  Patient Name: Keli Gilbert  Patient MRN: [id number]  Patient YOB: 1975 Friday, December 16, 2022  Jaylen Pelletier MD  Dear patient,  As a result of recent federal legislation (The Federal Cures Act), you may   receive lab or pathology results from your procedure in your [facility]   account before your physician is able to contact you. Your physician or   their representative will relay the results to you with their   recommendations at their soonest availability.  Thank you,  RESTRICTIONS:  During your procedure today, you received medications for sedation.  These   medications may affect your judgment, balance and coordination.  Therefore,   for 24 hours, you have the following restrictions:   - DO NOT drive a car, operate machinery, make legal/financial decisions,   sign important papers or drink alcohol.    ACTIVITY:  Today: no heavy lifting, straining or running due to procedural   sedation/anesthesia.  The following day: return to full activity including work.  DIET:  Eat and drink normally unless instructed otherwise.     TREATMENT FOR COMMON SIDE EFFECTS:  - Mild abdominal pain, nausea, belching, bloating or excessive gas:  rest,   eat lightly and use a heating pad.  - Sore Throat: treat with throat lozenges and/or gargle with warm salt   water.  - Because air was used during the procedure, expelling large amounts of air   from your rectum or belching is normal.  - If a bowel prep was taken, you may not have a bowel movement for 1-3 days.    This is normal.  SYMPTOMS TO WATCH FOR AND REPORT TO YOUR PHYSICIAN:  1. Abdominal pain or bloating, other than gas cramps.  2. Chest pain.  3. Back pain.  4. Signs of infection such as: chills or fever occurring within 24 hours   after the procedure.  5. Rectal bleeding, which would show as bright red, maroon, or black stools.   (A tablespoon of blood from the rectum is not serious,  especially if   hemorrhoids are present.)  6. Vomiting.  7. Weakness or dizziness.  GO DIRECTLY TO THE NEAREST EMERGENCY ROOM IF YOU HAVE ANY OF THE FOLLOWING:      Difficulty breathing              Chills and/or fever over 101 F   Persistent vomiting and/or vomiting blood   Severe abdominal pain   Severe chest pain   Black, tarry stools   Bleeding- more than one tablespoon   Any other symptom or condition that you feel may need urgent attention  Your doctor recommends these additional instructions:  If any biopsies were taken, your doctors clinic will contact you in 1 to 2   weeks with any results.  - Discharge patient to home.   - The findings and recommendations were discussed with the patient.   - Patient has a contact number available for emergencies.  The signs and   symptoms of potential delayed complications were discussed with the   patient.  Return to normal activities tomorrow.  Written discharge   instructions were provided to the patient.   - Repeat colonoscopy in 10 years for screening purposes.  For questions, problems or results please call your physician - Jaylen Pelletier MD at Work:  [phone].  [facility] can be reached at [phone]     IF A COMPLICATION OR EMERGENCY SITUATION ARISES AND YOU ARE UNABLE TO REACH   YOUR PHYSICIAN - GO DIRECTLY TO THE EMERGENCY ROOM.  Jaylen Pelletier MD  12/16/2022 9:15:18 AM  This report has been verified and signed electronically.  Dear patient,  As a result of recent federal legislation (The Federal Cures Act), you may   receive lab or pathology results from your procedure in your [facility]   account before your physician is able to contact you. Your physician or   their representative will relay the results to you with their   recommendations at their soonest availability.  Thank you,  PROVATION

## 2022-12-16 NOTE — TRANSFER OF CARE
Anesthesia Transfer of Care Note    Patient: Keli Gilbert    Procedure(s) Performed: Procedure(s) (LRB):  COLONOSCOPY (N/A)    Patient location: GI    Anesthesia Type: general    Transport from OR: Transported from OR on room air with adequate spontaneous ventilation    Post pain: adequate analgesia    Post assessment: no apparent anesthetic complications and tolerated procedure well    Post vital signs: stable    Level of consciousness: awake and alert    Nausea/Vomiting: no nausea/vomiting    Complications: none    Transfer of care protocol was followed      Last vitals:   Visit Vitals  /69 (BP Location: Left arm, Patient Position: Lying)   Pulse 100   Temp 36.5 °C (97.7 °F) (Oral)   Resp 16   SpO2 100%   Breastfeeding No

## 2022-12-19 NOTE — ANESTHESIA POSTPROCEDURE EVALUATION
Anesthesia Post Evaluation    Patient: Keli Gilbert    Procedure(s) Performed: Procedure(s) (LRB):  COLONOSCOPY (N/A)    Final Anesthesia Type: general      Patient location during evaluation: GI PACU  Patient participation: Yes- Able to Participate  Level of consciousness: awake and alert  Post-procedure vital signs: reviewed and stable  Pain management: adequate  Airway patency: patent    PONV status at discharge: No PONV  Anesthetic complications: no      Cardiovascular status: blood pressure returned to baseline and hemodynamically stable  Respiratory status: unassisted and spontaneous ventilation  Hydration status: euvolemic  Follow-up not needed.          Vitals Value Taken Time   /87 12/16/22 0942   Temp 36.5 °C (97.7 °F) 12/16/22 0912   Pulse 90 12/16/22 0942   Resp 17 12/16/22 0942   SpO2 100 % 12/16/22 0942         Event Time   Out of Recovery 09:44:00         Pain/Yulissa Score: No data recorded

## 2023-01-12 ENCOUNTER — TELEPHONE (OUTPATIENT)
Dept: FAMILY MEDICINE | Facility: CLINIC | Age: 48
End: 2023-01-12
Payer: COMMERCIAL

## 2023-01-13 ENCOUNTER — OFFICE VISIT (OUTPATIENT)
Dept: FAMILY MEDICINE | Facility: CLINIC | Age: 48
End: 2023-01-13
Payer: COMMERCIAL

## 2023-01-13 VITALS
RESPIRATION RATE: 18 BRPM | OXYGEN SATURATION: 96 % | HEIGHT: 66 IN | TEMPERATURE: 98 F | WEIGHT: 215.38 LBS | BODY MASS INDEX: 34.61 KG/M2 | DIASTOLIC BLOOD PRESSURE: 66 MMHG | SYSTOLIC BLOOD PRESSURE: 130 MMHG | HEART RATE: 96 BPM

## 2023-01-13 DIAGNOSIS — R60.0 PEDAL EDEMA: ICD-10-CM

## 2023-01-13 DIAGNOSIS — Z86.39 HISTORY OF DIABETES MELLITUS: ICD-10-CM

## 2023-01-13 DIAGNOSIS — Z00.00 WELL WOMAN EXAM WITHOUT GYNECOLOGICAL EXAM: Primary | ICD-10-CM

## 2023-01-13 DIAGNOSIS — L73.2 HIDRADENITIS SUPPURATIVA: ICD-10-CM

## 2023-01-13 DIAGNOSIS — L40.50 PSORIATIC ARTHRITIS: ICD-10-CM

## 2023-01-13 DIAGNOSIS — F34.1 DYSTHYMIA: ICD-10-CM

## 2023-01-13 DIAGNOSIS — E03.9 HYPOTHYROIDISM, UNSPECIFIED TYPE: ICD-10-CM

## 2023-01-13 DIAGNOSIS — I10 BENIGN ESSENTIAL HTN: ICD-10-CM

## 2023-01-13 DIAGNOSIS — L40.9 PSORIASIS: ICD-10-CM

## 2023-01-13 PROCEDURE — 1160F PR REVIEW ALL MEDS BY PRESCRIBER/CLIN PHARMACIST DOCUMENTED: ICD-10-PCS | Mod: CPTII,S$GLB,, | Performed by: FAMILY MEDICINE

## 2023-01-13 PROCEDURE — 1159F PR MEDICATION LIST DOCUMENTED IN MEDICAL RECORD: ICD-10-PCS | Mod: CPTII,S$GLB,, | Performed by: FAMILY MEDICINE

## 2023-01-13 PROCEDURE — 99999 PR PBB SHADOW E&M-EST. PATIENT-LVL V: ICD-10-PCS | Mod: PBBFAC,,, | Performed by: FAMILY MEDICINE

## 2023-01-13 PROCEDURE — 4010F ACE/ARB THERAPY RXD/TAKEN: CPT | Mod: CPTII,S$GLB,, | Performed by: FAMILY MEDICINE

## 2023-01-13 PROCEDURE — 3044F PR MOST RECENT HEMOGLOBIN A1C LEVEL <7.0%: ICD-10-PCS | Mod: CPTII,S$GLB,, | Performed by: FAMILY MEDICINE

## 2023-01-13 PROCEDURE — 3044F HG A1C LEVEL LT 7.0%: CPT | Mod: CPTII,S$GLB,, | Performed by: FAMILY MEDICINE

## 2023-01-13 PROCEDURE — 4010F PR ACE/ARB THEARPY RXD/TAKEN: ICD-10-PCS | Mod: CPTII,S$GLB,, | Performed by: FAMILY MEDICINE

## 2023-01-13 PROCEDURE — 99999 PR PBB SHADOW E&M-EST. PATIENT-LVL V: CPT | Mod: PBBFAC,,, | Performed by: FAMILY MEDICINE

## 2023-01-13 PROCEDURE — 1160F RVW MEDS BY RX/DR IN RCRD: CPT | Mod: CPTII,S$GLB,, | Performed by: FAMILY MEDICINE

## 2023-01-13 PROCEDURE — 3078F DIAST BP <80 MM HG: CPT | Mod: CPTII,S$GLB,, | Performed by: FAMILY MEDICINE

## 2023-01-13 PROCEDURE — 99396 PR PREVENTIVE VISIT,EST,40-64: ICD-10-PCS | Mod: S$GLB,,, | Performed by: FAMILY MEDICINE

## 2023-01-13 PROCEDURE — 3008F PR BODY MASS INDEX (BMI) DOCUMENTED: ICD-10-PCS | Mod: CPTII,S$GLB,, | Performed by: FAMILY MEDICINE

## 2023-01-13 PROCEDURE — 99396 PREV VISIT EST AGE 40-64: CPT | Mod: S$GLB,,, | Performed by: FAMILY MEDICINE

## 2023-01-13 PROCEDURE — 3008F BODY MASS INDEX DOCD: CPT | Mod: CPTII,S$GLB,, | Performed by: FAMILY MEDICINE

## 2023-01-13 PROCEDURE — 3078F PR MOST RECENT DIASTOLIC BLOOD PRESSURE < 80 MM HG: ICD-10-PCS | Mod: CPTII,S$GLB,, | Performed by: FAMILY MEDICINE

## 2023-01-13 PROCEDURE — 3075F PR MOST RECENT SYSTOLIC BLOOD PRESS GE 130-139MM HG: ICD-10-PCS | Mod: CPTII,S$GLB,, | Performed by: FAMILY MEDICINE

## 2023-01-13 PROCEDURE — 1159F MED LIST DOCD IN RCRD: CPT | Mod: CPTII,S$GLB,, | Performed by: FAMILY MEDICINE

## 2023-01-13 PROCEDURE — 3075F SYST BP GE 130 - 139MM HG: CPT | Mod: CPTII,S$GLB,, | Performed by: FAMILY MEDICINE

## 2023-01-13 RX ORDER — FUROSEMIDE 40 MG/1
40 TABLET ORAL DAILY
Qty: 90 TABLET | Refills: 1 | Status: CANCELLED | OUTPATIENT
Start: 2023-01-13

## 2023-01-13 RX ORDER — LEVOTHYROXINE SODIUM 125 UG/1
125 TABLET ORAL
Qty: 90 TABLET | Refills: 3 | Status: CANCELLED | OUTPATIENT
Start: 2023-01-13

## 2023-01-13 RX ORDER — PROMETHAZINE HYDROCHLORIDE 25 MG/1
TABLET ORAL
Status: CANCELLED | OUTPATIENT
Start: 2023-01-13

## 2023-01-13 RX ORDER — METFORMIN HYDROCHLORIDE EXTENDED-RELEASE TABLETS 500 MG/1
TABLET, FILM COATED, EXTENDED RELEASE ORAL
Qty: 30 TABLET | Refills: 5 | Status: CANCELLED | OUTPATIENT
Start: 2023-01-13

## 2023-01-13 RX ORDER — BUPROPION HYDROCHLORIDE 300 MG/1
300 TABLET ORAL DAILY
Qty: 90 TABLET | Refills: 1 | Status: CANCELLED | OUTPATIENT
Start: 2023-01-13 | End: 2024-01-13

## 2023-01-13 RX ORDER — NAPROXEN 500 MG/1
500 TABLET ORAL
Qty: 270 TABLET | Refills: 2 | Status: CANCELLED | OUTPATIENT
Start: 2023-01-13

## 2023-01-13 RX ORDER — UPADACITINIB 15 MG/1
15 TABLET, EXTENDED RELEASE ORAL DAILY
Qty: 30 TABLET | Refills: 3 | Status: CANCELLED | OUTPATIENT
Start: 2023-01-13

## 2023-01-13 RX ORDER — GABAPENTIN 300 MG/1
300 CAPSULE ORAL 3 TIMES DAILY
Status: CANCELLED | OUTPATIENT
Start: 2023-01-13

## 2023-01-13 RX ORDER — TIRZEPATIDE 10 MG/.5ML
10 INJECTION, SOLUTION SUBCUTANEOUS
Qty: 12 PEN | Refills: 0 | Status: CANCELLED | OUTPATIENT
Start: 2023-01-13

## 2023-01-13 RX ORDER — SECUKINUMAB 150 MG/ML
300 INJECTION SUBCUTANEOUS
Qty: 6 EACH | Refills: 1 | Status: CANCELLED | OUTPATIENT
Start: 2023-01-13

## 2023-01-13 RX ORDER — PHENTERMINE HYDROCHLORIDE 37.5 MG/1
37.5 TABLET ORAL
Qty: 30 TABLET | Refills: 0 | Status: CANCELLED | OUTPATIENT
Start: 2023-01-13

## 2023-01-13 RX ORDER — SPIRONOLACTONE 50 MG/1
TABLET, FILM COATED ORAL
Qty: 60 TABLET | Refills: 3 | Status: CANCELLED | OUTPATIENT
Start: 2023-01-13

## 2023-01-13 RX ORDER — LOSARTAN POTASSIUM 50 MG/1
50 TABLET ORAL DAILY
Qty: 90 TABLET | Refills: 3 | Status: CANCELLED | OUTPATIENT
Start: 2023-01-13

## 2023-01-13 RX ORDER — METHADONE HYDROCHLORIDE 10 MG/1
10 TABLET ORAL
Status: CANCELLED | OUTPATIENT
Start: 2023-01-13

## 2023-01-13 NOTE — PROGRESS NOTES
"Well Woman VISIT      CHIEF COMPLAINT  Chief Complaint   Patient presents with    Follow-up    Hypertension       HPI  Keli Gilbert is a 47 y.o. female who presents for physical.     Social Factors  Tobacco use: No  Ready to Quit: No  Alcohol: Yes rarely  Intimate partner violence screening  "Do you feel safe in your current relationship?" single  "Have you ever been in a relationship in which your partner frightened you or hurt you?" No  Living Will/POA: No  Regular Exercise: No    Depression  Over the past two weeks, have you felt down, depressed, or hopeless? No  Over the past two weeks, have you felt little interest or pleasure in doing things? No    Reproductive Health  Followed by OBGYN    CHD, HTN, DM2  CHD Risk Factors: obesity (BMI >= 30 kg/m2)  Women 45 years and older should be screened for dyslipidemia if at increased risk of CHD  Women 20 to 45 years of age should be screened for dyslipidemia if at increased risk of CHD  Asymptomatic adults with sustained blood pressure greater than 135/80 mm Hg (treated or untreated) should be screened for type 2 diabetes mellitus    Estimated body mass index is 34.76 kg/m² as calculated from the following:    Height as of this encounter: 5' 6" (1.676 m).    Weight as of this encounter: 97.7 kg (215 lb 6.2 oz).      Screening  Mammogram needed: utd  Colonoscopy needed: n/a  Osteoporosis screen needed: n/a     Women 50 to 74 years of age should be screened for breast cancer with mammography biennially.  Women should be screened for cervical cancer with Pap tests beginning at 21 years of age. Low-risk women should receive Pap testing every three years. Co-testing for human papillomavirus is an option beginning at 30 years of age, and can extend the screening interval to five years. Cervical cancer screening should be discontinued at 65 years of age or after total hysterectomy if the woman has a benign gynecologic history  Adults 50 to 75 years of age should " "be screened for colorectal cancer with an FOBT annually, sigmoidoscopy every five years with an FOBT every three years, or colonoscopy every 10 years.  Women 65 years and older should be screened for osteoporosis. Women younger than 65 years should be screened if the risk of fracture is greater than or equal to that of a 65-year-old white woman without additional risk factors.      Immunizations  delayed    ALLERGIES and MEDS were verified.   PMHx, PSHx, FHx, SOCIALHx were updated as pertinent.    REVIEW OF SYSTEMS  Review of Systems   Constitutional: Negative.    Eyes: Negative.    Respiratory: Negative.     Cardiovascular: Negative.    Gastrointestinal: Negative.    Genitourinary: Negative.    Musculoskeletal:  Positive for joint pain.   Skin: Negative.    Neurological: Negative.        PHYSICAL EXAM  VITAL SIGNS: /66   Pulse 96   Temp 98 °F (36.7 °C) (Oral)   Resp 18   Ht 5' 6" (1.676 m)   Wt 97.7 kg (215 lb 6.2 oz)   SpO2 96%   BMI 34.76 kg/m²   GEN: Well developed, Well nourished, No acute distress.  HENT: Normocephalic, Atraumatic, Bilateral external ears normal, Nose normal, Oropharynx moist, No oral exudates.   Eyes: PERRL, EOMI, Conjunctiva normal, No discharge.   Neck: Supple, No tenderness.  Lymphatic: No cervical or supraclavicular lymphadenopathy noted.   Cardiovascular: Normal heart rate, Normal rhythm, No murmurs, No rubs, No gallops.   Thorax & Lungs: Normal breath sounds, No respiratory distress, No wheezing.  Abdomen: Soft, No tenderness, Bowel sounds normal.  Breast: deferred  Genital: deferred   Skin: Warm, Dry, No erythema, No rash.   Extremities: No edema, No tenderness.       ASSESSMENT/PLAN    Keli was seen today for follow-up and hypertension.    Diagnoses and all orders for this visit:    Well woman exam without gynecological exam  -     CBC Auto Differential; Future  -     Comprehensive Metabolic Panel; Future  -     Hemoglobin A1C; Future  -     TSH; Future    Psoriatic " arthritis    Benign essential HTN    Psoriasis    Dysthymia    Hypothyroidism, unspecified type  -     TSH; Future    Hidradenitis suppurativa    Pedal edema    History of diabetes mellitus  -     Hemoglobin A1C; Future    Other orders  The following orders have not been finalized:  -     Cancel: naproxen (NAPROSYN) 500 MG tablet  -     Cancel: losartan (COZAAR) 50 MG tablet  -     Cancel: secukinumab (COSENTYX PEN) 150 mg/mL PnIj  -     Cancel: buPROPion (WELLBUTRIN XL) 300 MG 24 hr tablet  -     Cancel: gabapentin (NEURONTIN) 300 MG capsule  -     Cancel: levothyroxine (SYNTHROID) 125 MCG tablet  -     Cancel: methadone (DOLOPHINE) 10 MG tablet  -     Cancel: metFORMIN (FORTAMET) 500 mg 24hr tablet  -     Cancel: phentermine (ADIPEX-P) 37.5 mg tablet  -     Cancel: spironolactone (ALDACTONE) 50 MG tablet  -     Cancel: upadacitinib (RINVOQ) 15 mg 24 hr tablet  -     Cancel: promethazine (PHENERGAN) 25 MG tablet  -     Cancel: furosemide (LASIX) 40 MG tablet  -     Cancel: tirzepatide (MOUNJARO) 10 mg/0.5 mL PnIj         FOLLOW UP: 1 year or sooner if needed  Labs to be done when fasting  Autoimmune conditions followed by rheumatology  Continue mounjaro due to history of type 2 dm diagnosis in epic  Call with any concerns  Encouraged weight loss     Andriy Moran MD

## 2023-01-21 ENCOUNTER — LAB VISIT (OUTPATIENT)
Dept: LAB | Facility: HOSPITAL | Age: 48
End: 2023-01-21
Attending: INTERNAL MEDICINE
Payer: COMMERCIAL

## 2023-01-21 DIAGNOSIS — E03.9 HYPOTHYROIDISM, UNSPECIFIED TYPE: ICD-10-CM

## 2023-01-21 DIAGNOSIS — Z79.899 LONG TERM CURRENT USE OF IMMUNOSUPPRESSIVE DRUG: ICD-10-CM

## 2023-01-21 DIAGNOSIS — Z00.00 WELL WOMAN EXAM WITHOUT GYNECOLOGICAL EXAM: ICD-10-CM

## 2023-01-21 DIAGNOSIS — Z86.39 HISTORY OF DIABETES MELLITUS: ICD-10-CM

## 2023-01-21 DIAGNOSIS — Z79.1 LONG TERM (CURRENT) USE OF NON-STEROIDAL ANTI-INFLAMMATORIES (NSAID): ICD-10-CM

## 2023-01-21 DIAGNOSIS — L40.50 PSORIATIC ARTHRITIS: ICD-10-CM

## 2023-01-21 LAB
ALBUMIN SERPL BCP-MCNC: 3.7 G/DL (ref 3.5–5.2)
ALP SERPL-CCNC: 72 U/L (ref 55–135)
ALT SERPL W/O P-5'-P-CCNC: 22 U/L (ref 10–44)
ANION GAP SERPL CALC-SCNC: 8 MMOL/L (ref 8–16)
AST SERPL-CCNC: 19 U/L (ref 10–40)
BASOPHILS # BLD AUTO: 0.05 K/UL (ref 0–0.2)
BASOPHILS NFR BLD: 0.6 % (ref 0–1.9)
BILIRUB SERPL-MCNC: 0.3 MG/DL (ref 0.1–1)
BUN SERPL-MCNC: 15 MG/DL (ref 6–20)
CALCIUM SERPL-MCNC: 9.9 MG/DL (ref 8.7–10.5)
CHLORIDE SERPL-SCNC: 102 MMOL/L (ref 95–110)
CO2 SERPL-SCNC: 25 MMOL/L (ref 23–29)
CREAT SERPL-MCNC: 0.8 MG/DL (ref 0.5–1.4)
CRP SERPL-MCNC: 6.3 MG/L (ref 0–8.2)
DIFFERENTIAL METHOD: ABNORMAL
EOSINOPHIL # BLD AUTO: 0.1 K/UL (ref 0–0.5)
EOSINOPHIL NFR BLD: 1 % (ref 0–8)
ERYTHROCYTE [DISTWIDTH] IN BLOOD BY AUTOMATED COUNT: 15.9 % (ref 11.5–14.5)
ERYTHROCYTE [SEDIMENTATION RATE] IN BLOOD BY PHOTOMETRIC METHOD: 14 MM/HR (ref 0–36)
EST. GFR  (NO RACE VARIABLE): >60 ML/MIN/1.73 M^2
ESTIMATED AVG GLUCOSE: 108 MG/DL (ref 68–131)
GLUCOSE SERPL-MCNC: 90 MG/DL (ref 70–110)
HBA1C MFR BLD: 5.4 % (ref 4–5.6)
HCT VFR BLD AUTO: 38.1 % (ref 37–48.5)
HGB BLD-MCNC: 11.8 G/DL (ref 12–16)
IMM GRANULOCYTES # BLD AUTO: 0.24 K/UL (ref 0–0.04)
IMM GRANULOCYTES NFR BLD AUTO: 2.9 % (ref 0–0.5)
LYMPHOCYTES # BLD AUTO: 3.1 K/UL (ref 1–4.8)
LYMPHOCYTES NFR BLD: 37.5 % (ref 18–48)
MCH RBC QN AUTO: 29 PG (ref 27–31)
MCHC RBC AUTO-ENTMCNC: 31 G/DL (ref 32–36)
MCV RBC AUTO: 94 FL (ref 82–98)
MONOCYTES # BLD AUTO: 0.6 K/UL (ref 0.3–1)
MONOCYTES NFR BLD: 7.3 % (ref 4–15)
NEUTROPHILS # BLD AUTO: 4.2 K/UL (ref 1.8–7.7)
NEUTROPHILS NFR BLD: 50.7 % (ref 38–73)
NRBC BLD-RTO: 0 /100 WBC
PLATELET # BLD AUTO: 392 K/UL (ref 150–450)
PMV BLD AUTO: 8.3 FL (ref 9.2–12.9)
POTASSIUM SERPL-SCNC: 4.5 MMOL/L (ref 3.5–5.1)
PROT SERPL-MCNC: 7.2 G/DL (ref 6–8.4)
RBC # BLD AUTO: 4.07 M/UL (ref 4–5.4)
SODIUM SERPL-SCNC: 135 MMOL/L (ref 136–145)
TSH SERPL DL<=0.005 MIU/L-ACNC: 0.98 UIU/ML (ref 0.4–4)
WBC # BLD AUTO: 8.35 K/UL (ref 3.9–12.7)

## 2023-01-21 PROCEDURE — 86140 C-REACTIVE PROTEIN: CPT | Performed by: INTERNAL MEDICINE

## 2023-01-21 PROCEDURE — 83036 HEMOGLOBIN GLYCOSYLATED A1C: CPT | Performed by: FAMILY MEDICINE

## 2023-01-21 PROCEDURE — 36415 COLL VENOUS BLD VENIPUNCTURE: CPT | Mod: PO | Performed by: INTERNAL MEDICINE

## 2023-01-21 PROCEDURE — 80053 COMPREHEN METABOLIC PANEL: CPT | Performed by: INTERNAL MEDICINE

## 2023-01-21 PROCEDURE — 84443 ASSAY THYROID STIM HORMONE: CPT | Performed by: FAMILY MEDICINE

## 2023-01-21 PROCEDURE — 85025 COMPLETE CBC W/AUTO DIFF WBC: CPT | Performed by: INTERNAL MEDICINE

## 2023-01-21 PROCEDURE — 85652 RBC SED RATE AUTOMATED: CPT | Performed by: INTERNAL MEDICINE

## 2023-01-23 NOTE — PROGRESS NOTES
Subjective:       Patient ID: Keli Concha Gilbert is a 47 y.o. female with psoriatic arthritis on Rinvoq & Leflunomide (& naproxen); demyelinating disorder secondary to Enbrel; incomplete effect on chronic pain syndrome on savella & methadone     Chief Complaint: Disease management    Ms. Gilbert is a 48 yo woman w PsA last seen last on 10/25/22.   She is on Rinvoq which she began on 1/29/22 and naproxen 500 mg which she takes up to tid pc without any adverse effects.   Leflunomide was stopped on 7/2022 without any change in sxs. She is doing very well PsA wise.  Her dactylitis is practically totally resolved.  She still has not been able to get Shingrix as insurance would not pay for it despite her immunosuppressed state & recommendations of CDC.  Also has hydradenitis suppurativa & sees Dr. Fam.   Doing better with this following Dr. Fam's prescriptions and losing weight (on tirzepatide).     Current Outpatient Medications   Medication Sig Dispense Refill    X5-cvsc-vyev-jwm-TL-H9-Zn-robert (NICAZEL FORTE) [id number] mg-mcg-mg-mg Tab Take 1 po qday 30 tablet 5    buPROPion (WELLBUTRIN XL) 300 MG 24 hr tablet Take 1 tablet (300 mg total) by mouth once daily. 90 tablet 1    cholecalciferol, vitamin D3, 2,000 unit Tab Take 1 tablet by mouth Once a week.      clindamycin (CLEOCIN T) 1 % external solution AAA bid 60 mL 3    furosemide (LASIX) 40 MG tablet TAKE 1 TABLET(40 MG) BY MOUTH EVERY DAY 90 tablet 1    gabapentin (NEURONTIN) 300 MG capsule Take 300 mg by mouth 3 (three) times daily.      levothyroxine (SYNTHROID) 125 MCG tablet Take 1 tablet (125 mcg total) by mouth before breakfast. 90 tablet 3    liothyronine (CYTOMEL) 5 MCG Tab TAKE 2 TABLETS(10 MCG) BY MOUTH EVERY  tablet 1    loratadine-pseudoephedrine  mg (CLARITIN-D 24-HOUR)  mg per 24 hr tablet Take 1 tablet by mouth once daily.      losartan (COZAAR) 50 MG tablet Take 1 tablet (50 mg total) by mouth once daily. 90  tablet 3    metFORMIN (FORTAMET) 500 mg 24hr tablet 1 po qam with breakfast 30 tablet 5    methadone (DOLOPHINE) 10 MG tablet Take 1 tablet by mouth Three times a day.      naproxen (NAPROSYN) 500 MG tablet Take 1 tablet (500 mg total) by mouth every meal as needed (pain). 270 tablet 2    phentermine (ADIPEX-P) 37.5 mg tablet TAKE 1 TABLET(37.5 MG) BY MOUTH BEFORE BREAKFAST 30 tablet 0    potassium chloride (MICRO-K) 10 MEQ CpSR TAKE 2 CAPSULES(20 MEQ) BY MOUTH EVERY  capsule 3    promethazine (PHENERGAN) 25 MG tablet TK 1 T PO BID PRN N      RINVOQ 15 mg 24 hr tablet TAKE 1 TABLET BY MOUTH  DAILY 30 tablet 3    sod sulf-pot chloride-mag sulf (SUTAB) 1.479-0.188- 0.225 gram tablet Take 12 tablets by mouth once daily. Take according to package instructions with indicated amount of water. 24 tablet 0    spironolactone (ALDACTONE) 50 MG tablet Start with 1 po qday, increase to 2 po qday as tolerated 60 tablet 3    tirzepatide (MOUNJARO) 10 mg/0.5 mL PnIj Inject 10 mg into the skin every 7 days. 12 pen 0    traZODone (DESYREL) 50 MG tablet TAKE 1 TO 2 TABLET BY MOUTH EVERY NIGHT       Current Facility-Administered Medications   Medication Dose Route Frequency Provider Last Rate Last Admin    triamcinolone acetonide injection 10 mg  10 mg Intradermal Once Marie Fam MD        triamcinolone acetonide injection 10 mg  10 mg Intradermal Once Marie Fam MD         Facility-Administered Medications Ordered in Other Visits   Medication Dose Route Frequency Provider Last Rate Last Admin    0.9%  NaCl infusion   Intravenous Continuous Gabriel Herring MD        mupirocin 2 % ointment   Nasal On Call Procedure Gabriel Herring MD   Given at 02/18/22 0703             Review of patient's allergies indicates:   Allergen Reactions    Doxycycline hcl Nausea And Vomiting    Enbrel [etanercept] Other (See Comments)     Optic neurtits     Past Medical History:   Diagnosis Date    Abnormal Pap smear of vagina     AR  (allergic rhinitis)     Demyelinating disorder     Depression     Fever blister     Fibromyalgia     followed by pain management    Generalized osteoarthritis of multiple sites     History of abnormal Pap smear     Hypertension     Hypothyroidism     Insulin resistance     Mixed anxiety and depressive disorder     Morbid obesity     Myelitis, optic neuritis in     treated with high-dose steroids and resolved; positive MRI and spinal fluid for a mass, but on embrel for psoriatic arthritis - she will see a MS specialist at [facility]; MRI normalized off embrel    Obesity     Pre-diabetes     hx diabetes on stereoids /    Psoriasis     Psoriatic arthritis     Vaginal delivery     x1    Vitamin D deficiency disease      Past Surgical History:   Procedure Laterality Date    COLONOSCOPY N/A 12/16/2022    Procedure: COLONOSCOPY;  Surgeon: Jaylen Pelletier MD;  Location: [facility] ENDO;  Service: Endoscopy;  Laterality: N/A;  Pt. holding Adipex for 5 days prior to.EC  (She states she doesnt take it every day.)    EYE SURGERY Bilateral 08/2021    Laser surgery for pressure relief    LAPAROSCOPIC CHOLECYSTECTOMY N/A 2/18/2022    Procedure: CHOLECYSTECTOMY, LAPAROSCOPIC;  Surgeon: Gabriel Herring MD;  Location: [facility] OR;  Service: General;  Laterality: N/A;  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  C A  NEED H/P-----CLEARED BY PCP    SINUS SURGERY  1998       Review of Systems   Constitutional:  Positive for fatigue. Negative for fever and unexpected weight change.   HENT: Negative.  Negative for dental problem, mouth sores and trouble swallowing.         Dry mouth only w antihistamines   Eyes:  Negative for redness, itching and visual disturbance.        Dry eyes only with antihistamines   Respiratory: Negative.  Negative for cough and shortness of breath.    Cardiovascular: Negative.  Negative for chest pain, palpitations and leg swelling.   Gastrointestinal: Negative.  Negative for abdominal pain, anal bleeding,  "blood in stool, constipation, diarrhea and nausea.        Heartburn.   Endocrine: Negative.    Genitourinary: Negative.  Negative for dysuria, frequency, genital sores, menstrual problem, pelvic pain and urgency.   Musculoskeletal: Negative.  Negative for arthralgias, back pain, gait problem and neck pain.   Skin: Negative.  Negative for color change and rash.   Allergic/Immunologic: Positive for immunocompromised state.   Neurological:  Positive for headaches. Negative for dizziness, seizures, weakness and numbness.   Hematological: Negative.  Negative for adenopathy. Does not bruise/bleed easily.   Psychiatric/Behavioral:  Positive for dysphoric mood (stable) and sleep disturbance (stable). Negative for decreased concentration, self-injury and suicidal ideas. The patient is nervous/anxious.        Family History   Problem Relation Age of Onset    Thyroid disease Mother     Heart disease Mother     Hypertension Mother     Hyperlipidemia Mother     Coronary artery disease Mother         s/p CABG    Heart attack Mother     Psoriasis Father     Cancer Father         throat    Breast cancer Sister     Heart disease Sister         MVP    Diabetes Paternal Uncle     Diabetes Maternal Grandfather     Heart disease Maternal Grandfather     Thyroid disease Maternal Grandfather     ADD / ADHD Son     Melanoma Neg Hx     Lupus Neg Hx     Eczema Neg Hx     Acne Neg Hx     Colon cancer Neg Hx     Ovarian cancer Neg Hx      Mom w CVAs & in a nursing home  22.    SH:   Working at a bank   Previously worked at a bar as well.     Granddaughter May, 2022--Lo--proud of her  No alcohol; no cigarettes;   Objective:     BP (!) 130/91   Pulse 91   Ht 5' 6" (1.676 m)   Wt 97.4 kg (214 lb 11.7 oz)   BMI 34.66 kg/m²   Was on 233 ;b 11 on 10/25/22  Was on 254 lb 3.1 oz on 22  Was 242 lb 8.1 oz on 22  Was 254 lb 3.1 oz on 3/16/21  Was 236 lb 12.4 oz on 19  Was 224 lb 13.9 oz on 19.    Physical Exam "   Constitutional: She is oriented to person, place, and time. She appears obese. No distress.   HENT:   Head: Normocephalic and atraumatic.   Mouth/Throat: Oropharynx is clear and moist. No oropharyngeal exudate.   No facial rashes  Parotids not enlarged     Eyes: Pupils are equal, round, and reactive to light. Conjunctivae are normal. Right eye exhibits no discharge. Left eye exhibits no discharge. No scleral icterus.   Neck: No JVD present. No tracheal deviation present. No thyromegaly present.   Cardiovascular: Normal rate, regular rhythm and normal heart sounds. Exam reveals no gallop and no friction rub.   No murmur heard.  Pulmonary/Chest: Effort normal and breath sounds normal. No respiratory distress. She has no wheezes. She has no rales. She exhibits no tenderness.   Abdominal: Soft. Bowel sounds are normal. She exhibits no distension and no mass. There is no splenomegaly or hepatomegaly. There is no abdominal tenderness. There is no rebound and no guarding.   Musculoskeletal:         General: No tenderness. Normal range of motion.      Cervical back: Neck supple.      Comments: Cspine slight decrease in lateral flexion & rotation; no tenderness  Tspine FROM no tenderness  Lspine FROM no tenderness.  TMJ: unremarkable  Shoulders: FROM; no synovitis  Elbows: FROM; no synovitis; no tophi or nodules  Wrists: no SHT; no tenderness; good ROM  MCPs: no metatarsalgia; no synovitis  PIPs: no more dactylitis 2nd ray R; R 3rd PIP w min flexion contracture; no TTP;    DIPs: FROM; no synovitis  HIPS: FROM  Knees: FROM; no synovitis; no instability;  Ankles: FROM: no synovitis   Toes: ok; no metatarsalgia                 Lymphadenopathy:     She has no cervical adenopathy.   Neurological: She is alert and oriented to person, place, and time. She has normal reflexes. No cranial nerve deficit. Gait normal.   Proximal and distal muscle strength 5/5.   Skin: Skin is warm and dry. Rash noted. No lesion noted. She is not  diaphoretic.   No psoriasis but has acne like facial & neck lesions..   Psychiatric: Her behavior is normal. Mood, memory, affect and judgment normal.   Vitals reviewed.      Labs:   1/21/23: ESR 14 (36); CRP 6.3; CBC Hg 11.8; CMP Na 135; Hg A1C 5.4; TSH ok;  10/17/22: ESR 30 (36); CRP 6.7; CBC plt 466; CMP Na 131; BUN 27;   7/16/22: ESR 26 (36); CRP 5.5; CMP glu 167  4/16/22: ESR 24 (36); CRP 8.0; CBC Hg 11.3; low indices; CMP ok;  2/8/22: CBC Hg 11.9; CMP ok;   1/19/22: ESR 66; CRP 18.1  10/9/21: ESR 38 (36); CRP 11.7; CBC ok; CMP ok;   5/15/21: CMP ok;  TSH ok; (150)  3/16/21: ESR 72 (36); CRP 25.1; CBC  Hg 11.4; CMP glu 125; alb 3.4;   7/22/20: ESR 34 (36); CRP 16.1; CBC Hg 10.8; Ht 36.8; CMP ok; last labs  12/7/19: ESR 17; CRP CBC Hg 11.8; CMP ok; TFTs ok;   9/14/19: ESR 24 (36); CRP 9.8; CBC Hg 11.5; CP ok;   2/19/19: ESR 13; CRP 5.8; CBC plts 362; CMP ok; HCV/HBV/QG neg;  11/17/18: ESR 8; CRP 3.9; Hg 11.9; low indices; CMP ok;   8/22/18: ESR 10; CRP 10.9; CBC Hg 10.7; Ht 35.7; CMP ok  5/14/18: ESR 13; CRP 13.4;  Hg 11.3; low indices; eos 12.4%; CMP ok;   2/10/18: ESR 30; CRP 10.5; Hg 11.3; plt 366; CMP ok;   10/19/17: HBV neg; HCV neg;   9/2/17: ESR 16; CRp 5.9; Hg 11.6; Ht 36.6; CMP ok;   5/29/17: QG neg  2/20/17: ESR 34; CRP 8.1; CBC Hg 11.4; Ht 35.6; CMP ok;   11/19/16: ESR 20; CRP 4.8; CBC Hg 11.9; CMP K+3.3, otherwise ok;   8/27/16: ESR 22; CRP 7; Hg 11.8; Ht 36.9; CMP ok;   5/26/16: ESR 35; CRP 11.2Hg 11.8; Ht 36.9; CMP ok;   5/21/16: Vit d 37  10/8/15: ESR 45; CRP 14.2; Hg 11.2; Ht 35.8; CMP; ok  7/7/15: CBC plt 380; K+ 3.4; Glu 154  7/6/15: ESR 48; CRP 9.6  4/4/15: ESR 41; CRP 19; Hg 11.0; Ht 34.6; plt 377; CMP ok;  1/10/15: ESR 34; CRP 18; CBC ok; ; Alb. 3.6  10/4/14: ESR 43; CRP 18.5; Hg 11.8; Ht 36.5; plt 378; Alb. 3.4;   3/20/14: ESR 41; CRP 14.3; plt 356; CMP ok;  2/15/13: ESR 30; CRP 9.8; CBC ok; Alb. 3.3  11/2/13: ESR 28; CRP 11.6; plt 363; Alb. 3.3;   5/28/13: ESR 45; CRP 8.8; plt  366; CMP ok;     12/1/18: Bilateral hands: personally viewed: Mild degenerative changes noted at the interphalangeal joints.  Mild-to-moderate degenerative change noted at the bilateral 1st CMC joints.  Mild degenerative changes also present at both radiocarpal joints, triscaphe joints and right distal radioulnar joint.  Minimal degenerative changes also present at the MCP joints bilaterally and greatest at the 1st MCP joint on the left.  6/15/17: Bilateral hands: personally reviewed: mild osteoarthritis w stable periarticular erosion at the left fourth MCP joint; no change since last year.   8/27/16: Bilateral feet: personally reviewed. Mild HV; mild OA shell 1st MTP dali; ? Erosion PIP left small toe;   5/26/16: Bilateral hands: personally reviewed: L 4th MCP (new) erosive lesion; R & L 2nd & 3rd metacarpal head cysts; R mild PIP erosion & STS 2nd.    Assessment:     Psoriatic arthritis-- with mild SHT 2 MCPs bilaterally (R >>L) & with sausage digit of R index finger--currently w  less& flexion deformity of R 3rd PIP--stable at present .    a) History of sausage digits of both toes      PIPs, DIPs, wrists, and knees., now w. only one sausage digit.   b) Psoriasis of the abdomen, elbow, and the shins--resolved.    c) Enbrel stopped 10/21/11 due to optic neuritis.     d) New erosion (4th L metacarpal head) on x-ray & possibly L 5th toe PIP     e) Persisting elevation of ESR and CRP    f) inadequate response to leflunomide and Stelara & apremilast (w. Intolerable nausea)   G) could not tolerate SSZ--nausea.   H) MTX x 2 even SC did not help sxs.   I) improved on Cosentyx (+leflunomide + naproxen) initially incompletely-- hand joints still hurt despite normalization of  ESR & CRP   J) Orencia begun 11/17 with incomplete response   K)Taltz begun 4/20/18-2/19   L) Tofacitinib 3/19 - 6/18/19    Chronic SHT 3rd R PIP & base of L thumb with questionable erosive lesion.   M) Back to Cosentyx + leflunomide + celebrex    N)  Tremfya begun 4/14/21--failed: stopped w headache & lack of efficacy   O) Back to Cosentyx 10/6/21 with improvement; held since 12/15/21.   P) On Rinvoq 30 mg since 1/29/22--stable to improved.    Leflunomide stopped 7/2022    Left optic neuritis with demyelinating lesions secondary to Enbrel--now resolved.    Repeat MRI ok    Adverse reaction to Etanercept.    Chronic pain/fibromyalgia syndrome with fatigue, tender points, withdrawals to palpation in the past, pain all over, responsive to Savella but with some nausea (off it now), followed by Dr. Odom at the [facility],    On methadone    Degenerative joint disease of the cervical spine, knees, and feet--very mild.     Depression on wellbutrin   Off savella & sertraline (much weight gain)    Hydradenitis suppurativa    Hypertriglyceridemia    Hypertension.     Hypothyroidism.     Vitamin D deficiency with regular prescription vitamin D supplementation.     S/P covid 7/3/22    Obesity from morbid obesity        Plan:   Again reviewed results of the Oral Surveillance study & increased risk of MACE in patients over 50 w CVD risk factors; also reviewed VTE data & increased risk of malignancy again.  Still needs Shingrix. Sent Rx to [facility] w note.  Ok to continue Rinvoq  Ok to continue naproxen 500 mg up to tid but less is best.   Ok to stay off leflunomide.  Labs~in 3 months        RTC 3 months or prn

## 2023-01-26 ENCOUNTER — PATIENT MESSAGE (OUTPATIENT)
Dept: RHEUMATOLOGY | Facility: CLINIC | Age: 48
End: 2023-01-26

## 2023-01-26 ENCOUNTER — PATIENT MESSAGE (OUTPATIENT)
Dept: FAMILY MEDICINE | Facility: CLINIC | Age: 48
End: 2023-01-26
Payer: COMMERCIAL

## 2023-01-26 ENCOUNTER — OFFICE VISIT (OUTPATIENT)
Dept: RHEUMATOLOGY | Facility: CLINIC | Age: 48
End: 2023-01-26
Payer: COMMERCIAL

## 2023-01-26 VITALS
DIASTOLIC BLOOD PRESSURE: 91 MMHG | WEIGHT: 214.75 LBS | HEIGHT: 66 IN | SYSTOLIC BLOOD PRESSURE: 130 MMHG | HEART RATE: 91 BPM | BODY MASS INDEX: 34.51 KG/M2

## 2023-01-26 DIAGNOSIS — Z79.899 LONG TERM CURRENT USE OF IMMUNOSUPPRESSIVE DRUG: ICD-10-CM

## 2023-01-26 DIAGNOSIS — Z79.1 LONG TERM (CURRENT) USE OF NON-STEROIDAL ANTI-INFLAMMATORIES (NSAID): ICD-10-CM

## 2023-01-26 DIAGNOSIS — M15.9 GENERALIZED OSTEOARTHRITIS OF MULTIPLE SITES: ICD-10-CM

## 2023-01-26 DIAGNOSIS — E66.9 OBESITY (BMI 30-39.9): ICD-10-CM

## 2023-01-26 DIAGNOSIS — L40.50 PSORIATIC ARTHRITIS: ICD-10-CM

## 2023-01-26 DIAGNOSIS — L40.50 PSORIATIC ARTHRITIS: Primary | ICD-10-CM

## 2023-01-26 PROCEDURE — 3075F SYST BP GE 130 - 139MM HG: CPT | Mod: CPTII,S$GLB,, | Performed by: INTERNAL MEDICINE

## 2023-01-26 PROCEDURE — 1160F RVW MEDS BY RX/DR IN RCRD: CPT | Mod: CPTII,S$GLB,, | Performed by: INTERNAL MEDICINE

## 2023-01-26 PROCEDURE — 99999 PR PBB SHADOW E&M-EST. PATIENT-LVL V: ICD-10-PCS | Mod: PBBFAC,,, | Performed by: INTERNAL MEDICINE

## 2023-01-26 PROCEDURE — 99214 PR OFFICE/OUTPT VISIT, EST, LEVL IV, 30-39 MIN: ICD-10-PCS | Mod: S$GLB,,, | Performed by: INTERNAL MEDICINE

## 2023-01-26 PROCEDURE — 1159F MED LIST DOCD IN RCRD: CPT | Mod: CPTII,S$GLB,, | Performed by: INTERNAL MEDICINE

## 2023-01-26 PROCEDURE — 3044F HG A1C LEVEL LT 7.0%: CPT | Mod: CPTII,S$GLB,, | Performed by: INTERNAL MEDICINE

## 2023-01-26 PROCEDURE — 3080F DIAST BP >= 90 MM HG: CPT | Mod: CPTII,S$GLB,, | Performed by: INTERNAL MEDICINE

## 2023-01-26 PROCEDURE — 1160F PR REVIEW ALL MEDS BY PRESCRIBER/CLIN PHARMACIST DOCUMENTED: ICD-10-PCS | Mod: CPTII,S$GLB,, | Performed by: INTERNAL MEDICINE

## 2023-01-26 PROCEDURE — 3044F PR MOST RECENT HEMOGLOBIN A1C LEVEL <7.0%: ICD-10-PCS | Mod: CPTII,S$GLB,, | Performed by: INTERNAL MEDICINE

## 2023-01-26 PROCEDURE — 3008F PR BODY MASS INDEX (BMI) DOCUMENTED: ICD-10-PCS | Mod: CPTII,S$GLB,, | Performed by: INTERNAL MEDICINE

## 2023-01-26 PROCEDURE — 3075F PR MOST RECENT SYSTOLIC BLOOD PRESS GE 130-139MM HG: ICD-10-PCS | Mod: CPTII,S$GLB,, | Performed by: INTERNAL MEDICINE

## 2023-01-26 PROCEDURE — 1159F PR MEDICATION LIST DOCUMENTED IN MEDICAL RECORD: ICD-10-PCS | Mod: CPTII,S$GLB,, | Performed by: INTERNAL MEDICINE

## 2023-01-26 PROCEDURE — 99214 OFFICE O/P EST MOD 30 MIN: CPT | Mod: S$GLB,,, | Performed by: INTERNAL MEDICINE

## 2023-01-26 PROCEDURE — 99999 PR PBB SHADOW E&M-EST. PATIENT-LVL V: CPT | Mod: PBBFAC,,, | Performed by: INTERNAL MEDICINE

## 2023-01-26 PROCEDURE — 3080F PR MOST RECENT DIASTOLIC BLOOD PRESSURE >= 90 MM HG: ICD-10-PCS | Mod: CPTII,S$GLB,, | Performed by: INTERNAL MEDICINE

## 2023-01-26 PROCEDURE — 3008F BODY MASS INDEX DOCD: CPT | Mod: CPTII,S$GLB,, | Performed by: INTERNAL MEDICINE

## 2023-01-26 ASSESSMENT — ROUTINE ASSESSMENT OF PATIENT INDEX DATA (RAPID3)
AM STIFFNESS SCORE: 1, YES
FATIGUE SCORE: 5
TOTAL RAPID3 SCORE: 5
PATIENT GLOBAL ASSESSMENT SCORE: 8
PAIN SCORE: 5
MDHAQ FUNCTION SCORE: 0.6
PSYCHOLOGICAL DISTRESS SCORE: 2.2

## 2023-01-27 ENCOUNTER — OFFICE VISIT (OUTPATIENT)
Dept: PRIMARY CARE CLINIC | Facility: CLINIC | Age: 48
End: 2023-01-27
Payer: COMMERCIAL

## 2023-01-27 DIAGNOSIS — B00.2 ORAL HERPES SIMPLEX INFECTION: ICD-10-CM

## 2023-01-27 DIAGNOSIS — A08.4 VIRAL GASTROENTERITIS: Primary | ICD-10-CM

## 2023-01-27 PROCEDURE — 99213 OFFICE O/P EST LOW 20 MIN: CPT | Mod: 95,,, | Performed by: INTERNAL MEDICINE

## 2023-01-27 PROCEDURE — 1160F PR REVIEW ALL MEDS BY PRESCRIBER/CLIN PHARMACIST DOCUMENTED: ICD-10-PCS | Mod: CPTII,95,, | Performed by: INTERNAL MEDICINE

## 2023-01-27 PROCEDURE — 3044F HG A1C LEVEL LT 7.0%: CPT | Mod: CPTII,95,, | Performed by: INTERNAL MEDICINE

## 2023-01-27 PROCEDURE — 1159F MED LIST DOCD IN RCRD: CPT | Mod: CPTII,95,, | Performed by: INTERNAL MEDICINE

## 2023-01-27 PROCEDURE — 1159F PR MEDICATION LIST DOCUMENTED IN MEDICAL RECORD: ICD-10-PCS | Mod: CPTII,95,, | Performed by: INTERNAL MEDICINE

## 2023-01-27 PROCEDURE — 1160F RVW MEDS BY RX/DR IN RCRD: CPT | Mod: CPTII,95,, | Performed by: INTERNAL MEDICINE

## 2023-01-27 PROCEDURE — 3044F PR MOST RECENT HEMOGLOBIN A1C LEVEL <7.0%: ICD-10-PCS | Mod: CPTII,95,, | Performed by: INTERNAL MEDICINE

## 2023-01-27 PROCEDURE — 99213 PR OFFICE/OUTPT VISIT, EST, LEVL III, 20-29 MIN: ICD-10-PCS | Mod: 95,,, | Performed by: INTERNAL MEDICINE

## 2023-01-27 RX ORDER — VALACYCLOVIR HYDROCHLORIDE 1 G/1
2000 TABLET, FILM COATED ORAL EVERY 12 HOURS
Qty: 4 TABLET | Refills: 0 | Status: SHIPPED | OUTPATIENT
Start: 2023-01-27 | End: 2023-04-27

## 2023-01-27 RX ORDER — UPADACITINIB 15 MG/1
15 TABLET, EXTENDED RELEASE ORAL DAILY
Qty: 30 TABLET | Refills: 3 | Status: SHIPPED | OUTPATIENT
Start: 2023-01-27 | End: 2023-05-31

## 2023-01-29 NOTE — PROGRESS NOTES
Subjective:       Patient ID: Keli Gilbert is a 47 y.o. female.    Chief Complaint: Fatigue    The patient location is: [place]  The chief complaint leading to consultation is: Fatigue    Visit type: audiovisual    Face to Face time with patient: 8 minutes  12 minutes of total time spent on the encounter, which includes face to face time and non-face to face time preparing to see the patient (eg, review of tests), Obtaining and/or reviewing separately obtained history, Documenting clinical information in the electronic or other health record, Independently interpreting results (not separately reported) and communicating results to the patient/family/caregiver, or Care coordination (not separately reported).     Each patient to whom he or she provides medical services by telemedicine is:  (1) informed of the relationship between the physician and patient and the respective role of any other health care provider with respect to management of the patient; and (2) notified that he or she may decline to receive medical services by telemedicine and may withdraw from such care at any time.    Notes:   Patient of Dr. Moran presents for an urgent virtual visit c/o residual fatigue after a stomach bug. Onset of illness 12 days ago with headache, body aches, nausea and diarrhea. Had been in contact with a family member sick with GI symptoms. The diarrhea has resolved. She still feels tired and achy. No respiratory symptoms. She broke out with a fever blister on her lip yesterday, requesting medication for this. Using Tylenol for pain. COVID test negative at home.     Past history, meds and allergies reviewed.         Fatigue  Pertinent negatives include no abdominal pain, chills, congestion, coughing, diaphoresis, sore throat, vomiting or weakness.   Review of Systems   Constitutional:  Negative for appetite change, chills and diaphoresis.   HENT:  Negative for nasal congestion, rhinorrhea, sneezing and sore throat.     Respiratory:  Negative for cough and shortness of breath.    Gastrointestinal:  Negative for abdominal pain, blood in stool and vomiting.   Genitourinary:  Negative for dysuria and urgency.   Neurological:  Negative for dizziness, syncope and weakness.       Objective:    No vital signs taken before the call.   Physical Exam  Constitutional:       General: She is not in acute distress.     Appearance: She is not ill-appearing.   Pulmonary:      Effort: Pulmonary effort is normal. No respiratory distress.   Skin:     Comments: Fever blister visible on left side of upper lip.   Neurological:      Mental Status: She is alert.   Psychiatric:         Mood and Affect: Mood normal.         Behavior: Behavior normal.       Assessment:       Problem List Items Addressed This Visit    None  Visit Diagnoses       Viral gastroenteritis    -  Primary    Oral herpes simplex infection        Relevant Medications    valACYclovir (VALTREX) 1000 MG tablet              Plan:       Viral gastroenteritis - resolved.        -     advance diet as tolerated, stay well hydrated.     Oral herpes simplex infection  -     valACYclovir (VALTREX) 1000 MG tablet; Take 2 tablets (2,000 mg total) by mouth every 12 (twelve) hours. For one day.  Dispense: 4 tablet; Refill: 0

## 2023-01-30 ENCOUNTER — PATIENT MESSAGE (OUTPATIENT)
Dept: RHEUMATOLOGY | Facility: CLINIC | Age: 48
End: 2023-01-30
Payer: COMMERCIAL

## 2023-01-30 ENCOUNTER — SPECIALTY PHARMACY (OUTPATIENT)
Dept: PHARMACY | Facility: CLINIC | Age: 48
End: 2023-01-30
Payer: COMMERCIAL

## 2023-01-30 DIAGNOSIS — Z79.899 LONG TERM CURRENT USE OF IMMUNOSUPPRESSIVE DRUG: Primary | ICD-10-CM

## 2023-01-30 NOTE — TELEPHONE ENCOUNTER
Hayden, this is Zamzam Smith with [facility].  We are working on your prescription that your doctor has sent us. We will be working with your insurance to get this approved for you. We will be calling you along the way with updates on your medication.  If you have any questions, you can reach us at [phone].    Welcome call outcome: Patient/caregiver reached    Rinvoq continuation of therapy. PA submitted via CMM  (Key: [id number])

## 2023-01-31 ENCOUNTER — PATIENT MESSAGE (OUTPATIENT)
Dept: RHEUMATOLOGY | Facility: CLINIC | Age: 48
End: 2023-01-31
Payer: COMMERCIAL

## 2023-01-31 ENCOUNTER — PATIENT MESSAGE (OUTPATIENT)
Dept: PHARMACY | Facility: CLINIC | Age: 48
End: 2023-01-31
Payer: COMMERCIAL

## 2023-01-31 NOTE — TELEPHONE ENCOUNTER
DOCUMENTATION ONLY:  Prior authorization for Rinvoq approved from 1/30/23 to 1/30/24    Case Id: [id number]      Patient locked into [facility]. Sent message to inform patient of approval and routed rx, closing out of [facility].

## 2023-02-01 ENCOUNTER — PATIENT MESSAGE (OUTPATIENT)
Dept: FAMILY MEDICINE | Facility: CLINIC | Age: 48
End: 2023-02-01
Payer: COMMERCIAL

## 2023-02-01 DIAGNOSIS — E87.6 HYPOKALEMIA: ICD-10-CM

## 2023-02-01 DIAGNOSIS — F34.1 DYSTHYMIA: ICD-10-CM

## 2023-02-01 DIAGNOSIS — I10 BENIGN ESSENTIAL HTN: ICD-10-CM

## 2023-02-01 DIAGNOSIS — R60.0 PEDAL EDEMA: ICD-10-CM

## 2023-02-01 DIAGNOSIS — E03.9 HYPOTHYROIDISM, UNSPECIFIED TYPE: ICD-10-CM

## 2023-02-01 RX ORDER — LOSARTAN POTASSIUM 50 MG/1
50 TABLET ORAL DAILY
Qty: 90 TABLET | Refills: 3 | Status: SHIPPED | OUTPATIENT
Start: 2023-02-01 | End: 2024-02-15

## 2023-02-01 RX ORDER — FUROSEMIDE 40 MG/1
40 TABLET ORAL DAILY
Qty: 90 TABLET | Refills: 1 | Status: SHIPPED | OUTPATIENT
Start: 2023-02-01 | End: 2023-08-08

## 2023-02-01 RX ORDER — BUPROPION HYDROCHLORIDE 300 MG/1
300 TABLET ORAL DAILY
Qty: 90 TABLET | Refills: 1 | Status: SHIPPED | OUTPATIENT
Start: 2023-02-01 | End: 2023-09-15

## 2023-02-01 RX ORDER — LEVOTHYROXINE SODIUM 125 UG/1
125 TABLET ORAL
Qty: 90 TABLET | Refills: 3 | Status: SHIPPED | OUTPATIENT
Start: 2023-02-01 | End: 2024-02-20

## 2023-02-01 RX ORDER — POTASSIUM CHLORIDE 750 MG/1
CAPSULE, EXTENDED RELEASE ORAL
Qty: 180 CAPSULE | Refills: 3 | Status: SHIPPED | OUTPATIENT
Start: 2023-02-01 | End: 2023-12-27

## 2023-02-01 RX ORDER — LIOTHYRONINE SODIUM 5 UG/1
TABLET ORAL
Qty: 180 TABLET | Refills: 1 | Status: SHIPPED | OUTPATIENT
Start: 2023-02-01 | End: 2023-06-15

## 2023-02-01 NOTE — TELEPHONE ENCOUNTER
No new care gaps identified.  [facility] Embedded Care Gaps. Reference number: [id number]. 2/01/2023   2:41:15 PM CST

## 2023-02-02 ENCOUNTER — PATIENT MESSAGE (OUTPATIENT)
Dept: RHEUMATOLOGY | Facility: CLINIC | Age: 48
End: 2023-02-02
Payer: COMMERCIAL

## 2023-02-28 ENCOUNTER — PATIENT MESSAGE (OUTPATIENT)
Dept: FAMILY MEDICINE | Facility: CLINIC | Age: 48
End: 2023-02-28
Payer: COMMERCIAL

## 2023-03-03 ENCOUNTER — PATIENT MESSAGE (OUTPATIENT)
Dept: FAMILY MEDICINE | Facility: CLINIC | Age: 48
End: 2023-03-03
Payer: COMMERCIAL

## 2023-03-03 DIAGNOSIS — Z86.39 HISTORY OF DIABETES MELLITUS: Primary | ICD-10-CM

## 2023-03-03 DIAGNOSIS — L73.2 HIDRADENITIS SUPPURATIVA: ICD-10-CM

## 2023-03-03 RX ORDER — METFORMIN HYDROCHLORIDE 500 MG/1
TABLET, EXTENDED RELEASE ORAL
Qty: 30 TABLET | Refills: 5 | Status: SHIPPED | OUTPATIENT
Start: 2023-03-03 | End: 2023-06-16 | Stop reason: SDUPTHER

## 2023-03-03 RX ORDER — TIRZEPATIDE 12.5 MG/.5ML
12.5 INJECTION, SOLUTION SUBCUTANEOUS
Qty: 12 PEN | Refills: 1 | Status: SHIPPED | OUTPATIENT
Start: 2023-03-03 | End: 2023-04-10

## 2023-03-07 ENCOUNTER — PATIENT MESSAGE (OUTPATIENT)
Dept: FAMILY MEDICINE | Facility: CLINIC | Age: 48
End: 2023-03-07
Payer: COMMERCIAL

## 2023-03-10 ENCOUNTER — PATIENT MESSAGE (OUTPATIENT)
Dept: FAMILY MEDICINE | Facility: CLINIC | Age: 48
End: 2023-03-10
Payer: COMMERCIAL

## 2023-03-10 ENCOUNTER — TELEPHONE (OUTPATIENT)
Dept: FAMILY MEDICINE | Facility: CLINIC | Age: 48
End: 2023-03-10
Payer: COMMERCIAL

## 2023-03-10 NOTE — TELEPHONE ENCOUNTER
----- Message from Cindy Villa sent at 3/10/2023  1:09 PM CST -----  Name of Who is Calling:MARLEN HILLIARD [[id number]]              What is the request in detail:Requesting  a call back.               Can the clinic reply by [facility]:              [facility]:[phone]

## 2023-03-14 ENCOUNTER — PATIENT MESSAGE (OUTPATIENT)
Dept: RHEUMATOLOGY | Facility: CLINIC | Age: 48
End: 2023-03-14
Payer: COMMERCIAL

## 2023-03-21 ENCOUNTER — TELEPHONE (OUTPATIENT)
Dept: PHARMACY | Facility: CLINIC | Age: 48
End: 2023-03-21
Payer: COMMERCIAL

## 2023-03-28 ENCOUNTER — PATIENT MESSAGE (OUTPATIENT)
Dept: FAMILY MEDICINE | Facility: CLINIC | Age: 48
End: 2023-03-28
Payer: COMMERCIAL

## 2023-04-09 ENCOUNTER — PATIENT MESSAGE (OUTPATIENT)
Dept: FAMILY MEDICINE | Facility: CLINIC | Age: 48
End: 2023-04-09
Payer: COMMERCIAL

## 2023-04-09 DIAGNOSIS — E66.01 MORBID OBESITY DUE TO EXCESS CALORIES: Primary | ICD-10-CM

## 2023-04-10 ENCOUNTER — PATIENT MESSAGE (OUTPATIENT)
Dept: FAMILY MEDICINE | Facility: CLINIC | Age: 48
End: 2023-04-10
Payer: COMMERCIAL

## 2023-04-10 DIAGNOSIS — E66.01 MORBID OBESITY DUE TO EXCESS CALORIES: ICD-10-CM

## 2023-04-10 RX ORDER — SEMAGLUTIDE 1.7 MG/.75ML
1.7 INJECTION, SOLUTION SUBCUTANEOUS
Qty: 4 EACH | Refills: 1 | Status: SHIPPED | OUTPATIENT
Start: 2023-04-10 | End: 2023-04-10 | Stop reason: SDUPTHER

## 2023-04-10 RX ORDER — SEMAGLUTIDE 1.7 MG/.75ML
1.7 INJECTION, SOLUTION SUBCUTANEOUS
Qty: 3 ML | Refills: 1 | Status: SHIPPED | OUTPATIENT
Start: 2023-04-10 | End: 2023-04-27

## 2023-04-10 NOTE — TELEPHONE ENCOUNTER
No new care gaps identified.  [facility] Embedded Care Gaps. Reference number: [id number]. 4/10/2023   12:17:13 PM CDT

## 2023-04-12 ENCOUNTER — PATIENT MESSAGE (OUTPATIENT)
Dept: RHEUMATOLOGY | Facility: CLINIC | Age: 48
End: 2023-04-12
Payer: COMMERCIAL

## 2023-04-12 DIAGNOSIS — L40.50 PSORIATIC ARTHRITIS: Primary | ICD-10-CM

## 2023-04-12 DIAGNOSIS — Z79.899 LONG TERM CURRENT USE OF IMMUNOSUPPRESSIVE DRUG: ICD-10-CM

## 2023-04-12 DIAGNOSIS — Z79.1 LONG TERM (CURRENT) USE OF NON-STEROIDAL ANTI-INFLAMMATORIES (NSAID): ICD-10-CM

## 2023-04-13 ENCOUNTER — PATIENT MESSAGE (OUTPATIENT)
Dept: FAMILY MEDICINE | Facility: CLINIC | Age: 48
End: 2023-04-13
Payer: COMMERCIAL

## 2023-04-19 ENCOUNTER — PATIENT MESSAGE (OUTPATIENT)
Dept: FAMILY MEDICINE | Facility: CLINIC | Age: 48
End: 2023-04-19
Payer: COMMERCIAL

## 2023-04-27 ENCOUNTER — LAB VISIT (OUTPATIENT)
Dept: LAB | Facility: HOSPITAL | Age: 48
End: 2023-04-27
Attending: INTERNAL MEDICINE
Payer: COMMERCIAL

## 2023-04-27 ENCOUNTER — OFFICE VISIT (OUTPATIENT)
Dept: RHEUMATOLOGY | Facility: CLINIC | Age: 48
End: 2023-04-27
Payer: COMMERCIAL

## 2023-04-27 VITALS
WEIGHT: 211.19 LBS | DIASTOLIC BLOOD PRESSURE: 80 MMHG | BODY MASS INDEX: 33.94 KG/M2 | HEART RATE: 100 BPM | SYSTOLIC BLOOD PRESSURE: 140 MMHG | HEIGHT: 66 IN

## 2023-04-27 DIAGNOSIS — M15.9 GENERALIZED OSTEOARTHRITIS OF MULTIPLE SITES: ICD-10-CM

## 2023-04-27 DIAGNOSIS — Z79.899 IMMUNODEFICIENCY DUE TO LONG TERM IMMUNOSUPPRESSIVE DRUG THERAPY: ICD-10-CM

## 2023-04-27 DIAGNOSIS — D84.821 IMMUNODEFICIENCY DUE TO LONG TERM IMMUNOSUPPRESSIVE DRUG THERAPY: ICD-10-CM

## 2023-04-27 DIAGNOSIS — Z79.60 LONG-TERM USE OF IMMUNOSUPPRESSANT MEDICATION: ICD-10-CM

## 2023-04-27 DIAGNOSIS — T45.1X5A IMMUNODEFICIENCY DUE TO LONG TERM IMMUNOSUPPRESSIVE DRUG THERAPY: ICD-10-CM

## 2023-04-27 DIAGNOSIS — L40.50 PSORIATIC ARTHRITIS: Primary | ICD-10-CM

## 2023-04-27 DIAGNOSIS — Z79.1 LONG TERM (CURRENT) USE OF NON-STEROIDAL ANTI-INFLAMMATORIES (NSAID): ICD-10-CM

## 2023-04-27 DIAGNOSIS — L40.50 PSORIATIC ARTHRITIS: ICD-10-CM

## 2023-04-27 DIAGNOSIS — E66.9 OBESITY (BMI 30-39.9): ICD-10-CM

## 2023-04-27 LAB
ALBUMIN SERPL BCP-MCNC: 4 G/DL (ref 3.5–5.2)
ALP SERPL-CCNC: 77 U/L (ref 55–135)
ALT SERPL W/O P-5'-P-CCNC: 16 U/L (ref 10–44)
ANION GAP SERPL CALC-SCNC: 8 MMOL/L (ref 8–16)
AST SERPL-CCNC: 20 U/L (ref 10–40)
BASOPHILS # BLD AUTO: 0.05 K/UL (ref 0–0.2)
BASOPHILS NFR BLD: 0.5 % (ref 0–1.9)
BILIRUB SERPL-MCNC: 0.2 MG/DL (ref 0.1–1)
BUN SERPL-MCNC: 25 MG/DL (ref 6–20)
CALCIUM SERPL-MCNC: 10.3 MG/DL (ref 8.7–10.5)
CHLORIDE SERPL-SCNC: 101 MMOL/L (ref 95–110)
CO2 SERPL-SCNC: 28 MMOL/L (ref 23–29)
CREAT SERPL-MCNC: 0.9 MG/DL (ref 0.5–1.4)
CRP SERPL-MCNC: 38.5 MG/L (ref 0–8.2)
DIFFERENTIAL METHOD: ABNORMAL
EOSINOPHIL # BLD AUTO: 0.1 K/UL (ref 0–0.5)
EOSINOPHIL NFR BLD: 1 % (ref 0–8)
ERYTHROCYTE [DISTWIDTH] IN BLOOD BY AUTOMATED COUNT: 13.4 % (ref 11.5–14.5)
ERYTHROCYTE [SEDIMENTATION RATE] IN BLOOD BY PHOTOMETRIC METHOD: 24 MM/HR (ref 0–36)
EST. GFR  (NO RACE VARIABLE): >60 ML/MIN/1.73 M^2
GLUCOSE SERPL-MCNC: 98 MG/DL (ref 70–110)
HCT VFR BLD AUTO: 38.7 % (ref 37–48.5)
HGB BLD-MCNC: 12.1 G/DL (ref 12–16)
IMM GRANULOCYTES # BLD AUTO: 0.07 K/UL (ref 0–0.04)
IMM GRANULOCYTES NFR BLD AUTO: 0.7 % (ref 0–0.5)
LYMPHOCYTES # BLD AUTO: 0.9 K/UL (ref 1–4.8)
LYMPHOCYTES NFR BLD: 8.5 % (ref 18–48)
MCH RBC QN AUTO: 29.9 PG (ref 27–31)
MCHC RBC AUTO-ENTMCNC: 31.3 G/DL (ref 32–36)
MCV RBC AUTO: 96 FL (ref 82–98)
MONOCYTES # BLD AUTO: 0.9 K/UL (ref 0.3–1)
MONOCYTES NFR BLD: 8.4 % (ref 4–15)
NEUTROPHILS # BLD AUTO: 8.3 K/UL (ref 1.8–7.7)
NEUTROPHILS NFR BLD: 80.9 % (ref 38–73)
NRBC BLD-RTO: 0 /100 WBC
PLATELET # BLD AUTO: 356 K/UL (ref 150–450)
PMV BLD AUTO: 8.3 FL (ref 9.2–12.9)
POTASSIUM SERPL-SCNC: 4.5 MMOL/L (ref 3.5–5.1)
PROT SERPL-MCNC: 7.8 G/DL (ref 6–8.4)
RBC # BLD AUTO: 4.05 M/UL (ref 4–5.4)
SODIUM SERPL-SCNC: 137 MMOL/L (ref 136–145)
WBC # BLD AUTO: 10.31 K/UL (ref 3.9–12.7)

## 2023-04-27 PROCEDURE — 3079F DIAST BP 80-89 MM HG: CPT | Mod: CPTII,S$GLB,, | Performed by: INTERNAL MEDICINE

## 2023-04-27 PROCEDURE — 3044F HG A1C LEVEL LT 7.0%: CPT | Mod: CPTII,S$GLB,, | Performed by: INTERNAL MEDICINE

## 2023-04-27 PROCEDURE — 85652 RBC SED RATE AUTOMATED: CPT | Performed by: INTERNAL MEDICINE

## 2023-04-27 PROCEDURE — 4010F ACE/ARB THERAPY RXD/TAKEN: CPT | Mod: CPTII,S$GLB,, | Performed by: INTERNAL MEDICINE

## 2023-04-27 PROCEDURE — 1159F PR MEDICATION LIST DOCUMENTED IN MEDICAL RECORD: ICD-10-PCS | Mod: CPTII,S$GLB,, | Performed by: INTERNAL MEDICINE

## 2023-04-27 PROCEDURE — 3008F BODY MASS INDEX DOCD: CPT | Mod: CPTII,S$GLB,, | Performed by: INTERNAL MEDICINE

## 2023-04-27 PROCEDURE — 1160F RVW MEDS BY RX/DR IN RCRD: CPT | Mod: CPTII,S$GLB,, | Performed by: INTERNAL MEDICINE

## 2023-04-27 PROCEDURE — 85025 COMPLETE CBC W/AUTO DIFF WBC: CPT | Performed by: INTERNAL MEDICINE

## 2023-04-27 PROCEDURE — 1159F MED LIST DOCD IN RCRD: CPT | Mod: CPTII,S$GLB,, | Performed by: INTERNAL MEDICINE

## 2023-04-27 PROCEDURE — 3077F PR MOST RECENT SYSTOLIC BLOOD PRESSURE >= 140 MM HG: ICD-10-PCS | Mod: CPTII,S$GLB,, | Performed by: INTERNAL MEDICINE

## 2023-04-27 PROCEDURE — 80053 COMPREHEN METABOLIC PANEL: CPT | Performed by: INTERNAL MEDICINE

## 2023-04-27 PROCEDURE — 99214 PR OFFICE/OUTPT VISIT, EST, LEVL IV, 30-39 MIN: ICD-10-PCS | Mod: S$GLB,,, | Performed by: INTERNAL MEDICINE

## 2023-04-27 PROCEDURE — 3044F PR MOST RECENT HEMOGLOBIN A1C LEVEL <7.0%: ICD-10-PCS | Mod: CPTII,S$GLB,, | Performed by: INTERNAL MEDICINE

## 2023-04-27 PROCEDURE — 99999 PR PBB SHADOW E&M-EST. PATIENT-LVL V: CPT | Mod: PBBFAC,,, | Performed by: INTERNAL MEDICINE

## 2023-04-27 PROCEDURE — 3077F SYST BP >= 140 MM HG: CPT | Mod: CPTII,S$GLB,, | Performed by: INTERNAL MEDICINE

## 2023-04-27 PROCEDURE — 99999 PR PBB SHADOW E&M-EST. PATIENT-LVL V: ICD-10-PCS | Mod: PBBFAC,,, | Performed by: INTERNAL MEDICINE

## 2023-04-27 PROCEDURE — 4010F PR ACE/ARB THEARPY RXD/TAKEN: ICD-10-PCS | Mod: CPTII,S$GLB,, | Performed by: INTERNAL MEDICINE

## 2023-04-27 PROCEDURE — 3008F PR BODY MASS INDEX (BMI) DOCUMENTED: ICD-10-PCS | Mod: CPTII,S$GLB,, | Performed by: INTERNAL MEDICINE

## 2023-04-27 PROCEDURE — 3079F PR MOST RECENT DIASTOLIC BLOOD PRESSURE 80-89 MM HG: ICD-10-PCS | Mod: CPTII,S$GLB,, | Performed by: INTERNAL MEDICINE

## 2023-04-27 PROCEDURE — 86140 C-REACTIVE PROTEIN: CPT | Performed by: INTERNAL MEDICINE

## 2023-04-27 PROCEDURE — 36415 COLL VENOUS BLD VENIPUNCTURE: CPT | Performed by: INTERNAL MEDICINE

## 2023-04-27 PROCEDURE — 99214 OFFICE O/P EST MOD 30 MIN: CPT | Mod: S$GLB,,, | Performed by: INTERNAL MEDICINE

## 2023-04-27 PROCEDURE — 1160F PR REVIEW ALL MEDS BY PRESCRIBER/CLIN PHARMACIST DOCUMENTED: ICD-10-PCS | Mod: CPTII,S$GLB,, | Performed by: INTERNAL MEDICINE

## 2023-04-27 RX ORDER — NAPROXEN 500 MG/1
500 TABLET ORAL
Qty: 270 TABLET | Refills: 2 | Status: SHIPPED | OUTPATIENT
Start: 2023-04-27 | End: 2023-06-07 | Stop reason: SDUPTHER

## 2023-04-27 ASSESSMENT — ROUTINE ASSESSMENT OF PATIENT INDEX DATA (RAPID3)
TOTAL RAPID3 SCORE: 7.28
WHEN YOU AWAKENED IN THE MORNING OVER THE LAST WEEK, PLEASE INDICATE THE AMOUNT OF TIME IT TAKES UNTIL YOU ARE AS LIMBER AS YOU WILL BE FOR THE DAY: 2
PATIENT GLOBAL ASSESSMENT SCORE: 9.5
FATIGUE SCORE: 9
PAIN SCORE: 9
AM STIFFNESS SCORE: 1, YES
MDHAQ FUNCTION SCORE: 1
PSYCHOLOGICAL DISTRESS SCORE: 5.5

## 2023-04-27 NOTE — PROGRESS NOTES
Subjective:       Patient ID: Keli Concha Gilbert is a 47 y.o. female with psoriatic arthritis on Rinvoq & Leflunomide (& naproxen); demyelinating disorder secondary to Enbrel; incomplete effect on chronic pain syndrome on methadone     Chief Complaint: Disease management    Ms. Gilbert is a 48 yo woman w PsA last seen last on 1/26/23  She is on Rinvoq which she began on 1/29/22 and naproxen 500 mg which she takes up to tid pc without any adverse effects.   Leflunomide was stopped on 7/2022 without any change in sxs. She is doing very well PsA wise.  Her dactylitis has resolved.  She still has not gotten Shingrix.    Also has hydradenitis suppurativa & sees Dr. Fam.   Doing better with this following Dr. Fam's prescriptions and after having lost some weight (was on tirzepatide).   However, insurance not paying for tirzepatide and she stopped it 3 months ago.  She is convinced that it also helped aches & pains that she has associated with her chronic pain syndrome.    Current Outpatient Medications   Medication Sig Dispense Refill    buPROPion (WELLBUTRIN XL) 300 MG 24 hr tablet Take 1 tablet (300 mg total) by mouth once daily. 90 tablet 1    cholecalciferol, vitamin D3, 2,000 unit Tab Take 1 tablet by mouth Once a week.      clindamycin (CLEOCIN T) 1 % external solution AAA bid 60 mL 3    furosemide (LASIX) 40 MG tablet Take 1 tablet (40 mg total) by mouth once daily. 90 tablet 1    gabapentin (NEURONTIN) 300 MG capsule Take 300 mg by mouth 3 (three) times daily.      levothyroxine (SYNTHROID) 125 MCG tablet Take 1 tablet (125 mcg total) by mouth before breakfast. 90 tablet 3    liothyronine (CYTOMEL) 5 MCG Tab TAKE 2 TABLETS(10 MCG) BY MOUTH EVERY  tablet 1    losartan (COZAAR) 50 MG tablet Take 1 tablet (50 mg total) by mouth once daily. 90 tablet 3    metFORMIN (GLUCOPHAGE-XR) 500 MG ER 24hr tablet TAKE 1 TABLET BY MOUTH ONCE DAILY IN THE MORNING WITH BREAKFAST 30 tablet 5    methadone  (DOLOPHINE) 10 MG tablet Take 1 tablet by mouth Three times a day.      phentermine (ADIPEX-P) 37.5 mg tablet TAKE 1 TABLET(37.5 MG) BY MOUTH BEFORE BREAKFAST 30 tablet 0    potassium chloride (MICRO-K) 10 MEQ CpSR TAKE 2 CAPSULES(20 MEQ) BY MOUTH EVERY  capsule 3    promethazine (PHENERGAN) 25 MG tablet TK 1 T PO BID PRN N      spironolactone (ALDACTONE) 50 MG tablet Start with 1 po qday, increase to 2 po qday as tolerated 60 tablet 3    traZODone (DESYREL) 50 MG tablet TAKE 1 TO 2 TABLET BY MOUTH EVERY NIGHT      upadacitinib (RINVOQ) 15 mg 24 hr tablet Take 1 tablet (15 mg total) by mouth once daily. 30 tablet 3    loratadine-pseudoephedrine  mg (CLARITIN-D 24-HOUR)  mg per 24 hr tablet Take 1 tablet by mouth once daily.      naproxen (NAPROSYN) 500 MG tablet Take 1 tablet (500 mg total) by mouth every meal as needed (pain). 270 tablet 2     Current Facility-Administered Medications   Medication Dose Route Frequency Provider Last Rate Last Admin    triamcinolone acetonide injection 10 mg  10 mg Intradermal Once Marie Fam MD        triamcinolone acetonide injection 10 mg  10 mg Intradermal Once Marie Fam MD         Facility-Administered Medications Ordered in Other Visits   Medication Dose Route Frequency Provider Last Rate Last Admin    0.9%  NaCl infusion   Intravenous Continuous Gabriel Herring MD        mupirocin 2 % ointment   Nasal On Call Procedure Gabriel Herring MD   Given at 02/18/22 0703                 Review of patient's allergies indicates:   Allergen Reactions    Doxycycline hcl Nausea And Vomiting    Enbrel [etanercept] Other (See Comments)     Optic neurtits     Past Medical History:   Diagnosis Date    Abnormal Pap smear of vagina     AR (allergic rhinitis)     Demyelinating disorder     Depression     Fever blister     Fibromyalgia     followed by pain management    Generalized osteoarthritis of multiple sites     History of abnormal Pap smear      Hypertension     Hypothyroidism     Insulin resistance     Mixed anxiety and depressive disorder     Morbid obesity     Myelitis, optic neuritis in     treated with high-dose steroids and resolved; positive MRI and spinal fluid for a mass, but on embrel for psoriatic arthritis - she will see a MS specialist at [facility]; MRI normalized off embrel    Obesity     Pre-diabetes     hx diabetes on stereoids /    Psoriasis     Psoriatic arthritis     Vaginal delivery     x1    Vitamin D deficiency disease      Past Surgical History:   Procedure Laterality Date    COLONOSCOPY N/A 12/16/2022    Procedure: COLONOSCOPY;  Surgeon: Jaylen Pelletier MD;  Location: [facility] ENDO;  Service: Endoscopy;  Laterality: N/A;  Pt. holding Adipex for 5 days prior to.EC  (She states she doesnt take it every day.)    EYE SURGERY Bilateral 08/2021    Laser surgery for pressure relief    LAPAROSCOPIC CHOLECYSTECTOMY N/A 2/18/2022    Procedure: CHOLECYSTECTOMY, LAPAROSCOPIC;  Surgeon: Gabriel Herring MD;  Location: [facility] OR;  Service: General;  Laterality: N/A;  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  C A  NEED H/P-----CLEARED BY PCP    SINUS SURGERY  1998       Review of Systems   Constitutional:  Positive for fatigue. Negative for fever and unexpected weight change.   HENT: Negative.  Negative for dental problem, mouth sores and trouble swallowing.         Dry mouth only w antihistamines   Eyes:  Negative for redness, itching and visual disturbance.        Dry eyes only with antihistamines   Respiratory: Negative.  Negative for cough and shortness of breath.    Cardiovascular: Negative.  Negative for chest pain, palpitations and leg swelling.   Gastrointestinal: Negative.  Negative for abdominal pain, anal bleeding, blood in stool, constipation, diarrhea and nausea.        Heartburn.   Endocrine: Negative.    Genitourinary: Negative.  Negative for dysuria, frequency, genital sores, menstrual problem, pelvic pain and urgency.  "  Musculoskeletal: Negative.  Negative for arthralgias, back pain, gait problem and neck pain.   Skin: Negative.  Negative for color change and rash.   Allergic/Immunologic: Positive for immunocompromised state.   Neurological:  Positive for headaches. Negative for dizziness, seizures, weakness and numbness.   Hematological: Negative.  Negative for adenopathy. Does not bruise/bleed easily.   Psychiatric/Behavioral:  Positive for dysphoric mood (stable) and sleep disturbance (stable). Negative for decreased concentration, self-injury and suicidal ideas. The patient is nervous/anxious.        Family History   Problem Relation Age of Onset    Thyroid disease Mother     Heart disease Mother     Hypertension Mother     Hyperlipidemia Mother     Coronary artery disease Mother         s/p CABG    Heart attack Mother     Psoriasis Father     Cancer Father         throat    Breast cancer Sister     Heart disease Sister         MVP    Diabetes Paternal Uncle     Diabetes Maternal Grandfather     Heart disease Maternal Grandfather     Thyroid disease Maternal Grandfather     ADD / ADHD Son     Melanoma Neg Hx     Lupus Neg Hx     Eczema Neg Hx     Acne Neg Hx     Colon cancer Neg Hx     Ovarian cancer Neg Hx      Mom  22.    SH:   Working at a bank   Previously worked at a bar as well.     Granddaughter May, 2022--Lo--proud of her  No alcohol; no cigarettes;   Objective:     BP (!) 140/80   Pulse 100   Ht 5' 6" (1.676 m)   Wt 95.8 kg (211 lb 3.2 oz)   BMI 34.09 kg/m²   Was 214 lb 11.7 oz on 23  Was on 233 ;b 11 on 10/25/22  Was on 254 lb 3.1 oz on 22  Was 242 lb 8.1 oz on 22  Was 254 lb 3.1 oz on 3/16/21  Was 236 lb 12.4 oz on 19  Was 224 lb 13.9 oz on 19.    Physical Exam   Constitutional: She is oriented to person, place, and time. She appears obese. No distress.   HENT:   Head: Normocephalic and atraumatic.   Mouth/Throat: Oropharynx is clear and moist. No oropharyngeal exudate. "   No facial rashes  Parotids not enlarged     Eyes: Pupils are equal, round, and reactive to light. Conjunctivae are normal. Right eye exhibits no discharge. Left eye exhibits no discharge. No scleral icterus.   Neck: No JVD present. No tracheal deviation present. No thyromegaly present.   Cardiovascular: Normal rate, regular rhythm and normal heart sounds. Exam reveals no gallop and no friction rub.   No murmur heard.  Pulmonary/Chest: Effort normal and breath sounds normal. No respiratory distress. She has no wheezes. She has no rales. She exhibits no tenderness.   Abdominal: Soft. Bowel sounds are normal. She exhibits no distension and no mass. There is no splenomegaly or hepatomegaly. There is no abdominal tenderness. There is no rebound and no guarding.   Musculoskeletal:         General: No tenderness. Normal range of motion.      Cervical back: Neck supple.      Comments: Cspine slight decrease in lateral flexion & rotation; no tenderness  Tspine FROM no tenderness  Lspine FROM no tenderness.  TMJ: unremarkable  Shoulders: FROM; no synovitis  Elbows: FROM; no synovitis; no tophi or nodules  Wrists: no SHT; no tenderness; good ROM  MCPs: no metatarsalgia; no synovitis  PIPs: no more dactylitis 2nd ray R; R 3rd PIP w min flexion contracture; no TTP;    DIPs: FROM; no synovitis  HIPS: FROM  Knees: FROM; no synovitis; no instability;  Ankles: FROM: no synovitis   Toes: ok; no metatarsalgia                 Lymphadenopathy:     She has no cervical adenopathy.   Neurological: She is alert and oriented to person, place, and time. She has normal reflexes. No cranial nerve deficit. Gait normal.   Proximal and distal muscle strength 5/5.   Skin: Skin is warm and dry. No lesion and no rash noted. She is not diaphoretic.   No psoriasis but has acne like facial & neck lesions..   Psychiatric: Her behavior is normal. Mood, memory, affect, judgment and thought content normal.   Vitals reviewed.      Labs:   1/21/23: ESR 14  (36); CRP 6.3; CBC Hg 11.8; CMP Na 135; Hg A1C 5.4; TSH ok;  10/17/22: ESR 30 (36); CRP 6.7; CBC plt 466; CMP Na 131; BUN 27;   7/16/22: ESR 26 (36); CRP 5.5; CMP glu 167  4/16/22: ESR 24 (36); CRP 8.0; CBC Hg 11.3; low indices; CMP ok;  2/8/22: CBC Hg 11.9; CMP ok;   1/19/22: ESR 66; CRP 18.1  10/9/21: ESR 38 (36); CRP 11.7; CBC ok; CMP ok;   5/15/21: CMP ok;  TSH ok; (150)  3/16/21: ESR 72 (36); CRP 25.1; CBC  Hg 11.4; CMP glu 125; alb 3.4;   7/22/20: ESR 34 (36); CRP 16.1; CBC Hg 10.8; Ht 36.8; CMP ok; last labs  12/7/19: ESR 17; CRP CBC Hg 11.8; CMP ok; TFTs ok;   9/14/19: ESR 24 (36); CRP 9.8; CBC Hg 11.5; CP ok;   2/19/19: ESR 13; CRP 5.8; CBC plts 362; CMP ok; HCV/HBV/QG neg;  11/17/18: ESR 8; CRP 3.9; Hg 11.9; low indices; CMP ok;   8/22/18: ESR 10; CRP 10.9; CBC Hg 10.7; Ht 35.7; CMP ok  5/14/18: ESR 13; CRP 13.4;  Hg 11.3; low indices; eos 12.4%; CMP ok;   2/10/18: ESR 30; CRP 10.5; Hg 11.3; plt 366; CMP ok;   10/19/17: HBV neg; HCV neg;   9/2/17: ESR 16; CRp 5.9; Hg 11.6; Ht 36.6; CMP ok;   5/29/17: QG neg  2/20/17: ESR 34; CRP 8.1; CBC Hg 11.4; Ht 35.6; CMP ok;   11/19/16: ESR 20; CRP 4.8; CBC Hg 11.9; CMP K+3.3, otherwise ok;   8/27/16: ESR 22; CRP 7; Hg 11.8; Ht 36.9; CMP ok;   5/26/16: ESR 35; CRP 11.2Hg 11.8; Ht 36.9; CMP ok;   5/21/16: Vit d 37  10/8/15: ESR 45; CRP 14.2; Hg 11.2; Ht 35.8; CMP; ok  7/7/15: CBC plt 380; K+ 3.4; Glu 154  7/6/15: ESR 48; CRP 9.6  4/4/15: ESR 41; CRP 19; Hg 11.0; Ht 34.6; plt 377; CMP ok;  1/10/15: ESR 34; CRP 18; CBC ok; ; Alb. 3.6  10/4/14: ESR 43; CRP 18.5; Hg 11.8; Ht 36.5; plt 378; Alb. 3.4;   3/20/14: ESR 41; CRP 14.3; plt 356; CMP ok;  2/15/13: ESR 30; CRP 9.8; CBC ok; Alb. 3.3  11/2/13: ESR 28; CRP 11.6; plt 363; Alb. 3.3;   5/28/13: ESR 45; CRP 8.8; plt 366; CMP ok;     12/1/18: Bilateral hands: personally viewed: Mild degenerative changes noted at the interphalangeal joints.  Mild-to-moderate degenerative change noted at the bilateral 1st CMC  joints.  Mild degenerative changes also present at both radiocarpal joints, triscaphe joints and right distal radioulnar joint.  Minimal degenerative changes also present at the MCP joints bilaterally and greatest at the 1st MCP joint on the left.  6/15/17: Bilateral hands: personally reviewed: mild osteoarthritis w stable periarticular erosion at the left fourth MCP joint; no change since last year.   8/27/16: Bilateral feet: personally reviewed. Mild HV; mild OA shell 1st MTP dali; ? Erosion PIP left small toe;   5/26/16: Bilateral hands: personally reviewed: L 4th MCP (new) erosive lesion; R & L 2nd & 3rd metacarpal head cysts; R mild PIP erosion & STS 2nd.    Assessment:     Psoriatic arthritis-- with mild SHT 2 MCPs bilaterally (R >>L) & with sausage digit of R index finger--currently w  less& flexion deformity of R 3rd PIP--stable at present .    a) History of sausage digits of both toes      PIPs, DIPs, wrists, and knees., now w. only one sausage digit.   b) Psoriasis of the abdomen, elbow, and the shins--resolved.    c) Enbrel stopped 10/21/11 due to optic neuritis.     d) New erosion (4th L metacarpal head) on x-ray & possibly L 5th toe PIP     e) Persisting elevation of ESR and CRP    f) inadequate response to leflunomide and Stelara & apremilast (w. Intolerable nausea)   G) could not tolerate SSZ--nausea.   H) MTX x 2 even SC did not help sxs.   I) improved on Cosentyx (+leflunomide + naproxen) initially incompletely-- hand joints still hurt despite normalization of  ESR & CRP   J) Orencia begun 11/17 with incomplete response   K)Taltz begun 4/20/18-2/19   L) Tofacitinib 3/19 - 6/18/19    Chronic SHT 3rd R PIP & base of L thumb with questionable erosive lesion.   M) Back to Cosentyx + leflunomide + celebrex    N) Tremfya begun 4/14/21--failed: stopped w headache & lack of efficacy   O) Back to Cosentyx 10/6/21 with improvement; held since 12/15/21.   P) On Rinvoq 30 mg since 1/29/22--stable to  improved.    Leflunomide stopped 7/2022    Left optic neuritis with demyelinating lesions secondary to Enbrel--now resolved.    Repeat MRI ok    Adverse reaction to Etanercept.    Chronic pain/fibromyalgia syndrome with fatigue, tender points, withdrawals to palpation in the past, pain all over, responsive to Savella but with some nausea (off it now), followed [facility] at the [facility],    On methadone    Degenerative joint disease of the cervical spine, knees, and feet--very mild.     Depression on wellbutrin   Off savella & sertraline (much weight gain)    Hydradenitis suppurativa    Hypertriglyceridemia    Hypertension.     Hypothyroidism.     Vitamin D deficiency with regular prescription vitamin D supplementation.     S/P covid 7/3/22    Obesity from morbid obesity    Plan:   Ok to continue Rinvoq  Ok to continue naproxen 500 mg up to tid but less is best.   Ok to stay off leflunomide.  Rx given for Shingrix  Labs today & in 3 months.          RTC 3 months or prn

## 2023-05-01 ENCOUNTER — PATIENT MESSAGE (OUTPATIENT)
Dept: FAMILY MEDICINE | Facility: CLINIC | Age: 48
End: 2023-05-01
Payer: COMMERCIAL

## 2023-05-01 DIAGNOSIS — Z86.39 HISTORY OF DIABETES MELLITUS: ICD-10-CM

## 2023-05-01 RX ORDER — TIRZEPATIDE 12.5 MG/.5ML
12.5 INJECTION, SOLUTION SUBCUTANEOUS
Qty: 12 PEN | Refills: 1 | Status: SHIPPED | OUTPATIENT
Start: 2023-05-01 | End: 2023-05-01

## 2023-05-02 ENCOUNTER — PATIENT MESSAGE (OUTPATIENT)
Dept: FAMILY MEDICINE | Facility: CLINIC | Age: 48
End: 2023-05-02
Payer: COMMERCIAL

## 2023-05-09 ENCOUNTER — PATIENT MESSAGE (OUTPATIENT)
Dept: OBSTETRICS AND GYNECOLOGY | Facility: CLINIC | Age: 48
End: 2023-05-09
Payer: COMMERCIAL

## 2023-05-22 ENCOUNTER — PATIENT MESSAGE (OUTPATIENT)
Dept: FAMILY MEDICINE | Facility: CLINIC | Age: 48
End: 2023-05-22
Payer: COMMERCIAL

## 2023-05-30 ENCOUNTER — PATIENT MESSAGE (OUTPATIENT)
Dept: FAMILY MEDICINE | Facility: CLINIC | Age: 48
End: 2023-05-30
Payer: COMMERCIAL

## 2023-05-31 DIAGNOSIS — L40.50 PSORIATIC ARTHRITIS: ICD-10-CM

## 2023-05-31 RX ORDER — UPADACITINIB 15 MG/1
TABLET, EXTENDED RELEASE ORAL
Qty: 30 TABLET | Refills: 3 | Status: SHIPPED | OUTPATIENT
Start: 2023-05-31 | End: 2023-10-09

## 2023-06-06 ENCOUNTER — PATIENT MESSAGE (OUTPATIENT)
Dept: FAMILY MEDICINE | Facility: CLINIC | Age: 48
End: 2023-06-06
Payer: COMMERCIAL

## 2023-06-07 DIAGNOSIS — L40.50 PSORIATIC ARTHRITIS: ICD-10-CM

## 2023-06-07 RX ORDER — NAPROXEN 500 MG/1
500 TABLET ORAL
Qty: 270 TABLET | Refills: 2 | Status: SHIPPED | OUTPATIENT
Start: 2023-06-07 | End: 2024-01-18

## 2023-06-07 RX ORDER — PHENTERMINE HYDROCHLORIDE 37.5 MG/1
37.5 TABLET ORAL
Qty: 30 TABLET | Refills: 0 | Status: SHIPPED | OUTPATIENT
Start: 2023-06-07 | End: 2023-10-06

## 2023-06-14 ENCOUNTER — TELEPHONE (OUTPATIENT)
Dept: FAMILY MEDICINE | Facility: CLINIC | Age: 48
End: 2023-06-14
Payer: COMMERCIAL

## 2023-06-14 NOTE — TELEPHONE ENCOUNTER
Phone pt informed that medication has been sent to the pharmacy and ready for  vs   Pt is requesting change of pharmacy to [facility] on next refill request vs

## 2023-06-16 DIAGNOSIS — E03.9 HYPOTHYROIDISM, UNSPECIFIED TYPE: ICD-10-CM

## 2023-06-16 RX ORDER — LIOTHYRONINE SODIUM 5 UG/1
TABLET ORAL
Qty: 180 TABLET | Refills: 3 | OUTPATIENT
Start: 2023-06-16

## 2023-06-16 NOTE — TELEPHONE ENCOUNTER
Refill Decision Note   Keli Gilbert  is requesting a refill authorization.  Brief Assessment and Rationale for Refill:  Quick Discontinue     Medication Therapy Plan:       Medication Reconciliation Completed: No   Comments:     No Care Gaps recommended.     Duplicate Pended Encounter(s)/ Last Prescribed Details:    Pharmacy    [facility] ([facility] ) - [place], [place] - [address]   [address], [facility] [address]   Phone:  [phone]  Fax:  [phone]   GOPI #:  --   SHERITA Reason: --     Outpatient Medication Detail     Disp Refills Start End SHERITA   liothyronine (CYTOMEL) 5 MCG Tab 180 tablet 2 6/15/2023  No   Sig: TAKE 2 TABLETS BY MOUTH DAILY   Sent to pharmacy as: liothyronine (CYTOMEL) 5 MCG Tab   Class: Normal   Notes to Pharmacy: Requesting 1 year supply   Order: [id number]   Cosign for Ordering: Accepted by Chloé Cm MD on 6/15/2023  3:11 PM   Date/Time Signed: 6/15/2023 14:27       E-Prescribing Status: Receipt confirmed by pharmacy (6/15/2023  2:27 PM CDT)     Ordering Encounter Report    Associated Reports   View Encounter            Note composed:11:35 AM 06/16/2023   Note composed:11:35 AM 06/16/2023

## 2023-06-16 NOTE — TELEPHONE ENCOUNTER
No care due was identified.  [facility] Embedded Care Due Messages. Reference number: [id number].   6/16/2023 9:01:30 AM CDT

## 2023-07-11 ENCOUNTER — PATIENT MESSAGE (OUTPATIENT)
Dept: RHEUMATOLOGY | Facility: CLINIC | Age: 48
End: 2023-07-11
Payer: COMMERCIAL

## 2023-07-14 ENCOUNTER — OFFICE VISIT (OUTPATIENT)
Dept: FAMILY MEDICINE | Facility: CLINIC | Age: 48
End: 2023-07-14
Payer: COMMERCIAL

## 2023-07-14 VITALS
OXYGEN SATURATION: 97 % | TEMPERATURE: 99 F | HEART RATE: 90 BPM | SYSTOLIC BLOOD PRESSURE: 132 MMHG | WEIGHT: 204.25 LBS | BODY MASS INDEX: 32.83 KG/M2 | HEIGHT: 66 IN | RESPIRATION RATE: 18 BRPM | DIASTOLIC BLOOD PRESSURE: 84 MMHG

## 2023-07-14 DIAGNOSIS — L73.2 HIDRADENITIS SUPPURATIVA: ICD-10-CM

## 2023-07-14 DIAGNOSIS — L40.9 PSORIASIS: ICD-10-CM

## 2023-07-14 DIAGNOSIS — E03.9 HYPOTHYROIDISM, UNSPECIFIED TYPE: ICD-10-CM

## 2023-07-14 DIAGNOSIS — L40.50 PSORIATIC ARTHRITIS: Primary | ICD-10-CM

## 2023-07-14 PROBLEM — E87.6 HYPOKALEMIA: Status: RESOLVED | Noted: 2017-06-29 | Resolved: 2023-07-14

## 2023-07-14 PROCEDURE — 3075F PR MOST RECENT SYSTOLIC BLOOD PRESS GE 130-139MM HG: ICD-10-PCS | Mod: CPTII,S$GLB,, | Performed by: FAMILY MEDICINE

## 2023-07-14 PROCEDURE — 1160F RVW MEDS BY RX/DR IN RCRD: CPT | Mod: CPTII,S$GLB,, | Performed by: FAMILY MEDICINE

## 2023-07-14 PROCEDURE — 99999 PR PBB SHADOW E&M-EST. PATIENT-LVL V: ICD-10-PCS | Mod: PBBFAC,,, | Performed by: FAMILY MEDICINE

## 2023-07-14 PROCEDURE — 3075F SYST BP GE 130 - 139MM HG: CPT | Mod: CPTII,S$GLB,, | Performed by: FAMILY MEDICINE

## 2023-07-14 PROCEDURE — 99214 PR OFFICE/OUTPT VISIT, EST, LEVL IV, 30-39 MIN: ICD-10-PCS | Mod: S$GLB,,, | Performed by: FAMILY MEDICINE

## 2023-07-14 PROCEDURE — 3079F PR MOST RECENT DIASTOLIC BLOOD PRESSURE 80-89 MM HG: ICD-10-PCS | Mod: CPTII,S$GLB,, | Performed by: FAMILY MEDICINE

## 2023-07-14 PROCEDURE — 3008F BODY MASS INDEX DOCD: CPT | Mod: CPTII,S$GLB,, | Performed by: FAMILY MEDICINE

## 2023-07-14 PROCEDURE — 99214 OFFICE O/P EST MOD 30 MIN: CPT | Mod: S$GLB,,, | Performed by: FAMILY MEDICINE

## 2023-07-14 PROCEDURE — 4010F ACE/ARB THERAPY RXD/TAKEN: CPT | Mod: CPTII,S$GLB,, | Performed by: FAMILY MEDICINE

## 2023-07-14 PROCEDURE — 3008F PR BODY MASS INDEX (BMI) DOCUMENTED: ICD-10-PCS | Mod: CPTII,S$GLB,, | Performed by: FAMILY MEDICINE

## 2023-07-14 PROCEDURE — 4010F PR ACE/ARB THEARPY RXD/TAKEN: ICD-10-PCS | Mod: CPTII,S$GLB,, | Performed by: FAMILY MEDICINE

## 2023-07-14 PROCEDURE — 3044F PR MOST RECENT HEMOGLOBIN A1C LEVEL <7.0%: ICD-10-PCS | Mod: CPTII,S$GLB,, | Performed by: FAMILY MEDICINE

## 2023-07-14 PROCEDURE — 1159F MED LIST DOCD IN RCRD: CPT | Mod: CPTII,S$GLB,, | Performed by: FAMILY MEDICINE

## 2023-07-14 PROCEDURE — 3079F DIAST BP 80-89 MM HG: CPT | Mod: CPTII,S$GLB,, | Performed by: FAMILY MEDICINE

## 2023-07-14 PROCEDURE — 99999 PR PBB SHADOW E&M-EST. PATIENT-LVL V: CPT | Mod: PBBFAC,,, | Performed by: FAMILY MEDICINE

## 2023-07-14 PROCEDURE — 3044F HG A1C LEVEL LT 7.0%: CPT | Mod: CPTII,S$GLB,, | Performed by: FAMILY MEDICINE

## 2023-07-14 PROCEDURE — 1159F PR MEDICATION LIST DOCUMENTED IN MEDICAL RECORD: ICD-10-PCS | Mod: CPTII,S$GLB,, | Performed by: FAMILY MEDICINE

## 2023-07-14 PROCEDURE — 1160F PR REVIEW ALL MEDS BY PRESCRIBER/CLIN PHARMACIST DOCUMENTED: ICD-10-PCS | Mod: CPTII,S$GLB,, | Performed by: FAMILY MEDICINE

## 2023-07-14 RX ORDER — LATANOPROST 50 UG/ML
SOLUTION/ DROPS OPHTHALMIC
COMMUNITY
Start: 2023-07-12

## 2023-07-16 PROBLEM — E66.01 SEVERE OBESITY (BMI 35.0-39.9) WITH COMORBIDITY: Status: RESOLVED | Noted: 2022-06-29 | Resolved: 2023-07-16

## 2023-07-16 PROBLEM — K80.20 SYMPTOMATIC CHOLELITHIASIS: Status: RESOLVED | Noted: 2022-02-18 | Resolved: 2023-07-16

## 2023-07-16 NOTE — PROGRESS NOTES
Routine Office Visit    Patient Name: Keli Gilbert    : 1975  MRN: [id number]    Subjective:  Keli is a 47 y.o. female who presents today for:    1. Medication questions  Patient presenting today to see if she can get on mounjaro as she was on it for weight loss and while on it saw drastic changes in her inflammatory numbers.  She does suffer with psoriasis/psoriatic arthritis and her pain was much better controlled while on mounjaro.  She continued her regular medications including biologics and for the first time saw her pain improve and not need nearly the amount of pain medication she was taking prior to starting mounjaro.  She had to stop the medication due to the coupon no longer being effective and shortage of supply prior to the coupon not working and even with not gaining weight back her inflammatory markers began to go back up despite no changes in taking other medications.  She has had to start taking more pain medication to be able to function.  Patient states that her rheumatologist was shocked at how much her inflammatory markers had improved prior to her stopping Mounjaro.  She did recently get the last prescription filled as supply at there pharmacy came in, but this will be the last month.  She does suffer with hypothyroidism, but no personal or family history of medullary thyroid cancer or adrenal cancers (MEN 1 or 2).  She has been able to lose a little more weight despite not being on the same dosage she was on prior to short supply at her pharmacy.      Past Medical History  Past Medical History:   Diagnosis Date    Abnormal Pap smear of vagina     AR (allergic rhinitis)     Demyelinating disorder     Depression     Fever blister     Fibromyalgia     followed by pain management    Generalized osteoarthritis of multiple sites     History of abnormal Pap smear     Hypertension     Hypothyroidism     Insulin resistance     Mixed anxiety and depressive disorder     Morbid obesity      Myelitis, optic neuritis in     treated with high-dose steroids and resolved; positive MRI and spinal fluid for a mass, but on embrel for psoriatic arthritis - she will see a MS specialist at [facility]; MRI normalized off embrel    Obesity     Pre-diabetes     hx diabetes on stereoids /    Psoriasis     Psoriatic arthritis     Vaginal delivery     x1    Vitamin D deficiency disease        Past Surgical History  Past Surgical History:   Procedure Laterality Date    COLONOSCOPY N/A 12/16/2022    Procedure: COLONOSCOPY;  Surgeon: Jaylen Pelletier MD;  Location: [facility];  Service: Endoscopy;  Laterality: N/A;  Pt. holding Adipex for 5 days prior to.EC  (She states she doesnt take it every day.)    EYE SURGERY Bilateral 08/2021    Laser surgery for pressure relief    LAPAROSCOPIC CHOLECYSTECTOMY N/A 2/18/2022    Procedure: CHOLECYSTECTOMY, LAPAROSCOPIC;  Surgeon: Gabriel Herring MD;  Location: [facility] OR;  Service: General;  Laterality: N/A;  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  C A  NEED H/P-----CLEARED BY PCP    SINUS SURGERY  1998       Family History  Family History   Problem Relation Age of Onset    Thyroid disease Mother     Heart disease Mother     Hypertension Mother     Hyperlipidemia Mother     Coronary artery disease Mother         s/p CABG    Heart attack Mother     Psoriasis Father     Cancer Father         throat    Breast cancer Sister     Heart disease Sister         MVP    Diabetes Paternal Uncle     Diabetes Maternal Grandfather     Heart disease Maternal Grandfather     Thyroid disease Maternal Grandfather     ADD / ADHD Son     Melanoma Neg Hx     Lupus Neg Hx     Eczema Neg Hx     Acne Neg Hx     Colon cancer Neg Hx     Ovarian cancer Neg Hx        Social History  Social History     Socioeconomic History    Marital status: Single    Number of children: 1   Occupational History    Occupation: [employer]     Employer: [employer] (MAIN OFFICE)   Tobacco Use    Smoking status: Never      Passive exposure: Never    Smokeless tobacco: Never   Substance and Sexual Activity    Alcohol use: No    Drug use: No     Comment: 7/7/15 one weed brownie    Sexual activity: Yes     Partners: Male     Birth control/protection: I.U.D.   Other Topics Concern    Are you pregnant or think you may be? No    Breast-feeding No       Current Medications  Current Outpatient Medications on File Prior to Visit   Medication Sig Dispense Refill    buPROPion (WELLBUTRIN XL) 300 MG 24 hr tablet Take 1 tablet (300 mg total) by mouth once daily. 90 tablet 1    cholecalciferol, vitamin D3, 2,000 unit Tab Take 1 tablet by mouth Once a week.      clindamycin (CLEOCIN T) 1 % external solution AAA bid 60 mL 3    furosemide (LASIX) 40 MG tablet Take 1 tablet (40 mg total) by mouth once daily. 90 tablet 1    gabapentin (NEURONTIN) 300 MG capsule Take 300 mg by mouth 3 (three) times daily.      latanoprost 0.005 % ophthalmic solution Place into both eyes.      levothyroxine (SYNTHROID) 125 MCG tablet Take 1 tablet (125 mcg total) by mouth before breakfast. 90 tablet 3    liothyronine (CYTOMEL) 5 MCG Tab TAKE 2 TABLETS BY MOUTH DAILY 180 tablet 2    loratadine-pseudoephedrine  mg (CLARITIN-D 24-HOUR)  mg per 24 hr tablet Take 1 tablet by mouth once daily.      losartan (COZAAR) 50 MG tablet Take 1 tablet (50 mg total) by mouth once daily. 90 tablet 3    metFORMIN (GLUCOPHAGE-XR) 500 MG ER 24hr tablet TAKE 1 TABLET BY MOUTH ONCE DAILY IN THE MORNING WITH BREAKFAST 30 tablet 5    methadone (DOLOPHINE) 10 MG tablet Take 1 tablet by mouth Three times a day.      naproxen (NAPROSYN) 500 MG tablet Take 1 tablet (500 mg total) by mouth every meal as needed (pain). 270 tablet 2    phentermine (ADIPEX-P) 37.5 mg tablet Take 1 tablet (37.5 mg total) by mouth before breakfast. 30 tablet 0    potassium chloride (MICRO-K) 10 MEQ CpSR TAKE 2 CAPSULES(20 MEQ) BY MOUTH EVERY  capsule 3    promethazine (PHENERGAN) 25 MG tablet TK 1 T  "PO BID PRN N      RINVOQ 15 mg 24 hr tablet TAKE 1 TABLET BY MOUTH ONCE  DAILY 30 tablet 3    spironolactone (ALDACTONE) 50 MG tablet Start with 1 po qday, increase to 2 po qday as tolerated 60 tablet 3    tirzepatide 7.5 mg/0.5 mL PnIj Inject 7.5 mg into the skin every 7 days. 12 pen 0    traZODone (DESYREL) 50 MG tablet TAKE 1 TO 2 TABLET BY MOUTH EVERY NIGHT      [DISCONTINUED] secukinumab (COSENTYX PEN) 150 mg/mL PnIj Inject 300 mg into the skin every 28 days. 6 each 1     Current Facility-Administered Medications on File Prior to Visit   Medication Dose Route Frequency Provider Last Rate Last Admin    0.9%  NaCl infusion   Intravenous Continuous Gabriel Herring MD        mupirocin 2 % ointment   Nasal On Call Procedure Gabriel Herring MD   Given at 02/18/22 0703    triamcinolone acetonide injection 10 mg  10 mg Intradermal Once Marie Fam MD        triamcinolone acetonide injection 10 mg  10 mg Intradermal Once Marie Fam MD           Allergies   Review of patient's allergies indicates:   Allergen Reactions    Doxycycline hcl Nausea And Vomiting    Enbrel [etanercept] Other (See Comments)     Optic neurtits       Review of Systems (Pertinent positives)  Review of Systems   Constitutional:  Positive for malaise/fatigue.   HENT: Negative.     Eyes: Negative.    Respiratory: Negative.     Cardiovascular: Negative.    Gastrointestinal: Negative.    Genitourinary: Negative.    Musculoskeletal:  Positive for joint pain and myalgias.   Skin: Negative.        /84   Pulse 90   Temp 98.8 °F (37.1 °C) (Oral)   Resp 18   Ht 5' 6" (1.676 m)   Wt 92.7 kg (204 lb 4.1 oz)   SpO2 97%   BMI 32.97 kg/m²     GENERAL APPEARANCE: obese female in no apparent distress and well developed and well nourished  HEENT: PERRL, EOMI, Sclera clear, anicteric, Oropharynx clear, no lesions, Neck supple with midline trachea  NECK: normal, supple, no adenopathy, thyroid normal in size  RESPIRATORY: appears well, " vitals normal, no respiratory distress, acyanotic, normal RR, chest clear, no wheezing, crepitations, rhonchi, normal symmetric air entry  HEART: regular rate and rhythm, S1, S2 normal, no murmur, click, rub or gallop.    ABDOMEN: abdomen is soft without tenderness, no masses, no hernias, no organomegaly, no rebound, no guarding. Suprapubic tenderness absent. No CVA tenderness.  NEUROLOGIC: normal without focal findings, CN II-XII are intact.    Extremities: warm/well perfused.  No abnormal hair patterns.  No clubbing, cyanosis or edema. No acute swelling of joints today, but tenderness in fingers and wrists as well as elbows  SKIN: no rashes, no wounds, no other lesions  PSYCH: Alert, oriented x 3, thought content appropriate, speech normal, pleasant and cooperative, good eye contact,     Assessment/Plan:  Keli Gilbert is a 47 y.o. female who presents today for :    Keli was seen today for follow-up and diabetes.    Diagnoses and all orders for this visit:    Psoriatic arthritis  - Patient has history of steroid induced type 2 DM that resolved when steroids were stopped  - Research studies currently going on to see the effects of GLP1 medications on autoimmune conditions and there has been some evidence of improved inflammation  - Patient managed by rheumatology    Hypothyroidism, unspecified type  - Labs have been normal lately  - Continue to monitor    Psoriasis  - Patient has occasional plaques, but being treated   - Continue current treatment    Hidradenitis suppurativa  - Managed by dermatology          Andriy Moran MD

## 2023-07-19 ENCOUNTER — PATIENT MESSAGE (OUTPATIENT)
Dept: FAMILY MEDICINE | Facility: CLINIC | Age: 48
End: 2023-07-19
Payer: COMMERCIAL

## 2023-07-31 ENCOUNTER — PATIENT MESSAGE (OUTPATIENT)
Dept: RHEUMATOLOGY | Facility: CLINIC | Age: 48
End: 2023-07-31
Payer: COMMERCIAL

## 2023-08-02 ENCOUNTER — LAB VISIT (OUTPATIENT)
Dept: LAB | Facility: HOSPITAL | Age: 48
End: 2023-08-02
Attending: INTERNAL MEDICINE
Payer: COMMERCIAL

## 2023-08-02 DIAGNOSIS — Z79.60 LONG-TERM USE OF IMMUNOSUPPRESSANT MEDICATION: ICD-10-CM

## 2023-08-02 DIAGNOSIS — D84.821 IMMUNODEFICIENCY DUE TO LONG TERM IMMUNOSUPPRESSIVE DRUG THERAPY: ICD-10-CM

## 2023-08-02 DIAGNOSIS — Z79.899 IMMUNODEFICIENCY DUE TO LONG TERM IMMUNOSUPPRESSIVE DRUG THERAPY: ICD-10-CM

## 2023-08-02 DIAGNOSIS — L40.50 PSORIATIC ARTHRITIS: ICD-10-CM

## 2023-08-02 DIAGNOSIS — T45.1X5A IMMUNODEFICIENCY DUE TO LONG TERM IMMUNOSUPPRESSIVE DRUG THERAPY: ICD-10-CM

## 2023-08-02 LAB
ALBUMIN SERPL BCP-MCNC: 4 G/DL (ref 3.5–5.2)
ALP SERPL-CCNC: 87 U/L (ref 55–135)
ALT SERPL W/O P-5'-P-CCNC: 15 U/L (ref 10–44)
ANION GAP SERPL CALC-SCNC: 6 MMOL/L (ref 8–16)
AST SERPL-CCNC: 19 U/L (ref 10–40)
BASOPHILS # BLD AUTO: 0.04 K/UL (ref 0–0.2)
BASOPHILS NFR BLD: 0.5 % (ref 0–1.9)
BILIRUB SERPL-MCNC: 0.3 MG/DL (ref 0.1–1)
BUN SERPL-MCNC: 32 MG/DL (ref 6–20)
CALCIUM SERPL-MCNC: 9.8 MG/DL (ref 8.7–10.5)
CHLORIDE SERPL-SCNC: 102 MMOL/L (ref 95–110)
CO2 SERPL-SCNC: 26 MMOL/L (ref 23–29)
CREAT SERPL-MCNC: 1.1 MG/DL (ref 0.5–1.4)
CRP SERPL-MCNC: 1.6 MG/L (ref 0–8.2)
DIFFERENTIAL METHOD: ABNORMAL
EOSINOPHIL # BLD AUTO: 0.2 K/UL (ref 0–0.5)
EOSINOPHIL NFR BLD: 1.7 % (ref 0–8)
ERYTHROCYTE [DISTWIDTH] IN BLOOD BY AUTOMATED COUNT: 14.5 % (ref 11.5–14.5)
ERYTHROCYTE [SEDIMENTATION RATE] IN BLOOD BY PHOTOMETRIC METHOD: 13 MM/HR (ref 0–36)
EST. GFR  (NO RACE VARIABLE): >60 ML/MIN/1.73 M^2
GLUCOSE SERPL-MCNC: 80 MG/DL (ref 70–110)
HCT VFR BLD AUTO: 39.1 % (ref 37–48.5)
HGB BLD-MCNC: 12.9 G/DL (ref 12–16)
IMM GRANULOCYTES # BLD AUTO: 0.07 K/UL (ref 0–0.04)
IMM GRANULOCYTES NFR BLD AUTO: 0.8 % (ref 0–0.5)
LYMPHOCYTES # BLD AUTO: 2.1 K/UL (ref 1–4.8)
LYMPHOCYTES NFR BLD: 24.1 % (ref 18–48)
MCH RBC QN AUTO: 29.9 PG (ref 27–31)
MCHC RBC AUTO-ENTMCNC: 33 G/DL (ref 32–36)
MCV RBC AUTO: 91 FL (ref 82–98)
MONOCYTES # BLD AUTO: 0.8 K/UL (ref 0.3–1)
MONOCYTES NFR BLD: 8.6 % (ref 4–15)
NEUTROPHILS # BLD AUTO: 5.7 K/UL (ref 1.8–7.7)
NEUTROPHILS NFR BLD: 64.3 % (ref 38–73)
NRBC BLD-RTO: 0 /100 WBC
PLATELET # BLD AUTO: 389 K/UL (ref 150–450)
PMV BLD AUTO: 8.6 FL (ref 9.2–12.9)
POTASSIUM SERPL-SCNC: 4.6 MMOL/L (ref 3.5–5.1)
PROT SERPL-MCNC: 7.6 G/DL (ref 6–8.4)
RBC # BLD AUTO: 4.32 M/UL (ref 4–5.4)
SODIUM SERPL-SCNC: 134 MMOL/L (ref 136–145)
WBC # BLD AUTO: 8.87 K/UL (ref 3.9–12.7)

## 2023-08-02 PROCEDURE — 80053 COMPREHEN METABOLIC PANEL: CPT | Performed by: INTERNAL MEDICINE

## 2023-08-02 PROCEDURE — 85652 RBC SED RATE AUTOMATED: CPT | Performed by: INTERNAL MEDICINE

## 2023-08-02 PROCEDURE — 86140 C-REACTIVE PROTEIN: CPT | Performed by: INTERNAL MEDICINE

## 2023-08-02 PROCEDURE — 36415 COLL VENOUS BLD VENIPUNCTURE: CPT | Mod: PO | Performed by: INTERNAL MEDICINE

## 2023-08-02 PROCEDURE — 85025 COMPLETE CBC W/AUTO DIFF WBC: CPT | Performed by: INTERNAL MEDICINE

## 2023-08-03 ENCOUNTER — PATIENT MESSAGE (OUTPATIENT)
Dept: FAMILY MEDICINE | Facility: CLINIC | Age: 48
End: 2023-08-03
Payer: COMMERCIAL

## 2023-08-04 ENCOUNTER — PATIENT MESSAGE (OUTPATIENT)
Dept: FAMILY MEDICINE | Facility: CLINIC | Age: 48
End: 2023-08-04
Payer: COMMERCIAL

## 2023-08-08 DIAGNOSIS — R60.0 PEDAL EDEMA: ICD-10-CM

## 2023-08-08 RX ORDER — FUROSEMIDE 40 MG/1
40 TABLET ORAL
Qty: 90 TABLET | Refills: 3 | Status: SHIPPED | OUTPATIENT
Start: 2023-08-08

## 2023-08-08 NOTE — TELEPHONE ENCOUNTER
No care due was identified.  Health [facility] Embedded Care Due Messages. Reference number: [id number].   8/08/2023 7:55:41 AM CDT

## 2023-08-08 NOTE — TELEPHONE ENCOUNTER
Refill Decision Note   Keli Gilbert  is requesting a refill authorization.  Brief Assessment and Rationale for Refill:  Approve     Medication Therapy Plan:         Comments:     No Care Gaps recommended.     Note composed:6:10 PM 08/08/2023

## 2023-08-21 ENCOUNTER — PATIENT MESSAGE (OUTPATIENT)
Dept: RHEUMATOLOGY | Facility: CLINIC | Age: 48
End: 2023-08-21
Payer: COMMERCIAL

## 2023-09-15 DIAGNOSIS — F34.1 DYSTHYMIA: ICD-10-CM

## 2023-09-15 RX ORDER — BUPROPION HYDROCHLORIDE 300 MG/1
300 TABLET ORAL
Qty: 90 TABLET | Refills: 3 | Status: SHIPPED | OUTPATIENT
Start: 2023-09-15

## 2023-09-15 NOTE — TELEPHONE ENCOUNTER
Provider Staff:  Action required for this patient     Please see care gap opportunities below in Care Due Message.    Thanks!  [facility]     Appointments      Date Provider   Last Visit   7/14/2023 Andriy Moran MD   Next Visit   1/22/2024 Luisa, Andriy RUSSO MD     Refill Decision Note   Keli Gilbert  is requesting a refill authorization.  Brief Assessment and Rationale for Refill:  Approve     Medication Therapy Plan:  FOVS      Comments:     Note composed:8:44 AM 09/15/2023

## 2023-09-15 NOTE — TELEPHONE ENCOUNTER
Care Due:                  Date            Visit Type   Department     Provider  --------------------------------------------------------------------------------                                EP -         [facility]                              PRIMARY      MED/ INTERNAL  Last Visit: 07-      CARE ([facility])   MED/ PEDS      Andriy A  Page                              [facility]                              PRIMARY      MED/ INTERNAL  Next Visit: 01-      CARE ([facility])   MED/ PEDS      Andriy A  Page                                                            Last  Test          Frequency    Reason                     Performed    Due Date  --------------------------------------------------------------------------------    HBA1C.......  6 months...  metFORMIN................  01- 07-    Health [facility] Embedded Care Due Messages. Reference number: [id number].   9/15/2023 6:38:10 AM CDT

## 2023-09-24 NOTE — PROGRESS NOTES
Subjective:       Patient ID: Keli Concha Gilbert is a 47 y.o. female with psoriatic arthritis on Rinvoq & Leflunomide (& naproxen); demyelinating disorder secondary to Enbrel; incomplete effect on chronic pain syndrome on methadone     Chief Complaint: Disease management    Ms. Gilbert is a 48 yo woman w PsA last seen last on 4/27/23.    She is on Rinvoq which she began on 1/29/22 and naproxen 500 mg which she takes up to tid pc without any adverse effects.   Leflunomide was stopped on 7/2022 without any change in sxs. She is doing well PsO wise.  Joint wise, her dactylitis has resolved but she has generalized arthralgia wo swelling.  She has had to take another job with [employer] & is on her feet a lot and has been having L knee pain & LLE swelling. No calf pain. She's had injury to this knee ~ 2000 & was told there was a bone fragment loose.  She still has not gotten Shingrix as her pharmacy has not gotten approval for it.     Having several stressful issues. Was on Mounjaro & was doing well. She thought it also helped her joint pain associated w her chronic pain syndrome. She is no longer on it.    Has been having loose teeth that is attributed to teeth grinding at night.   Now using Invisiline.    Current Outpatient Medications   Medication Sig Dispense Refill    buPROPion (WELLBUTRIN XL) 300 MG 24 hr tablet TAKE 1 TABLET BY MOUTH ONCE  DAILY 90 tablet 3    cholecalciferol, vitamin D3, 2,000 unit Tab Take 1 tablet by mouth Once a week.      clindamycin (CLEOCIN T) 1 % external solution AAA bid 60 mL 3    furosemide (LASIX) 40 MG tablet TAKE 1 TABLET BY MOUTH ONCE  DAILY 90 tablet 3    gabapentin (NEURONTIN) 300 MG capsule Take 300 mg by mouth 3 (three) times daily.      latanoprost 0.005 % ophthalmic solution Place into both eyes.      levothyroxine (SYNTHROID) 125 MCG tablet Take 1 tablet (125 mcg total) by mouth before breakfast. 90 tablet 3    liothyronine (CYTOMEL) 5 MCG Tab TAKE 2 TABLETS BY MOUTH DAILY  180 tablet 2    loratadine-pseudoephedrine  mg (CLARITIN-D 24-HOUR)  mg per 24 hr tablet Take 1 tablet by mouth once daily.      losartan (COZAAR) 50 MG tablet Take 1 tablet (50 mg total) by mouth once daily. 90 tablet 3    metFORMIN (GLUCOPHAGE-XR) 500 MG ER 24hr tablet TAKE 1 TABLET BY MOUTH ONCE DAILY IN THE MORNING WITH BREAKFAST 30 tablet 5    methadone (DOLOPHINE) 10 MG tablet Take 1 tablet by mouth Three times a day.      naproxen (NAPROSYN) 500 MG tablet Take 1 tablet (500 mg total) by mouth every meal as needed (pain). 270 tablet 2    phentermine (ADIPEX-P) 37.5 mg tablet Take 1 tablet (37.5 mg total) by mouth before breakfast. 30 tablet 0    potassium chloride (MICRO-K) 10 MEQ CpSR TAKE 2 CAPSULES(20 MEQ) BY MOUTH EVERY  capsule 3    promethazine (PHENERGAN) 25 MG tablet TK 1 T PO BID PRN N      RINVOQ 15 mg 24 hr tablet TAKE 1 TABLET BY MOUTH ONCE  DAILY 30 tablet 3    spironolactone (ALDACTONE) 50 MG tablet Start with 1 po qday, increase to 2 po qday as tolerated 60 tablet 3    traZODone (DESYREL) 50 MG tablet TAKE 1 TO 2 TABLET BY MOUTH EVERY NIGHT       Current Facility-Administered Medications   Medication Dose Route Frequency Provider Last Rate Last Admin    triamcinolone acetonide injection 10 mg  10 mg Intradermal Once Marie Fam MD        triamcinolone acetonide injection 10 mg  10 mg Intradermal Once Marie Fam MD         Facility-Administered Medications Ordered in Other Visits   Medication Dose Route Frequency Provider Last Rate Last Admin    0.9%  NaCl infusion   Intravenous Continuous Gabriel Herring MD        mupirocin 2 % ointment   Nasal On Call Procedure Gabriel Herring MD   Given at 02/18/22 0703   Taking methadone 10 mg qid.        Review of patient's allergies indicates:   Allergen Reactions    Doxycycline hcl Nausea And Vomiting    Enbrel [etanercept] Other (See Comments)     Optic neurtits     Past Medical History:   Diagnosis Date    Abnormal  Pap smear of vagina     AR (allergic rhinitis)     Demyelinating disorder     Depression     Fever blister     Fibromyalgia     followed by pain management    Generalized osteoarthritis of multiple sites     History of abnormal Pap smear     Hypertension     Hypothyroidism     Insulin resistance     Mixed anxiety and depressive disorder     Morbid obesity     Myelitis, optic neuritis in     treated with high-dose steroids and resolved; positive MRI and spinal fluid for a mass, but on embrel for psoriatic arthritis - she will see a MS specialist at [facility]; MRI normalized off embrel    Obesity     Pre-diabetes     hx diabetes on stereoids /    Psoriasis     Psoriatic arthritis     Vaginal delivery     x1    Vitamin D deficiency disease      Past Surgical History:   Procedure Laterality Date    COLONOSCOPY N/A 12/16/2022    Procedure: COLONOSCOPY;  Surgeon: Jaylen Pelletier MD;  Location: [facility] ENDO;  Service: Endoscopy;  Laterality: N/A;  Pt. holding Adipex for 5 days prior to.EC  (She states she doesnt take it every day.)    EYE SURGERY Bilateral 08/2021    Laser surgery for pressure relief    LAPAROSCOPIC CHOLECYSTECTOMY N/A 2/18/2022    Procedure: CHOLECYSTECTOMY, LAPAROSCOPIC;  Surgeon: Gabriel Herring MD;  Location: [facility] OR;  Service: General;  Laterality: N/A;  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  C A  NEED H/P-----CLEARED BY PCP    SINUS SURGERY  1998       Review of Systems   Constitutional:  Positive for fatigue. Negative for fever and unexpected weight change.   HENT: Negative.  Negative for dental problem, mouth sores and trouble swallowing.         Dry mouth only w antihistamines   Eyes:  Negative for redness, itching and visual disturbance.        Dry eyes only with antihistamines   Respiratory: Negative.  Negative for cough and shortness of breath.    Cardiovascular: Negative.  Negative for chest pain, palpitations and leg swelling.   Gastrointestinal: Negative.  Negative for  "abdominal pain, anal bleeding, blood in stool, constipation, diarrhea and nausea.        Heartburn.   Endocrine: Negative.    Genitourinary: Negative.  Negative for dysuria, frequency, genital sores, menstrual problem, pelvic pain and urgency.   Musculoskeletal: Negative.  Negative for arthralgias, back pain, gait problem and neck pain.   Skin: Negative.  Negative for color change and rash.   Allergic/Immunologic: Positive for immunocompromised state.   Neurological:  Positive for headaches. Negative for dizziness, seizures, weakness and numbness.   Hematological: Negative.  Negative for adenopathy. Does not bruise/bleed easily.   Psychiatric/Behavioral:  Positive for dysphoric mood (stable) and sleep disturbance (stable). Negative for decreased concentration, self-injury and suicidal ideas. The patient is nervous/anxious.          Family History   Problem Relation Age of Onset    Thyroid disease Mother     Heart disease Mother     Hypertension Mother     Hyperlipidemia Mother     Coronary artery disease Mother         s/p CABG    Heart attack Mother     Psoriasis Father     Cancer Father         throat    Breast cancer Sister     Heart disease Sister         MVP    Diabetes Paternal Uncle     Diabetes Maternal Grandfather     Heart disease Maternal Grandfather     Thyroid disease Maternal Grandfather     ADD / ADHD Son     Melanoma Neg Hx     Lupus Neg Hx     Eczema Neg Hx     Acne Neg Hx     Colon cancer Neg Hx     Ovarian cancer Neg Hx      Mom  22.    SH:   Working at a bank   Previously worked at a bar as well.   Now has to work [employer] to keep up with her increasing mortgage.    Granddaughter May, 2022--Lo--proud of her  No alcohol; no cigarettes;   Objective:   /67   Pulse 73   Ht 5' 6" (1.676 m)   Wt 98.9 kg (218 lb 0.6 oz)   BMI 35.19 kg/m²     Was 214 lb 11.7 oz on 23  Was on 233 ;b 11 on 10/25/22  Was on 254 lb 3.1 oz on 22  Was 242 lb 8.1 oz on 22  Was 254 lb " 3.1 oz on 3/16/21  Was 236 lb 12.4 oz on 6/8/19  Was 224 lb 13.9 oz on 2/19/19.    Physical Exam   Constitutional: She is oriented to person, place, and time. She appears obese. No distress.   HENT:   Head: Normocephalic and atraumatic.   Mouth/Throat: Oropharynx is clear and moist. No oropharyngeal exudate.   No facial rashes  Parotids not enlarged     Eyes: Pupils are equal, round, and reactive to light. Conjunctivae are normal. Right eye exhibits no discharge. Left eye exhibits no discharge. No scleral icterus.   Neck: No JVD present. No tracheal deviation present. No thyromegaly present.   Cardiovascular: Normal rate, regular rhythm and normal heart sounds. Exam reveals no gallop and no friction rub.   No murmur heard.  Pulmonary/Chest: Effort normal and breath sounds normal. No respiratory distress. She has no wheezes. She has no rales. She exhibits no tenderness.   Abdominal: Soft. Bowel sounds are normal. She exhibits no distension and no mass. There is no splenomegaly or hepatomegaly. There is no abdominal tenderness. There is no rebound and no guarding.   Musculoskeletal:         General: No tenderness. Normal range of motion.      Cervical back: Neck supple.      Comments: Cspine slight decrease in lateral flexion & rotation; no tenderness  Tspine FROM no tenderness  Lspine FROM no tenderness.  TMJ: unremarkable  Shoulders: FROM; no synovitis  Elbows: FROM; no synovitis; no tophi or nodules  Wrists: no SHT; no tenderness; good ROM  MCPs: no metatarsalgia; no synovitis  PIPs: no more dactylitis 2nd ray R; R 3rd PIP w min flexion contracture; no TTP;    DIPs: FROM; no synovitis  HIPS: FROM  Knees: adequate ROM; bony hypertrophy dali; L appears larger than R; no instability; no calf tenderness; to TTP   Ankles: FROM: no synovitis   Toes: ok; no metatarsalgia    LLE wo calf tenderness;                  Lymphadenopathy:     She has no cervical adenopathy.   Neurological: She is alert and oriented to person,  place, and time. She has normal reflexes. No cranial nerve deficit. Gait normal.   Proximal and distal muscle strength 5/5.   Skin: Skin is warm and dry. No lesion and no rash noted. She is not diaphoretic.   No psoriasis but has acne like facial & neck lesions..   Psychiatric: Her behavior is normal. Mood, memory, affect, judgment and thought content normal.   Vitals reviewed.          Labs:   1/21/23: ESR 14 (36); CRP 6.3; CBC Hg 11.8; CMP Na 135; Hg A1C 5.4; TSH ok;  10/17/22: ESR 30 (36); CRP 6.7; CBC plt 466; CMP Na 131; BUN 27;   7/16/22: ESR 26 (36); CRP 5.5; CMP glu 167  4/16/22: ESR 24 (36); CRP 8.0; CBC Hg 11.3; low indices; CMP ok;  2/8/22: CBC Hg 11.9; CMP ok;   1/19/22: ESR 66; CRP 18.1  10/9/21: ESR 38 (36); CRP 11.7; CBC ok; CMP ok;   5/15/21: CMP ok;  TSH ok; (150)  3/16/21: ESR 72 (36); CRP 25.1; CBC  Hg 11.4; CMP glu 125; alb 3.4;   7/22/20: ESR 34 (36); CRP 16.1; CBC Hg 10.8; Ht 36.8; CMP ok; last labs  12/7/19: ESR 17; CRP CBC Hg 11.8; CMP ok; TFTs ok;   9/14/19: ESR 24 (36); CRP 9.8; CBC Hg 11.5; CP ok;   2/19/19: ESR 13; CRP 5.8; CBC plts 362; CMP ok; HCV/HBV/QG neg;  11/17/18: ESR 8; CRP 3.9; Hg 11.9; low indices; CMP ok;   8/22/18: ESR 10; CRP 10.9; CBC Hg 10.7; Ht 35.7; CMP ok  5/14/18: ESR 13; CRP 13.4;  Hg 11.3; low indices; eos 12.4%; CMP ok;   2/10/18: ESR 30; CRP 10.5; Hg 11.3; plt 366; CMP ok;   10/19/17: HBV neg; HCV neg;   9/2/17: ESR 16; CRp 5.9; Hg 11.6; Ht 36.6; CMP ok;   5/29/17: QG neg  2/20/17: ESR 34; CRP 8.1; CBC Hg 11.4; Ht 35.6; CMP ok;   11/19/16: ESR 20; CRP 4.8; CBC Hg 11.9; CMP K+3.3, otherwise ok;   8/27/16: ESR 22; CRP 7; Hg 11.8; Ht 36.9; CMP ok;   5/26/16: ESR 35; CRP 11.2Hg 11.8; Ht 36.9; CMP ok;   5/21/16: Vit d 37  10/8/15: ESR 45; CRP 14.2; Hg 11.2; Ht 35.8; CMP; ok  7/7/15: CBC plt 380; K+ 3.4; Glu 154  7/6/15: ESR 48; CRP 9.6  4/4/15: ESR 41; CRP 19; Hg 11.0; Ht 34.6; plt 377; CMP ok;  1/10/15: ESR 34; CRP 18; CBC ok; ; Alb. 3.6  10/4/14: ESR 43;  CRP 18.5; Hg 11.8; Ht 36.5; plt 378; Alb. 3.4;   3/20/14: ESR 41; CRP 14.3; plt 356; CMP ok;  2/15/13: ESR 30; CRP 9.8; CBC ok; Alb. 3.3  11/2/13: ESR 28; CRP 11.6; plt 363; Alb. 3.3;   5/28/13: ESR 45; CRP 8.8; plt 366; CMP ok;     12/1/18: Bilateral hands: personally viewed: Mild degenerative changes noted at the interphalangeal joints.  Mild-to-moderate degenerative change noted at the bilateral 1st CMC joints.  Mild degenerative changes also present at both radiocarpal joints, triscaphe joints and right distal radioulnar joint.  Minimal degenerative changes also present at the MCP joints bilaterally and greatest at the 1st MCP joint on the left.  6/15/17: Bilateral hands: personally reviewed: mild osteoarthritis w stable periarticular erosion at the left fourth MCP joint; no change since last year.   8/27/16: Bilateral feet: personally reviewed. Mild HV; mild OA shell 1st MTP dali; ? Erosion PIP left small toe;   5/26/16: Bilateral hands: personally reviewed: L 4th MCP (new) erosive lesion; R & L 2nd & 3rd metacarpal head cysts; R mild PIP erosion & STS 2nd.    Assessment:     Psoriatic arthritis-- with mild SHT 2 MCPs bilaterally (R >>L) & with sausage digit of R index finger--currently w  less& flexion deformity of R 3rd PIP--stable at present .    a) History of sausage digits of both toes      PIPs, DIPs, wrists, and knees., now w. only one sausage digit.   b) Psoriasis of the abdomen, elbow, and the shins--resolved.    c) Enbrel stopped 10/21/11 due to optic neuritis.     d) New erosion (4th L metacarpal head) on x-ray & possibly L 5th toe PIP     e) Persisting elevation of ESR and CRP    f) inadequate response to leflunomide and Stelara & apremilast (w. Intolerable nausea)   G) could not tolerate SSZ--nausea.   H) MTX x 2 even SC did not help sxs.   I) improved on Cosentyx (+leflunomide + naproxen) initially incompletely-- hand joints still hurt despite normalization of  ESR & CRP   J) Orencia begun 11/17 with  incomplete response   K)Taltz begun 4/20/18-2/19   L) Tofacitinib 3/19 - 6/18/19    Chronic SHT 3rd R PIP & base of L thumb with questionable erosive lesion.   M) Back to Cosentyx + leflunomide + celebrex    N) Tremfya begun 4/14/21--failed: stopped w headache & lack of efficacy   O) Back to Cosentyx 10/6/21 with improvement; held since 12/15/21.   P) On Rinvoq 30 mg since 1/29/22--stable to improved.    Leflunomide stopped 7/2022    Left optic neuritis with demyelinating lesions secondary to Enbrel--now resolved.    Repeat MRI ok    Adverse reaction to Etanercept.    RLE & R knee pain/swelling   S/P injury ~ 2000   Doubt R DVT but on a Jakinib   R/O R Popliteal cyst    Chronic pain/fibromyalgia syndrome with fatigue, tender points, withdrawals to palpation in the past, pain all over, responsive to Savella but with some nausea (off it now), followed by Dr. Odom at the [facility],    On methadone    Degenerative joint disease of the cervical spine, knees, and feet--very mild.     Depression on wellbutrin   Off savella & sertraline (much weight gain)    Hydradenitis suppurativa    Hypertriglyceridemia    Hypertension.     Hypothyroidism.     Vitamin D deficiency with regular prescription vitamin D supplementation.     S/P covid 7/3/22    Obesity from morbid obesity    Plan:   Ultrasound to r/o DVT (low probability)  Ultrasound to r/o Popliteal Cyst  Bilateral knee imaging.  Ok to continue Rinvoq  Ok to continue naproxen 500 mg up to tid but less is best.   Ok to stay off leflunomide.  Still needs Shingrix: The CDC recommends that healthy adults 50 or older, as well as those 19 years and older who are immunocompromised, get two doses of the vaccine Shingrix.  She may benefit from duloxetine--she will discuss with her pain management physician.  Labs in November & q 3 months.      RTC 3 months or prn

## 2023-09-26 ENCOUNTER — OFFICE VISIT (OUTPATIENT)
Dept: RHEUMATOLOGY | Facility: CLINIC | Age: 48
End: 2023-09-26
Payer: COMMERCIAL

## 2023-09-26 VITALS
BODY MASS INDEX: 35.04 KG/M2 | HEART RATE: 73 BPM | HEIGHT: 66 IN | DIASTOLIC BLOOD PRESSURE: 67 MMHG | WEIGHT: 218.06 LBS | SYSTOLIC BLOOD PRESSURE: 110 MMHG

## 2023-09-26 DIAGNOSIS — Z79.899 LONG-TERM USE OF HIGH-RISK MEDICATION: ICD-10-CM

## 2023-09-26 DIAGNOSIS — Z79.60 LONG-TERM USE OF IMMUNOSUPPRESSANT MEDICATION: ICD-10-CM

## 2023-09-26 DIAGNOSIS — Z79.899 LONG TERM CURRENT USE OF IMMUNOSUPPRESSIVE DRUG: ICD-10-CM

## 2023-09-26 DIAGNOSIS — M79.89 LEFT LEG SWELLING: ICD-10-CM

## 2023-09-26 DIAGNOSIS — M15.9 GENERALIZED OSTEOARTHRITIS OF MULTIPLE SITES: ICD-10-CM

## 2023-09-26 DIAGNOSIS — M25.562 CHRONIC PAIN OF LEFT KNEE: ICD-10-CM

## 2023-09-26 DIAGNOSIS — E66.9 OBESITY (BMI 30-39.9): ICD-10-CM

## 2023-09-26 DIAGNOSIS — L40.50 PSORIATIC ARTHRITIS: Primary | ICD-10-CM

## 2023-09-26 DIAGNOSIS — G89.29 CHRONIC PAIN OF LEFT KNEE: ICD-10-CM

## 2023-09-26 PROCEDURE — 3008F PR BODY MASS INDEX (BMI) DOCUMENTED: ICD-10-PCS | Mod: CPTII,S$GLB,, | Performed by: INTERNAL MEDICINE

## 2023-09-26 PROCEDURE — 1160F RVW MEDS BY RX/DR IN RCRD: CPT | Mod: CPTII,S$GLB,, | Performed by: INTERNAL MEDICINE

## 2023-09-26 PROCEDURE — 3008F BODY MASS INDEX DOCD: CPT | Mod: CPTII,S$GLB,, | Performed by: INTERNAL MEDICINE

## 2023-09-26 PROCEDURE — 99999 PR PBB SHADOW E&M-EST. PATIENT-LVL V: ICD-10-PCS | Mod: PBBFAC,,, | Performed by: INTERNAL MEDICINE

## 2023-09-26 PROCEDURE — 1159F PR MEDICATION LIST DOCUMENTED IN MEDICAL RECORD: ICD-10-PCS | Mod: CPTII,S$GLB,, | Performed by: INTERNAL MEDICINE

## 2023-09-26 PROCEDURE — 3078F DIAST BP <80 MM HG: CPT | Mod: CPTII,S$GLB,, | Performed by: INTERNAL MEDICINE

## 2023-09-26 PROCEDURE — 3074F PR MOST RECENT SYSTOLIC BLOOD PRESSURE < 130 MM HG: ICD-10-PCS | Mod: CPTII,S$GLB,, | Performed by: INTERNAL MEDICINE

## 2023-09-26 PROCEDURE — 4010F ACE/ARB THERAPY RXD/TAKEN: CPT | Mod: CPTII,S$GLB,, | Performed by: INTERNAL MEDICINE

## 2023-09-26 PROCEDURE — 3044F HG A1C LEVEL LT 7.0%: CPT | Mod: CPTII,S$GLB,, | Performed by: INTERNAL MEDICINE

## 2023-09-26 PROCEDURE — 1159F MED LIST DOCD IN RCRD: CPT | Mod: CPTII,S$GLB,, | Performed by: INTERNAL MEDICINE

## 2023-09-26 PROCEDURE — 99999 PR PBB SHADOW E&M-EST. PATIENT-LVL V: CPT | Mod: PBBFAC,,, | Performed by: INTERNAL MEDICINE

## 2023-09-26 PROCEDURE — 1160F PR REVIEW ALL MEDS BY PRESCRIBER/CLIN PHARMACIST DOCUMENTED: ICD-10-PCS | Mod: CPTII,S$GLB,, | Performed by: INTERNAL MEDICINE

## 2023-09-26 PROCEDURE — 99214 OFFICE O/P EST MOD 30 MIN: CPT | Mod: S$GLB,,, | Performed by: INTERNAL MEDICINE

## 2023-09-26 PROCEDURE — 3078F PR MOST RECENT DIASTOLIC BLOOD PRESSURE < 80 MM HG: ICD-10-PCS | Mod: CPTII,S$GLB,, | Performed by: INTERNAL MEDICINE

## 2023-09-26 PROCEDURE — 3044F PR MOST RECENT HEMOGLOBIN A1C LEVEL <7.0%: ICD-10-PCS | Mod: CPTII,S$GLB,, | Performed by: INTERNAL MEDICINE

## 2023-09-26 PROCEDURE — 4010F PR ACE/ARB THEARPY RXD/TAKEN: ICD-10-PCS | Mod: CPTII,S$GLB,, | Performed by: INTERNAL MEDICINE

## 2023-09-26 PROCEDURE — 3074F SYST BP LT 130 MM HG: CPT | Mod: CPTII,S$GLB,, | Performed by: INTERNAL MEDICINE

## 2023-09-26 PROCEDURE — 99214 PR OFFICE/OUTPT VISIT, EST, LEVL IV, 30-39 MIN: ICD-10-PCS | Mod: S$GLB,,, | Performed by: INTERNAL MEDICINE

## 2023-09-26 NOTE — PATIENT INSTRUCTIONS
Drink more fluids.    Keep us informed on your L knee pain/swelling    Discuss possibility of adding of switching to duloxetine from buproprion as it may have a better effect on pain & stiffness.

## 2023-09-26 NOTE — PROGRESS NOTES
10/6/2020     3:21 PM   Rapid3 Question Responses and Scores   MDHAQ Score 0.5   Psychologic Score 3.3   Pain Score 5   When you awakened in the morning OVER THE LAST WEEK, did you feel stiff? Yes   If Yes, please indicate the number of hours until you are as limber as you will be for the day 1   Fatigue Score 6.5   Global Health Score 5   RAPID3 Score 3.88

## 2023-10-06 RX ORDER — PHENTERMINE HYDROCHLORIDE 37.5 MG/1
37.5 TABLET ORAL
Qty: 30 TABLET | Refills: 0 | Status: SHIPPED | OUTPATIENT
Start: 2023-10-06

## 2023-10-09 DIAGNOSIS — L40.50 PSORIATIC ARTHRITIS: ICD-10-CM

## 2023-10-09 RX ORDER — UPADACITINIB 15 MG/1
15 TABLET, EXTENDED RELEASE ORAL
Qty: 30 TABLET | Refills: 3 | Status: SHIPPED | OUTPATIENT
Start: 2023-10-09 | End: 2024-02-06 | Stop reason: SDUPTHER

## 2023-10-22 PROBLEM — U07.1 COVID: Status: RESOLVED | Noted: 2022-07-05 | Resolved: 2023-10-22

## 2023-10-24 ENCOUNTER — PATIENT MESSAGE (OUTPATIENT)
Dept: FAMILY MEDICINE | Facility: CLINIC | Age: 48
End: 2023-10-24
Payer: COMMERCIAL

## 2023-10-26 ENCOUNTER — PATIENT MESSAGE (OUTPATIENT)
Dept: FAMILY MEDICINE | Facility: CLINIC | Age: 48
End: 2023-10-26
Payer: COMMERCIAL

## 2023-10-26 ENCOUNTER — TELEPHONE (OUTPATIENT)
Dept: FAMILY MEDICINE | Facility: CLINIC | Age: 48
End: 2023-10-26
Payer: COMMERCIAL

## 2023-10-26 NOTE — TELEPHONE ENCOUNTER
L/M in my chart message that Dr. Moran labs are standing order dated 01/13/23 that's when he order the labs to be done but there is labs order for a Dr susan Richards  dated 04/27/23 standing order this is Ruba Sutton MA

## 2023-11-06 ENCOUNTER — OFFICE VISIT (OUTPATIENT)
Dept: FAMILY MEDICINE | Facility: CLINIC | Age: 48
End: 2023-11-06
Payer: COMMERCIAL

## 2023-11-06 DIAGNOSIS — F34.1 PERSISTENT DEPRESSIVE DISORDER: Primary | ICD-10-CM

## 2023-11-06 DIAGNOSIS — E66.09 CLASS 1 OBESITY DUE TO EXCESS CALORIES WITH SERIOUS COMORBIDITY AND BODY MASS INDEX (BMI) OF 32.0 TO 32.9 IN ADULT: ICD-10-CM

## 2023-11-06 DIAGNOSIS — G89.4 CHRONIC PAIN SYNDROME: ICD-10-CM

## 2023-11-06 PROCEDURE — 1159F PR MEDICATION LIST DOCUMENTED IN MEDICAL RECORD: ICD-10-PCS | Mod: CPTII,95,, | Performed by: FAMILY MEDICINE

## 2023-11-06 PROCEDURE — 1160F RVW MEDS BY RX/DR IN RCRD: CPT | Mod: CPTII,95,, | Performed by: FAMILY MEDICINE

## 2023-11-06 PROCEDURE — 1160F PR REVIEW ALL MEDS BY PRESCRIBER/CLIN PHARMACIST DOCUMENTED: ICD-10-PCS | Mod: CPTII,95,, | Performed by: FAMILY MEDICINE

## 2023-11-06 PROCEDURE — 4010F ACE/ARB THERAPY RXD/TAKEN: CPT | Mod: CPTII,95,, | Performed by: FAMILY MEDICINE

## 2023-11-06 PROCEDURE — 99214 OFFICE O/P EST MOD 30 MIN: CPT | Mod: 95,,, | Performed by: FAMILY MEDICINE

## 2023-11-06 PROCEDURE — 4010F PR ACE/ARB THEARPY RXD/TAKEN: ICD-10-PCS | Mod: CPTII,95,, | Performed by: FAMILY MEDICINE

## 2023-11-06 PROCEDURE — 99214 PR OFFICE/OUTPT VISIT, EST, LEVL IV, 30-39 MIN: ICD-10-PCS | Mod: 95,,, | Performed by: FAMILY MEDICINE

## 2023-11-06 PROCEDURE — 1159F MED LIST DOCD IN RCRD: CPT | Mod: CPTII,95,, | Performed by: FAMILY MEDICINE

## 2023-11-06 PROCEDURE — 3044F PR MOST RECENT HEMOGLOBIN A1C LEVEL <7.0%: ICD-10-PCS | Mod: CPTII,95,, | Performed by: FAMILY MEDICINE

## 2023-11-06 PROCEDURE — 3044F HG A1C LEVEL LT 7.0%: CPT | Mod: CPTII,95,, | Performed by: FAMILY MEDICINE

## 2023-11-06 RX ORDER — DULOXETIN HYDROCHLORIDE 20 MG/1
20 CAPSULE, DELAYED RELEASE ORAL DAILY
Qty: 90 CAPSULE | Refills: 0 | Status: SHIPPED | OUTPATIENT
Start: 2023-11-06 | End: 2024-01-09

## 2023-11-06 NOTE — PROGRESS NOTES
The patient location is: [place]  The chief complaint leading to consultation is: depression    Visit type: audiovisual    Face to Face time with patient: 15 minutes of total time spent on the encounter, which includes face to face time and non-face to face time preparing to see the patient (eg, review of tests), Obtaining and/or reviewing separately obtained history, Documenting clinical information in the electronic or other health record, Independently interpreting results (not separately reported) and communicating results to the patient/family/caregiver, or Care coordination (not separately reported).         Each patient to whom he or she provides medical services by telemedicine is:  (1) informed of the relationship between the physician and patient and the respective role of any other health care provider with respect to management of the patient; and (2) notified that he or she may decline to receive medical services by telemedicine and may withdraw from such care at any time.    Notes:     Routine Office Visit    Patient Name: Keli Gilbert    : 1975  MRN: [id number]    Subjective:  Keli is a 47 y.o. female who presents today for:    1. Depression and pain  Patient presenting today for continued depression/anxiety and along with chronic pain secondary to her chronic autoimmune conditions.  She recently had to buy a new care  and get invisiline, which wiped out a lot of her savings.  She states that she woke up with severe neck pain and decreased range of motion.  She does see pain management at [facility], who is treating the pain, but states that with all that has happened recently, she needs something to help with mood.  She has suffered with depression for years and has been on wellbutrin for several years.  She states she doesn't know that it is working as well or life events have just been too much recently.  She does inquire about cymbalta as it has been shown to help with depression and  chronic pain.  There has been no SI/HI or AH/VH.  She states she just feels overwhelmed and down.      Past Medical History  Past Medical History:   Diagnosis Date    Abnormal Pap smear of vagina     AR (allergic rhinitis)     Demyelinating disorder     Depression     Fever blister     Fibromyalgia     followed by pain management    Generalized osteoarthritis of multiple sites     History of abnormal Pap smear     Hypertension     Hypothyroidism     Insulin resistance     Mixed anxiety and depressive disorder     Morbid obesity     Myelitis, optic neuritis in     treated with high-dose steroids and resolved; positive MRI and spinal fluid for a mass, but on embrel for psoriatic arthritis - she will see a MS specialist at [facility]; MRI normalized off embrel    Obesity     Pre-diabetes     hx diabetes on stereoids /    Psoriasis     Psoriatic arthritis     Vitamin D deficiency disease        Past Surgical History  Past Surgical History:   Procedure Laterality Date    COLONOSCOPY N/A 12/16/2022    Procedure: COLONOSCOPY;  Surgeon: Jaylen Pelletier MD;  Location: [facility] ENDO;  Service: Endoscopy;  Laterality: N/A;  Pt. holding Adipex for 5 days prior to.EC  (She states she doesnt take it every day.)    EYE SURGERY Bilateral 08/2021    Laser surgery for pressure relief    LAPAROSCOPIC CHOLECYSTECTOMY N/A 2/18/2022    Procedure: CHOLECYSTECTOMY, LAPAROSCOPIC;  Surgeon: Gabriel Herring MD;  Location: [facility] OR;  Service: General;  Laterality: N/A;  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  C A  NEED H/P-----CLEARED BY PCP    SINUS SURGERY  1998       Family History  Family History   Problem Relation Age of Onset    Thyroid disease Mother     Heart disease Mother     Hypertension Mother     Hyperlipidemia Mother     Coronary artery disease Mother         s/p CABG    Heart attack Mother     Psoriasis Father     Cancer Father         throat    Breast cancer Sister     Heart disease Sister         MVP    Diabetes  Paternal Uncle     Diabetes Maternal Grandfather     Heart disease Maternal Grandfather     Thyroid disease Maternal Grandfather     ADD / ADHD Son     Melanoma Neg Hx     Lupus Neg Hx     Eczema Neg Hx     Acne Neg Hx     Colon cancer Neg Hx     Ovarian cancer Neg Hx        Social History  Social History     Socioeconomic History    Marital status: Single    Number of children: 1   Occupational History    Occupation: [employer]     Employer: [employer] (MAIN OFFICE)   Tobacco Use    Smoking status: Never     Passive exposure: Never    Smokeless tobacco: Never   Substance and Sexual Activity    Alcohol use: No    Drug use: No     Comment: 7/7/15 one weed brownie    Sexual activity: Yes     Partners: Male     Birth control/protection: I.U.D.   Other Topics Concern    Are you pregnant or think you may be? No    Breast-feeding No       Current Medications  Current Outpatient Medications on File Prior to Visit   Medication Sig Dispense Refill    buPROPion (WELLBUTRIN XL) 300 MG 24 hr tablet TAKE 1 TABLET BY MOUTH ONCE  DAILY 90 tablet 3    cholecalciferol, vitamin D3, 2,000 unit Tab Take 1 tablet by mouth Once a week.      clindamycin (CLEOCIN T) 1 % external solution AAA bid 60 mL 3    furosemide (LASIX) 40 MG tablet TAKE 1 TABLET BY MOUTH ONCE  DAILY 90 tablet 3    gabapentin (NEURONTIN) 300 MG capsule Take 300 mg by mouth 3 (three) times daily.      latanoprost 0.005 % ophthalmic solution Place into both eyes.      levothyroxine (SYNTHROID) 125 MCG tablet Take 1 tablet (125 mcg total) by mouth before breakfast. 90 tablet 3    liothyronine (CYTOMEL) 5 MCG Tab TAKE 2 TABLETS BY MOUTH DAILY 180 tablet 2    loratadine-pseudoephedrine  mg (CLARITIN-D 24-HOUR)  mg per 24 hr tablet Take 1 tablet by mouth once daily.      losartan (COZAAR) 50 MG tablet Take 1 tablet (50 mg total) by mouth once daily. 90 tablet 3    metFORMIN (GLUCOPHAGE-XR) 500 MG ER 24hr tablet TAKE 1 TABLET BY MOUTH ONCE DAILY IN THE MORNING WITH  BREAKFAST 30 tablet 5    methadone (DOLOPHINE) 10 MG tablet Take 1 tablet by mouth Three times a day.      naproxen (NAPROSYN) 500 MG tablet Take 1 tablet (500 mg total) by mouth every meal as needed (pain). 270 tablet 2    phentermine (ADIPEX-P) 37.5 mg tablet TAKE 1 TABLET(37.5 MG) BY MOUTH BEFORE BREAKFAST 30 tablet 0    potassium chloride (MICRO-K) 10 MEQ CpSR TAKE 2 CAPSULES(20 MEQ) BY MOUTH EVERY  capsule 3    promethazine (PHENERGAN) 25 MG tablet TK 1 T PO BID PRN N      RINVOQ 15 mg 24 hr tablet TAKE 1 TABLET BY MOUTH DAILY 30 tablet 3    spironolactone (ALDACTONE) 50 MG tablet Start with 1 po qday, increase to 2 po qday as tolerated 60 tablet 3    traZODone (DESYREL) 50 MG tablet TAKE 1 TO 2 TABLET BY MOUTH EVERY NIGHT      [DISCONTINUED] secukinumab (COSENTYX PEN) 150 mg/mL PnIj Inject 300 mg into the skin every 28 days. 6 each 1     Current Facility-Administered Medications on File Prior to Visit   Medication Dose Route Frequency Provider Last Rate Last Admin    0.9%  NaCl infusion   Intravenous Continuous Gabriel Herring MD        mupirocin 2 % ointment   Nasal On Call Procedure Gabriel Herring MD   Given at 02/18/22 0703    triamcinolone acetonide injection 10 mg  10 mg Intradermal Once Marie Fam MD        triamcinolone acetonide injection 10 mg  10 mg Intradermal Once Marie Fam MD           Allergies   Review of patient's allergies indicates:   Allergen Reactions    Doxycycline hcl Nausea And Vomiting    Enbrel [etanercept] Other (See Comments)     Optic neurtits       Review of Systems (Pertinent positives)  Review of Systems   Constitutional: Negative.    HENT:  Negative for hearing loss.    Eyes:  Negative for discharge.   Respiratory:  Negative for wheezing.    Cardiovascular:  Negative for chest pain and palpitations.   Gastrointestinal:  Negative for blood in stool, constipation, diarrhea and vomiting.   Genitourinary:  Negative for dysuria and hematuria.    Musculoskeletal:  Positive for neck pain.   Skin:  Negative for itching.   Neurological:  Negative for weakness and headaches.   Endo/Heme/Allergies:  Negative for polydipsia.   Psychiatric/Behavioral:  Positive for depression. Negative for hallucinations, memory loss, substance abuse and suicidal ideas.          There were no vitals taken for this visit.    GENERAL APPEARANCE: obese female in no apparent distress and well developed and well nourished  HEENT: PERRL, EOMI, Sclera clear, anicteric,   RESPIRATORY: appears well, vitals normal, no respiratory distress, acyanotic, normal RR,  HEART: spontaneously beating    ABDOMEN: abdomen is soft without tenderness when she palpates.  SKIN: no rashes, no wounds, no other lesions visible during exam  PSYCH: Alert, oriented x 3, thought content appropriate, speech normal, pleasant and cooperative, good eye contact, well groomed, but appears down.    Assessment/Plan:  Keli Gilbert is a 47 y.o. female who presents today for :    Diagnoses and all orders for this visit:    Persistent depressive disorder  -     DULoxetine (CYMBALTA) 20 MG capsule; Take 1 capsule (20 mg total) by mouth once daily.  - Will add cymbalta to her current regimen to see if this will help with mood and pain  - Warned of risks when combining with wellbutrin and that we would start with low dose to see if it helps  -  She is to discuss with her pain management doctor as well  - She should call 911 or go to the ED for any SI/HI or AH/VH    Class 1 obesity due to excess calories with serious comorbidity and body mass index (BMI) of 32.0 to 32.9 in adult  - Encouraged diet modification as she is limited in exercises due to chronic conditions    Chronic pain syndrome  -     DULoxetine (CYMBALTA) 20 MG capsule; Take 1 capsule (20 mg total) by mouth once daily.  - Follow up with [facility] management at [facility]  -  She is to call with any concerns or adverse effects after taking cymbalta    Andriy Moran,  MD

## 2023-11-07 ENCOUNTER — PATIENT MESSAGE (OUTPATIENT)
Dept: FAMILY MEDICINE | Facility: CLINIC | Age: 48
End: 2023-11-07
Payer: COMMERCIAL

## 2023-12-14 ENCOUNTER — LAB VISIT (OUTPATIENT)
Dept: LAB | Facility: HOSPITAL | Age: 48
End: 2023-12-14
Attending: INTERNAL MEDICINE
Payer: COMMERCIAL

## 2023-12-14 ENCOUNTER — OFFICE VISIT (OUTPATIENT)
Dept: RHEUMATOLOGY | Facility: CLINIC | Age: 48
End: 2023-12-14
Payer: COMMERCIAL

## 2023-12-14 VITALS
WEIGHT: 229.94 LBS | SYSTOLIC BLOOD PRESSURE: 119 MMHG | DIASTOLIC BLOOD PRESSURE: 85 MMHG | HEART RATE: 89 BPM | BODY MASS INDEX: 36.95 KG/M2 | HEIGHT: 66 IN

## 2023-12-14 DIAGNOSIS — L40.50 PSORIATIC ARTHRITIS: Primary | ICD-10-CM

## 2023-12-14 DIAGNOSIS — Z79.1 LONG TERM (CURRENT) USE OF NON-STEROIDAL ANTI-INFLAMMATORIES (NSAID): ICD-10-CM

## 2023-12-14 DIAGNOSIS — Z79.60 LONG-TERM USE OF IMMUNOSUPPRESSANT MEDICATION: ICD-10-CM

## 2023-12-14 DIAGNOSIS — M15.9 GENERALIZED OSTEOARTHRITIS OF MULTIPLE SITES: ICD-10-CM

## 2023-12-14 DIAGNOSIS — E66.9 OBESITY (BMI 30-39.9): ICD-10-CM

## 2023-12-14 DIAGNOSIS — L40.50 PSORIATIC ARTHRITIS: ICD-10-CM

## 2023-12-14 LAB
ALBUMIN SERPL BCP-MCNC: 3.7 G/DL (ref 3.5–5.2)
ALP SERPL-CCNC: 111 U/L (ref 55–135)
ALT SERPL W/O P-5'-P-CCNC: 22 U/L (ref 10–44)
ANION GAP SERPL CALC-SCNC: 5 MMOL/L (ref 8–16)
AST SERPL-CCNC: 25 U/L (ref 10–40)
BASOPHILS # BLD AUTO: 0.04 K/UL (ref 0–0.2)
BASOPHILS NFR BLD: 0.5 % (ref 0–1.9)
BILIRUB SERPL-MCNC: 0.2 MG/DL (ref 0.1–1)
BUN SERPL-MCNC: 31 MG/DL (ref 6–20)
CALCIUM SERPL-MCNC: 9.7 MG/DL (ref 8.7–10.5)
CHLORIDE SERPL-SCNC: 100 MMOL/L (ref 95–110)
CO2 SERPL-SCNC: 30 MMOL/L (ref 23–29)
CREAT SERPL-MCNC: 0.8 MG/DL (ref 0.5–1.4)
CRP SERPL-MCNC: 4.7 MG/L (ref 0–8.2)
DIFFERENTIAL METHOD: ABNORMAL
EOSINOPHIL # BLD AUTO: 0.1 K/UL (ref 0–0.5)
EOSINOPHIL NFR BLD: 1.6 % (ref 0–8)
ERYTHROCYTE [DISTWIDTH] IN BLOOD BY AUTOMATED COUNT: 13.6 % (ref 11.5–14.5)
ERYTHROCYTE [SEDIMENTATION RATE] IN BLOOD BY PHOTOMETRIC METHOD: 26 MM/HR (ref 0–36)
EST. GFR  (NO RACE VARIABLE): >60 ML/MIN/1.73 M^2
GLUCOSE SERPL-MCNC: 106 MG/DL (ref 70–110)
HCT VFR BLD AUTO: 36.6 % (ref 37–48.5)
HGB BLD-MCNC: 11.7 G/DL (ref 12–16)
IMM GRANULOCYTES # BLD AUTO: 0.09 K/UL (ref 0–0.04)
IMM GRANULOCYTES NFR BLD AUTO: 1.2 % (ref 0–0.5)
LYMPHOCYTES # BLD AUTO: 1.7 K/UL (ref 1–4.8)
LYMPHOCYTES NFR BLD: 22.6 % (ref 18–48)
MCH RBC QN AUTO: 29.1 PG (ref 27–31)
MCHC RBC AUTO-ENTMCNC: 32 G/DL (ref 32–36)
MCV RBC AUTO: 91 FL (ref 82–98)
MONOCYTES # BLD AUTO: 0.7 K/UL (ref 0.3–1)
MONOCYTES NFR BLD: 9.7 % (ref 4–15)
NEUTROPHILS # BLD AUTO: 4.9 K/UL (ref 1.8–7.7)
NEUTROPHILS NFR BLD: 64.4 % (ref 38–73)
NRBC BLD-RTO: 0 /100 WBC
PLATELET # BLD AUTO: 344 K/UL (ref 150–450)
PMV BLD AUTO: 8.3 FL (ref 9.2–12.9)
POTASSIUM SERPL-SCNC: 4.6 MMOL/L (ref 3.5–5.1)
PROT SERPL-MCNC: 7.3 G/DL (ref 6–8.4)
RBC # BLD AUTO: 4.02 M/UL (ref 4–5.4)
SODIUM SERPL-SCNC: 135 MMOL/L (ref 136–145)
WBC # BLD AUTO: 7.61 K/UL (ref 3.9–12.7)

## 2023-12-14 PROCEDURE — 85652 RBC SED RATE AUTOMATED: CPT | Performed by: INTERNAL MEDICINE

## 2023-12-14 PROCEDURE — 99214 PR OFFICE/OUTPT VISIT, EST, LEVL IV, 30-39 MIN: ICD-10-PCS | Mod: S$GLB,,, | Performed by: INTERNAL MEDICINE

## 2023-12-14 PROCEDURE — 1160F PR REVIEW ALL MEDS BY PRESCRIBER/CLIN PHARMACIST DOCUMENTED: ICD-10-PCS | Mod: CPTII,S$GLB,, | Performed by: INTERNAL MEDICINE

## 2023-12-14 PROCEDURE — 1160F RVW MEDS BY RX/DR IN RCRD: CPT | Mod: CPTII,S$GLB,, | Performed by: INTERNAL MEDICINE

## 2023-12-14 PROCEDURE — 99214 OFFICE O/P EST MOD 30 MIN: CPT | Mod: S$GLB,,, | Performed by: INTERNAL MEDICINE

## 2023-12-14 PROCEDURE — 85025 COMPLETE CBC W/AUTO DIFF WBC: CPT | Performed by: INTERNAL MEDICINE

## 2023-12-14 PROCEDURE — 36415 COLL VENOUS BLD VENIPUNCTURE: CPT | Performed by: INTERNAL MEDICINE

## 2023-12-14 PROCEDURE — 80053 COMPREHEN METABOLIC PANEL: CPT | Performed by: INTERNAL MEDICINE

## 2023-12-14 PROCEDURE — 3008F BODY MASS INDEX DOCD: CPT | Mod: CPTII,S$GLB,, | Performed by: INTERNAL MEDICINE

## 2023-12-14 PROCEDURE — 3074F PR MOST RECENT SYSTOLIC BLOOD PRESSURE < 130 MM HG: ICD-10-PCS | Mod: CPTII,S$GLB,, | Performed by: INTERNAL MEDICINE

## 2023-12-14 PROCEDURE — 3008F PR BODY MASS INDEX (BMI) DOCUMENTED: ICD-10-PCS | Mod: CPTII,S$GLB,, | Performed by: INTERNAL MEDICINE

## 2023-12-14 PROCEDURE — 4010F ACE/ARB THERAPY RXD/TAKEN: CPT | Mod: CPTII,S$GLB,, | Performed by: INTERNAL MEDICINE

## 2023-12-14 PROCEDURE — 86140 C-REACTIVE PROTEIN: CPT | Performed by: INTERNAL MEDICINE

## 2023-12-14 PROCEDURE — 4010F PR ACE/ARB THEARPY RXD/TAKEN: ICD-10-PCS | Mod: CPTII,S$GLB,, | Performed by: INTERNAL MEDICINE

## 2023-12-14 PROCEDURE — 1159F MED LIST DOCD IN RCRD: CPT | Mod: CPTII,S$GLB,, | Performed by: INTERNAL MEDICINE

## 2023-12-14 PROCEDURE — 3079F DIAST BP 80-89 MM HG: CPT | Mod: CPTII,S$GLB,, | Performed by: INTERNAL MEDICINE

## 2023-12-14 PROCEDURE — 3044F PR MOST RECENT HEMOGLOBIN A1C LEVEL <7.0%: ICD-10-PCS | Mod: CPTII,S$GLB,, | Performed by: INTERNAL MEDICINE

## 2023-12-14 PROCEDURE — 99999 PR PBB SHADOW E&M-EST. PATIENT-LVL V: CPT | Mod: PBBFAC,,, | Performed by: INTERNAL MEDICINE

## 2023-12-14 PROCEDURE — 99999 PR PBB SHADOW E&M-EST. PATIENT-LVL V: ICD-10-PCS | Mod: PBBFAC,,, | Performed by: INTERNAL MEDICINE

## 2023-12-14 PROCEDURE — 3074F SYST BP LT 130 MM HG: CPT | Mod: CPTII,S$GLB,, | Performed by: INTERNAL MEDICINE

## 2023-12-14 PROCEDURE — 3079F PR MOST RECENT DIASTOLIC BLOOD PRESSURE 80-89 MM HG: ICD-10-PCS | Mod: CPTII,S$GLB,, | Performed by: INTERNAL MEDICINE

## 2023-12-14 PROCEDURE — 3044F HG A1C LEVEL LT 7.0%: CPT | Mod: CPTII,S$GLB,, | Performed by: INTERNAL MEDICINE

## 2023-12-14 PROCEDURE — 1159F PR MEDICATION LIST DOCUMENTED IN MEDICAL RECORD: ICD-10-PCS | Mod: CPTII,S$GLB,, | Performed by: INTERNAL MEDICINE

## 2023-12-14 ASSESSMENT — ROUTINE ASSESSMENT OF PATIENT INDEX DATA (RAPID3)
WHEN YOU AWAKENED IN THE MORNING OVER THE LAST WEEK, PLEASE INDICATE THE AMOUNT OF TIME IT TAKES UNTIL YOU ARE AS LIMBER AS YOU WILL BE FOR THE DAY: 30 MINUTES
MDHAQ FUNCTION SCORE: 0.6
PAIN SCORE: 8.5
PSYCHOLOGICAL DISTRESS SCORE: 4.4
TOTAL RAPID3 SCORE: 6.33
PATIENT GLOBAL ASSESSMENT SCORE: 8.5
FATIGUE SCORE: 9
AM STIFFNESS SCORE: 1, YES

## 2023-12-14 NOTE — PROGRESS NOTES
Subjective:       Patient ID: Keli Concha Gilbert is a 47 y.o. female with psoriatic arthritis on Rinvoq (& naproxen); demyelinating disorder secondary to Enbrel; incomplete effect on chronic pain syndrome on methadone     Chief Complaint: Disease management    Ms. Gilbert is a 48 yo woman w PsA last seen last on 9/26/23.    She is on Rinvoq which she began on 1/29/22 and naproxen 500 mg which she takes up to tid pc without any adverse effects.   Leflunomide was stopped on 7/2022 without any change in sxs.     She returns for f/u. PsO wise she is stable.Occasionally has some paresthesias on the dorsum of her hands, but joint wise her dactylitis has largely resolved.  She continues w generalized arthralgia wo swelling & has been started on duloxetine, low dose for know for her CSS/Fibro sxs.  Overall AM stiffness up to 30 minutes.     She still has not gotten Shingrix as her pharmacy has not gotten approval for it.     Continues w stress. Finances are a problem. Was on Mounjaro & was doing well but it is no longer approved for her.  She thought it also helped her joint pain associated w her chronic pain syndrome. She is no longer on it.      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        Current Outpatient Medications   Medication Sig Dispense Refill    buPROPion (WELLBUTRIN XL) 300 MG 24 hr tablet TAKE 1 TABLET BY MOUTH ONCE  DAILY 90 tablet 3    cholecalciferol, vitamin D3, 2,000 unit Tab Take 1 tablet by mouth Once a week.      clindamycin (CLEOCIN T) 1 % external solution AAA bid 60 mL 3    DULoxetine (CYMBALTA) 20 MG capsule Take 1 capsule (20 mg total) by mouth once daily. 90 capsule 0    furosemide (LASIX) 40 MG tablet TAKE 1 TABLET BY MOUTH ONCE  DAILY 90 tablet 3    gabapentin (NEURONTIN) 300 MG capsule Take 300 mg by mouth 3 (three) times daily.      latanoprost 0.005 % ophthalmic solution Place into both eyes.      levothyroxine (SYNTHROID) 125 MCG tablet Take 1 tablet (125 mcg total) by mouth before  breakfast. 90 tablet 3    liothyronine (CYTOMEL) 5 MCG Tab TAKE 2 TABLETS BY MOUTH DAILY 180 tablet 2    loratadine-pseudoephedrine  mg (CLARITIN-D 24-HOUR)  mg per 24 hr tablet Take 1 tablet by mouth once daily.      losartan (COZAAR) 50 MG tablet Take 1 tablet (50 mg total) by mouth once daily. 90 tablet 3    metFORMIN (GLUCOPHAGE-XR) 500 MG ER 24hr tablet TAKE 1 TABLET BY MOUTH ONCE DAILY IN THE MORNING WITH BREAKFAST 30 tablet 5    methadone (DOLOPHINE) 10 MG tablet Take 1 tablet by mouth Three times a day.      naproxen (NAPROSYN) 500 MG tablet Take 1 tablet (500 mg total) by mouth every meal as needed (pain). 270 tablet 2    phentermine (ADIPEX-P) 37.5 mg tablet TAKE 1 TABLET(37.5 MG) BY MOUTH BEFORE BREAKFAST 30 tablet 0    promethazine (PHENERGAN) 25 MG tablet TK 1 T PO BID PRN N      RINVOQ 15 mg 24 hr tablet TAKE 1 TABLET BY MOUTH DAILY 30 tablet 3    spironolactone (ALDACTONE) 50 MG tablet Start with 1 po qday, increase to 2 po qday as tolerated 60 tablet 3    traZODone (DESYREL) 50 MG tablet TAKE 1 TO 2 TABLET BY MOUTH EVERY NIGHT      potassium chloride (MICRO-K) 10 MEQ CpSR TAKE 2 CAPSULES(20 MEQ) BY MOUTH EVERY  capsule 3     Current Facility-Administered Medications   Medication Dose Route Frequency Provider Last Rate Last Admin    triamcinolone acetonide injection 10 mg  10 mg Intradermal Once Marie Fam MD        triamcinolone acetonide injection 10 mg  10 mg Intradermal Once Marie Fam MD         Facility-Administered Medications Ordered in Other Visits   Medication Dose Route Frequency Provider Last Rate Last Admin    0.9%  NaCl infusion   Intravenous Continuous Gabriel Herring MD        mupirocin 2 % ointment   Nasal On Call Procedure Gabriel Herring MD   Given at 02/18/22 0703   Taking methadone 10 mg qid.    Review of patient's allergies indicates:   Allergen Reactions    Doxycycline hcl Nausea And Vomiting    Enbrel [etanercept] Other (See Comments)      Optic neurtits     Past Medical History:   Diagnosis Date    Abnormal Pap smear of vagina     AR (allergic rhinitis)     Demyelinating disorder     Depression     Fever blister     Fibromyalgia     followed by pain management    Generalized osteoarthritis of multiple sites     History of abnormal Pap smear     Hypertension     Hypothyroidism     Insulin resistance     Mixed anxiety and depressive disorder     Morbid obesity     Myelitis, optic neuritis in     treated with high-dose steroids and resolved; positive MRI and spinal fluid for a mass, but on embrel for psoriatic arthritis - she will see a MS specialist at [facility]; MRI normalized off embrel    Obesity     Pre-diabetes     hx diabetes on stereoids /    Psoriasis     Psoriatic arthritis     Vitamin D deficiency disease      Past Surgical History:   Procedure Laterality Date    COLONOSCOPY N/A 12/16/2022    Procedure: COLONOSCOPY;  Surgeon: Jaylen Pelletier MD;  Location: [facility] ENDO;  Service: Endoscopy;  Laterality: N/A;  Pt. holding Adipex for 5 days prior to.EC  (She states she doesnt take it every day.)    EYE SURGERY Bilateral 08/2021    Laser surgery for pressure relief    LAPAROSCOPIC CHOLECYSTECTOMY N/A 2/18/2022    Procedure: CHOLECYSTECTOMY, LAPAROSCOPIC;  Surgeon: Gabriel Herring MD;  Location: [facility] OR;  Service: General;  Laterality: N/A;  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  C A  NEED H/P-----CLEARED BY PCP    SINUS SURGERY  1998       Review of Systems   Constitutional:  Positive for fatigue. Negative for fever and unexpected weight change.   HENT: Negative.  Negative for dental problem, mouth sores and trouble swallowing.         Dry mouth only w antihistamines   Eyes:  Negative for redness, itching and visual disturbance.        Dry eyes only with antihistamines   Respiratory: Negative.  Negative for cough and shortness of breath.    Cardiovascular: Negative.  Negative for chest pain, palpitations and leg swelling.  "  Gastrointestinal: Negative.  Negative for abdominal pain, anal bleeding, blood in stool, constipation, diarrhea and nausea.        Heartburn.   Endocrine: Negative.    Genitourinary: Negative.  Negative for dysuria, frequency, genital sores, menstrual problem, pelvic pain and urgency.   Musculoskeletal: Negative.  Negative for arthralgias, back pain, gait problem and neck pain.   Skin: Negative.  Negative for color change and rash.   Allergic/Immunologic: Positive for immunocompromised state.   Neurological:  Positive for headaches. Negative for dizziness, seizures, weakness and numbness.   Hematological: Negative.  Negative for adenopathy. Does not bruise/bleed easily.   Psychiatric/Behavioral:  Positive for dysphoric mood (stable) and sleep disturbance (stable). Negative for decreased concentration, self-injury and suicidal ideas. The patient is nervous/anxious.          Family History   Problem Relation Age of Onset    Thyroid disease Mother     Heart disease Mother     Hypertension Mother     Hyperlipidemia Mother     Coronary artery disease Mother         s/p CABG    Heart attack Mother     Psoriasis Father     Cancer Father         throat    Breast cancer Sister     Heart disease Sister         MVP    Diabetes Paternal Uncle     Diabetes Maternal Grandfather     Heart disease Maternal Grandfather     Thyroid disease Maternal Grandfather     ADD / ADHD Son     Melanoma Neg Hx     Lupus Neg Hx     Eczema Neg Hx     Acne Neg Hx     Colon cancer Neg Hx     Ovarian cancer Neg Hx      Mom  22.    SH:   Working at a bank   Previously worked at a bar as well.   Now has to work [employer] to keep up with her increasing mortgage.    Granddaughter May, 2022--Lo--proud of her  No alcohol; no cigarettes;   Objective:   /85   Pulse 89   Ht 5' 6" (1.676 m)   Wt 104.3 kg (229 lb 15 oz)   BMI 37.11 kg/m²   Was 214 lb 11.7 oz on 23  Was on 233 ;b 11 on 10/25/22  Was on 254 lb 3.1 oz on " 7/7/22  Was 242 lb 8.1 oz on 1/19/22  Was 254 lb 3.1 oz on 3/16/21  Was 236 lb 12.4 oz on 6/8/19  Was 224 lb 13.9 oz on 2/19/19.    Physical Exam   Constitutional: She is oriented to person, place, and time. She appears obese. No distress.   HENT:   Head: Normocephalic and atraumatic.   Mouth/Throat: Oropharynx is clear and moist. No oropharyngeal exudate.   No facial rashes  Parotids not enlarged     Eyes: Pupils are equal, round, and reactive to light. Conjunctivae are normal. Right eye exhibits no discharge. Left eye exhibits no discharge. No scleral icterus.   Neck: No JVD present. No tracheal deviation present. No thyromegaly present.   Cardiovascular: Normal rate, regular rhythm and normal heart sounds. Exam reveals no gallop and no friction rub.   No murmur heard.  Pulmonary/Chest: Effort normal and breath sounds normal. No respiratory distress. She has no wheezes. She has no rales. She exhibits no tenderness.   Abdominal: Soft. Bowel sounds are normal. She exhibits no distension and no mass. There is no splenomegaly or hepatomegaly. There is no abdominal tenderness. There is no rebound and no guarding.   Musculoskeletal:         General: No tenderness. Normal range of motion.      Cervical back: Neck supple.      Comments: Cspine slight decrease in lateral flexion & rotation; no tenderness  Tspine FROM no tenderness  Lspine FROM no tenderness.  TMJ: unremarkable  Shoulders: FROM; no synovitis  Elbows: FROM; no synovitis; no tophi or nodules  Wrists: no SHT; no tenderness; good ROM  MCPs: no metatarsalgia; no synovitis  PIPs: no more dactylitis 2nd ray R; R 3rd PIP w min flexion contracture; no TTP;    DIPs: FROM; no synovitis  HIPS: FROM  Knees: adequate ROM; bony hypertrophy dali; L appears larger than R; no instability; no calf tenderness; to TTP   Ankles: FROM: no synovitis   Toes: ok; no metatarsalgia    LLE wo calf tenderness;                  Lymphadenopathy:     She has no cervical adenopathy.    Neurological: She is alert and oriented to person, place, and time. She has normal reflexes. No cranial nerve deficit. Gait normal.   Proximal and distal muscle strength 5/5.   Skin: Skin is warm and dry. No lesion and no rash noted. She is not diaphoretic.   No psoriasis but has acne like facial & neck lesions..   Psychiatric: Her behavior is normal. Mood, memory, affect, judgment and thought content normal.   Vitals reviewed.          Labs:   1/21/23: ESR 14 (36); CRP 6.3; CBC Hg 11.8; CMP Na 135; Hg A1C 5.4; TSH ok;  10/17/22: ESR 30 (36); CRP 6.7; CBC plt 466; CMP Na 131; BUN 27;   7/16/22: ESR 26 (36); CRP 5.5; CMP glu 167  4/16/22: ESR 24 (36); CRP 8.0; CBC Hg 11.3; low indices; CMP ok;  2/8/22: CBC Hg 11.9; CMP ok;   1/19/22: ESR 66; CRP 18.1  10/9/21: ESR 38 (36); CRP 11.7; CBC ok; CMP ok;   5/15/21: CMP ok;  TSH ok; (150)  3/16/21: ESR 72 (36); CRP 25.1; CBC  Hg 11.4; CMP glu 125; alb 3.4;   7/22/20: ESR 34 (36); CRP 16.1; CBC Hg 10.8; Ht 36.8; CMP ok; last labs  12/7/19: ESR 17; CRP CBC Hg 11.8; CMP ok; TFTs ok;   9/14/19: ESR 24 (36); CRP 9.8; CBC Hg 11.5; CP ok;   2/19/19: ESR 13; CRP 5.8; CBC plts 362; CMP ok; HCV/HBV/QG neg;  11/17/18: ESR 8; CRP 3.9; Hg 11.9; low indices; CMP ok;   8/22/18: ESR 10; CRP 10.9; CBC Hg 10.7; Ht 35.7; CMP ok  5/14/18: ESR 13; CRP 13.4;  Hg 11.3; low indices; eos 12.4%; CMP ok;   2/10/18: ESR 30; CRP 10.5; Hg 11.3; plt 366; CMP ok;   10/19/17: HBV neg; HCV neg;   9/2/17: ESR 16; CRp 5.9; Hg 11.6; Ht 36.6; CMP ok;   5/29/17: QG neg  2/20/17: ESR 34; CRP 8.1; CBC Hg 11.4; Ht 35.6; CMP ok;   11/19/16: ESR 20; CRP 4.8; CBC Hg 11.9; CMP K+3.3, otherwise ok;   8/27/16: ESR 22; CRP 7; Hg 11.8; Ht 36.9; CMP ok;   5/26/16: ESR 35; CRP 11.2Hg 11.8; Ht 36.9; CMP ok;   5/21/16: Vit d 37  10/8/15: ESR 45; CRP 14.2; Hg 11.2; Ht 35.8; CMP; ok  7/7/15: CBC plt 380; K+ 3.4; Glu 154  7/6/15: ESR 48; CRP 9.6  4/4/15: ESR 41; CRP 19; Hg 11.0; Ht 34.6; plt 377; CMP ok;  1/10/15: ESR 34;  CRP 18; CBC ok; ; Alb. 3.6  10/4/14: ESR 43; CRP 18.5; Hg 11.8; Ht 36.5; plt 378; Alb. 3.4;   3/20/14: ESR 41; CRP 14.3; plt 356; CMP ok;  2/15/13: ESR 30; CRP 9.8; CBC ok; Alb. 3.3  11/2/13: ESR 28; CRP 11.6; plt 363; Alb. 3.3;   5/28/13: ESR 45; CRP 8.8; plt 366; CMP ok;     12/1/18: Bilateral hands: personally viewed: Mild degenerative changes noted at the interphalangeal joints.  Mild-to-moderate degenerative change noted at the bilateral 1st CMC joints.  Mild degenerative changes also present at both radiocarpal joints, triscaphe joints and right distal radioulnar joint.  Minimal degenerative changes also present at the MCP joints bilaterally and greatest at the 1st MCP joint on the left.  6/15/17: Bilateral hands: personally reviewed: mild osteoarthritis w stable periarticular erosion at the left fourth MCP joint; no change since last year.   8/27/16: Bilateral feet: personally reviewed. Mild HV; mild OA shell 1st MTP dali; ? Erosion PIP left small toe;   5/26/16: Bilateral hands: personally reviewed: L 4th MCP (new) erosive lesion; R & L 2nd & 3rd metacarpal head cysts; R mild PIP erosion & STS 2nd.    Assessment:     Psoriatic arthritis-- with mild SHT 2 MCPs bilaterally (R >>L) & with sausage digit of R index finger--currently w  less& flexion deformity of R 3rd PIP--stable at present .    a) History of sausage digits of both toes      PIPs, DIPs, wrists, and knees., now w. only one sausage digit.   b) Psoriasis of the abdomen, elbow, and the shins--resolved.    c) Enbrel stopped 10/21/11 due to optic neuritis.     d) New erosion (4th L metacarpal head) on x-ray & possibly L 5th toe PIP     e) Persisting elevation of ESR and CRP    f) inadequate response to leflunomide and Stelara & apremilast (w. Intolerable nausea)   G) could not tolerate SSZ--nausea.   H) MTX x 2 even SC did not help sxs.   I) improved on Cosentyx (+leflunomide + naproxen) initially incompletely-- hand joints still hurt despite  normalization of  ESR & CRP   J) Orencia begun 11/17 with incomplete response   K)Taltz begun 4/20/18-2/19   L) Tofacitinib 3/19 - 6/18/19    Chronic SHT 3rd R PIP & base of L thumb with questionable erosive lesion.   M) Back to Cosentyx + leflunomide + celebrex    N) Tremfya begun 4/14/21--failed: stopped w headache & lack of efficacy   O) Back to Cosentyx 10/6/21 with improvement; held since 12/15/21.   P) On Rinvoq 30 mg since 1/29/22--stable to improved.    Leflunomide stopped 7/2022    Left optic neuritis with demyelinating lesions secondary to Enbrel--now resolved.    Repeat MRI ok    Adverse reaction to Etanercept.    RLE & R knee pain/swelling--stable   S/P injury ~ 2000   Doubt R DVT but on a Jakinib   R/O R Popliteal cyst    Chronic pain/fibromyalgia syndrome with fatigue, tender points, withdrawals to palpation in the past, pain all over, responsive to Savella but with some nausea (off it now), followed by Dr. Odom at the [facility],    On methadone    Degenerative joint disease of the cervical spine, knees, and feet--very mild.     Depression on wellbutrin   Off savella & sertraline (much weight gain)    Hydradenitis suppurativa    Hypertriglyceridemia    Hypertension.     Hypothyroidism.     Vitamin D deficiency with regular prescription vitamin D supplementation.     S/P covid 7/3/22    Obesity from morbid obesity    Plan:   Ok to continue Rinvoq  Ok to continue naproxen 500 mg up to tid but less is best.   Ok to stay off leflunomide.  Still needs Shingrix: The CDC recommends that healthy adults 50 or older, as well as those 19 years and older who are immunocompromised, get two doses of the vaccine Shingrix. She was given another rx to try another pharmacy  Labs today & q 3 months      RTC 3 months or prn

## 2023-12-19 ENCOUNTER — PATIENT MESSAGE (OUTPATIENT)
Dept: FAMILY MEDICINE | Facility: CLINIC | Age: 48
End: 2023-12-19
Payer: COMMERCIAL

## 2023-12-19 DIAGNOSIS — E03.9 HYPOTHYROIDISM, UNSPECIFIED TYPE: ICD-10-CM

## 2023-12-19 RX ORDER — LIOTHYRONINE SODIUM 5 UG/1
TABLET ORAL
Qty: 180 TABLET | Refills: 2 | Status: SHIPPED | OUTPATIENT
Start: 2023-12-19

## 2023-12-19 NOTE — TELEPHONE ENCOUNTER
Care Due:                  Date            Visit Type   Department     Provider  --------------------------------------------------------------------------------                                ESTABLISHED   [facility]                              PATIENT -    MED/ INTERNAL  Last Visit: 11-      VIRTUAL      MED/ PEDS      Andriy A  Page  Next Visit: None Scheduled  None         None Found                                                            Last  Test          Frequency    Reason                     Performed    Due Date  --------------------------------------------------------------------------------    HBA1C.......  6 months...  metFORMIN................  01- 07-    [facility] Embedded Care Due Messages. Reference number: [id number].   12/19/2023 10:07:36 AM CST

## 2023-12-25 DIAGNOSIS — E87.6 HYPOKALEMIA: ICD-10-CM

## 2023-12-25 NOTE — TELEPHONE ENCOUNTER
No care due was identified.  Health [facility] Embedded Care Due Messages. Reference number: [id number].   12/25/2023 6:37:52 AM CST

## 2023-12-27 RX ORDER — POTASSIUM CHLORIDE 750 MG/1
CAPSULE, EXTENDED RELEASE ORAL
Qty: 180 CAPSULE | Refills: 3 | Status: SHIPPED | OUTPATIENT
Start: 2023-12-27

## 2023-12-27 NOTE — TELEPHONE ENCOUNTER
Refill Decision Note   Keli Gilbert  is requesting a refill authorization.  Brief Assessment and Rationale for Refill:  Approve     Medication Therapy Plan:         Comments:     Note composed:2:04 AM 12/27/2023

## 2024-01-07 DIAGNOSIS — G89.4 CHRONIC PAIN SYNDROME: ICD-10-CM

## 2024-01-07 DIAGNOSIS — F34.1 PERSISTENT DEPRESSIVE DISORDER: ICD-10-CM

## 2024-01-07 NOTE — TELEPHONE ENCOUNTER
No care due was identified.  Health [facility] Embedded Care Due Messages. Reference number: [id number].   1/07/2024 7:16:58 AM CST

## 2024-01-08 NOTE — TELEPHONE ENCOUNTER
Refill Routing Note   Medication(s) are not appropriate for processing by [facility] for the following reason(s):        New or recently adjusted medication    [facility] action(s):  Defer               Appointments  past 12m or future 3m with PCP    Date Provider   Last Visit   11/6/2023 Andriy Moran MD   Next Visit   1/22/2024 Andriy Moran MD   ED visits in past 90 days: 0        Note composed:1:38 PM 01/08/2024

## 2024-01-09 ENCOUNTER — PATIENT MESSAGE (OUTPATIENT)
Dept: FAMILY MEDICINE | Facility: CLINIC | Age: 49
End: 2024-01-09
Payer: COMMERCIAL

## 2024-01-09 RX ORDER — DULOXETIN HYDROCHLORIDE 20 MG/1
20 CAPSULE, DELAYED RELEASE ORAL
Qty: 90 CAPSULE | Refills: 3 | Status: SHIPPED | OUTPATIENT
Start: 2024-01-09 | End: 2024-01-22

## 2024-01-18 DIAGNOSIS — L40.50 PSORIATIC ARTHRITIS: ICD-10-CM

## 2024-01-18 RX ORDER — NAPROXEN 500 MG/1
TABLET ORAL
Qty: 270 TABLET | Refills: 3 | Status: SHIPPED | OUTPATIENT
Start: 2024-01-18

## 2024-01-19 ENCOUNTER — TELEPHONE (OUTPATIENT)
Dept: FAMILY MEDICINE | Facility: CLINIC | Age: 49
End: 2024-01-19
Payer: COMMERCIAL

## 2024-01-22 ENCOUNTER — PATIENT MESSAGE (OUTPATIENT)
Dept: FAMILY MEDICINE | Facility: CLINIC | Age: 49
End: 2024-01-22
Payer: COMMERCIAL

## 2024-01-22 ENCOUNTER — OFFICE VISIT (OUTPATIENT)
Dept: FAMILY MEDICINE | Facility: CLINIC | Age: 49
End: 2024-01-22
Payer: COMMERCIAL

## 2024-01-22 VITALS
BODY MASS INDEX: 36.63 KG/M2 | HEART RATE: 88 BPM | HEIGHT: 66 IN | SYSTOLIC BLOOD PRESSURE: 124 MMHG | OXYGEN SATURATION: 98 % | DIASTOLIC BLOOD PRESSURE: 80 MMHG | TEMPERATURE: 98 F | WEIGHT: 227.94 LBS | RESPIRATION RATE: 18 BRPM

## 2024-01-22 DIAGNOSIS — E66.01 CLASS 2 SEVERE OBESITY DUE TO EXCESS CALORIES WITH SERIOUS COMORBIDITY AND BODY MASS INDEX (BMI) OF 36.0 TO 36.9 IN ADULT: ICD-10-CM

## 2024-01-22 DIAGNOSIS — E03.9 HYPOTHYROIDISM, UNSPECIFIED TYPE: ICD-10-CM

## 2024-01-22 DIAGNOSIS — L73.2 HIDRADENITIS SUPPURATIVA: ICD-10-CM

## 2024-01-22 DIAGNOSIS — G47.00 INSOMNIA, UNSPECIFIED TYPE: ICD-10-CM

## 2024-01-22 DIAGNOSIS — Z82.49 FAMILY HISTORY OF EARLY CAD: ICD-10-CM

## 2024-01-22 DIAGNOSIS — Z00.00 WELL WOMAN EXAM WITHOUT GYNECOLOGICAL EXAM: Primary | ICD-10-CM

## 2024-01-22 DIAGNOSIS — F34.1 DYSTHYMIA: ICD-10-CM

## 2024-01-22 DIAGNOSIS — I10 BENIGN ESSENTIAL HTN: ICD-10-CM

## 2024-01-22 DIAGNOSIS — R60.0 PEDAL EDEMA: ICD-10-CM

## 2024-01-22 DIAGNOSIS — G89.4 CHRONIC PAIN SYNDROME: ICD-10-CM

## 2024-01-22 PROCEDURE — 1160F RVW MEDS BY RX/DR IN RCRD: CPT | Mod: CPTII,S$GLB,, | Performed by: FAMILY MEDICINE

## 2024-01-22 PROCEDURE — 99396 PREV VISIT EST AGE 40-64: CPT | Mod: S$GLB,,, | Performed by: FAMILY MEDICINE

## 2024-01-22 PROCEDURE — 3008F BODY MASS INDEX DOCD: CPT | Mod: CPTII,S$GLB,, | Performed by: FAMILY MEDICINE

## 2024-01-22 PROCEDURE — 1159F MED LIST DOCD IN RCRD: CPT | Mod: CPTII,S$GLB,, | Performed by: FAMILY MEDICINE

## 2024-01-22 PROCEDURE — 4010F ACE/ARB THERAPY RXD/TAKEN: CPT | Mod: CPTII,S$GLB,, | Performed by: FAMILY MEDICINE

## 2024-01-22 PROCEDURE — 3079F DIAST BP 80-89 MM HG: CPT | Mod: CPTII,S$GLB,, | Performed by: FAMILY MEDICINE

## 2024-01-22 PROCEDURE — 3074F SYST BP LT 130 MM HG: CPT | Mod: CPTII,S$GLB,, | Performed by: FAMILY MEDICINE

## 2024-01-22 PROCEDURE — 99999 PR PBB SHADOW E&M-EST. PATIENT-LVL V: CPT | Mod: PBBFAC,,, | Performed by: FAMILY MEDICINE

## 2024-01-22 RX ORDER — TRETINOIN 0.25 MG/G
CREAM TOPICAL NIGHTLY
COMMUNITY
Start: 2024-01-16

## 2024-01-22 RX ORDER — LOSARTAN POTASSIUM 50 MG/1
50 TABLET ORAL DAILY
Qty: 90 TABLET | Refills: 3 | Status: CANCELLED | OUTPATIENT
Start: 2024-01-22

## 2024-01-22 RX ORDER — BUPROPION HYDROCHLORIDE 300 MG/1
300 TABLET ORAL
Qty: 90 TABLET | Refills: 3 | Status: CANCELLED | OUTPATIENT
Start: 2024-01-22

## 2024-01-22 RX ORDER — METFORMIN HYDROCHLORIDE 500 MG/1
TABLET, EXTENDED RELEASE ORAL
Qty: 30 TABLET | Refills: 5 | Status: CANCELLED | OUTPATIENT
Start: 2024-01-22

## 2024-01-22 RX ORDER — DULOXETIN HYDROCHLORIDE 30 MG/1
30 CAPSULE, DELAYED RELEASE ORAL DAILY
Qty: 90 CAPSULE | Refills: 0 | Status: SHIPPED | OUTPATIENT
Start: 2024-01-22 | End: 2024-03-25

## 2024-01-22 RX ORDER — LEVOTHYROXINE SODIUM 125 UG/1
125 TABLET ORAL
Qty: 90 TABLET | Refills: 3 | Status: CANCELLED | OUTPATIENT
Start: 2024-01-22

## 2024-01-22 RX ORDER — FUROSEMIDE 40 MG/1
40 TABLET ORAL
Qty: 90 TABLET | Refills: 3 | Status: CANCELLED | OUTPATIENT
Start: 2024-01-22

## 2024-01-22 RX ORDER — PHENTERMINE HYDROCHLORIDE 37.5 MG/1
37.5 TABLET ORAL
Qty: 30 TABLET | Refills: 0 | Status: CANCELLED | OUTPATIENT
Start: 2024-01-22

## 2024-01-22 RX ORDER — BRIMONIDINE TARTRATE 1 MG/ML
1 SOLUTION/ DROPS OPHTHALMIC 2 TIMES DAILY
COMMUNITY
Start: 2023-12-07

## 2024-01-22 RX ORDER — TRAZODONE HYDROCHLORIDE 50 MG/1
TABLET ORAL
Status: CANCELLED | OUTPATIENT
Start: 2024-01-22

## 2024-01-23 RX ORDER — METFORMIN HYDROCHLORIDE 500 MG/1
TABLET, EXTENDED RELEASE ORAL
Qty: 90 TABLET | Refills: 1 | Status: SHIPPED | OUTPATIENT
Start: 2024-01-23

## 2024-02-06 DIAGNOSIS — L40.50 PSORIATIC ARTHRITIS: ICD-10-CM

## 2024-02-06 RX ORDER — UPADACITINIB 15 MG/1
15 TABLET, EXTENDED RELEASE ORAL DAILY
Qty: 30 TABLET | Refills: 3 | Status: ACTIVE | OUTPATIENT
Start: 2024-02-06 | End: 2024-06-06 | Stop reason: SDUPTHER

## 2024-02-06 NOTE — TELEPHONE ENCOUNTER
----- Message from Tiffanie Nguyễn sent at 2/6/2024  8:24 AM CST -----  Irasema     is calling to speak with someone in provider office regarding they are looking for the prior auth for the patient medication [facility] code [id number] they are is asking for a return call please call.  [phone]

## 2024-02-15 DIAGNOSIS — I10 BENIGN ESSENTIAL HTN: ICD-10-CM

## 2024-02-15 RX ORDER — LOSARTAN POTASSIUM 50 MG/1
50 TABLET ORAL
Qty: 90 TABLET | Refills: 3 | Status: SHIPPED | OUTPATIENT
Start: 2024-02-15

## 2024-02-15 NOTE — TELEPHONE ENCOUNTER
No care due was identified.  Health [facility] Embedded Care Due Messages. Reference number: [id number].   2/15/2024 6:50:44 AM CST

## 2024-02-16 NOTE — PROGRESS NOTES
"Well Woman VISIT      CHIEF COMPLAINT  Chief Complaint   Patient presents with    Follow-up    Hypertension       HPI  Keli Gilbert is a 48 y.o. female who presents for physical.     Social Factors  Tobacco use: No  Ready to Quit: No  Alcohol: Yes rarely  Intimate partner violence screening  "Do you feel safe in your current relationship?" single  "Have you ever been in a relationship in which your partner frightened you or hurt you?" No  Living Will/POA: No  Regular Exercise: No    Depression  Over the past two weeks, have you felt down, depressed, or hopeless? No  Over the past two weeks, have you felt little interest or pleasure in doing things? No    Reproductive Health  Followed by OBGYN    CHD, HTN, DM2  CHD Risk Factors: obesity (BMI >= 30 kg/m2)  Women 45 years and older should be screened for dyslipidemia if at increased risk of CHD  Women 20 to 45 years of age should be screened for dyslipidemia if at increased risk of CHD  Asymptomatic adults with sustained blood pressure greater than 135/80 mm Hg (treated or untreated) should be screened for type 2 diabetes mellitus    Estimated body mass index is 36.79 kg/m² as calculated from the following:    Height as of this encounter: 5' 6" (1.676 m).    Weight as of this encounter: 103.4 kg (227 lb 15.3 oz).      Screening  Mammogram needed: utd  Colonoscopy needed: n/a  Osteoporosis screen needed: n/a     Women 50 to 74 years of age should be screened for breast cancer with mammography biennially.  Women should be screened for cervical cancer with Pap tests beginning at 21 years of age. Low-risk women should receive Pap testing every three years. Co-testing for human papillomavirus is an option beginning at 30 years of age, and can extend the screening interval to five years. Cervical cancer screening should be discontinued at 65 years of age or after total hysterectomy if the woman has a benign gynecologic history  Adults 50 to 75 years of age should " "be screened for colorectal cancer with an FOBT annually, sigmoidoscopy every five years with an FOBT every three years, or colonoscopy every 10 years.  Women 65 years and older should be screened for osteoporosis. Women younger than 65 years should be screened if the risk of fracture is greater than or equal to that of a 65-year-old white woman without additional risk factors.      Immunizations  delayed    ALLERGIES and MEDS were verified.   PMHx, PSHx, FHx, SOCIALHx were updated as pertinent.    REVIEW OF SYSTEMS  Review of Systems   Constitutional: Negative.    Eyes: Negative.    Respiratory: Negative.     Cardiovascular: Negative.    Gastrointestinal: Negative.    Genitourinary: Negative.    Musculoskeletal:  Positive for joint pain.   Skin: Negative.    Neurological: Negative.          PHYSICAL EXAM  VITAL SIGNS: /80   Pulse 88   Temp 98.2 °F (36.8 °C) (Oral)   Resp 18   Ht 5' 6" (1.676 m)   Wt 103.4 kg (227 lb 15.3 oz)   SpO2 98%   BMI 36.79 kg/m²   GEN: Well developed, Well nourished, No acute distress.  HENT: Normocephalic, Atraumatic, Bilateral external ears normal, Nose normal, Oropharynx moist, No oral exudates.   Eyes: PERRL, EOMI, Conjunctiva normal, No discharge.   Neck: Supple, No tenderness.  Lymphatic: No cervical or supraclavicular lymphadenopathy noted.   Cardiovascular: Normal heart rate, Normal rhythm, No murmurs, No rubs, No gallops.   Thorax & Lungs: Normal breath sounds, No respiratory distress, No wheezing.  Abdomen: Soft, No tenderness, Bowel sounds normal.  Breast: deferred  Genital: deferred   Skin: Warm, Dry, No erythema, No rash.   Extremities: No edema, No tenderness.       ASSESSMENT/PLAN    Keli was seen today for follow-up and hypertension.    Diagnoses and all orders for this visit:      Well woman exam without gynecological exam  - Labs are up to date    Dysthymia  -     DULoxetine (CYMBALTA) 30 MG capsule; Take 1 capsule (30 mg total) by mouth once " daily.  - Refilled to help with anxiety and pain    Pedal edema  - No acute changes  - Continue to monitor    Hypothyroidism, unspecified type  - Stable on current regimen  - Followed by endocrinology    Benign essential HTN  -     HIGH SENSITIVITY CRP; Future  -     LIPOPROTEIN A (LPA); Future  -     Apolipoprotein B; Future  - Stable on current medication regimen    Hidradenitis suppurativa  - Followed by dermatology    Chronic pain syndrome  -     DULoxetine (CYMBALTA) 30 MG capsule; Take 1 capsule (30 mg total) by mouth once daily.    Class 2 severe obesity due to excess calories with serious comorbidity and body mass index (BMI) of 36.0 to 36.9 in adult  - Encouraged weight loss    Insomnia, unspecified type  - Secondary to anxiety and depression    Family history of early CAD  -     HIGH SENSITIVITY CRP; Future  -     LIPOPROTEIN A (LPA); Future  -     Apolipoprotein B; Future  - Labs to be done when fasting  - Follow up with cardiology           FOLLOW UP: 1 year or sooner if needed      Andriy Moran MD

## 2024-02-16 NOTE — TELEPHONE ENCOUNTER
Refill Decision Note   Keli Gilbert  is requesting a refill authorization.  Brief Assessment and Rationale for Refill:  Approve     Medication Therapy Plan:         Comments:     Note composed:9:47 PM 02/15/2024

## 2024-02-20 DIAGNOSIS — E03.9 HYPOTHYROIDISM, UNSPECIFIED TYPE: ICD-10-CM

## 2024-02-20 RX ORDER — LEVOTHYROXINE SODIUM 125 UG/1
125 TABLET ORAL
Qty: 90 TABLET | Refills: 0 | Status: SHIPPED | OUTPATIENT
Start: 2024-02-20 | End: 2024-05-27

## 2024-02-20 NOTE — TELEPHONE ENCOUNTER
Refill Routing Note   Medication(s) are not appropriate for processing by [facility] for the following reason(s):        Required labs outdated    [facility] action(s):  Defer     Requires labs : Yes             Appointments  past 12m or future 3m with PCP    Date Provider   Last Visit   1/22/2024 Andriy Moran MD   Next Visit   8/2/2024 Andriy Moran MD   ED visits in past 90 days: 0        Note composed:3:21 PM 02/20/2024

## 2024-02-20 NOTE — TELEPHONE ENCOUNTER
Care Due:                  Date            Visit Type   Department     Provider  --------------------------------------------------------------------------------                                EP -         [facility]                              PRIMARY      MED/ INTERNAL  Last Visit: 01-      CARE ([facility])   MED/ PEDS      Andriy A  Page                              EP [facility]                              PRIMARY      MED/ INTERNAL  Next Visit: 08-      CARE ([facility])   MED/ PEDS      Andriy A  Page                                                            Last  Test          Frequency    Reason                     Performed    Due Date  --------------------------------------------------------------------------------    HBA1C.......  6 months...  metFORMIN................  01- 07-    Health [facility] Embedded Care Due Messages. Reference number: [id number].   2/20/2024 1:11:39 PM CST

## 2024-03-10 NOTE — PROGRESS NOTES
Subjective:       Patient ID: Keli Concha Gilbert is a 48 y.o. female with psoriatic arthritis on Rinvoq (& naproxen); demyelinating disorder secondary to Enbrel; incomplete effect on chronic pain syndrome on methadone     Chief Complaint: Disease management    Ms. Gilbert is a 49 yo woman w PsA last seen last on 12/14/23.    She is on Rinvoq which she began on 1/29/22 and naproxen 500 mg which she takes up to tid pc without any adverse effects.   Leflunomide was stopped on 7/2022 without any change in sxs.     She returns for f/u. She is still controlled on Rinvoq & naproxen but has generalized arthralgia wo joint swelling & AM stiffness for at least 30 minutes involving the whole body.   PsO & dactylitis largely gone.   She still has not gotten Shingrix.    Today c/o severe L hip pain that is intermittent, worse w walking & laying on left side.  She has been doing lots of walking & standing at her job w [employer] & thinks perhaps that triggered it.  Pain radiates low lateral aspect of L thigh to above the knee.  No groin pain.     Has chronic stress.   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        Current Outpatient Medications   Medication Sig Dispense Refill    brimonidine 0.1% (ALPHAGAN P) 0.1 % Drop Place 1 drop into both eyes 2 (two) times daily.      buPROPion (WELLBUTRIN XL) 300 MG 24 hr tablet TAKE 1 TABLET BY MOUTH ONCE  DAILY 90 tablet 3    cholecalciferol, vitamin D3, 2,000 unit Tab Take 1 tablet by mouth Once a week.      clindamycin (CLEOCIN T) 1 % external solution AAA bid 60 mL 3    DULoxetine (CYMBALTA) 30 MG capsule Take 1 capsule (30 mg total) by mouth once daily. 90 capsule 0    furosemide (LASIX) 40 MG tablet TAKE 1 TABLET BY MOUTH ONCE  DAILY 90 tablet 3    gabapentin (NEURONTIN) 300 MG capsule Take 300 mg by mouth 3 (three) times daily.      latanoprost 0.005 % ophthalmic solution Place into both eyes.      levothyroxine (SYNTHROID) 125 MCG tablet TAKE 1 TABLET BY MOUTH  BEFORE  BREAKFAST 90 tablet 0    liothyronine (CYTOMEL) 5 MCG Tab TAKE 2 TABLETS BY MOUTH DAILY 180 tablet 2    loratadine-pseudoephedrine  mg (CLARITIN-D 24-HOUR)  mg per 24 hr tablet Take 1 tablet by mouth once daily.      losartan (COZAAR) 50 MG tablet TAKE 1 TABLET BY MOUTH ONCE  DAILY 90 tablet 3    metFORMIN (GLUCOPHAGE-XR) 500 MG ER 24hr tablet TAKE 1 TABLET BY MOUTH ONCE DAILY IN THE MORNING WITH BREAKFAST 90 tablet 1    methadone (DOLOPHINE) 10 MG tablet Take 1 tablet by mouth Three times a day.      naproxen (NAPROSYN) 500 MG tablet TAKE 1 TABLET BY MOUTH EVERY  MEAL AS NEEDED 270 tablet 3    phentermine (ADIPEX-P) 37.5 mg tablet TAKE 1 TABLET(37.5 MG) BY MOUTH BEFORE BREAKFAST 30 tablet 0    potassium chloride (MICRO-K) 10 MEQ CpSR TAKE 2 CAPSULES BY MOUTH DAILY 180 capsule 3    promethazine (PHENERGAN) 25 MG tablet TK 1 T PO BID PRN N      spironolactone (ALDACTONE) 50 MG tablet Start with 1 po qday, increase to 2 po qday as tolerated 60 tablet 3    traZODone (DESYREL) 50 MG tablet TAKE 1 TO 2 TABLET BY MOUTH EVERY NIGHT      tretinoin (RETIN-A) 0.025 % cream Apply topically every evening.      upadacitinib (RINVOQ) 15 mg 24 hr tablet Take 1 tablet (15 mg total) by mouth once daily. 30 tablet 3     Current Facility-Administered Medications   Medication Dose Route Frequency Provider Last Rate Last Admin    triamcinolone acetonide injection 10 mg  10 mg Intradermal Once Marie Fam MD        triamcinolone acetonide injection 10 mg  10 mg Intradermal Once Marie Fam MD         Facility-Administered Medications Ordered in Other Visits   Medication Dose Route Frequency Provider Last Rate Last Admin    0.9%  NaCl infusion   Intravenous Continuous Gabriel Herring MD        mupirocin 2 % ointment   Nasal On Call Procedure Gabriel Herring MD   Given at 02/18/22 0703   Taking methadone 10 mg qid.  Not taking trazodone.    Review of patient's allergies indicates:   Allergen  Reactions    Doxycycline hcl Nausea And Vomiting    Enbrel [etanercept] Other (See Comments)     Optic neurtits     Past Medical History:   Diagnosis Date    Abnormal Pap smear of vagina     AR (allergic rhinitis)     Demyelinating disorder     Depression     Fever blister     Fibromyalgia     followed by pain management    Generalized osteoarthritis of multiple sites     History of abnormal Pap smear     Hypertension     Hypothyroidism     Insulin resistance     Mixed anxiety and depressive disorder     Morbid obesity     Myelitis, optic neuritis in     treated with high-dose steroids and resolved; positive MRI and spinal fluid for a mass, but on embrel for psoriatic arthritis - she will see a MS specialist at [facility]; MRI normalized off embrel    Obesity     Pre-diabetes     hx diabetes on stereoids /    Psoriasis     Psoriatic arthritis     Vitamin D deficiency disease      Past Surgical History:   Procedure Laterality Date    COLONOSCOPY N/A 12/16/2022    Procedure: COLONOSCOPY;  Surgeon: Jaylen Pelletier MD;  Location: [facility] ENDO;  Service: Endoscopy;  Laterality: N/A;  Pt. holding Adipex for 5 days prior to.EC  (She states she doesnt take it every day.)    EYE SURGERY Bilateral 08/2021    Laser surgery for pressure relief    LAPAROSCOPIC CHOLECYSTECTOMY N/A 2/18/2022    Procedure: CHOLECYSTECTOMY, LAPAROSCOPIC;  Surgeon: Gabriel Herring MD;  Location: [facility] OR;  Service: General;  Laterality: N/A;  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  C A  NEED H/P-----CLEARED BY PCP    SINUS SURGERY  1998       Review of Systems   Constitutional:  Positive for fatigue. Negative for fever and unexpected weight change.   HENT: Negative.  Negative for dental problem, mouth sores and trouble swallowing.         Dry mouth only w antihistamines   Eyes:  Negative for redness, itching and visual disturbance.        Dry eyes only with antihistamines   Respiratory: Negative.  Negative for cough and shortness of  breath.    Cardiovascular: Negative.  Negative for chest pain, palpitations and leg swelling.   Gastrointestinal: Negative.  Negative for abdominal pain, anal bleeding, blood in stool, constipation, diarrhea and nausea.        Heartburn.   Endocrine: Negative.    Genitourinary: Negative.  Negative for dysuria, frequency, genital sores, menstrual problem, pelvic pain and urgency.   Musculoskeletal: Negative.  Negative for arthralgias, back pain, gait problem and neck pain.   Skin: Negative.  Negative for color change and rash.   Allergic/Immunologic: Positive for immunocompromised state.   Neurological:  Positive for headaches. Negative for dizziness, seizures, weakness and numbness.   Hematological: Negative.  Negative for adenopathy. Does not bruise/bleed easily.   Psychiatric/Behavioral:  Positive for dysphoric mood (stable) and sleep disturbance (stable). Negative for decreased concentration, self-injury and suicidal ideas. The patient is nervous/anxious.          Family History   Problem Relation Age of Onset    Thyroid disease Mother     Heart disease Mother     Hypertension Mother     Hyperlipidemia Mother     Coronary artery disease Mother         s/p CABG    Heart attack Mother     Psoriasis Father     Cancer Father         throat    Breast cancer Sister     Heart disease Sister         MVP    Diabetes Paternal Uncle     Diabetes Maternal Grandfather     Heart disease Maternal Grandfather     Thyroid disease Maternal Grandfather     ADD / ADHD Son     Melanoma Neg Hx     Lupus Neg Hx     Eczema Neg Hx     Acne Neg Hx     Colon cancer Neg Hx     Ovarian cancer Neg Hx      Mom  22.    SH:   Previously worked at a bar as well.   Now has to work [employer] & [employer] to keep up with her increasing mortgage.    Granddaughter May, 2022--Lo--proud of her  No alcohol; no cigarettes;   Objective:     /78   Wt 102.8 kg (226 lb 10.1 oz)   BMI 36.58 kg/m²   Was 214 lb 11.7 oz on 23  Was on 233  ;b 11 on 10/25/22  Was on 254 lb 3.1 oz on 7/7/22  Was 242 lb 8.1 oz on 1/19/22  Was 254 lb 3.1 oz on 3/16/21  Was 236 lb 12.4 oz on 6/8/19  Was 224 lb 13.9 oz on 2/19/19.    Physical Exam   Constitutional: She is oriented to person, place, and time. She appears obese. No distress.   HENT:   Head: Normocephalic and atraumatic.   Mouth/Throat: Oropharynx is clear and moist. No oropharyngeal exudate.   No facial rashes  Parotids not enlarged     Eyes: Pupils are equal, round, and reactive to light. Conjunctivae are normal. Right eye exhibits no discharge. Left eye exhibits no discharge. No scleral icterus.   Neck: No JVD present. No tracheal deviation present. No thyromegaly present.   Cardiovascular: Normal rate, regular rhythm and normal heart sounds. Exam reveals no gallop and no friction rub.   No murmur heard.  Pulmonary/Chest: Effort normal and breath sounds normal. No respiratory distress. She has no wheezes. She has no rales. She exhibits no tenderness.   Abdominal: Soft. Bowel sounds are normal. She exhibits no distension and no mass. There is no splenomegaly or hepatomegaly. There is no abdominal tenderness. There is no rebound and no guarding.   Musculoskeletal:         General: Tenderness (L trochanteric bursal TTP rated 4-5/5) present. Normal range of motion.      Cervical back: Neck supple.      Comments: Cspine slight decrease in lateral flexion & rotation; no tenderness  Tspine FROM no tenderness  Lspine FROM no tenderness.  TMJ: unremarkable  Shoulders: FROM; no synovitis  Elbows: FROM; no synovitis; no tophi or nodules  Wrists: no SHT; no tenderness; good ROM  MCPs: no metatarsalgia; no synovitis  PIPs: no more dactylitis 2nd ray R; R 3rd PIP w min flexion contracture; no TTP;    DIPs: FROM; no synovitis  HIPS: FROM  Knees: adequate ROM; bony hypertrophy dali; L appears larger than R; no instability; no calf tenderness; to TTP   Ankles: FROM: no synovitis   Toes: ok; no metatarsalgia                    Lymphadenopathy:     She has no cervical adenopathy.   Neurological: She is alert and oriented to person, place, and time. She has normal reflexes. No cranial nerve deficit. Gait normal.   Proximal and distal muscle strength 5/5.   Skin: Skin is warm and dry. No lesion and no rash noted. She is not diaphoretic.   No psoriasis but has acne like facial & neck lesions..   Psychiatric: Her behavior is normal. Mood, memory, affect, judgment and thought content normal.   Vitals reviewed.          Labs:   12/14/23: ESR 26; CRP 4.7; CBC Hg 11.7; CMP Na 135; BUN 31;   1/21/23: ESR 14 (36); CRP 6.3; CBC Hg 11.8; CMP Na 135; Hg A1C 5.4; TSH ok;  10/17/22: ESR 30 (36); CRP 6.7; CBC plt 466; CMP Na 131; BUN 27;   7/16/22: ESR 26 (36); CRP 5.5; CMP glu 167  4/16/22: ESR 24 (36); CRP 8.0; CBC Hg 11.3; low indices; CMP ok;  2/8/22: CBC Hg 11.9; CMP ok;   1/19/22: ESR 66; CRP 18.1  10/9/21: ESR 38 (36); CRP 11.7; CBC ok; CMP ok;   5/15/21: CMP ok;  TSH ok; (150)  3/16/21: ESR 72 (36); CRP 25.1; CBC  Hg 11.4; CMP glu 125; alb 3.4;   7/22/20: ESR 34 (36); CRP 16.1; CBC Hg 10.8; Ht 36.8; CMP ok; last labs  12/7/19: ESR 17; CRP CBC Hg 11.8; CMP ok; TFTs ok;   9/14/19: ESR 24 (36); CRP 9.8; CBC Hg 11.5; CP ok;   2/19/19: ESR 13; CRP 5.8; CBC plts 362; CMP ok; HCV/HBV/QG neg;  11/17/18: ESR 8; CRP 3.9; Hg 11.9; low indices; CMP ok;   8/22/18: ESR 10; CRP 10.9; CBC Hg 10.7; Ht 35.7; CMP ok  5/14/18: ESR 13; CRP 13.4;  Hg 11.3; low indices; eos 12.4%; CMP ok;   2/10/18: ESR 30; CRP 10.5; Hg 11.3; plt 366; CMP ok;   10/19/17: HBV neg; HCV neg;   9/2/17: ESR 16; CRp 5.9; Hg 11.6; Ht 36.6; CMP ok;   5/29/17: QG neg  2/20/17: ESR 34; CRP 8.1; CBC Hg 11.4; Ht 35.6; CMP ok;   11/19/16: ESR 20; CRP 4.8; CBC Hg 11.9; CMP K+3.3, otherwise ok;   8/27/16: ESR 22; CRP 7; Hg 11.8; Ht 36.9; CMP ok;   5/26/16: ESR 35; CRP 11.2Hg 11.8; Ht 36.9; CMP ok;   5/21/16: Vit d 37  10/8/15: ESR 45; CRP 14.2; Hg 11.2; Ht 35.8; CMP; ok  7/7/15: CBC plt 380;  K+ 3.4; Glu 154  7/6/15: ESR 48; CRP 9.6  4/4/15: ESR 41; CRP 19; Hg 11.0; Ht 34.6; plt 377; CMP ok;  1/10/15: ESR 34; CRP 18; CBC ok; ; Alb. 3.6  10/4/14: ESR 43; CRP 18.5; Hg 11.8; Ht 36.5; plt 378; Alb. 3.4;   3/20/14: ESR 41; CRP 14.3; plt 356; CMP ok;  2/15/13: ESR 30; CRP 9.8; CBC ok; Alb. 3.3  11/2/13: ESR 28; CRP 11.6; plt 363; Alb. 3.3;   5/28/13: ESR 45; CRP 8.8; plt 366; CMP ok;     12/1/18: Bilateral hands: personally viewed: Mild degenerative changes noted at the interphalangeal joints.  Mild-to-moderate degenerative change noted at the bilateral 1st CMC joints.  Mild degenerative changes also present at both radiocarpal joints, triscaphe joints and right distal radioulnar joint.  Minimal degenerative changes also present at the MCP joints bilaterally and greatest at the 1st MCP joint on the left.  6/15/17: Bilateral hands: personally reviewed: mild osteoarthritis w stable periarticular erosion at the left fourth MCP joint; no change since last year.   8/27/16: Bilateral feet: personally reviewed. Mild HV; mild OA shell 1st MTP dali; ? Erosion PIP left small toe;   5/26/16: Bilateral hands: personally reviewed: L 4th MCP (new) erosive lesion; R & L 2nd & 3rd metacarpal head cysts; R mild PIP erosion & STS 2nd.    Assessment:     Psoriatic arthritis-- with mild SHT 2 MCPs bilaterally (R >>L) & with sausage digit of R index finger--currently w  less& flexion deformity of R 3rd PIP--stable at present .    a) History of sausage digits of both toes      PIPs, DIPs, wrists, and knees., now w. only one sausage digit.   b) Psoriasis of the abdomen, elbow, and the shins--resolved.    c) Enbrel stopped 10/21/11 due to optic neuritis.     d) New erosion (4th L metacarpal head) on x-ray & possibly L 5th toe PIP     e) Persisting elevation of ESR and CRP    f) inadequate response to leflunomide and Stelara & apremilast (w. Intolerable nausea)   G) could not tolerate SSZ--nausea.   H) MTX x 2 even SC did not help  sxs.   I) improved on Cosentyx (+leflunomide + naproxen) initially incompletely-- hand joints still hurt despite normalization of  ESR & CRP   J) Orencia begun 11/17 with incomplete response   K)Taltz begun 4/20/18-2/19   L) Tofacitinib 3/19 - 6/18/19    Chronic SHT 3rd R PIP & base of L thumb with questionable erosive lesion.   M) Back to Cosentyx + leflunomide + celebrex    N) Tremfya begun 4/14/21--failed: stopped w headache & lack of efficacy   O) Back to Cosentyx 10/6/21 with improvement; held since 12/15/21.   P) On Rinvoq 15 mg since 1/29/22--stable to improved.    Leflunomide stopped 7/2022--no effect of stopping    Left optic neuritis with demyelinating lesions secondary to Enbrel--now resolved.    Repeat MRI ok    Adverse reaction to Etanercept.    Trochanteric bursitis on L    RLE & R knee pain/swelling--stable   S/P injury ~ 2000   Doubt R DVT but on a Jakinib   R/O R Popliteal cyst    Chronic pain/fibromyalgia syndrome with fatigue, tender points, withdrawals to palpation in the past, pain all over, responsive to Savella but with some nausea (off it now), followed by Dr. O[facility] at the [facility],    On methadone    Degenerative joint disease of the cervical spine, knees, and feet--very mild.     Depression on wellbutrin   Off savella & sertraline (much weight gain)    Hydradenitis suppurativa    Hypertriglyceridemia    Hypertension.     Hypothyroidism.     Vitamin D deficiency with regular prescription vitamin D supplementation.     S/P covid 7/3/22    Obesity from morbid obesity    Plan:   Discussed & educated on trochanteric bursitis.  Reviewed management.  Offered injection. Patient declined.  Ok to continue Rinvoq  Ok to continue naproxen 500 mg up to tid but less is best.   Still needs Shingrix: The CDC recommends that healthy adults 50 or older, as well as those 19 years and older who are immunocompromised, get two doses of the vaccine Shingrix.   Labs today & q 3 months  RTC 3 months or prn to see  Dr. May.

## 2024-03-14 ENCOUNTER — LAB VISIT (OUTPATIENT)
Dept: LAB | Facility: HOSPITAL | Age: 49
End: 2024-03-14
Attending: INTERNAL MEDICINE
Payer: COMMERCIAL

## 2024-03-14 ENCOUNTER — OFFICE VISIT (OUTPATIENT)
Dept: RHEUMATOLOGY | Facility: CLINIC | Age: 49
End: 2024-03-14
Payer: COMMERCIAL

## 2024-03-14 ENCOUNTER — PATIENT MESSAGE (OUTPATIENT)
Dept: RHEUMATOLOGY | Facility: CLINIC | Age: 49
End: 2024-03-14

## 2024-03-14 VITALS
SYSTOLIC BLOOD PRESSURE: 118 MMHG | BODY MASS INDEX: 36.58 KG/M2 | DIASTOLIC BLOOD PRESSURE: 78 MMHG | WEIGHT: 226.63 LBS

## 2024-03-14 DIAGNOSIS — M15.9 GENERALIZED OSTEOARTHRITIS OF MULTIPLE SITES: ICD-10-CM

## 2024-03-14 DIAGNOSIS — Z79.1 LONG TERM (CURRENT) USE OF NON-STEROIDAL ANTI-INFLAMMATORIES (NSAID): ICD-10-CM

## 2024-03-14 DIAGNOSIS — Z79.60 LONG-TERM USE OF IMMUNOSUPPRESSANT MEDICATION: ICD-10-CM

## 2024-03-14 DIAGNOSIS — L40.50 PSORIATIC ARTHRITIS: Primary | ICD-10-CM

## 2024-03-14 DIAGNOSIS — L40.50 PSORIATIC ARTHRITIS: ICD-10-CM

## 2024-03-14 DIAGNOSIS — M70.62 TROCHANTERIC BURSITIS OF LEFT HIP: ICD-10-CM

## 2024-03-14 LAB
ALBUMIN SERPL BCP-MCNC: 3.8 G/DL (ref 3.5–5.2)
ALP SERPL-CCNC: 98 U/L (ref 55–135)
ALT SERPL W/O P-5'-P-CCNC: 21 U/L (ref 10–44)
ANION GAP SERPL CALC-SCNC: 10 MMOL/L (ref 8–16)
AST SERPL-CCNC: 24 U/L (ref 10–40)
BASOPHILS # BLD AUTO: 0.04 K/UL (ref 0–0.2)
BASOPHILS NFR BLD: 0.6 % (ref 0–1.9)
BILIRUB SERPL-MCNC: 0.2 MG/DL (ref 0.1–1)
BUN SERPL-MCNC: 21 MG/DL (ref 6–20)
CALCIUM SERPL-MCNC: 9.8 MG/DL (ref 8.7–10.5)
CHLORIDE SERPL-SCNC: 102 MMOL/L (ref 95–110)
CO2 SERPL-SCNC: 24 MMOL/L (ref 23–29)
CREAT SERPL-MCNC: 0.9 MG/DL (ref 0.5–1.4)
CRP SERPL-MCNC: 1.8 MG/L (ref 0–8.2)
DIFFERENTIAL METHOD BLD: ABNORMAL
EOSINOPHIL # BLD AUTO: 0.3 K/UL (ref 0–0.5)
EOSINOPHIL NFR BLD: 3.9 % (ref 0–8)
ERYTHROCYTE [DISTWIDTH] IN BLOOD BY AUTOMATED COUNT: 15 % (ref 11.5–14.5)
ERYTHROCYTE [SEDIMENTATION RATE] IN BLOOD BY PHOTOMETRIC METHOD: 13 MM/HR (ref 0–36)
EST. GFR  (NO RACE VARIABLE): >60 ML/MIN/1.73 M^2
GLUCOSE SERPL-MCNC: 111 MG/DL (ref 70–110)
HCT VFR BLD AUTO: 38.8 % (ref 37–48.5)
HGB BLD-MCNC: 12.3 G/DL (ref 12–16)
IMM GRANULOCYTES # BLD AUTO: 0.06 K/UL (ref 0–0.04)
IMM GRANULOCYTES NFR BLD AUTO: 0.9 % (ref 0–0.5)
LYMPHOCYTES # BLD AUTO: 2 K/UL (ref 1–4.8)
LYMPHOCYTES NFR BLD: 31.4 % (ref 18–48)
MCH RBC QN AUTO: 28.8 PG (ref 27–31)
MCHC RBC AUTO-ENTMCNC: 31.7 G/DL (ref 32–36)
MCV RBC AUTO: 91 FL (ref 82–98)
MONOCYTES # BLD AUTO: 0.6 K/UL (ref 0.3–1)
MONOCYTES NFR BLD: 9 % (ref 4–15)
NEUTROPHILS # BLD AUTO: 3.4 K/UL (ref 1.8–7.7)
NEUTROPHILS NFR BLD: 54.2 % (ref 38–73)
NRBC BLD-RTO: 0 /100 WBC
PLATELET # BLD AUTO: 401 K/UL (ref 150–450)
PMV BLD AUTO: 8.2 FL (ref 9.2–12.9)
POTASSIUM SERPL-SCNC: 4.7 MMOL/L (ref 3.5–5.1)
PROT SERPL-MCNC: 7.5 G/DL (ref 6–8.4)
RBC # BLD AUTO: 4.27 M/UL (ref 4–5.4)
SODIUM SERPL-SCNC: 136 MMOL/L (ref 136–145)
WBC # BLD AUTO: 6.33 K/UL (ref 3.9–12.7)

## 2024-03-14 PROCEDURE — 3008F BODY MASS INDEX DOCD: CPT | Mod: CPTII,S$GLB,, | Performed by: INTERNAL MEDICINE

## 2024-03-14 PROCEDURE — 85652 RBC SED RATE AUTOMATED: CPT | Performed by: INTERNAL MEDICINE

## 2024-03-14 PROCEDURE — 80053 COMPREHEN METABOLIC PANEL: CPT | Performed by: INTERNAL MEDICINE

## 2024-03-14 PROCEDURE — 99999 PR PBB SHADOW E&M-EST. PATIENT-LVL V: CPT | Mod: PBBFAC,,, | Performed by: INTERNAL MEDICINE

## 2024-03-14 PROCEDURE — 1159F MED LIST DOCD IN RCRD: CPT | Mod: CPTII,S$GLB,, | Performed by: INTERNAL MEDICINE

## 2024-03-14 PROCEDURE — 86140 C-REACTIVE PROTEIN: CPT | Performed by: INTERNAL MEDICINE

## 2024-03-14 PROCEDURE — 3078F DIAST BP <80 MM HG: CPT | Mod: CPTII,S$GLB,, | Performed by: INTERNAL MEDICINE

## 2024-03-14 PROCEDURE — 4010F ACE/ARB THERAPY RXD/TAKEN: CPT | Mod: CPTII,S$GLB,, | Performed by: INTERNAL MEDICINE

## 2024-03-14 PROCEDURE — 1160F RVW MEDS BY RX/DR IN RCRD: CPT | Mod: CPTII,S$GLB,, | Performed by: INTERNAL MEDICINE

## 2024-03-14 PROCEDURE — 36415 COLL VENOUS BLD VENIPUNCTURE: CPT | Performed by: INTERNAL MEDICINE

## 2024-03-14 PROCEDURE — 99214 OFFICE O/P EST MOD 30 MIN: CPT | Mod: S$GLB,,, | Performed by: INTERNAL MEDICINE

## 2024-03-14 PROCEDURE — 85025 COMPLETE CBC W/AUTO DIFF WBC: CPT | Performed by: INTERNAL MEDICINE

## 2024-03-14 PROCEDURE — 3074F SYST BP LT 130 MM HG: CPT | Mod: CPTII,S$GLB,, | Performed by: INTERNAL MEDICINE

## 2024-03-14 NOTE — PATIENT INSTRUCTIONS
Read on trochanteric bursitis.    Discuss with your pain MD possibly taking some gabapentin during the day.

## 2024-03-15 ENCOUNTER — PATIENT MESSAGE (OUTPATIENT)
Dept: RHEUMATOLOGY | Facility: CLINIC | Age: 49
End: 2024-03-15
Payer: COMMERCIAL

## 2024-03-24 DIAGNOSIS — G89.4 CHRONIC PAIN SYNDROME: ICD-10-CM

## 2024-03-24 DIAGNOSIS — F34.1 DYSTHYMIA: ICD-10-CM

## 2024-03-24 NOTE — TELEPHONE ENCOUNTER
No care due was identified.  Health [facility] Embedded Care Due Messages. Reference number: [id number].   3/24/2024 6:10:08 AM CDT

## 2024-03-25 RX ORDER — DULOXETIN HYDROCHLORIDE 30 MG/1
30 CAPSULE, DELAYED RELEASE ORAL
Qty: 90 CAPSULE | Refills: 3 | Status: SHIPPED | OUTPATIENT
Start: 2024-03-25

## 2024-03-25 NOTE — TELEPHONE ENCOUNTER
Refill Routing Note   Medication(s) are not appropriate for processing by [facility] for the following reason(s):        New or recently adjusted medication    [facility] action(s):  Defer               Appointments  past 12m or future 3m with PCP    Date Provider   Last Visit   1/22/2024 Andriy Moran MD   Next Visit   8/2/2024 Andriy Moran MD   ED visits in past 90 days: 0        Note composed:3:12 PM 03/25/2024

## 2024-03-27 ENCOUNTER — OFFICE VISIT (OUTPATIENT)
Dept: RHEUMATOLOGY | Facility: CLINIC | Age: 49
End: 2024-03-27
Payer: COMMERCIAL

## 2024-03-27 VITALS
BODY MASS INDEX: 35.22 KG/M2 | DIASTOLIC BLOOD PRESSURE: 82 MMHG | WEIGHT: 219.13 LBS | HEART RATE: 86 BPM | HEIGHT: 66 IN | SYSTOLIC BLOOD PRESSURE: 149 MMHG

## 2024-03-27 DIAGNOSIS — M70.62 TROCHANTERIC BURSITIS, LEFT HIP: Primary | ICD-10-CM

## 2024-03-27 PROCEDURE — 3077F SYST BP >= 140 MM HG: CPT | Mod: CPTII,S$GLB,, | Performed by: INTERNAL MEDICINE

## 2024-03-27 PROCEDURE — 1160F RVW MEDS BY RX/DR IN RCRD: CPT | Mod: CPTII,S$GLB,, | Performed by: INTERNAL MEDICINE

## 2024-03-27 PROCEDURE — 4010F ACE/ARB THERAPY RXD/TAKEN: CPT | Mod: CPTII,S$GLB,, | Performed by: INTERNAL MEDICINE

## 2024-03-27 PROCEDURE — 3008F BODY MASS INDEX DOCD: CPT | Mod: CPTII,S$GLB,, | Performed by: INTERNAL MEDICINE

## 2024-03-27 PROCEDURE — 99999 PR PBB SHADOW E&M-EST. PATIENT-LVL V: CPT | Mod: PBBFAC,,, | Performed by: INTERNAL MEDICINE

## 2024-03-27 PROCEDURE — 1159F MED LIST DOCD IN RCRD: CPT | Mod: CPTII,S$GLB,, | Performed by: INTERNAL MEDICINE

## 2024-03-27 PROCEDURE — 3079F DIAST BP 80-89 MM HG: CPT | Mod: CPTII,S$GLB,, | Performed by: INTERNAL MEDICINE

## 2024-03-27 PROCEDURE — 20610 DRAIN/INJ JOINT/BURSA W/O US: CPT | Mod: LT,S$GLB,, | Performed by: INTERNAL MEDICINE

## 2024-03-27 RX ORDER — TRIAMCINOLONE ACETONIDE 40 MG/ML
40 INJECTION, SUSPENSION INTRA-ARTICULAR; INTRAMUSCULAR ONCE
Status: COMPLETED | OUTPATIENT
Start: 2024-03-27 | End: 2024-03-27

## 2024-03-27 RX ADMIN — TRIAMCINOLONE ACETONIDE 40 MG: 40 INJECTION, SUSPENSION INTRA-ARTICULAR; INTRAMUSCULAR at 04:03

## 2024-04-07 ENCOUNTER — PATIENT MESSAGE (OUTPATIENT)
Dept: RHEUMATOLOGY | Facility: CLINIC | Age: 49
End: 2024-04-07
Payer: COMMERCIAL

## 2024-05-26 DIAGNOSIS — E03.9 HYPOTHYROIDISM, UNSPECIFIED TYPE: ICD-10-CM

## 2024-05-26 NOTE — TELEPHONE ENCOUNTER
Care Due:                  Date            Visit Type   Department     Provider  --------------------------------------------------------------------------------                                EP -         [facility]                              PRIMARY      MED/ INTERNAL  Last Visit: 01-      CARE ([facility])   MED/ PEDS      Andriy A  Page                              EP [facility]                              PRIMARY      MED/ INTERNAL  Next Visit: 08-      CARE ([facility])   MED/ PEDS      Andriy A  Page                                                            Last  Test          Frequency    Reason                     Performed    Due Date  --------------------------------------------------------------------------------    HBA1C.......  6 months...  metFORMIN................  01- 07-    Health [facility] Embedded Care Due Messages. Reference number: [id number].   5/26/2024 7:00:33 AM CDT

## 2024-05-26 NOTE — TELEPHONE ENCOUNTER
Refill Routing Note   Medication(s) are not appropriate for processing by [facility] for the following reason(s):        Required labs outdated: TSH    [facility] action(s):  Defer     Requires labs : Yes             Appointments  past 12m or future 3m with PCP    Date Provider   Last Visit   1/22/2024 Andriy Moran MD   Next Visit   8/2/2024 Andriy Moran MD   ED visits in past 90 days: 0        Note composed:3:13 PM 05/26/2024

## 2024-05-27 RX ORDER — LEVOTHYROXINE SODIUM 125 UG/1
125 TABLET ORAL
Qty: 90 TABLET | Refills: 0 | Status: SHIPPED | OUTPATIENT
Start: 2024-05-27

## 2024-05-27 NOTE — TELEPHONE ENCOUNTER
Please get patient scheduled to check TSH and free T4 as her last one was a little low    Thanks  Dr. Moran

## 2024-06-06 DIAGNOSIS — L40.50 PSORIATIC ARTHRITIS: ICD-10-CM

## 2024-06-06 RX ORDER — UPADACITINIB 15 MG/1
15 TABLET, EXTENDED RELEASE ORAL DAILY
Qty: 30 TABLET | Refills: 0 | Status: ACTIVE | OUTPATIENT
Start: 2024-06-06

## 2024-06-06 NOTE — PROGRESS NOTES
Patient of Dr. Richards in need of refill.  Will send refill however staff to make sure that she gets rescheduled and has labs done.  They will be due soon.

## 2024-06-07 ENCOUNTER — PATIENT MESSAGE (OUTPATIENT)
Dept: RHEUMATOLOGY | Facility: CLINIC | Age: 49
End: 2024-06-07
Payer: COMMERCIAL

## 2024-06-07 NOTE — PROGRESS NOTES
Rinvoq refill request - Reached out to patient to ask if she can get lab work done and re-schedule appointment.

## 2024-06-17 ENCOUNTER — PATIENT MESSAGE (OUTPATIENT)
Dept: FAMILY MEDICINE | Facility: CLINIC | Age: 49
End: 2024-06-17
Payer: COMMERCIAL

## 2024-06-17 DIAGNOSIS — E03.9 HYPOTHYROIDISM, UNSPECIFIED TYPE: Primary | ICD-10-CM

## 2024-06-19 ENCOUNTER — LAB VISIT (OUTPATIENT)
Dept: LAB | Facility: HOSPITAL | Age: 49
End: 2024-06-19
Attending: FAMILY MEDICINE
Payer: COMMERCIAL

## 2024-06-19 DIAGNOSIS — Z79.1 LONG TERM (CURRENT) USE OF NON-STEROIDAL ANTI-INFLAMMATORIES (NSAID): ICD-10-CM

## 2024-06-19 DIAGNOSIS — E03.9 HYPOTHYROIDISM, UNSPECIFIED TYPE: ICD-10-CM

## 2024-06-19 DIAGNOSIS — Z79.60 LONG-TERM USE OF IMMUNOSUPPRESSANT MEDICATION: ICD-10-CM

## 2024-06-19 DIAGNOSIS — L40.50 PSORIATIC ARTHRITIS: ICD-10-CM

## 2024-06-19 LAB
ALBUMIN SERPL BCP-MCNC: 3.5 G/DL (ref 3.5–5.2)
ALP SERPL-CCNC: 89 U/L (ref 55–135)
ALT SERPL W/O P-5'-P-CCNC: 14 U/L (ref 10–44)
ANION GAP SERPL CALC-SCNC: 10 MMOL/L (ref 8–16)
AST SERPL-CCNC: 22 U/L (ref 10–40)
BASOPHILS # BLD AUTO: 0.04 K/UL (ref 0–0.2)
BASOPHILS NFR BLD: 0.6 % (ref 0–1.9)
BILIRUB SERPL-MCNC: 0.1 MG/DL (ref 0.1–1)
BUN SERPL-MCNC: 18 MG/DL (ref 6–20)
CALCIUM SERPL-MCNC: 9.4 MG/DL (ref 8.7–10.5)
CHLORIDE SERPL-SCNC: 103 MMOL/L (ref 95–110)
CO2 SERPL-SCNC: 24 MMOL/L (ref 23–29)
CREAT SERPL-MCNC: 1 MG/DL (ref 0.5–1.4)
CRP SERPL-MCNC: 1.1 MG/L (ref 0–8.2)
DIFFERENTIAL METHOD BLD: ABNORMAL
EOSINOPHIL # BLD AUTO: 0.1 K/UL (ref 0–0.5)
EOSINOPHIL NFR BLD: 1.7 % (ref 0–8)
ERYTHROCYTE [DISTWIDTH] IN BLOOD BY AUTOMATED COUNT: 14.5 % (ref 11.5–14.5)
ERYTHROCYTE [SEDIMENTATION RATE] IN BLOOD BY PHOTOMETRIC METHOD: 6 MM/HR (ref 0–36)
EST. GFR  (NO RACE VARIABLE): >60 ML/MIN/1.73 M^2
GLUCOSE SERPL-MCNC: 94 MG/DL (ref 70–110)
HCT VFR BLD AUTO: 39.7 % (ref 37–48.5)
HGB BLD-MCNC: 12.1 G/DL (ref 12–16)
IMM GRANULOCYTES # BLD AUTO: 0.08 K/UL (ref 0–0.04)
IMM GRANULOCYTES NFR BLD AUTO: 1.1 % (ref 0–0.5)
LYMPHOCYTES # BLD AUTO: 2 K/UL (ref 1–4.8)
LYMPHOCYTES NFR BLD: 28.9 % (ref 18–48)
MCH RBC QN AUTO: 28.9 PG (ref 27–31)
MCHC RBC AUTO-ENTMCNC: 30.5 G/DL (ref 32–36)
MCV RBC AUTO: 95 FL (ref 82–98)
MONOCYTES # BLD AUTO: 0.8 K/UL (ref 0.3–1)
MONOCYTES NFR BLD: 10.7 % (ref 4–15)
NEUTROPHILS # BLD AUTO: 4 K/UL (ref 1.8–7.7)
NEUTROPHILS NFR BLD: 57 % (ref 38–73)
NRBC BLD-RTO: 0 /100 WBC
PLATELET # BLD AUTO: 436 K/UL (ref 150–450)
PMV BLD AUTO: 8.5 FL (ref 9.2–12.9)
POTASSIUM SERPL-SCNC: 4.5 MMOL/L (ref 3.5–5.1)
PROT SERPL-MCNC: 7 G/DL (ref 6–8.4)
RBC # BLD AUTO: 4.18 M/UL (ref 4–5.4)
SODIUM SERPL-SCNC: 137 MMOL/L (ref 136–145)
T4 FREE SERPL-MCNC: 0.99 NG/DL (ref 0.71–1.51)
TSH SERPL DL<=0.005 MIU/L-ACNC: 0.23 UIU/ML (ref 0.4–4)
WBC # BLD AUTO: 7.07 K/UL (ref 3.9–12.7)

## 2024-06-19 PROCEDURE — 85025 COMPLETE CBC W/AUTO DIFF WBC: CPT | Performed by: INTERNAL MEDICINE

## 2024-06-19 PROCEDURE — 80053 COMPREHEN METABOLIC PANEL: CPT | Performed by: INTERNAL MEDICINE

## 2024-06-19 PROCEDURE — 86140 C-REACTIVE PROTEIN: CPT | Performed by: INTERNAL MEDICINE

## 2024-06-19 PROCEDURE — 84443 ASSAY THYROID STIM HORMONE: CPT | Performed by: FAMILY MEDICINE

## 2024-06-19 PROCEDURE — 85652 RBC SED RATE AUTOMATED: CPT | Performed by: INTERNAL MEDICINE

## 2024-06-19 PROCEDURE — 84439 ASSAY OF FREE THYROXINE: CPT | Performed by: FAMILY MEDICINE

## 2024-06-19 PROCEDURE — 36415 COLL VENOUS BLD VENIPUNCTURE: CPT | Mod: PO | Performed by: INTERNAL MEDICINE

## 2024-06-25 ENCOUNTER — PATIENT MESSAGE (OUTPATIENT)
Dept: FAMILY MEDICINE | Facility: CLINIC | Age: 49
End: 2024-06-25
Payer: COMMERCIAL

## 2024-06-25 DIAGNOSIS — R79.89 ABNORMAL TSH: Primary | ICD-10-CM

## 2024-06-27 DIAGNOSIS — L40.50 PSORIATIC ARTHRITIS: ICD-10-CM

## 2024-06-27 RX ORDER — UPADACITINIB 15 MG/1
15 TABLET, EXTENDED RELEASE ORAL
Qty: 60 TABLET | Refills: 0 | Status: SHIPPED | OUTPATIENT
Start: 2024-06-27

## 2024-07-01 ENCOUNTER — OFFICE VISIT (OUTPATIENT)
Dept: OBSTETRICS AND GYNECOLOGY | Facility: CLINIC | Age: 49
End: 2024-07-01
Payer: COMMERCIAL

## 2024-07-01 VITALS
BODY MASS INDEX: 36.42 KG/M2 | WEIGHT: 226.63 LBS | DIASTOLIC BLOOD PRESSURE: 60 MMHG | SYSTOLIC BLOOD PRESSURE: 110 MMHG | HEIGHT: 66 IN

## 2024-07-01 DIAGNOSIS — Z80.3 FAMILY HISTORY OF BREAST CANCER: ICD-10-CM

## 2024-07-01 DIAGNOSIS — Z01.419 WELL FEMALE EXAM WITH ROUTINE GYNECOLOGICAL EXAM: Primary | ICD-10-CM

## 2024-07-01 DIAGNOSIS — Z12.31 ENCOUNTER FOR SCREENING MAMMOGRAM FOR MALIGNANT NEOPLASM OF BREAST: ICD-10-CM

## 2024-07-01 PROCEDURE — 99999 PR PBB SHADOW E&M-EST. PATIENT-LVL V: CPT | Mod: PBBFAC,,, | Performed by: OBSTETRICS & GYNECOLOGY

## 2024-07-01 PROCEDURE — 3008F BODY MASS INDEX DOCD: CPT | Mod: CPTII,S$GLB,, | Performed by: OBSTETRICS & GYNECOLOGY

## 2024-07-01 PROCEDURE — 99396 PREV VISIT EST AGE 40-64: CPT | Mod: S$GLB,,, | Performed by: OBSTETRICS & GYNECOLOGY

## 2024-07-01 PROCEDURE — 4010F ACE/ARB THERAPY RXD/TAKEN: CPT | Mod: CPTII,S$GLB,, | Performed by: OBSTETRICS & GYNECOLOGY

## 2024-07-01 PROCEDURE — 1159F MED LIST DOCD IN RCRD: CPT | Mod: CPTII,S$GLB,, | Performed by: OBSTETRICS & GYNECOLOGY

## 2024-07-01 PROCEDURE — 3078F DIAST BP <80 MM HG: CPT | Mod: CPTII,S$GLB,, | Performed by: OBSTETRICS & GYNECOLOGY

## 2024-07-01 PROCEDURE — 88175 CYTOPATH C/V AUTO FLUID REDO: CPT | Performed by: OBSTETRICS & GYNECOLOGY

## 2024-07-01 PROCEDURE — 3074F SYST BP LT 130 MM HG: CPT | Mod: CPTII,S$GLB,, | Performed by: OBSTETRICS & GYNECOLOGY

## 2024-07-01 NOTE — PROGRESS NOTES
SUBJECTIVE:   48 y.o. female   for routine gyn exam. No LMP recorded. (Menstrual status: Birth Control)..  She has no unusual complaints. Amenorrhea with Mirena         Past Medical History:   Diagnosis Date    Abnormal Pap smear of vagina     AR (allergic rhinitis)     Demyelinating disorder     Depression     Fever blister     Fibromyalgia     followed by pain management    Generalized osteoarthritis of multiple sites     Hypertension     Hypothyroidism     Insulin resistance     Mixed anxiety and depressive disorder     Morbid obesity     Myelitis, optic neuritis in     treated with high-dose steroids and resolved; positive MRI and spinal fluid for a mass, but on embrel for psoriatic arthritis - she will see a MS specialist at [facility]; MRI normalized off embrel    Obesity     Pre-diabetes     hx diabetes on stereoids /    Psoriasis     Psoriatic arthritis     Vitamin D deficiency disease      Past Surgical History:   Procedure Laterality Date    COLONOSCOPY N/A 2022    Procedure: COLONOSCOPY;  Surgeon: Jaylen Pelletier MD;  Location: [facility] ENDO;  Service: Endoscopy;  Laterality: N/A;  Pt. holding Adipex for 5 days prior to.EC  (She states she doesnt take it every day.)    EYE SURGERY Bilateral 2021    Laser surgery for pressure relief    LAPAROSCOPIC CHOLECYSTECTOMY N/A 2022    Procedure: CHOLECYSTECTOMY, LAPAROSCOPIC;  Surgeon: Gabriel Herring MD;  Location: [facility] OR;  Service: General;  Laterality: N/A;  RN PRE OP 22, Covid NEGATIVE---- UPT  IN AM ---.  C A  NEED H/P-----CLEARED BY PCP    SINUS SURGERY       Social History     Socioeconomic History    Marital status: Single    Number of children: 1   Occupational History    Occupation: Banking     Employer: [employer] (MAIN OFFICE)   Tobacco Use    Smoking status: Never     Passive exposure: Never    Smokeless tobacco: Never   Substance and Sexual Activity    Alcohol use: No    Drug use: No     Comment: 7/7/15 one weed brownie     Sexual activity: Yes     Partners: Male     Birth control/protection: I.U.D.     Comment: Mirena 2022   Other Topics Concern    Are you pregnant or think you may be? No    Breast-feeding No     Family History   Problem Relation Name Age of Onset    Diabetes Maternal Grandfather      Heart disease Maternal Grandfather      Thyroid disease Maternal Grandfather      Psoriasis Father      Cancer Father          throat    Thyroid disease Mother      Heart disease Mother      Hypertension Mother      Hyperlipidemia Mother      Coronary artery disease Mother          s/p CABG    Heart attack Mother      Breast cancer Sister  54    Heart disease Sister          MVP    ADD / ADHD Son      Diabetes Paternal Uncle      Melanoma Neg Hx      Lupus Neg Hx      Eczema Neg Hx      Acne Neg Hx      Colon cancer Neg Hx      Ovarian cancer Neg Hx       OB History    Para Term  AB Living   1 1 1     1   SAB IAB Ectopic Multiple Live Births           1      # Outcome Date GA Lbr Zack/2nd Weight Sex Type Anes PTL Lv   1 Term      Vag-Spont  N BOO         Current Outpatient Medications   Medication Sig Dispense Refill    brimonidine 0.1% (ALPHAGAN P) 0.1 % Drop Place 1 drop into both eyes 2 (two) times daily.      buPROPion (WELLBUTRIN XL) 300 MG 24 hr tablet TAKE 1 TABLET BY MOUTH ONCE  DAILY 90 tablet 3    cholecalciferol, vitamin D3, 2,000 unit Tab Take 1 tablet by mouth Once a week.      clindamycin (CLEOCIN T) 1 % external solution AAA bid 60 mL 3    DULoxetine (CYMBALTA) 30 MG capsule TAKE 1 CAPSULE BY MOUTH ONCE  DAILY 90 capsule 3    furosemide (LASIX) 40 MG tablet TAKE 1 TABLET BY MOUTH ONCE  DAILY 90 tablet 3    gabapentin (NEURONTIN) 300 MG capsule Take 300 mg by mouth 3 (three) times daily.      latanoprost 0.005 % ophthalmic solution Place into both eyes.      levothyroxine (SYNTHROID) 125 MCG tablet Take 1 tablet (125 mcg total) by mouth before breakfast. 90 tablet 0    liothyronine (CYTOMEL) 5 MCG Tab  TAKE 2 TABLETS BY MOUTH DAILY 180 tablet 2    loratadine-pseudoephedrine  mg (CLARITIN-D 24-HOUR)  mg per 24 hr tablet Take 1 tablet by mouth once daily.      losartan (COZAAR) 50 MG tablet TAKE 1 TABLET BY MOUTH ONCE  DAILY 90 tablet 3    metFORMIN (GLUCOPHAGE-XR) 500 MG ER 24hr tablet TAKE 1 TABLET BY MOUTH ONCE DAILY IN THE MORNING WITH BREAKFAST 90 tablet 1    methadone (DOLOPHINE) 10 MG tablet Take 1 tablet by mouth Three times a day.      naproxen (NAPROSYN) 500 MG tablet TAKE 1 TABLET BY MOUTH EVERY  MEAL AS NEEDED 270 tablet 3    phentermine (ADIPEX-P) 37.5 mg tablet TAKE 1 TABLET(37.5 MG) BY MOUTH BEFORE BREAKFAST 30 tablet 0    potassium chloride (MICRO-K) 10 MEQ CpSR TAKE 2 CAPSULES BY MOUTH DAILY 180 capsule 3    promethazine (PHENERGAN) 25 MG tablet TK 1 T PO BID PRN N      RINVOQ 15 mg 24 hr tablet TAKE 1 TABLET BY MOUTH DAILY 60 tablet 0    spironolactone (ALDACTONE) 50 MG tablet Start with 1 po qday, increase to 2 po qday as tolerated 60 tablet 3    traZODone (DESYREL) 50 MG tablet TAKE 1 TO 2 TABLET BY MOUTH EVERY NIGHT      tretinoin (RETIN-A) 0.025 % cream Apply topically every evening.       No current facility-administered medications for this visit.     Facility-Administered Medications Ordered in Other Visits   Medication Dose Route Frequency Provider Last Rate Last Admin    mupirocin 2 % ointment   Nasal On Call Procedure Gabriel Herring MD   Given at 02/18/22 0703     Allergies: Doxycycline hcl and Enbrel [etanercept]     ROS:  Constitutional: no weight loss, weight gain, fever, fatigue  Eyes:  No vision changes, glasses/contacts  ENT/Mouth: No ulcers, sinus problems, ears ringing, headache  Cardiovascular: No inability to lie flat, chest pain, exercise intolerance, swelling, heart palpitations  Respiratory: No wheezing, coughing blood, shortness of breath, or cough  Gastrointestinal: No diarrhea, bloody stool, nausea/vomiting, constipation, gas, hemorrhoids  Genitourinary: No  "blood in urine, painful urination, urgency of urination, frequency of urination, incomplete emptying, incontinence, abnormal bleeding, painful periods, heavy periods, vaginal discharge, vaginal odor, painful intercourse, sexual problems, bleeding after intercourse.  Musculoskeletal: No muscle weakness  Skin/Breast: No painful breasts, nipple discharge, masses, rash, ulcers  Neurological: No passing out, seizures, numbness, headache  Endocrine: No diabetes, hypothyroid, hyperthyroid, hot flashes, hair loss, abnormal hair growth, acne  Psychiatric: No depression, crying  Hematologic: No bruises, bleeding, swollen lymph nodes, anemia.      OBJECTIVE:   The patient appears well, alert, oriented x 3, in no distress.  /60 (BP Location: Right arm, Patient Position: Sitting, BP Method: Large (Manual))   Ht 5' 6" (1.676 m)   Wt 102.8 kg (226 lb 10.1 oz)   BMI 36.58 kg/m²   NECK: no thyromegaly, trachea midline  SKIN: no acne, striae, hirsutism  CHEST: CTAB  CV: RRR  BREAST EXAM: breasts appear normal, no suspicious masses, no skin or nipple changes or axillary nodes  ABDOMEN: no hernias, masses, or hepatosplenomegaly  GENITALIA: normal external genitalia, no erythema, no discharge  URETHRA: normal urethra, normal urethral meatus  VAGINA: Normal  CERVIX: no lesions or cervical motion tenderness. IUD strings seen  UTERUS: normal size, contour, position, consistency, mobility, non-tender  ADNEXA: no mass, fullness, tenderness      ASSESSMENT:   1. Well female exam with routine gynecological exam  Liquid-Based Pap Smear, Screening      2. Encounter for screening mammogram for malignant neoplasm of breast  Mammo Digital Screening Bilat w/ Philippe      3. Family history of breast cancer            PLAN:   Orders Placed This Encounter    Mammo Digital Screening Bilat w/ Philippe    Liquid-Based Pap Smear, Screening     Discussed healthy lifestyle including regular exercise, healthy eating, etc.  Return to clinic in 1 year    "

## 2024-07-02 LAB
CLINICAL INFO: NORMAL
DATE OF PREVIOUS PAP: NORMAL
DATE PREVIOUS BX: NORMAL
LMP START DATE: NORMAL
SPECIMEN SOURCE CVX/VAG CYTO: NORMAL

## 2024-07-10 ENCOUNTER — PATIENT MESSAGE (OUTPATIENT)
Dept: FAMILY MEDICINE | Facility: CLINIC | Age: 49
End: 2024-07-10
Payer: COMMERCIAL

## 2024-07-31 ENCOUNTER — TELEPHONE (OUTPATIENT)
Dept: FAMILY MEDICINE | Facility: CLINIC | Age: 49
End: 2024-07-31
Payer: COMMERCIAL

## 2024-07-31 ENCOUNTER — PATIENT MESSAGE (OUTPATIENT)
Dept: FAMILY MEDICINE | Facility: CLINIC | Age: 49
End: 2024-07-31
Payer: COMMERCIAL

## 2024-07-31 NOTE — PROGRESS NOTES
Subjective:      Patient ID: Keli Gilbert is a 48 y.o. female.    Chief Complaint: follow up for PsA    Keli Gilbert is a 47yo F with PMH of PsA, mild OA/DJD multiple joints, fibromyalgia, chronic pain on methadone, HTN, hypothyroidism, anxiety, depression, hypertriglyceridemia, vitamin D deficiency, trochanteric bursitis, morbid obesity, hidradenitis suppurativa.     Rheum History:  - Psoriatic arthritis dx around 1997, had psoriasis since childhood  - Symptoms: psoriasis lesions of abdomen/elbow/shins, dactylitis in toes and fingers, MCP synovial hypertrophy, arthralgia  - Negative BIBI, RF/CCP  - Has had elevated inflammatory markers in the past  - Imaging has shown some erosive changes  - Was following with Dr. Richards from before 2012-3/2024  - Last visit in 3/2024: mostly well controlled (PsO and dactylitis resolved) but with some generalized arthralgia and 30min of generalized AM stiffness   - Received L trochanteric bursa CSI with Dr. Richards in 3/2024    Prior Treatments:  - SSZ (GI intolerance)  - Methotrexate PO and SQ (inefficacy)  - Apremilast/otezla (GI intolerance)  - Leflunomide (d/c'd in 7/2022 due inefficacy)  - Orencia (2017, incomplete response)  - Enbrel TNFi (6814-1455, d/c'd due to demyelinating lesions - L optic neuritis, required high dose steroids, imaging normalized after stopping enbrel)  - Stelara IL12/23i (lack of efficacy)  - Tremfya IL-23i (4/2021, stopped due to headache and inefficacy)  - Taltz IL-17i (4/2018-2/2019)  - Cosentyx IL-17i (initially had response, but then later lost efficacy; later back on it a couple times when other therapies failed, last stopped in 12/2021)  - Tofacitinib Ori (3/2019-6/2019, progressed with erosive lesion)    Current Treatments:  - Rinvoq (1/2022-current)  - Naproxen 500mg BID to QID prn - on xantac/H2 blocker daily    Interval History:  Pt is here for follow up today, was last seen in 3/2024 with Dr. Richards. Overall, she has  "been doing well without acute disease flares. She has remained adherent with the rinvoq and tolerates it well. Has not had any recent psoriatic plaques. Follows with a dermatologist, who has done local steroid injections at plaques if needed in the past. Has some L hip and knee area pain, worse with standing for long periods of time at work. Pt works 2 jobs at a bank and [employer] - so is quite active for these. She does endorse some chronic diffuse pain and AM stiffness (up to 1.5hrs) but this she feels is related to her fibromyalgia a lot as well. She is on methadone and follows with pain management.     Review of Systems   Constitutional:  Negative for activity change, appetite change, chills, fatigue, fever and unexpected weight change.   HENT:  Negative for congestion, dental problem, facial swelling, mouth sores, nosebleeds, sinus pain, sore throat and trouble swallowing.    Eyes:  Negative for discharge, redness and visual disturbance.   Respiratory:  Negative for cough, chest tightness and shortness of breath.    Cardiovascular:  Negative for chest pain and leg swelling.   Gastrointestinal:  Negative for abdominal pain, constipation, diarrhea, nausea and vomiting.   Genitourinary:  Negative for dysuria, frequency and urgency.   Musculoskeletal:  Negative for arthralgias, back pain, joint swelling and myalgias.   Skin:  Negative for color change, rash and wound.   Neurological:  Negative for dizziness, tremors, syncope, weakness, light-headedness and headaches.   Psychiatric/Behavioral:  Negative for sleep disturbance. The patient is not nervous/anxious.       Objective:   Ht 5' 6" (1.676 m)   Wt 103.1 kg (227 lb 4.7 oz)   BMI 36.69 kg/m²   Physical Exam   Constitutional: She is oriented to person, place, and time. She appears well-developed and well-nourished. No distress.   HENT:   Head: Normocephalic and atraumatic.   Right Ear: External ear normal.   Left Ear: External ear normal.   Nose: Nose normal. " No nasal congestion.   Mouth/Throat: Mucous membranes are moist. Oropharynx is clear.   Eyes: Conjunctivae are normal.   Cardiovascular: Normal rate, regular rhythm and normal heart sounds.   Pulmonary/Chest: Effort normal and breath sounds normal. No respiratory distress. She has no wheezes.   Abdominal: Soft. She exhibits no distension. There is no abdominal tenderness.   Musculoskeletal:         General: No swelling or tenderness. Normal range of motion.      Cervical back: Normal range of motion and neck supple.      Comments: No acute synovitis in any of the small or large joints. There are multiple MCPs and PIPs that are tender to palpation (on homunculus) but none have any swelling currently.    Neurological: She is alert and oriented to person, place, and time.   Skin: Skin is warm and dry.   R elbow surface with 2 tiny erythematous areas - unclear if possible psoriatic lesions. Base of scalp above neck with some scarred areas from prior psoriatic plaques. Otherwise, no active psoriasis noted elsewhere.    Psychiatric: Her behavior is normal. Mood normal.   Vitals reviewed.         8/1/2024   Tender (SMILEY-28) 12 / 28    Swollen (SMILEY-28) 0 / 28    Provider Global --   Patient Global --   ESR --   CRP --   SMILEY-28 (ESR) --   SMILEY-28 (CRP) --   CDAI Score --      Assessment:     1. Psoriatic arthritis    2. Fibromyalgia    3. Osteoarthritis, unspecified osteoarthritis type, unspecified site    4. Chronic pain syndrome    5. History of optic neuritis    6. Psoriasis    7. Generalized osteoarthritis of multiple sites    8. Immunosuppression due to drug therapy    9. Therapeutic drug monitoring      - Pt with long standing PsA since a young age  - Has previously tried and failed multiple therapies and drug classes due to active disease on other therapeutics  - Most recently, has been well controlled on Ori rinvoq for 2.5 years   - DAPSA 26.61 (due to 12 tender joints, pt pain 8.5, pt global 6) but CRP normal and no  swollen joints  - Provider global is lower   - Pt also states she deals with a lot of chronic pain related to fibromyalgia as well  - Despite DAPSA indicating moderate disease activity, clinically pt appears to have low disease activity    Regarding therapeutic options, pt has tried and failed the following drug classes:  - csDMARDs (mtx, SSZ, leflunomide, apremilast)  - Abatacept  - TNFi - CANNOT USE any others in this class due to demyelinating disease hx 2/2 etanercept   - IL12/23i (ustekinumab) - lost efficacy  - IL23i (guselkumab) - lost efficacy  - Il17i (ixekizumab, secukinumab) - had efficacy for some time with cosentyx but then later not as efficacious  - Ori (tofacitinib) - lost efficacy, currently doing well on rinvoq/upadacitinib    In the future, if needed, could consider:  - Brodalumab/Siliq (Il17i)  - Risankizumab/Skyrizi (Il23i, humanized IgG1 MAb)    Plan:     Problem List Items Addressed This Visit          Neuro    Chronic pain syndrome       Ophtho    History of optic neuritis       Derm    Psoriasis       Orthopedic    Psoriatic arthritis - Primary    Relevant Orders    DXA Bone Density Axial Skeleton 1 or more sites    Hepatitis B surface antigen    HBcAB    Hepatitis B surface antibody    Hepatitis C antibody    Quantiferon Gold TB    C-Reactive Protein    Sedimentation rate    Comprehensive Metabolic Panel    CBC Auto Differential    XR Arthritis Survey    BIBI Screen w/Reflex    Sjogrens syndrome-A extractable nuclear antibody    Fibromyalgia    Generalized osteoarthritis of multiple sites    DJD (degenerative joint disease)     Other Visit Diagnoses       Immunosuppression due to drug therapy        Relevant Orders    Hepatitis B surface antigen    HBcAB    Hepatitis B surface antibody    Hepatitis C antibody    Quantiferon Gold TB    C-Reactive Protein    Sedimentation rate    Comprehensive Metabolic Panel    CBC Auto Differential    BIBI Screen w/Reflex    Sjogrens syndrome-A extractable  nuclear antibody    Therapeutic drug monitoring        Relevant Orders    C-Reactive Protein    Sedimentation rate    Comprehensive Metabolic Panel    CBC Auto Differential          - Check labs in about 2 months - CBC, CMP, ESR/CRP, BIBI w/ profile, SSA, update Hep B/C and TB  - Continue rinvoq daily   - Pt can continue with naproxen, but we did discuss long term use and risks associated with NSAID use including GI and renal toxicity  - Pt is on H2 blocker daily, will look into if PPI would be more appropriate for GI ppx    Follow up here in clinic in about 3-4 months or earlier if needed.     Assessment and plan discussed with supervising attending, Dr. Bro.       Sabra Bledsoe MD  PGY-5, Rheumatology

## 2024-08-01 ENCOUNTER — OFFICE VISIT (OUTPATIENT)
Dept: RHEUMATOLOGY | Facility: CLINIC | Age: 49
End: 2024-08-01
Payer: COMMERCIAL

## 2024-08-01 VITALS — WEIGHT: 227.31 LBS | BODY MASS INDEX: 36.53 KG/M2 | HEIGHT: 66 IN

## 2024-08-01 DIAGNOSIS — Z51.81 THERAPEUTIC DRUG MONITORING: ICD-10-CM

## 2024-08-01 DIAGNOSIS — M79.7 FIBROMYALGIA: ICD-10-CM

## 2024-08-01 DIAGNOSIS — D84.821 IMMUNOSUPPRESSION DUE TO DRUG THERAPY: ICD-10-CM

## 2024-08-01 DIAGNOSIS — G89.4 CHRONIC PAIN SYNDROME: ICD-10-CM

## 2024-08-01 DIAGNOSIS — M15.9 GENERALIZED OSTEOARTHRITIS OF MULTIPLE SITES: ICD-10-CM

## 2024-08-01 DIAGNOSIS — M19.90 OSTEOARTHRITIS, UNSPECIFIED OSTEOARTHRITIS TYPE, UNSPECIFIED SITE: ICD-10-CM

## 2024-08-01 DIAGNOSIS — Z86.69 HISTORY OF OPTIC NEURITIS: ICD-10-CM

## 2024-08-01 DIAGNOSIS — L40.50 PSORIATIC ARTHRITIS: Primary | ICD-10-CM

## 2024-08-01 DIAGNOSIS — L40.9 PSORIASIS: ICD-10-CM

## 2024-08-01 DIAGNOSIS — Z79.899 IMMUNOSUPPRESSION DUE TO DRUG THERAPY: ICD-10-CM

## 2024-08-01 PROCEDURE — 99999 PR PBB SHADOW E&M-EST. PATIENT-LVL V: CPT | Mod: PBBFAC,,, | Performed by: STUDENT IN AN ORGANIZED HEALTH CARE EDUCATION/TRAINING PROGRAM

## 2024-08-01 PROCEDURE — 99214 OFFICE O/P EST MOD 30 MIN: CPT | Mod: S$GLB,,, | Performed by: STUDENT IN AN ORGANIZED HEALTH CARE EDUCATION/TRAINING PROGRAM

## 2024-08-01 PROCEDURE — 1159F MED LIST DOCD IN RCRD: CPT | Mod: CPTII,S$GLB,, | Performed by: STUDENT IN AN ORGANIZED HEALTH CARE EDUCATION/TRAINING PROGRAM

## 2024-08-01 PROCEDURE — 4010F ACE/ARB THERAPY RXD/TAKEN: CPT | Mod: CPTII,S$GLB,, | Performed by: STUDENT IN AN ORGANIZED HEALTH CARE EDUCATION/TRAINING PROGRAM

## 2024-08-01 PROCEDURE — 1160F RVW MEDS BY RX/DR IN RCRD: CPT | Mod: CPTII,S$GLB,, | Performed by: STUDENT IN AN ORGANIZED HEALTH CARE EDUCATION/TRAINING PROGRAM

## 2024-08-01 PROCEDURE — 3008F BODY MASS INDEX DOCD: CPT | Mod: CPTII,S$GLB,, | Performed by: STUDENT IN AN ORGANIZED HEALTH CARE EDUCATION/TRAINING PROGRAM

## 2024-08-01 RX ORDER — TRIAMCINOLONE ACETONIDE 1 MG/G
OINTMENT TOPICAL 2 TIMES DAILY
COMMUNITY
Start: 2024-05-13

## 2024-08-01 RX ORDER — DOXYCYCLINE HYCLATE 80 MG/1
1 TABLET, DELAYED RELEASE ORAL 2 TIMES DAILY
COMMUNITY
Start: 2024-07-01

## 2024-08-01 NOTE — PROGRESS NOTES
Patient seen and examined with fellow.  All elements of history, physical exam and medical decision making independently confirmed by me.  Patient of Dr. Richards with psoriatic arthritis now being seen by me with the fellow.  Patient is doing well on Rinvoq.  She would like to stay on that therapy.  Will update her labs and images and monitor.  See note for details.

## 2024-08-02 ENCOUNTER — PATIENT MESSAGE (OUTPATIENT)
Dept: FAMILY MEDICINE | Facility: CLINIC | Age: 49
End: 2024-08-02
Payer: COMMERCIAL

## 2024-08-02 ENCOUNTER — OFFICE VISIT (OUTPATIENT)
Dept: FAMILY MEDICINE | Facility: CLINIC | Age: 49
End: 2024-08-02
Payer: COMMERCIAL

## 2024-08-02 VITALS
TEMPERATURE: 98 F | DIASTOLIC BLOOD PRESSURE: 60 MMHG | HEART RATE: 87 BPM | WEIGHT: 226.94 LBS | OXYGEN SATURATION: 97 % | HEIGHT: 66 IN | BODY MASS INDEX: 36.47 KG/M2 | SYSTOLIC BLOOD PRESSURE: 126 MMHG

## 2024-08-02 DIAGNOSIS — Z82.49 FAMILY HISTORY OF CARDIAC DISORDER: ICD-10-CM

## 2024-08-02 DIAGNOSIS — E03.9 HYPOTHYROIDISM, UNSPECIFIED TYPE: Primary | ICD-10-CM

## 2024-08-02 DIAGNOSIS — L40.50 PSORIATIC ARTHRITIS: ICD-10-CM

## 2024-08-02 PROCEDURE — G2211 COMPLEX E/M VISIT ADD ON: HCPCS | Mod: S$GLB,,, | Performed by: FAMILY MEDICINE

## 2024-08-02 PROCEDURE — 99999 PR PBB SHADOW E&M-EST. PATIENT-LVL IV: CPT | Mod: PBBFAC,,, | Performed by: FAMILY MEDICINE

## 2024-08-02 PROCEDURE — 3078F DIAST BP <80 MM HG: CPT | Mod: CPTII,S$GLB,, | Performed by: FAMILY MEDICINE

## 2024-08-02 PROCEDURE — 3074F SYST BP LT 130 MM HG: CPT | Mod: CPTII,S$GLB,, | Performed by: FAMILY MEDICINE

## 2024-08-02 PROCEDURE — 3008F BODY MASS INDEX DOCD: CPT | Mod: CPTII,S$GLB,, | Performed by: FAMILY MEDICINE

## 2024-08-02 PROCEDURE — 99214 OFFICE O/P EST MOD 30 MIN: CPT | Mod: S$GLB,,, | Performed by: FAMILY MEDICINE

## 2024-08-02 PROCEDURE — 4010F ACE/ARB THERAPY RXD/TAKEN: CPT | Mod: CPTII,S$GLB,, | Performed by: FAMILY MEDICINE

## 2024-08-02 RX ORDER — LIOTHYRONINE SODIUM 5 UG/1
TABLET ORAL
Qty: 180 TABLET | Refills: 2 | Status: SHIPPED | OUTPATIENT
Start: 2024-08-02

## 2024-08-02 NOTE — PROGRESS NOTES
Routine Office Visit    Patient Name: Keli Gilbert    : 1975  MRN: [id number]    Subjective:  Keli is a 48 y.o. female who presents today for:    1. Medication refill  Patient presenting today for refill of cytomel.  She is going to be establishing with an endocrinologist to evaluate the need for cytomel.  She has not heard about an appointment as of now.  She states she was placed on it by her previous endocrinologist    Past Medical History  Past Medical History:   Diagnosis Date    Abnormal Pap smear of vagina     AR (allergic rhinitis)     Demyelinating disorder     Depression     Fever blister     Fibromyalgia     followed by pain management    Generalized osteoarthritis of multiple sites     Hypertension     Hypothyroidism     Insulin resistance     Mixed anxiety and depressive disorder     Morbid obesity     Myelitis, optic neuritis in     treated with high-dose steroids and resolved; positive MRI and spinal fluid for a mass, but on embrel for psoriatic arthritis - she will see a MS specialist at [facility]; MRI normalized off embrel    Obesity     Pre-diabetes     hx diabetes on stereoids /    Psoriasis     Psoriatic arthritis     Vitamin D deficiency disease        Past Surgical History  Past Surgical History:   Procedure Laterality Date    COLONOSCOPY N/A 2022    Procedure: COLONOSCOPY;  Surgeon: Jaylen Pelletier MD;  Location: [facility] ENDO;  Service: Endoscopy;  Laterality: N/A;  Pt. holding Adipex for 5 days prior to.EC  (She states she doesnt take it every day.)    EYE SURGERY Bilateral 2021    Laser surgery for pressure relief    LAPAROSCOPIC CHOLECYSTECTOMY N/A 2022    Procedure: CHOLECYSTECTOMY, LAPAROSCOPIC;  Surgeon: Gabriel Herring MD;  Location: [facility] OR;  Service: General;  Laterality: N/A;  RN PRE OP 22, Covid NEGATIVE---- UPT  IN AM ---.  C A  NEED H/P-----CLEARED BY PCP    SINUS SURGERY         Family History  Family History   Problem Relation Name Age of  Onset    Diabetes Maternal Grandfather      Heart disease Maternal Grandfather      Thyroid disease Maternal Grandfather      Psoriasis Father      Cancer Father          throat    Thyroid disease Mother      Heart disease Mother      Hypertension Mother      Hyperlipidemia Mother      Coronary artery disease Mother          s/p CABG    Heart attack Mother      Breast cancer Sister  54    Heart disease Sister          MVP    ADD / ADHD Son      Diabetes Paternal Uncle      Melanoma Neg Hx      Lupus Neg Hx      Eczema Neg Hx      Acne Neg Hx      Colon cancer Neg Hx      Ovarian cancer Neg Hx         Social History  Social History     Socioeconomic History    Marital status: Single    Number of children: 1   Occupational History    Occupation: [employer]     Employer: [employer] (MAIN OFFICE)   Tobacco Use    Smoking status: Never     Passive exposure: Never    Smokeless tobacco: Never   Substance and Sexual Activity    Alcohol use: No    Drug use: No     Comment: 7/7/15 one weed brownie    Sexual activity: Yes     Partners: Male     Birth control/protection: I.U.D.     Comment: Mirena 03/07/2022   Other Topics Concern    Are you pregnant or think you may be? No    Breast-feeding No       Current Medications  Current Outpatient Medications on File Prior to Visit   Medication Sig Dispense Refill    brimonidine 0.1% (ALPHAGAN P) 0.1 % Drop Place 1 drop into both eyes 2 (two) times daily.      buPROPion (WELLBUTRIN XL) 300 MG 24 hr tablet TAKE 1 TABLET BY MOUTH ONCE  DAILY 90 tablet 3    cholecalciferol, vitamin D3, 2,000 unit Tab Take 1 tablet by mouth Once a week.      clindamycin (CLEOCIN T) 1 % external solution AAA bid 60 mL 3    doxycycline hyclate 80 mg TbEC Take 1 tablet by mouth 2 (two) times daily.      DULoxetine (CYMBALTA) 30 MG capsule TAKE 1 CAPSULE BY MOUTH ONCE  DAILY 90 capsule 3    furosemide (LASIX) 40 MG tablet TAKE 1 TABLET BY MOUTH ONCE  DAILY 90 tablet 3    gabapentin (NEURONTIN) 300 MG capsule  Take 300 mg by mouth 3 (three) times daily.      latanoprost 0.005 % ophthalmic solution Place into both eyes.      levothyroxine (SYNTHROID) 125 MCG tablet Take 1 tablet (125 mcg total) by mouth before breakfast. 90 tablet 0    loratadine-pseudoephedrine  mg (CLARITIN-D 24-HOUR)  mg per 24 hr tablet Take 1 tablet by mouth once daily.      losartan (COZAAR) 50 MG tablet TAKE 1 TABLET BY MOUTH ONCE  DAILY 90 tablet 3    metFORMIN (GLUCOPHAGE-XR) 500 MG ER 24hr tablet TAKE 1 TABLET BY MOUTH ONCE DAILY IN THE MORNING WITH BREAKFAST 90 tablet 1    methadone (DOLOPHINE) 10 MG tablet Take 1 tablet by mouth Three times a day.      naproxen (NAPROSYN) 500 MG tablet TAKE 1 TABLET BY MOUTH EVERY  MEAL AS NEEDED 270 tablet 3    phentermine (ADIPEX-P) 37.5 mg tablet TAKE 1 TABLET(37.5 MG) BY MOUTH BEFORE BREAKFAST 30 tablet 0    potassium chloride (MICRO-K) 10 MEQ CpSR TAKE 2 CAPSULES BY MOUTH DAILY 180 capsule 3    promethazine (PHENERGAN) 25 MG tablet TK 1 T PO BID PRN N      RINVOQ 15 mg 24 hr tablet TAKE 1 TABLET BY MOUTH DAILY 60 tablet 0    spironolactone (ALDACTONE) 50 MG tablet Start with 1 po qday, increase to 2 po qday as tolerated 60 tablet 3    traZODone (DESYREL) 50 MG tablet TAKE 1 TO 2 TABLET BY MOUTH EVERY NIGHT      tretinoin (RETIN-A) 0.025 % cream Apply topically every evening.      triamcinolone acetonide 0.1% (KENALOG) 0.1 % ointment Apply topically 2 (two) times daily.      [DISCONTINUED] liothyronine (CYTOMEL) 5 MCG Tab TAKE 2 TABLETS BY MOUTH DAILY 180 tablet 2    [DISCONTINUED] secukinumab (COSENTYX PEN) 150 mg/mL PnIj Inject 300 mg into the skin every 28 days. 6 each 1     Current Facility-Administered Medications on File Prior to Visit   Medication Dose Route Frequency Provider Last Rate Last Admin    mupirocin 2 % ointment   Nasal On Call Procedure Gabriel Herring MD   Given at 02/18/22 0703       Allergies   Review of patient's allergies indicates:   Allergen Reactions    Doxycycline  "hcl Nausea And Vomiting    Enbrel [etanercept] Other (See Comments)     Optic neurtits       Review of Systems (Pertinent positives)   Review of Systems   Constitutional: Negative.    HENT: Negative.     Eyes: Negative.    Respiratory: Negative.     Cardiovascular: Negative.    Musculoskeletal:  Positive for joint pain and myalgias.   Skin: Negative.          /60   Pulse 87   Temp 98.2 °F (36.8 °C) (Oral)   Ht 5' 6" (1.676 m)   Wt 102.9 kg (226 lb 15.1 oz)   LMP  (LMP Unknown)   SpO2 97%   BMI 36.63 kg/m²     GENERAL APPEARANCE: in no apparent distress and well developed and well nourished  HEENT: PERRL, EOMI, Sclera clear, anicteric, Oropharynx clear, no lesions, Neck supple with midline trachea  NECK: normal, supple, no adenopathy, thyroid normal in size  RESPIRATORY: appears well, vitals normal, no respiratory distress, acyanotic, normal RR, chest clear, no wheezing, crepitations, rhonchi, normal symmetric air entry  HEART: regular rate and rhythm, S1, S2 normal, no murmur, click, rub or gallop.    ABDOMEN: abdomen is soft without tenderness, no masses, no hernias, no organomegaly, no rebound, no guarding. Suprapubic tenderness absent. No CVA tenderness.  SKIN: no rashes, no wounds, no other lesions  PSYCH: Alert, oriented x 3, thought content appropriate, speech normal, pleasant and cooperative, good eye contact, well groomed    Assessment/Plan:  Keli Gilbert is a 48 y.o. female who presents today for :    Keli was seen today for follow-up.    Diagnoses and all orders for this visit:    Hypothyroidism, unspecified type  -     liothyronine (CYTOMEL) 5 MCG Tab; TAKE 2 TABLETS BY MOUTH DAILY  - Will refill for now as she has been tolerating well  - She is to establish with endocrinology    Family history of cardiac disorder  -     Ambulatory referral/consult to Cardiology; Future  - Patients mother had long history of CAD that started in her 40's  - Patient is asymptomatic    Psoriatic " arthritis  -     Ambulatory referral/consult to Rheumatology; Future  - Will refer to rheumatology

## 2024-08-16 ENCOUNTER — PATIENT MESSAGE (OUTPATIENT)
Dept: FAMILY MEDICINE | Facility: CLINIC | Age: 49
End: 2024-08-16
Payer: COMMERCIAL

## 2024-08-18 DIAGNOSIS — L73.2 HIDRADENITIS SUPPURATIVA: ICD-10-CM

## 2024-08-18 RX ORDER — METFORMIN HYDROCHLORIDE 500 MG/1
TABLET, EXTENDED RELEASE ORAL
Qty: 90 TABLET | Refills: 3 | Status: SHIPPED | OUTPATIENT
Start: 2024-08-18

## 2024-08-18 NOTE — TELEPHONE ENCOUNTER
Care Due:                  Date            Visit Type   Department     Provider  --------------------------------------------------------------------------------                                EP -         [facility]                              PRIMARY      MED/ INTERNAL  Last Visit: 08-      CARE ([facility])   MED/ PEDS      Andriy A  Page                              EP [facility]                              PRIMARY      MED/ INTERNAL  Next Visit: 02-      CARE ([facility])   MED/ PEDS      Andriy A  Page                                                            Last  Test          Frequency    Reason                     Performed    Due Date  --------------------------------------------------------------------------------    HBA1C.......  6 months...  metFORMIN................  10-   04-    Health [facility] Embedded Care Due Messages. Reference number: [id number].   8/18/2024 4:27:10 AM CDT

## 2024-08-18 NOTE — TELEPHONE ENCOUNTER
Refill Routing Note   Medication(s) are not appropriate for processing by [facility] for the following reason(s):        Required labs outdated    [facility] action(s):  Defer     Requires labs : Yes             Appointments  past 12m or future 3m with PCP    Date Provider   Last Visit   8/2/2024 Andriy Moran MD   Next Visit   2/5/2025 Andriy Moran MD   ED visits in past 90 days: 0        Note composed:12:32 PM 08/18/2024

## 2024-08-20 ENCOUNTER — OFFICE VISIT (OUTPATIENT)
Dept: CARDIOLOGY | Facility: CLINIC | Age: 49
End: 2024-08-20
Payer: COMMERCIAL

## 2024-08-20 VITALS
OXYGEN SATURATION: 98 % | BODY MASS INDEX: 36.14 KG/M2 | SYSTOLIC BLOOD PRESSURE: 132 MMHG | HEIGHT: 66 IN | RESPIRATION RATE: 15 BRPM | DIASTOLIC BLOOD PRESSURE: 82 MMHG | WEIGHT: 224.88 LBS | HEART RATE: 82 BPM

## 2024-08-20 DIAGNOSIS — E03.9 HYPOTHYROIDISM, UNSPECIFIED TYPE: Primary | ICD-10-CM

## 2024-08-20 DIAGNOSIS — Z82.49 FAMILY HISTORY OF CARDIAC DISORDER: ICD-10-CM

## 2024-08-20 DIAGNOSIS — I10 HTN (HYPERTENSION), BENIGN: ICD-10-CM

## 2024-08-20 DIAGNOSIS — E66.01 CLASS 2 SEVERE OBESITY DUE TO EXCESS CALORIES WITH SERIOUS COMORBIDITY AND BODY MASS INDEX (BMI) OF 36.0 TO 36.9 IN ADULT: ICD-10-CM

## 2024-08-20 LAB
OHS QRS DURATION: 86 MS
OHS QTC CALCULATION: 392 MS

## 2024-08-20 PROCEDURE — 3079F DIAST BP 80-89 MM HG: CPT | Mod: CPTII,S$GLB,, | Performed by: INTERNAL MEDICINE

## 2024-08-20 PROCEDURE — 99203 OFFICE O/P NEW LOW 30 MIN: CPT | Mod: S$GLB,,, | Performed by: INTERNAL MEDICINE

## 2024-08-20 PROCEDURE — 3075F SYST BP GE 130 - 139MM HG: CPT | Mod: CPTII,S$GLB,, | Performed by: INTERNAL MEDICINE

## 2024-08-20 PROCEDURE — 99999 PR PBB SHADOW E&M-EST. PATIENT-LVL V: CPT | Mod: PBBFAC,,, | Performed by: INTERNAL MEDICINE

## 2024-08-20 PROCEDURE — 3008F BODY MASS INDEX DOCD: CPT | Mod: CPTII,S$GLB,, | Performed by: INTERNAL MEDICINE

## 2024-08-20 PROCEDURE — 93000 ELECTROCARDIOGRAM COMPLETE: CPT | Mod: S$GLB,,, | Performed by: INTERNAL MEDICINE

## 2024-08-20 PROCEDURE — 1160F RVW MEDS BY RX/DR IN RCRD: CPT | Mod: CPTII,S$GLB,, | Performed by: INTERNAL MEDICINE

## 2024-08-20 PROCEDURE — 1159F MED LIST DOCD IN RCRD: CPT | Mod: CPTII,S$GLB,, | Performed by: INTERNAL MEDICINE

## 2024-08-20 PROCEDURE — 4010F ACE/ARB THERAPY RXD/TAKEN: CPT | Mod: CPTII,S$GLB,, | Performed by: INTERNAL MEDICINE

## 2024-08-20 NOTE — PROGRESS NOTES
CARDIOLOGY CLINIC VISIT        HISTORY OF PRESENT ILLNESS:     08/20/2024: Keli Gilbert presents for cardiovascular evaluation.  Family history of premature coronary artery disease. Mother with MI in her 50s.  Stroke.  Grandparents on mother's side with significant episodes in her 50s.  Hypertension.  Hyperlipidemia.  Prediabetes.  She denies chest pain.  No shortness of breath.  EKG today shows normal sinus rhythm.    CARDIOVASCULAR HISTORY:     None    PAST MEDICAL HISTORY:     Past Medical History:   Diagnosis Date    Abnormal Pap smear of vagina     AR (allergic rhinitis)     Demyelinating disorder     Depression     Fever blister     Fibromyalgia     followed by pain management    Generalized osteoarthritis of multiple sites     Hypertension     Hypothyroidism     Insulin resistance     Mixed anxiety and depressive disorder     Morbid obesity     Myelitis, optic neuritis in     treated with high-dose steroids and resolved; positive MRI and spinal fluid for a mass, but on embrel for psoriatic arthritis - she will see a MS specialist at [facility]; MRI normalized off embrel    Obesity     Pre-diabetes     hx diabetes on stereoids /    Psoriasis     Psoriatic arthritis     Vitamin D deficiency disease        PAST SURGICAL HISTORY:     Past Surgical History:   Procedure Laterality Date    COLONOSCOPY N/A 12/16/2022    Procedure: COLONOSCOPY;  Surgeon: Jaylen Pelletier MD;  Location: [facility] ENDO;  Service: Endoscopy;  Laterality: N/A;  Pt. holding Adipex for 5 days prior to.EC  (She states she doesnt take it every day.)    EYE SURGERY Bilateral 08/2021    Laser surgery for pressure relief    LAPAROSCOPIC CHOLECYSTECTOMY N/A 2/18/2022    Procedure: CHOLECYSTECTOMY, LAPAROSCOPIC;  Surgeon: Gabriel Herring MD;  Location: [facility] OR;  Service: General;  Laterality: N/A;  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  C A  NEED H/P-----CLEARED BY PCP    SINUS SURGERY  1998       ALLERGIES AND MEDICATION:      Review of patient's allergies indicates:   Allergen Reactions    Doxycycline hcl Nausea And Vomiting    Enbrel [etanercept] Other (See Comments)     Optic neurtits        Medication List            Accurate as of August 20, 2024 11:49 AM. If you have any questions, ask your nurse or doctor.                CONTINUE taking these medications      brimonidine 0.1% 0.1 % Drop  Commonly known as: ALPHAGAN P     buPROPion 300 MG 24 hr tablet  Commonly known as: WELLBUTRIN XL  TAKE 1 TABLET BY MOUTH ONCE  DAILY     cholecalciferol (vitamin D3) 50 mcg (2,000 unit) Tab  Commonly known as: VITAMIN D3     clindamycin 1 % external solution  Commonly known as: CLEOCIN T  AAA bid     doxycycline hyclate 80 mg Tbec     DULoxetine 30 MG capsule  Commonly known as: CYMBALTA  TAKE 1 CAPSULE BY MOUTH ONCE  DAILY     furosemide 40 MG tablet  Commonly known as: LASIX  TAKE 1 TABLET BY MOUTH ONCE  DAILY     gabapentin 300 MG capsule  Commonly known as: NEURONTIN     latanoprost 0.005 % ophthalmic solution     levothyroxine 125 MCG tablet  Commonly known as: SYNTHROID  Take 1 tablet (125 mcg total) by mouth before breakfast.     liothyronine 5 MCG Tab  Commonly known as: CYTOMEL  TAKE 2 TABLETS BY MOUTH DAILY     loratadine-pseudoephedrine  mg  mg per 24 hr tablet  Commonly known as: CLARITIN-D 24-hour     losartan 50 MG tablet  Commonly known as: COZAAR  TAKE 1 TABLET BY MOUTH ONCE  DAILY     metFORMIN 500 MG ER 24hr tablet  Commonly known as: GLUCOPHAGE-XR  TAKE 1 TABLET BY MOUTH ONCE  DAILY IN THE MORNING WITH  BREAKFAST     methadone 10 MG tablet  Commonly known as: DOLOPHINE     naproxen 500 MG tablet  Commonly known as: NAPROSYN  TAKE 1 TABLET BY MOUTH EVERY  MEAL AS NEEDED     phentermine 37.5 mg tablet  Commonly known as: ADIPEX-P  TAKE 1 TABLET(37.5 MG) BY MOUTH BEFORE BREAKFAST     potassium chloride 10 MEQ Cpsr  Commonly known as: MICRO-K  TAKE 2 CAPSULES BY MOUTH DAILY     promethazine 25 MG tablet  Commonly  known as: PHENERGAN     RINVOQ 15 mg 24 hr tablet  Generic drug: upadacitinib  TAKE 1 TABLET BY MOUTH DAILY     spironolactone 50 MG tablet  Commonly known as: ALDACTONE  Start with 1 po qday, increase to 2 po qday as tolerated     traZODone 50 MG tablet  Commonly known as: DESYREL     tretinoin 0.025 % cream  Commonly known as: RETIN-A     triamcinolone acetonide 0.1% 0.1 % ointment  Commonly known as: KENALOG              SOCIAL HISTORY:     Social History     Socioeconomic History    Marital status: Single    Number of children: 1   Occupational History    Occupation: [employer]     Employer: [employer] (MAIN OFFICE)   Tobacco Use    Smoking status: Never     Passive exposure: Never    Smokeless tobacco: Never   Substance and Sexual Activity    Alcohol use: No    Drug use: No     Comment: 7/7/15 one weed brownie    Sexual activity: Yes     Partners: Male     Birth control/protection: I.U.D.     Comment: Mirena 03/07/2022   Other Topics Concern    Are you pregnant or think you may be? No    Breast-feeding No       FAMILY HISTORY:     Family History   Problem Relation Name Age of Onset    Diabetes Maternal Grandfather      Heart disease Maternal Grandfather      Thyroid disease Maternal Grandfather      Psoriasis Father      Cancer Father          throat    Thyroid disease Mother      Heart disease Mother      Hypertension Mother      Hyperlipidemia Mother      Coronary artery disease Mother          s/p CABG    Heart attack Mother      Breast cancer Sister  54    Heart disease Sister          MVP    ADD / ADHD Son      Diabetes Paternal Uncle      Melanoma Neg Hx      Lupus Neg Hx      Eczema Neg Hx      Acne Neg Hx      Colon cancer Neg Hx      Ovarian cancer Neg Hx         REVIEW OF SYSTEMS:   Review of Systems   Constitutional:  Negative for chills, diaphoresis, fever, malaise/fatigue and weight loss.   HENT:  Negative for congestion, hearing loss, sinus pain, sore throat and tinnitus.    Eyes:  Negative for  "blurred vision, double vision, photophobia and pain.   Respiratory:  Negative for cough, hemoptysis, sputum production, shortness of breath, wheezing and stridor.    Cardiovascular:  Negative for chest pain, palpitations, orthopnea, claudication, leg swelling and PND.   Gastrointestinal:  Negative for abdominal pain, blood in stool, heartburn, melena, nausea and vomiting.   Musculoskeletal:  Negative for back pain, falls, joint pain, myalgias and neck pain.   Neurological:  Negative for dizziness, tingling, tremors, sensory change, speech change, focal weakness, seizures, loss of consciousness, weakness and headaches.   Endo/Heme/Allergies:  Does not bruise/bleed easily.   Psychiatric/Behavioral:  Negative for depression, memory loss and substance abuse. The patient is not nervous/anxious.        PHYSICAL EXAM:     Vitals:    08/20/24 1125   BP: 132/82   Pulse: 82   Resp: 15    Body mass index is 36.29 kg/m².  Weight: 102 kg (224 lb 13.9 oz)   Height: 5' 6" (167.6 cm)     Physical Exam  Vitals reviewed.   Constitutional:       General: She is not in acute distress.     Appearance: Normal appearance. She is well-developed. She is obese. She is not diaphoretic.   HENT:      Head: Normocephalic.   Neck:      Vascular: No carotid bruit or JVD.   Cardiovascular:      Rate and Rhythm: Normal rate and regular rhythm.      Pulses: Normal pulses.      Heart sounds: Normal heart sounds.   Pulmonary:      Effort: Pulmonary effort is normal.      Breath sounds: Normal breath sounds.   Abdominal:      General: Bowel sounds are normal.      Palpations: Abdomen is soft.      Tenderness: There is no abdominal tenderness.   Skin:     General: Skin is warm and dry.   Neurological:      Mental Status: She is alert and oriented to person, place, and time.   Psychiatric:         Speech: Speech normal.         Behavior: Behavior normal.         Thought Content: Thought content normal.         DATA:   EKG: (personally reviewed " tracing)  08/20/2024-normal sinus rhythm  Results for orders placed or performed during the hospital encounter of 02/08/22   EKG 12-lead    Collection Time: 02/08/22  3:36 PM    Narrative    Test Reason : Z01.818,    Vent. Rate : 079 BPM     Atrial Rate : 079 BPM     P-R Int : 198 ms          QRS Dur : 092 ms      QT Int : 350 ms       P-R-T Axes : 066 051 046 degrees     QTc Int : 401 ms    Normal sinus rhythm  Normal ECG  When compared with ECG of 07-JUL-2015 16:33,  Significant changes have occurred  Confirmed by Kelsey Sidhu MD ([id number]) on 2/10/2022 12:02:08 AM    Referred By:             Confirmed By:Kelsey Sidhu MD        Laboratory:  CBC:  Recent Labs   Lab 12/14/23  1500 03/14/24  1647 06/19/24  0853   WBC 7.61 6.33 7.07   Hemoglobin 11.7 L 12.3 12.1   Hematocrit 36.6 L 38.8 39.7   Platelets 344 401 436       CHEMISTRIES:  Recent Labs   Lab 02/08/22  1540 02/12/22  0825 04/16/22  0839 07/16/22  0940 10/17/22  1605 12/14/23  1500 03/14/24  1647 06/19/24  0853   Glucose 95   < > 99 167 H   < > 106 111 H 94   Sodium 141  --  141 140   < > 135 L 136 137   Potassium 4.2  --  4.0 4.0   < > 4.6 4.7 4.5   BUN 15  --  11 12   < > 31 H 21 H 18   Creatinine 0.8  --  0.7 0.8   < > 0.8 0.9 1.0   eGFR if African American >60  --  >60.0 >60.0  --   --   --   --    eGFR if non African American >60  --  >60.0 >60.0  --   --   --   --    Calcium 9.6  --  9.7 9.4   < > 9.7 9.8 9.4    < > = values in this interval not displayed.       CARDIAC BIOMARKERS:        COAGS:        LIPIDS/LFTS:  Recent Labs   Lab 07/16/22  0940 10/17/22  1605 01/21/23  1026 12/14/23  1500 03/14/24  1647 06/19/24  0853   Cholesterol 186 174  --   --   --   --    Triglycerides 235 H 211 H  --   --   --   --    HDL 38 L 43  --   --   --   --    LDL Cholesterol 101.0 88.8  --   --   --   --    Non-HDL Cholesterol 148 131  --   --   --   --    AST 23 29   < > 25 24 22   ALT 29 28   < > 22 21 14    < > = values in this interval not displayed.        Hemoglobin A1C   Date Value Ref Range Status   01/21/2023 5.4 4.0 - 5.6 % Final     Comment:     ADA Screening Guidelines:  5.7-6.4%  Consistent with prediabetes  >or=6.5%  Consistent with diabetes    High levels of fetal hemoglobin interfere with the HbA1C  assay. Heterozygous hemoglobin variants (HbS, HgC, etc)do  not significantly interfere with this assay.   However, presence of multiple variants may affect accuracy.     03/05/2016 5.4 4.5 - 6.2 % Final   04/04/2015 5.5 4.5 - 6.2 % Final        The 10-year ASCVD risk score (Lynne QUARLES, et al., 2019) is: 1.8%    Values used to calculate the score:      Age: 48 years      Sex: Female      Is Non- : No      Diabetic: No      Tobacco smoker: No      Systolic Blood Pressure: 132 mmHg      Is BP treated: Yes      HDL Cholesterol: 43 mg/dL      Total Cholesterol: 174 mg/dL      Cardiovascular Testing:    No echocardiogram results found for the past 12 months     No cardiac monitor results found for the past 12 months         ASSESSMENT:     Family history of premature coronary artery disease  Hypertension  Prediabetes  Psoriatic arthritis  Hypothroid  Obesity    PLAN:     Family history of premature coronary artery disease. Echocardiogram. GXT.  Hypertension: Continue current management.  Return to clinic one month.           Jose M Lugo MD, MPH, FACC, [facility]

## 2024-08-27 DIAGNOSIS — F34.1 DYSTHYMIA: ICD-10-CM

## 2024-08-27 DIAGNOSIS — E03.9 HYPOTHYROIDISM, UNSPECIFIED TYPE: ICD-10-CM

## 2024-08-27 RX ORDER — LEVOTHYROXINE SODIUM 125 UG/1
125 TABLET ORAL
Qty: 90 TABLET | Refills: 3 | Status: SHIPPED | OUTPATIENT
Start: 2024-08-27

## 2024-08-27 RX ORDER — BUPROPION HYDROCHLORIDE 300 MG/1
300 TABLET ORAL DAILY
Qty: 90 TABLET | Refills: 3 | Status: SHIPPED | OUTPATIENT
Start: 2024-08-27

## 2024-08-27 NOTE — TELEPHONE ENCOUNTER
Refill Decision Note   Kelizeb Gilbert  is requesting a refill authorization.  Brief Assessment and Rationale for Refill:  Approve     Medication Therapy Plan:  6/19/24-TSH out of range, but T4 WNL on same date. Ok for approval.      Pharmacist review requested: Yes   Comments:     Note composed:10:55 AM 08/27/2024

## 2024-08-27 NOTE — TELEPHONE ENCOUNTER
No care due was identified.  Health [facility] Embedded Care Due Messages. Reference number: [id number].   8/27/2024 4:10:47 AM CDT

## 2024-08-27 NOTE — TELEPHONE ENCOUNTER
Refill Routing Note   Medication(s) are not appropriate for processing by [facility] for the following reason(s):        Drug-drug interaction      [facility] action(s):  Defer  Approve        Medication Therapy Plan: T4-WNL    Pharmacist review requested: Yes     Appointments  past 12m or future 3m with PCP    Date Provider   Last Visit   8/2/2024 Andriy Moran MD   Next Visit   2/5/2025 Andriy Moran MD   ED visits in past 90 days: 0        Note composed:9:47 AM 08/27/2024

## 2024-09-02 ENCOUNTER — PATIENT MESSAGE (OUTPATIENT)
Dept: CARDIOLOGY | Facility: CLINIC | Age: 49
End: 2024-09-02
Payer: COMMERCIAL

## 2024-09-02 DIAGNOSIS — R60.0 PEDAL EDEMA: ICD-10-CM

## 2024-09-02 RX ORDER — FUROSEMIDE 40 MG/1
40 TABLET ORAL
Qty: 90 TABLET | Refills: 3 | Status: SHIPPED | OUTPATIENT
Start: 2024-09-02

## 2024-09-02 NOTE — TELEPHONE ENCOUNTER
No care due was identified.  Health [facility] Embedded Care Due Messages. Reference number: [id number].   9/02/2024 4:10:37 AM CDT

## 2024-09-03 NOTE — TELEPHONE ENCOUNTER
Refill Decision Note   Keli Gilbert  is requesting a refill authorization.  Brief Assessment and Rationale for Refill:  Approve     Medication Therapy Plan:       Medication Reconciliation Completed: No   Comments:     No Care Gaps recommended.     Note composed:10:00 PM 09/02/2024

## 2024-10-02 DIAGNOSIS — L40.50 PSORIATIC ARTHRITIS: ICD-10-CM

## 2024-10-03 RX ORDER — UPADACITINIB 15 MG/1
15 TABLET, EXTENDED RELEASE ORAL DAILY
Qty: 60 TABLET | Refills: 0 | Status: SHIPPED | OUTPATIENT
Start: 2024-10-03

## 2024-11-11 ENCOUNTER — OFFICE VISIT (OUTPATIENT)
Dept: RHEUMATOLOGY | Facility: CLINIC | Age: 49
End: 2024-11-11
Payer: COMMERCIAL

## 2024-11-11 VITALS
SYSTOLIC BLOOD PRESSURE: 129 MMHG | DIASTOLIC BLOOD PRESSURE: 96 MMHG | HEART RATE: 97 BPM | HEIGHT: 66 IN | BODY MASS INDEX: 38.05 KG/M2 | WEIGHT: 236.75 LBS

## 2024-11-11 DIAGNOSIS — L73.2 HIDRADENITIS SUPPURATIVA: ICD-10-CM

## 2024-11-11 DIAGNOSIS — Z79.899 IMMUNOSUPPRESSION DUE TO DRUG THERAPY: ICD-10-CM

## 2024-11-11 DIAGNOSIS — D84.821 IMMUNOSUPPRESSION DUE TO DRUG THERAPY: ICD-10-CM

## 2024-11-11 DIAGNOSIS — L40.50 PSORIATIC ARTHRITIS: Primary | ICD-10-CM

## 2024-11-11 DIAGNOSIS — M79.7 FIBROMYALGIA: ICD-10-CM

## 2024-11-11 DIAGNOSIS — Z79.1 NSAID LONG-TERM USE: ICD-10-CM

## 2024-11-11 PROCEDURE — 99999 PR PBB SHADOW E&M-EST. PATIENT-LVL IV: CPT | Mod: PBBFAC,,, | Performed by: STUDENT IN AN ORGANIZED HEALTH CARE EDUCATION/TRAINING PROGRAM

## 2024-11-11 PROCEDURE — 4010F ACE/ARB THERAPY RXD/TAKEN: CPT | Mod: CPTII,S$GLB,, | Performed by: STUDENT IN AN ORGANIZED HEALTH CARE EDUCATION/TRAINING PROGRAM

## 2024-11-11 PROCEDURE — 3008F BODY MASS INDEX DOCD: CPT | Mod: CPTII,S$GLB,, | Performed by: STUDENT IN AN ORGANIZED HEALTH CARE EDUCATION/TRAINING PROGRAM

## 2024-11-11 PROCEDURE — 3074F SYST BP LT 130 MM HG: CPT | Mod: CPTII,S$GLB,, | Performed by: STUDENT IN AN ORGANIZED HEALTH CARE EDUCATION/TRAINING PROGRAM

## 2024-11-11 PROCEDURE — 1159F MED LIST DOCD IN RCRD: CPT | Mod: CPTII,S$GLB,, | Performed by: STUDENT IN AN ORGANIZED HEALTH CARE EDUCATION/TRAINING PROGRAM

## 2024-11-11 PROCEDURE — 99214 OFFICE O/P EST MOD 30 MIN: CPT | Mod: S$GLB,,, | Performed by: STUDENT IN AN ORGANIZED HEALTH CARE EDUCATION/TRAINING PROGRAM

## 2024-11-11 PROCEDURE — 3080F DIAST BP >= 90 MM HG: CPT | Mod: CPTII,S$GLB,, | Performed by: STUDENT IN AN ORGANIZED HEALTH CARE EDUCATION/TRAINING PROGRAM

## 2024-11-11 RX ORDER — UPADACITINIB 15 MG/1
15 TABLET, EXTENDED RELEASE ORAL DAILY
Qty: 30 TABLET | Refills: 11 | Status: ACTIVE | OUTPATIENT
Start: 2024-11-11

## 2024-11-11 RX ORDER — SPIRONOLACTONE 100 MG/1
100 TABLET, FILM COATED ORAL 2 TIMES DAILY
COMMUNITY
Start: 2024-10-24

## 2024-11-11 NOTE — PROGRESS NOTES
Subjective:      Patient ID: Keli Gilbert is a 48 y.o. female.    Chief Complaint: follow up for PsA    Keli Gilbert is a 47yo F with PMH of PsA, mild OA/DJD multiple joints, fibromyalgia, chronic pain on methadone, HTN, hypothyroidism, anxiety, depression, hypertriglyceridemia, vitamin D deficiency, trochanteric bursitis, morbid obesity, hidradenitis suppurativa.     Rheum History:  - Psoriatic arthritis dx around 1997, had psoriasis since childhood  - Symptoms: psoriasis lesions of abdomen/elbow/shins, dactylitis in toes and fingers, MCP synovial hypertrophy, arthralgia  - Negative BIBI, RF/CCP  - Has had elevated inflammatory markers in the past  - Imaging has shown some erosive changes  - Was following with Dr. Richards from before 2012-3/2024  - Last visit in 3/2024: mostly well controlled (PsO and dactylitis resolved) but with some generalized arthralgia and 30min of generalized AM stiffness   - Received L trochanteric bursa CSI with Dr. Richards in 3/2024    Prior Treatments:  - SSZ (GI intolerance)  - Methotrexate PO and SQ (inefficacy)  - Apremilast/otezla (GI intolerance)  - Leflunomide (d/c'd in 7/2022 due inefficacy)  - Orencia (2017, incomplete response)  - Enbrel TNFi ([id number], d/c'd due to demyelinating lesions - L optic neuritis, required high dose steroids, imaging normalized after stopping enbrel)  - Stelara IL12/23i (lack of efficacy)  - Tremfya IL-23i (4/2021, stopped due to headache and inefficacy)  - Taltz IL-17i (4/2018-2/2019)  - Cosentyx IL-17i (initially had response, but then later lost efficacy; later back on it a couple times when other therapies failed, last stopped in 12/2021)  - Tofacitinib Ori (3/2019-6/2019, progressed with erosive lesion)    Current Treatments:  - Rinvoq (1/2022-current)  - Naproxen 500mg BID to QID prn - on xantac/H2 blocker daily    Interval History:  -she is new to me. Used to follow with Dr. Richards for years. Saw Dr. Bro and Dr. Bledsoe  "in 8/24  -Doing well on Rinvoq. She has been on it for a couple of years.   -Follows with a dermatologist, who has done local steroid injections at plaques if needed in the past. Has some L hip and knee area pain, worse with standing for long periods of time at work. Pt works 2 jobs at a bank and [employer] - so is quite active for these.   -She does endorse some chronic diffuse pain and AM stiffness (up to 1.5hrs) but this she feels is related to her fibromyalgia a lot as well. She is on methadone and follows with pain management. She feels that methadone helps very well.   -she also has HS. She has intermittent boils ar needed. Takes doxycycline as needed.     Review of Systems   Constitutional:  Negative for activity change, appetite change, chills, fatigue, fever and unexpected weight change.   HENT:  Negative for congestion, dental problem, facial swelling, mouth sores, nosebleeds, sinus pain, sore throat and trouble swallowing.    Eyes:  Negative for discharge, redness and visual disturbance.   Respiratory:  Negative for cough, chest tightness and shortness of breath.    Cardiovascular:  Negative for chest pain and leg swelling.   Gastrointestinal:  Negative for abdominal pain, constipation, diarrhea, nausea and vomiting.   Genitourinary:  Negative for dysuria, frequency and urgency.   Musculoskeletal:  Negative for arthralgias, back pain, joint swelling and myalgias.   Skin:  Negative for color change, rash and wound.   Neurological:  Negative for dizziness, tremors, syncope, weakness, light-headedness and headaches.   Psychiatric/Behavioral:  Negative for sleep disturbance. The patient is not nervous/anxious.       Objective:   BP (!) 129/96 (BP Location: Right arm, Patient Position: Sitting)   Pulse 97   Ht 5' 6" (1.676 m)   Wt 107.4 kg (236 lb 12.4 oz)   BMI 38.22 kg/m²   Physical Exam   Constitutional: She is oriented to person, place, and time. She appears well-developed and well-nourished. No " distress.   HENT:   Head: Normocephalic.   Cardiovascular: Normal heart sounds.   Pulmonary/Chest: Effort normal.   Musculoskeletal:         General: No swelling or tenderness. Normal range of motion.      Cervical back: Normal range of motion and neck supple.      Comments: No acute synovitis in any of the small or large joints.    Neurological: She is alert and oriented to person, place, and time.   Skin: Skin is warm and dry.   Base of scalp above neck with some scarred areas from prior psoriatic plaques. Otherwise, no active psoriasis noted elsewhere.    Psychiatric: Her behavior is normal. Mood normal.   Vitals reviewed.     8/1/2024 11/11/2024   Tender (SMILEY-28) 12 / 28  0 / 28    Swollen (SMILEY-28) 0 / 28  0 / 28    Provider Global -- --   Patient Global -- --   ESR -- --   CRP -- --   SMILEY-28 (ESR) -- --   SMILEY-28 (CRP) -- --   CDAI Score -- --      Assessment:     1. Psoriatic arthritis    2. Fibromyalgia    3. Immunosuppression due to drug therapy    4. Hidradenitis suppurativa    5. NSAID long-term use      - Pt with long standing PsA since a young age  - Has previously tried and failed multiple therapies and drug classes due to active disease on other therapeutics  - Most recently, has been well controlled on Ori rinvoq for 2.5 years     Regarding therapeutic options, pt has tried and failed the following drug classes:  - csDMARDs (mtx, SSZ, leflunomide, apremilast)  - Abatacept  - TNFi - CANNOT USE any others in this class due to demyelinating disease hx 2/2 etanercept   - IL12/23i (ustekinumab) - lost efficacy  - IL23i (guselkumab) - lost efficacy  - Il17i (ixekizumab, secukinumab) - had efficacy for some time with cosentyx but then later not as efficacious  - Ori (tofacitinib) - lost efficacy, currently doing well on rinvoq/upadacitinib    In the future, if needed, could consider:  - Brodalumab/Siliq (Il17i)  - Risankizumab/Skyrizi (Il23i, humanized IgG1 MAb)      Plan:     Problem List Items Addressed  This Visit          Derm    Hidradenitis suppurativa       Orthopedic    Psoriatic arthritis - Primary    Relevant Medications    upadacitinib (RINVOQ) 15 mg 24 hr tablet    Fibromyalgia     Other Visit Diagnoses       Immunosuppression due to drug therapy        NSAID long-term use                -Continue Rinvoq 11 mg QD. Needs refill now. Sent to [facility].   -Check labs that were ordered by Dr. Bledsoe in the past.  - CBC, CMP, ESR/CRP, BIBI w/ profile, SSA, update Hep B/C and TB  -Pt can continue with naproxen, but we did discuss long term use and risks associated with NSAID use including GI and renal toxicity  no history of GI bleed or coronary artery disease. previous labs without features of CKD. clinical significance side effect of long-term NSAID use discussed in detail. recommended for alternative therapies for pain management.  -recent labs reviewed. no live vaccines. vaccines per guidelines. immunosuppression/infectious precautions reinforced    -On methadone per pain management. Fibromyalgia is controlled     Follow up in about 3 months (around 2/11/2025).     Method of contact with patient concerns: [facility]    Disclaimer:  This note is prepared using voice recognition software and as such is likely to have errors and has not been proof read. Please contact me for questions.     Time spent: 25 minutes in face to face discussion concerning diagnosis, prognosis, review of lab and test results, benefits of treatment as well as management of disease, counseling of patient and coordination of care between various health care providers.      Leta Tran M.D.  Rheumatology  [facility]

## 2024-12-05 ENCOUNTER — OFFICE VISIT (OUTPATIENT)
Dept: FAMILY MEDICINE | Facility: CLINIC | Age: 49
End: 2024-12-05
Payer: COMMERCIAL

## 2024-12-05 VITALS
DIASTOLIC BLOOD PRESSURE: 84 MMHG | SYSTOLIC BLOOD PRESSURE: 126 MMHG | OXYGEN SATURATION: 98 % | HEART RATE: 86 BPM | WEIGHT: 235.88 LBS | BODY MASS INDEX: 37.91 KG/M2 | HEIGHT: 66 IN

## 2024-12-05 DIAGNOSIS — L40.9 PSORIASIS: ICD-10-CM

## 2024-12-05 DIAGNOSIS — H92.22 BLEEDING FROM LEFT EAR: Primary | ICD-10-CM

## 2024-12-05 DIAGNOSIS — I10 BENIGN ESSENTIAL HTN: ICD-10-CM

## 2024-12-05 DIAGNOSIS — E03.9 HYPOTHYROIDISM, UNSPECIFIED TYPE: ICD-10-CM

## 2024-12-05 PROCEDURE — 99999 PR PBB SHADOW E&M-EST. PATIENT-LVL V: CPT | Mod: PBBFAC,,, | Performed by: FAMILY MEDICINE

## 2024-12-05 NOTE — PROGRESS NOTES
"Routine Office Visit     Patient Name: Keli Gilbert    : 1975  MRN: [id number]    Subjective     History of Present Illness    CHIEF COMPLAINT:  Patient presents today for left ear bleeding.    LEFT EAR BLEEDING:  She reports left ear bleeding that started with an itch yesterday. She noticed blood after showering and removed blood clots from her ear. She denies any pain, hearing loss, or congestion associated with the bleeding. No history of ear infections, recent accidents, or head injuries.    MEDICATIONS:  She takes thyroid medications, fluid pills, spironolactone, Rinvoq, and naproxen 1,500 mg daily.    ALLERGIES:  She reports allergies to doxycycline and Enbrel.  She expresses concern about experiencing adverse effects with many medications and treatments.    MEDICAL HISTORY:  Her history includes eye surgery, sinus surgery, and gallbladder surgery. She has been diagnosed with psoriatic arthritis since age 25 and also has psoriasis. She reports having psoriasis "everywhere" from head to toe when not taking medication. She is currently on thyroid medication and Rinvoq for psoriatic arthritis and psoriasis management.    ENDOCRINE:  She has a history of thyroid issues and is currently on thyroid medication.      ROS:  General: no fever, no chills, no fatigue, no weight gain, no weight loss  Eyes: no vision changes, no redness, no discharge  ENT: no ear pain, no nasal congestion, no sore throat, no hearing loss  Cardiovascular: no chest pain, no palpitations, no lower extremity edema  Respiratory: no cough, no shortness of breath  Gastrointestinal: no abdominal pain, no nausea, no vomiting, no diarrhea, no constipation, no blood in stool  Genitourinary: no dysuria, no hematuria, no frequency  Musculoskeletal: no joint pain, no muscle pain  Skin: +rash, no lesion  Neurological: no headache, no dizziness, no numbness, no tingling  Psychiatric: no anxiety, no depression, no sleep difficulty     " "      Objective     /84   Pulse 86   Ht 5' 6" (1.676 m)   Wt 107 kg (235 lb 14.3 oz)   SpO2 98%   BMI 38.07 kg/m²   Physical Exam  Constitutional:       Appearance: She is well-developed.   HENT:      Head: Normocephalic and atraumatic.      Right Ear: Hearing, tympanic membrane and ear canal normal.      Left Ear: Hearing and tympanic membrane normal. Laceration and tenderness present.   Eyes:      Conjunctiva/sclera: Conjunctivae normal.      Pupils: Pupils are equal, round, and reactive to light.   Cardiovascular:      Rate and Rhythm: Normal rate and regular rhythm.      Heart sounds: Normal heart sounds. No murmur heard.     No friction rub. No gallop.   Pulmonary:      Effort: No respiratory distress.      Breath sounds: Normal breath sounds.   Abdominal:      General: Bowel sounds are normal. There is no distension.      Palpations: Abdomen is soft.      Tenderness: There is no abdominal tenderness.   Musculoskeletal:         General: Normal range of motion.      Cervical back: Normal range of motion and neck supple.   Lymphadenopathy:      Cervical: No cervical adenopathy.   Skin:     General: Skin is warm.   Neurological:      Mental Status: She is alert and oriented to person, place, and time.           Assessment     Assessment & Plan        Problem List Items Addressed This Visit          Derm    Psoriasis    Overview     Continue f/u with rheumatology             Endocrine    Hypothyroidism  The current medical regimen is effective;  continue present plan and medications.        Other Visit Diagnoses       Bleeding from left ear    -  Primary   Assessed left ear bleeding: observed blood pool at 6-7 o'clock position in ear canal, likely from minor nick over blood vessel   Determined bleeding not continuous or actively flowing   Considered cauterization but deemed unnecessary due to protected location and lack of active bleeding   Opted for conservative management, allowing natural scabbing and " healing process   Discussed potential for scab formation and associated itching in about 1 week.   Patient to avoid picking at or manipulating the affected ear.   Patient to allow scab to fall off naturally.   Recommend not inserting anything into the ear canal.   May refer to ENT for cauterization if bleeding persists.   Contact the office if bleeding continues or worsens.           Benign essential HTN      The current medical regimen is effective;  continue present plan and medications.                 This note was generated with the assistance of ambient listening technology. Verbal consent was obtained by the patient and accompanying visitor(s) for the recording of patient appointment to facilitate this note. I attest to having reviewed and edited the generated note for accuracy, though some syntax or spelling errors may persist. Please contact the author of this note for any clarification.

## 2024-12-06 ENCOUNTER — HOSPITAL ENCOUNTER (OUTPATIENT)
Dept: RADIOLOGY | Facility: HOSPITAL | Age: 49
Discharge: HOME | End: 2024-12-06
Attending: OBSTETRICS & GYNECOLOGY
Payer: COMMERCIAL

## 2024-12-06 DIAGNOSIS — Z12.31 ENCOUNTER FOR SCREENING MAMMOGRAM FOR MALIGNANT NEOPLASM OF BREAST: ICD-10-CM

## 2024-12-06 PROCEDURE — 77063 BREAST TOMOSYNTHESIS BI: CPT | Mod: 26,,, | Performed by: RADIOLOGY

## 2024-12-06 PROCEDURE — 77067 SCR MAMMO BI INCL CAD: CPT | Mod: 26,,, | Performed by: RADIOLOGY

## 2024-12-06 PROCEDURE — 77063 BREAST TOMOSYNTHESIS BI: CPT | Mod: TC

## 2024-12-11 ENCOUNTER — PATIENT MESSAGE (OUTPATIENT)
Dept: OBSTETRICS AND GYNECOLOGY | Facility: CLINIC | Age: 49
End: 2024-12-11
Payer: COMMERCIAL

## 2024-12-11 ENCOUNTER — TELEPHONE (OUTPATIENT)
Dept: OBSTETRICS AND GYNECOLOGY | Facility: CLINIC | Age: 49
End: 2024-12-11
Payer: COMMERCIAL

## 2024-12-11 NOTE — TELEPHONE ENCOUNTER
----- Message from [employer] sent at 12/11/2024  3:46 PM CST -----  Name of Who is Calling: MARLEN HILLIARD [[id number]]      What is the request in detail: Pt states she needs an order for a mammogram placed in the system. Please contact to further discuss and advise.        Can the clinic reply by [facility]: Y      What Number to Call Back if not in [facility]:

## 2024-12-13 NOTE — ANESTHESIA PROCEDURE NOTES
Intubation    Date/Time: 2/18/2022 7:43 AM  Performed by: Chau Abarca MD  Authorized by: Toshia Navas MD     Intubation:     Induction:  Intravenous    Intubated:  Postinduction    Mask Ventilation:  Moderately difficult with oral airway    Attempts:  1    Attempted By:  Other (see comments) (RAFAELA Abarca MD ED resident)    Method of Intubation:  Video laryngoscopy    Blade:  Villasenor 3    Laryngeal View Grade: Grade I - full view of cords      Difficult Airway Encountered?: No      Complications:  None    Airway Device:  Oral endotracheal tube    Airway Device Size:  7.0    Style/Cuff Inflation:  Cuffed (inflated to minimal occlusive pressure)    Tube secured:  22    Secured at:  The lips    Placement Verified By:  Capnometry    Complicating Factors:  Obesity    Findings Post-Intubation:  BS equal bilateral and atraumatic/condition of teeth unchanged         Stool culture still in process.    Evette Oh RN 12/13/2024

## 2025-01-02 DIAGNOSIS — L40.50 PSORIATIC ARTHRITIS: ICD-10-CM

## 2025-01-02 RX ORDER — NAPROXEN 500 MG/1
TABLET ORAL
Qty: 270 TABLET | Refills: 3 | Status: SHIPPED | OUTPATIENT
Start: 2025-01-02

## 2025-01-06 ENCOUNTER — PATIENT MESSAGE (OUTPATIENT)
Dept: FAMILY MEDICINE | Facility: CLINIC | Age: 50
End: 2025-01-06
Payer: COMMERCIAL

## 2025-01-06 ENCOUNTER — PATIENT MESSAGE (OUTPATIENT)
Dept: RHEUMATOLOGY | Facility: CLINIC | Age: 50
End: 2025-01-06
Payer: COMMERCIAL

## 2025-01-06 DIAGNOSIS — E03.9 HYPOTHYROIDISM, UNSPECIFIED TYPE: ICD-10-CM

## 2025-01-06 DIAGNOSIS — F34.1 DYSTHYMIA: ICD-10-CM

## 2025-01-06 DIAGNOSIS — R60.0 PEDAL EDEMA: ICD-10-CM

## 2025-01-06 DIAGNOSIS — L40.50 PSORIATIC ARTHRITIS: ICD-10-CM

## 2025-01-06 RX ORDER — UPADACITINIB 15 MG/1
15 TABLET, EXTENDED RELEASE ORAL DAILY
Qty: 30 TABLET | Refills: 11 | Status: ACTIVE | OUTPATIENT
Start: 2025-01-06

## 2025-01-06 RX ORDER — NAPROXEN 500 MG/1
TABLET ORAL
Qty: 270 TABLET | Refills: 3 | Status: SHIPPED | OUTPATIENT
Start: 2025-01-06

## 2025-01-07 RX ORDER — BUPROPION HYDROCHLORIDE 300 MG/1
300 TABLET ORAL DAILY
Qty: 90 TABLET | Refills: 3 | Status: SHIPPED | OUTPATIENT
Start: 2025-01-07

## 2025-01-07 RX ORDER — FUROSEMIDE 40 MG/1
40 TABLET ORAL DAILY
Qty: 90 TABLET | Refills: 3 | Status: SHIPPED | OUTPATIENT
Start: 2025-01-07

## 2025-01-07 RX ORDER — LEVOTHYROXINE SODIUM 125 UG/1
125 TABLET ORAL
Qty: 90 TABLET | Refills: 3 | Status: SHIPPED | OUTPATIENT
Start: 2025-01-07

## 2025-01-07 NOTE — TELEPHONE ENCOUNTER
No care due was identified.  Health [facility] Embedded Care Due Messages. Reference number: [id number].   1/07/2025 2:28:43 PM CST

## 2025-01-10 ENCOUNTER — OFFICE VISIT (OUTPATIENT)
Dept: ENDOCRINOLOGY | Facility: CLINIC | Age: 50
End: 2025-01-10
Payer: COMMERCIAL

## 2025-01-10 VITALS
BODY MASS INDEX: 37.61 KG/M2 | DIASTOLIC BLOOD PRESSURE: 84 MMHG | SYSTOLIC BLOOD PRESSURE: 120 MMHG | WEIGHT: 233 LBS | HEART RATE: 91 BPM

## 2025-01-10 DIAGNOSIS — E66.09 CLASS 1 OBESITY DUE TO EXCESS CALORIES WITH SERIOUS COMORBIDITY AND BODY MASS INDEX (BMI) OF 32.0 TO 32.9 IN ADULT: ICD-10-CM

## 2025-01-10 DIAGNOSIS — E03.9 HYPOTHYROIDISM, UNSPECIFIED TYPE: Primary | ICD-10-CM

## 2025-01-10 DIAGNOSIS — E66.811 CLASS 1 OBESITY DUE TO EXCESS CALORIES WITH SERIOUS COMORBIDITY AND BODY MASS INDEX (BMI) OF 32.0 TO 32.9 IN ADULT: ICD-10-CM

## 2025-01-10 DIAGNOSIS — M79.7 FIBROMYALGIA: ICD-10-CM

## 2025-01-10 DIAGNOSIS — E55.9 VITAMIN D DEFICIENCY DISEASE: ICD-10-CM

## 2025-01-10 PROCEDURE — 99999 PR PBB SHADOW E&M-EST. PATIENT-LVL IV: CPT | Mod: PBBFAC,,, | Performed by: HOSPITALIST

## 2025-01-10 NOTE — ASSESSMENT & PLAN NOTE
- Body mass index is 37.61 kg/m².  - Encourage pt to work on healthy diet, increase exercise as tolerated.  - Continue to monitor weight

## 2025-01-10 NOTE — PROGRESS NOTES
"Subjective:      Patient ID: Keli Gilbert is a 49 y.o. female presented to [facility] today.  Chief complaint:  Thyroid Problem      History of Present illness: Keli Gilbert is a 49 y.o. female here for  hypothyroidism  Other significant past medical history:  Fibromyalgia, psoriatic arthritis, obesity  Current PCP Andriy Moran MD    Interval history: New to me, previously seen by [facility] end[facility] team: Last seen Dr. Rodriguez 2022.  No further workup.  Patient reports chronic fatigue, she was on GLP1 for weight loss.  Since being [facility] did notice some weight gain.        Hypothyroidism  - Diagnosed  in: in 2007  - Family history thyroid disorder: yes, grandfather, mother, uncle  - Saw Dr Rodriguez and Dr Galo at [facility]  - Hx of Thyroid goiter: no  - Tobacco user: no    - Current medication: Generic levothyroxine 125 mcg daily, Cytomel 10 mg daily  - Takes thyroid medication properly without food first thing in the morning, yes    Current symptoms:   No   Yes  []    [x]   Weight gain>> off Mounjaro  []    [x]   Fatigue  [x]    []   Constipation  [x]    []   Hair loss  [x]    []   Brittle nails  []    [x]   Mental fog  []    []   Cold intolerance    Thyroid lab work  Lab Results   Component Value Date    TSH 0.233 (L) 06/19/2024    TSH 0.982 01/21/2023    TSH 0.611 02/12/2022    FREET4 0.99 06/19/2024    FREET4 0.92 01/25/2020    FREET4 0.95 07/27/2019      Antibodies  No results found for: "THYROPEROXID", "THYROIDAB", "TSIMMGLBN", "THYROIDSTIMI", "THYROTROPINR", "THGABSCRN"      The patient's medications, allergies, past medical, surgical, social and family histories were reviewed and updated as appropriate.  Review of Systems: pertinent positives as per the above HPI, and otherwise negative.    Objective:   /84   Pulse 91   Wt 105.7 kg (233 lb)   BMI 37.61 kg/m²   Body mass index is 37.61 kg/m².  Vital signs reviewed    Physical " Exam  Vitals and nursing note reviewed.   Constitutional:       Appearance: Normal appearance. She is well-developed. She is obese. She is not ill-appearing.   Neck:      Thyroid: No thyromegaly.      Comments: Thyroid gland not enlarged on exam  Pulmonary:      Effort: Pulmonary effort is normal. No respiratory distress.   Musculoskeletal:         General: Normal range of motion.      Cervical back: Normal range of motion.   Neurological:      General: No focal deficit present.      Mental Status: She is alert. Mental status is at baseline.   Psychiatric:         Mood and Affect: Mood normal.         Behavior: Behavior normal.       Labs reviewed:  See results in subjective  Lab Results   Component Value Date    HGBA1C 5.4 10/09/2023     Lab Results   Component Value Date    CHOL 174 10/17/2022    HDL 43 10/17/2022    LDLCALC 88.8 10/17/2022    TRIG 211 (H) 10/17/2022    CHOLHDL 24.7 10/17/2022     Lab Results   Component Value Date     11/11/2024    K 4.4 11/11/2024     11/11/2024    CO2 27 11/11/2024    GLU 92 11/11/2024    BUN 12 11/11/2024    CREATININE 0.8 11/11/2024    CALCIUM 9.6 11/11/2024    PROT 6.6 11/11/2024    ALBUMIN 3.3 (L) 11/11/2024    BILITOT 0.2 11/11/2024    ALKPHOS 81 11/11/2024    AST 15 11/11/2024    ALT 18 11/11/2024    ANIONGAP 7 (L) 11/11/2024    EGFRNORACEVR >60.0 11/11/2024    TSH 0.233 (L) 06/19/2024    [id number] 39 07/10/2021     Assessment     1. Hypothyroidism, unspecified type  TSH    T4, Free    T3    Thyroid Peroxidase Antibody      2. Class 1 obesity due to excess calories with serious comorbidity and body mass index (BMI) of 32.0 to 32.9 in adult        3. Fibromyalgia        4. Vitamin D deficiency disease           Plan     Hypothyroidism  - Hypothyroidism due to autoimmune thyroiditis  - I reviewed lab work trends: TSH, Free T4, with patient in clinic today 1/10/2025   - Discussed and confirmed the proper way of taking levothyroxine: daily on an empty stomach,  waiting 30 minutes before any other medication. The patient verbalized understanding.  - REPEAT LAB WORK SOON TO RE-EVALUATE THYROID FUNCTION TSH, T4, T3  - IF STABLE WILL CONTINUE Cytomel 10 mg daily, consider changing to name brand Synthroid 125 mcg daily.  - Trend lab work TSH, FT4 every 6 months> follow-up with endocrinology to adjust medication  - If TSH/T4 in normal range, patient should continue refills with their PCP Andriy Moran MD, given chronic nature of hypothyroidism    - consider gluten free/anti-inflammatory to diet.  Discussed with patient.  Not interested    Class 1 obesity due to excess calories with serious comorbidity and body mass index (BMI) of 32.0 to 32.9 in adult  - Body mass index is 37.61 kg/m².  - Encourage pt to work on healthy diet, increase exercise as tolerated.  - Continue to monitor weight    Fibromyalgia  - chronic pain, with fatigue.  - may cause similar symptoms to hypothyroidism    Vitamin D deficiency disease  - Vitamin-D goal >30       Advised patient to follow up with PCP for routine health maintenance care.   RTC in 6 months    Jerzy Smart M.D.  Endocrinology  [facility]  1/10/2025      Disclaimer: This note has been generated in part with the use of voice-recognition software. There may be typographical errors that have been missed during proof-reading.

## 2025-01-10 NOTE — ASSESSMENT & PLAN NOTE
- Hypothyroidism due to autoimmune thyroiditis  - I reviewed lab work trends: TSH, Free T4, with patient in clinic today 1/10/2025   - Discussed and confirmed the proper way of taking levothyroxine: daily on an empty stomach, waiting 30 minutes before any other medication. The patient verbalized understanding.  - REPEAT LAB WORK SOON TO RE-EVALUATE THYROID FUNCTION TSH, T4, T3  - IF STABLE WILL CONTINUE Cytomel 10 mg daily, consider changing to name brand Synthroid 125 mcg daily.  - Trend lab work TSH, FT4 every 6 months> follow-up with endocrinology to adjust medication  - If TSH/T4 in normal range, patient should continue refills with their PCP Page, Andriy RUSSO MD, given chronic nature of hypothyroidism    - consider gluten free/anti-inflammatory to diet.  Discussed with patient.  Not interested   [FreeTextEntry1] : FROM 11/13/20:\par This is a 41-year-old right-handed man who reports having pain in both forearms, worsened with gripping items and squeezing his hands, starting 1 month ago. He also experienced numbness in the fingertips of both hands when symptoms first started, although this has since resolved. Lab tests earlier this year revealed positive VASILE of uncertain significance and mildly elevated CPK. He states that he has tested negative for COVID-19. He was previously involved in intense exercises at his workplace gym, but he cannot participate in such exercises at this time (last participation was about 2-3 weeks ago). He has noticed that over the past 2-3 months, he has been getting easily fatigued after workouts and after eating heavy meals. PCP: Dr. Lorenzo Menendez, [address], [place], [place].\par \par FROM 11/16/20:\par This appointment is conducted via real-time two-way audiovisual technology at the patient's request to accommodate an urgent follow-up during the current COVID-19 pandemic. Verbal consent for this encounter was received by the patient. The patient was located at his home address, and I was located in [place], [place]. The patient states that he has continued to have pain in both of his forearms, and that he has been feeling very anxious about what this could indicate. He has taken ibuprofen 200mg, but he has still experienced GI discomfort at this low dose.\par \par FROM 11/17/20:\par The patient is seen today for an urgent follow-up as per the patient's request after his EMG/NCV. He reports feeling anxious about the pain he experiences in both of his arms. He has no other neurological complaints at present.

## 2025-01-16 ENCOUNTER — PATIENT MESSAGE (OUTPATIENT)
Dept: ENDOCRINOLOGY | Facility: CLINIC | Age: 50
End: 2025-01-16
Payer: COMMERCIAL

## 2025-01-21 ENCOUNTER — PATIENT MESSAGE (OUTPATIENT)
Dept: RADIOLOGY | Facility: HOSPITAL | Age: 50
End: 2025-01-21
Payer: COMMERCIAL

## 2025-01-22 ENCOUNTER — PATIENT MESSAGE (OUTPATIENT)
Dept: FAMILY MEDICINE | Facility: CLINIC | Age: 50
End: 2025-01-22

## 2025-01-22 ENCOUNTER — OFFICE VISIT (OUTPATIENT)
Dept: FAMILY MEDICINE | Facility: CLINIC | Age: 50
End: 2025-01-22
Payer: COMMERCIAL

## 2025-01-22 DIAGNOSIS — B96.89 ACUTE BACTERIAL BRONCHITIS: Primary | ICD-10-CM

## 2025-01-22 DIAGNOSIS — J20.8 ACUTE BACTERIAL BRONCHITIS: Primary | ICD-10-CM

## 2025-01-22 DIAGNOSIS — L40.50 PSORIATIC ARTHRITIS: ICD-10-CM

## 2025-01-22 RX ORDER — PREDNISONE 20 MG/1
40 TABLET ORAL DAILY
Qty: 10 TABLET | Refills: 0 | Status: SHIPPED | OUTPATIENT
Start: 2025-01-22 | End: 2025-01-27

## 2025-01-22 RX ORDER — ALBUTEROL SULFATE 90 UG/1
2 INHALANT RESPIRATORY (INHALATION) EVERY 6 HOURS PRN
Qty: 18 G | Refills: 0 | Status: SHIPPED | OUTPATIENT
Start: 2025-01-22 | End: 2026-01-22

## 2025-01-22 RX ORDER — AMOXICILLIN AND CLAVULANATE POTASSIUM 875; 125 MG/1; MG/1
1 TABLET, FILM COATED ORAL 2 TIMES DAILY
Qty: 20 TABLET | Refills: 0 | Status: SHIPPED | OUTPATIENT
Start: 2025-01-22 | End: 2025-02-01

## 2025-01-22 RX ORDER — PROMETHAZINE HYDROCHLORIDE AND DEXTROMETHORPHAN HYDROBROMIDE 6.25; 15 MG/5ML; MG/5ML
10 SYRUP ORAL EVERY 8 HOURS PRN
Qty: 240 ML | Refills: 0 | Status: SHIPPED | OUTPATIENT
Start: 2025-01-22

## 2025-01-22 NOTE — PROGRESS NOTES
Routine Office Visit     Patient Name: Keli Gilbert    : 1975  MRN: [id number]    Subjective     History of Present Illness    CHIEF COMPLAINT:  Patient presents today with an upper respiratory infection.    HISTORY OF PRESENT ILLNESS:  She reports upper respiratory symptoms beginning 2 weeks ago, initially affecting her head and now progressed to her chest. She experiences deep cough, wheezing, fever, sinus pain, and congestion affecting her ability to breathe and taste. Symptoms worsen when outside in cold weather, with increased difficulty breathing, tightness, and wheezing. She notes this is an unusually severe and prolonged respiratory illness compared to her history. She has been using hot tea with honey, elderberry, and VapoRub for symptom management since Monday.    MEDICATIONS:  She is currently taking methadone for pain management as well as antidepressants for chronic depression.  She has been taking biologics as prescribed for psoriatic arthritis as directed.    ALLERGIES:  She has allergies to doxycycline and Bactrim.      ROS:  General: +fever, -chills, -fatigue, -weight gain, -weight loss  Eyes: -vision changes, -redness, -discharge  ENT: -ear pain, +nasal congestion, -sore throat  Cardiovascular: -chest pain, -palpitations, -lower extremity edema  Respiratory: +cough, -shortness of breath, +wheezing, +difficulty breathing  Gastrointestinal: -abdominal pain, -nausea, -vomiting, -diarrhea, -constipation, -blood in stool  Genitourinary: -dysuria, -hematuria, -frequency  Musculoskeletal: -joint pain, -muscle pain  Skin: -rash, -lesion  Neurological: -headache, -dizziness, -numbness, -tingling  Psychiatric: -anxiety, -depression, -sleep difficulty           Objective     There were no vitals taken for this visit.  Physical Exam  Constitutional:       Appearance: She is ill-appearing.   HENT:      Head: Normocephalic and atraumatic.      Right Ear: External ear normal.      Left Ear: External  ear normal.      Mouth/Throat:      Mouth: Mucous membranes are moist.   Eyes:      Extraocular Movements: Extraocular movements intact.      Conjunctiva/sclera: Conjunctivae normal.   Cardiovascular:      Comments: +spontaneously beating  Pulmonary:      Effort: Pulmonary effort is normal. No respiratory distress.   Skin:     General: Skin is warm.      Findings: No rash.   Neurological:      Mental Status: She is alert and oriented to person, place, and time. Mental status is at baseline.           Assessment     Assessment & Plan    IMPRESSION:  - Assessed patient with symptoms of upper respiratory infection progressing to chest involvement  - Considered antibiotic therapy to cover both typical and atypical pathogens  - Recommend steroid course for respiratory symptoms if no improvement by Friday  - Evaluated need for inhaler therapy  - Considered potential drug interactions with patient's current pain management regimen (methadone)  - Prescribed cough syrup compatible with methadone    PSORIATIC ARTHRITIS:  - Noted patient's report of pain, potentially related to psoriatic arthritis that is currently managed by rheumatology.  - Continued methadone for pain management.    RESPIRATORY INFECTION:  - Assessed patient's two-week illness progression from head to chest symptoms, including fever, difficulty breathing, wheezing, and chest tightness in cold weather.  - Noted deep cough, wheezing, and ageusia.  - Prescribed Augmentin to cover typical and atypical infections.  - Prescribed a course of steroids to be initiated if no relief by Friday or if symptoms worsen, particularly wheezing and shortness of breath.  - Prescribed an inhaler for potential use and cough syrup compatible with methadone.  - Recommend continued use of hot tea with honey and elderberry, and VapoRub.  - Instructed on steam inhalation technique using VapoRub in hot water to help open airways and drain congestion.    SINUSITIS:  - Noted patient's  report of sinus pain and nasal congestion.  - Augmentin prescription will also address this condition.    METHADONE USE:  - Considered methadone use when prescribing cough syrup to avoid interactions.    ALLERGIES:  - Confirmed patient's allergies to doxycycline and erythromycin.    FOLLOW UP:  - Advised to contact the office if symptoms do not improve.  - Scheduled follow up in 2-3 weeks.         Problem List Items Addressed This Visit          Orthopedic    Psoriatic arthritis    Overview     Psoriatic arthritis--active characterized by,   a) History of sausage digits of both toes with synovitis of the MCPs,   PIPs, DIPs, wrists, and knees., now w. only one sausage digit.  b) Psoriasis of the abdomen, elbow, and the shins.   c) Enbrel held since 10/21 due to optic neuritis. On leflunomide and vimovo.   d) No erosions on x-ray.   e) Has had elevated ESR and CRP.   f) Some response to leflunomide  Stelara since 4/12.              Other Visit Diagnoses       Acute bacterial bronchitis    -  Primary    Relevant Medications    amoxicillin-clavulanate 875-125mg (AUGMENTIN) 875-125 mg per tablet    predniSONE (DELTASONE) 20 MG tablet    promethazine-dextromethorphan (PROMETHAZINE-DM) 6.25-15 mg/5 mL Syrp    albuterol (VENTOLIN HFA) 90 mcg/actuation inhaler              This note was generated with the assistance of ambient listening technology. Verbal consent was obtained by the patient and accompanying visitor(s) for the recording of patient appointment to facilitate this note. I attest to having reviewed and edited the generated note for accuracy, though some syntax or spelling errors may persist. Please contact the author of this note for any clarification.      Andriy Moran MD

## 2025-01-27 ENCOUNTER — PATIENT MESSAGE (OUTPATIENT)
Dept: FAMILY MEDICINE | Facility: CLINIC | Age: 50
End: 2025-01-27
Payer: COMMERCIAL

## 2025-01-27 DIAGNOSIS — J06.9 URI WITH COUGH AND CONGESTION: Primary | ICD-10-CM

## 2025-01-28 ENCOUNTER — HOSPITAL ENCOUNTER (OUTPATIENT)
Dept: RADIOLOGY | Facility: HOSPITAL | Age: 50
Discharge: HOME | End: 2025-01-28
Attending: FAMILY MEDICINE
Payer: COMMERCIAL

## 2025-01-28 DIAGNOSIS — J06.9 URI WITH COUGH AND CONGESTION: ICD-10-CM

## 2025-01-28 PROCEDURE — 71046 X-RAY EXAM CHEST 2 VIEWS: CPT | Mod: TC,FY,PO

## 2025-01-28 PROCEDURE — 71046 X-RAY EXAM CHEST 2 VIEWS: CPT | Mod: 26,,, | Performed by: RADIOLOGY

## 2025-01-29 ENCOUNTER — PATIENT MESSAGE (OUTPATIENT)
Dept: FAMILY MEDICINE | Facility: CLINIC | Age: 50
End: 2025-01-29
Payer: COMMERCIAL

## 2025-02-04 ENCOUNTER — HOSPITAL ENCOUNTER (OUTPATIENT)
Dept: RADIOLOGY | Facility: HOSPITAL | Age: 50
Discharge: HOME | End: 2025-02-04
Attending: OBSTETRICS & GYNECOLOGY
Payer: COMMERCIAL

## 2025-02-04 DIAGNOSIS — R92.8 ABNORMAL MAMMOGRAM OF RIGHT BREAST: ICD-10-CM

## 2025-02-04 PROCEDURE — 77065 DX MAMMO INCL CAD UNI: CPT | Mod: 26,RT,, | Performed by: RADIOLOGY

## 2025-02-04 PROCEDURE — 77061 BREAST TOMOSYNTHESIS UNI: CPT | Mod: 26,RT,, | Performed by: RADIOLOGY

## 2025-02-04 PROCEDURE — 77061 BREAST TOMOSYNTHESIS UNI: CPT | Mod: TC,RT

## 2025-02-05 ENCOUNTER — OFFICE VISIT (OUTPATIENT)
Dept: FAMILY MEDICINE | Facility: CLINIC | Age: 50
End: 2025-02-05
Payer: COMMERCIAL

## 2025-02-05 VITALS
BODY MASS INDEX: 39.31 KG/M2 | HEIGHT: 66 IN | WEIGHT: 244.63 LBS | RESPIRATION RATE: 18 BRPM | OXYGEN SATURATION: 99 % | HEART RATE: 89 BPM | TEMPERATURE: 98 F | DIASTOLIC BLOOD PRESSURE: 74 MMHG | SYSTOLIC BLOOD PRESSURE: 126 MMHG

## 2025-02-05 DIAGNOSIS — I10 HTN (HYPERTENSION), BENIGN: ICD-10-CM

## 2025-02-05 DIAGNOSIS — L40.50 PSORIATIC ARTHRITIS: ICD-10-CM

## 2025-02-05 DIAGNOSIS — Z86.69 HISTORY OF OPTIC NEURITIS: ICD-10-CM

## 2025-02-05 DIAGNOSIS — Z80.3 FAMILY HISTORY OF BREAST CANCER: ICD-10-CM

## 2025-02-05 DIAGNOSIS — G89.4 CHRONIC PAIN SYNDROME: ICD-10-CM

## 2025-02-05 DIAGNOSIS — E66.812 CLASS 2 SEVERE OBESITY DUE TO EXCESS CALORIES WITH SERIOUS COMORBIDITY AND BODY MASS INDEX (BMI) OF 39.0 TO 39.9 IN ADULT: ICD-10-CM

## 2025-02-05 DIAGNOSIS — Z00.00 WELL WOMAN EXAM WITHOUT GYNECOLOGICAL EXAM: Primary | ICD-10-CM

## 2025-02-05 DIAGNOSIS — L73.2 HIDRADENITIS SUPPURATIVA: ICD-10-CM

## 2025-02-05 DIAGNOSIS — F34.1 PERSISTENT DEPRESSIVE DISORDER: ICD-10-CM

## 2025-02-05 DIAGNOSIS — L40.9 PSORIASIS: ICD-10-CM

## 2025-02-05 DIAGNOSIS — E03.9 HYPOTHYROIDISM, UNSPECIFIED TYPE: ICD-10-CM

## 2025-02-05 DIAGNOSIS — E66.01 CLASS 2 SEVERE OBESITY DUE TO EXCESS CALORIES WITH SERIOUS COMORBIDITY AND BODY MASS INDEX (BMI) OF 39.0 TO 39.9 IN ADULT: ICD-10-CM

## 2025-02-05 PROCEDURE — 3008F BODY MASS INDEX DOCD: CPT | Mod: CPTII,S$GLB,, | Performed by: FAMILY MEDICINE

## 2025-02-05 PROCEDURE — 99999 PR PBB SHADOW E&M-EST. PATIENT-LVL III: CPT | Mod: PBBFAC,,, | Performed by: FAMILY MEDICINE

## 2025-02-05 PROCEDURE — 99396 PREV VISIT EST AGE 40-64: CPT | Mod: S$GLB,,, | Performed by: FAMILY MEDICINE

## 2025-02-05 PROCEDURE — 1160F RVW MEDS BY RX/DR IN RCRD: CPT | Mod: CPTII,S$GLB,, | Performed by: FAMILY MEDICINE

## 2025-02-05 PROCEDURE — 3074F SYST BP LT 130 MM HG: CPT | Mod: CPTII,S$GLB,, | Performed by: FAMILY MEDICINE

## 2025-02-05 PROCEDURE — 3078F DIAST BP <80 MM HG: CPT | Mod: CPTII,S$GLB,, | Performed by: FAMILY MEDICINE

## 2025-02-05 PROCEDURE — 1159F MED LIST DOCD IN RCRD: CPT | Mod: CPTII,S$GLB,, | Performed by: FAMILY MEDICINE

## 2025-02-05 NOTE — PROGRESS NOTES
"Well Woman VISIT      CHIEF COMPLAINT  Chief Complaint   Patient presents with    Follow-up    Medication Refill       HPI  Keli Gilbert is a 49 y.o. female who presents for physical.     Social Factors  Tobacco use: No  Ready to Quit: No  Alcohol: Yes rarely  Intimate partner violence screening  "Do you feel safe in your current relationship?" single  "Have you ever been in a relationship in which your partner frightened you or hurt you?" No  Living Will/POA: No  Regular Exercise: No    Depression  Over the past two weeks, have you felt down, depressed, or hopeless? No  Over the past two weeks, have you felt little interest or pleasure in doing things? No    Reproductive Health  Followed by OBGYN    CHD, HTN, DM2  CHD Risk Factors: obesity (BMI >= 30 kg/m2)  Women 45 years and older should be screened for dyslipidemia if at increased risk of CHD  Women 20 to 45 years of age should be screened for dyslipidemia if at increased risk of CHD  Asymptomatic adults with sustained blood pressure greater than 135/80 mm Hg (treated or untreated) should be screened for type 2 diabetes mellitus    Estimated body mass index is 39.48 kg/m² as calculated from the following:    Height as of this encounter: 5' 6" (1.676 m).    Weight as of this encounter: 111 kg (244 lb 9.6 oz).      Screening  Mammogram needed: utd  Colonoscopy needed: n/a  Osteoporosis screen needed: n/a     Women 50 to 74 years of age should be screened for breast cancer with mammography biennially.  Women should be screened for cervical cancer with Pap tests beginning at 21 years of age. Low-risk women should receive Pap testing every three years. Co-testing for human papillomavirus is an option beginning at 30 years of age, and can extend the screening interval to five years. Cervical cancer screening should be discontinued at 65 years of age or after total hysterectomy if the woman has a benign gynecologic history  Adults 50 to 75 years of age " "should be screened for colorectal cancer with an FOBT annually, sigmoidoscopy every five years with an FOBT every three years, or colonoscopy every 10 years.  Women 65 years and older should be screened for osteoporosis. Women younger than 65 years should be screened if the risk of fracture is greater than or equal to that of a 65-year-old white woman without additional risk factors.      Immunizations  delayed    ALLERGIES and MEDS were verified.   PMHx, PSHx, FHx, SOCIALHx were updated as pertinent.    REVIEW OF SYSTEMS  Review of Systems   Constitutional: Negative.    Eyes: Negative.    Respiratory: Negative.     Cardiovascular: Negative.    Gastrointestinal: Negative.    Genitourinary: Negative.    Musculoskeletal:  Positive for joint pain.   Skin: Negative.    Neurological: Negative.          PHYSICAL EXAM  VITAL SIGNS: /74   Pulse 89   Temp 98.2 °F (36.8 °C) (Oral)   Resp 18   Ht 5' 6" (1.676 m)   Wt 111 kg (244 lb 9.6 oz)   SpO2 99%   BMI 39.48 kg/m²   GEN: Well developed, Well nourished, No acute distress.  HENT: Normocephalic, Atraumatic, Bilateral external ears normal, Nose normal, Oropharynx moist, No oral exudates.   Eyes: PERRL, EOMI, Conjunctiva normal, No discharge.   Neck: Supple, No tenderness.  Lymphatic: No cervical or supraclavicular lymphadenopathy noted.   Cardiovascular: Normal heart rate, Normal rhythm, No murmurs, No rubs, No gallops.   Thorax & Lungs: Normal breath sounds, No respiratory distress, No wheezing.  Abdomen: Soft, No tenderness, Bowel sounds normal.  Breast: deferred  Genital: deferred   Skin: Warm, Dry, No erythema, No rash.   Extremities: No edema, No tenderness.       ASSESSMENT/PLAN    Keli was seen today for follow-up and hypertension.    Diagnoses and all orders for this visit:      Well woman exam without gynecological exam  - Labs are up to date    Dysthymia  -     DULoxetine (CYMBALTA) 30 MG capsule; Take 1 capsule (30 mg total) by mouth once " daily.  - Refilled to help with anxiety and pain    Pedal edema  - No acute changes  - Continue to monitor    Hypothyroidism, unspecified type  - Stable on current regimen  - Followed by endocrinology    Benign essential HTN  -     controlled on current regimen    Hidradenitis suppurativa  - Followed by dermatology  - Did trial of accutane and did not tolerate well  - Now followed by Dr. Block and will treat for acute flares    Chronic pain syndrome  -     DULoxetine (CYMBALTA) 30 MG capsule; Take 1 capsule (30 mg total) by mouth once daily.  - Controlled on current regimen along with pain medication    Class 2 severe obesity due to excess calories with serious comorbidity and body mass index (BMI) of 36.0 to 36.9 in adult  - Encouraged weight loss    Insomnia, unspecified type  - Secondary to anxiety and depression    Family history of early CAD  -    elevated apolipoprotein    Psoriatic arthritis  -  Taking medication as prescribed  - Scheduled for follow up with rheumatology           FOLLOW UP: 1 year or sooner if needed      Andriy Moran MD

## 2025-02-19 ENCOUNTER — PATIENT MESSAGE (OUTPATIENT)
Dept: FAMILY MEDICINE | Facility: CLINIC | Age: 50
End: 2025-02-19
Payer: COMMERCIAL

## 2025-02-26 DIAGNOSIS — I10 BENIGN ESSENTIAL HTN: ICD-10-CM

## 2025-02-26 RX ORDER — LOSARTAN POTASSIUM 50 MG/1
50 TABLET ORAL
Qty: 90 TABLET | Refills: 3 | Status: SHIPPED | OUTPATIENT
Start: 2025-02-26 | End: 2025-02-27 | Stop reason: SDUPTHER

## 2025-02-26 NOTE — TELEPHONE ENCOUNTER
Care Due:                  Date            Visit Type   Department     Provider  --------------------------------------------------------------------------------                                EP -         [facility]                              PRIMARY      MED/ INTERNAL  Last Visit: 02-      CARE ([facility])   MED/ PEDS      Andriy A  Page                              EP [facility]                              PRIMARY      MED/ INTERNAL  Next Visit: 08-      CARE ([facility])   MED/ PEDS      Andriy A  Page                                                            Last  Test          Frequency    Reason                     Performed    Due Date  --------------------------------------------------------------------------------    HBA1C.......  6 months...  metFORMIN................  01- 07-    [facility] Embedded Care Due Messages. Reference number: [id number].   2/26/2025 4:24:04 AM CST

## 2025-02-26 NOTE — TELEPHONE ENCOUNTER
Phone pt informed that losartan (COZAAR) 50 MG tablet medication has been sent to the pharmacy on 02/26/25 and ready for . Vs

## 2025-02-26 NOTE — TELEPHONE ENCOUNTER
Provider Staff:  Action required for this patient    Requires labs      Please see care gap opportunities below in Care Due Message.    Thanks!  [facility]     Appointments      Date Provider   Last Visit   2/5/2025 Luisa, Andriy RUSSO MD   Next Visit   8/6/2025 Page, Andriy RUSSO MD     Refill Decision Note   Keli Gilbert  is requesting a refill authorization.  Brief Assessment and Rationale for Refill:  Approve     Medication Therapy Plan:         Comments:     Note composed:10:55 AM 02/26/2025

## 2025-02-27 RX ORDER — LOSARTAN POTASSIUM 50 MG/1
50 TABLET ORAL DAILY
Qty: 90 TABLET | Refills: 3 | Status: SHIPPED | OUTPATIENT
Start: 2025-02-27

## 2025-02-27 NOTE — TELEPHONE ENCOUNTER
Called patient stated that she doesn't use [facility]  and only use [facility] as of 1/1/2025. Was not aware of the message that was sent about her Blood pressure RX and it was filled in December. Also the RX was sent to the wrong Pharmacy and will like to hold all refills until called for request. Thanks

## 2025-02-28 NOTE — TELEPHONE ENCOUNTER
No care due was identified.  [facility] Embedded Care Due Messages. Reference number: [id number].   2/27/2025 7:03:58 PM CST

## 2025-02-28 NOTE — TELEPHONE ENCOUNTER
Refill Decision Note   Keli Gilbert  is requesting a refill authorization.  Brief Assessment and Rationale for Refill:  Approve     Medication Therapy Plan: previous rx sent to wrong pharmacy, pharmacy change per pt; approve      Comments:     Note composed:7:10 PM 02/27/2025

## 2025-03-03 ENCOUNTER — RESULTS FOLLOW-UP (OUTPATIENT)
Dept: ENDOCRINOLOGY | Facility: CLINIC | Age: 50
End: 2025-03-03

## 2025-03-11 ENCOUNTER — PATIENT MESSAGE (OUTPATIENT)
Dept: FAMILY MEDICINE | Facility: CLINIC | Age: 50
End: 2025-03-11
Payer: COMMERCIAL

## 2025-03-11 DIAGNOSIS — E03.9 HYPOTHYROIDISM, UNSPECIFIED TYPE: ICD-10-CM

## 2025-03-11 DIAGNOSIS — E87.6 HYPOKALEMIA: ICD-10-CM

## 2025-03-11 RX ORDER — LIOTHYRONINE SODIUM 5 UG/1
10 TABLET ORAL
Qty: 180 TABLET | Refills: 3 | Status: SHIPPED | OUTPATIENT
Start: 2025-03-11

## 2025-03-11 NOTE — TELEPHONE ENCOUNTER
No care due was identified.  [facility] Embedded Care Due Messages. Reference number: [id number].   3/11/2025 12:16:41 PM CDT

## 2025-03-12 NOTE — TELEPHONE ENCOUNTER
Refill Decision Note   Keli Gilbert  is requesting a refill authorization.  Brief Assessment and Rationale for Refill:  Approve     Medication Therapy Plan:         Alert overridden per protocol: Yes   Comments:     Note composed:11:43 PM 03/11/2025             Appointments     Last Visit   2/5/2025 Andriy Moran MD   Next Visit   8/6/2025 Andriy Moran MD

## 2025-03-14 NOTE — TELEPHONE ENCOUNTER
No care due was identified.  [facility] Embedded Care Due Messages. Reference number: [id number].   3/14/2025 4:14:02 PM CDT

## 2025-03-17 RX ORDER — POTASSIUM CHLORIDE 750 MG/1
CAPSULE, EXTENDED RELEASE ORAL
Qty: 180 CAPSULE | Refills: 2 | Status: SHIPPED | OUTPATIENT
Start: 2025-03-17

## 2025-06-19 ENCOUNTER — OFFICE VISIT (OUTPATIENT)
Dept: RHEUMATOLOGY | Facility: CLINIC | Age: 50
End: 2025-06-19
Payer: COMMERCIAL

## 2025-06-19 VITALS
OXYGEN SATURATION: 99 % | SYSTOLIC BLOOD PRESSURE: 101 MMHG | HEART RATE: 92 BPM | DIASTOLIC BLOOD PRESSURE: 68 MMHG | WEIGHT: 229.06 LBS | HEIGHT: 66 IN | BODY MASS INDEX: 36.81 KG/M2 | RESPIRATION RATE: 19 BRPM

## 2025-06-19 DIAGNOSIS — Z79.1 NSAID LONG-TERM USE: ICD-10-CM

## 2025-06-19 DIAGNOSIS — L40.50 PSORIATIC ARTHRITIS: Primary | ICD-10-CM

## 2025-06-19 DIAGNOSIS — M79.7 FIBROMYALGIA: ICD-10-CM

## 2025-06-19 DIAGNOSIS — Z79.899 IMMUNOSUPPRESSION DUE TO DRUG THERAPY: ICD-10-CM

## 2025-06-19 DIAGNOSIS — D84.821 IMMUNOSUPPRESSION DUE TO DRUG THERAPY: ICD-10-CM

## 2025-06-19 DIAGNOSIS — L73.2 HIDRADENITIS SUPPURATIVA: ICD-10-CM

## 2025-06-19 PROCEDURE — 3078F DIAST BP <80 MM HG: CPT | Mod: CPTII,S$GLB,, | Performed by: STUDENT IN AN ORGANIZED HEALTH CARE EDUCATION/TRAINING PROGRAM

## 2025-06-19 PROCEDURE — 1159F MED LIST DOCD IN RCRD: CPT | Mod: CPTII,S$GLB,, | Performed by: STUDENT IN AN ORGANIZED HEALTH CARE EDUCATION/TRAINING PROGRAM

## 2025-06-19 PROCEDURE — G2211 COMPLEX E/M VISIT ADD ON: HCPCS | Mod: S$GLB,,, | Performed by: STUDENT IN AN ORGANIZED HEALTH CARE EDUCATION/TRAINING PROGRAM

## 2025-06-19 PROCEDURE — 99999 PR PBB SHADOW E&M-EST. PATIENT-LVL V: CPT | Mod: PBBFAC,,, | Performed by: STUDENT IN AN ORGANIZED HEALTH CARE EDUCATION/TRAINING PROGRAM

## 2025-06-19 PROCEDURE — 4010F ACE/ARB THERAPY RXD/TAKEN: CPT | Mod: CPTII,S$GLB,, | Performed by: STUDENT IN AN ORGANIZED HEALTH CARE EDUCATION/TRAINING PROGRAM

## 2025-06-19 PROCEDURE — 3074F SYST BP LT 130 MM HG: CPT | Mod: CPTII,S$GLB,, | Performed by: STUDENT IN AN ORGANIZED HEALTH CARE EDUCATION/TRAINING PROGRAM

## 2025-06-19 PROCEDURE — 99214 OFFICE O/P EST MOD 30 MIN: CPT | Mod: S$GLB,,, | Performed by: STUDENT IN AN ORGANIZED HEALTH CARE EDUCATION/TRAINING PROGRAM

## 2025-06-19 PROCEDURE — 3008F BODY MASS INDEX DOCD: CPT | Mod: CPTII,S$GLB,, | Performed by: STUDENT IN AN ORGANIZED HEALTH CARE EDUCATION/TRAINING PROGRAM

## 2025-06-19 RX ORDER — HYDROCORTISONE 25 MG/G
OINTMENT TOPICAL 2 TIMES DAILY
COMMUNITY
Start: 2025-04-10

## 2025-06-19 RX ORDER — PREDNISONE 20 MG/1
20 TABLET ORAL EVERY MORNING
COMMUNITY
Start: 2025-05-17

## 2025-06-19 RX ORDER — KETOCONAZOLE 20 MG/G
CREAM TOPICAL
COMMUNITY
Start: 2025-05-05

## 2025-06-19 RX ORDER — FLUCONAZOLE 150 MG/1
150 TABLET ORAL
COMMUNITY
Start: 2025-06-15

## 2025-06-19 RX ORDER — ACYCLOVIR 50 MG/G
CREAM TOPICAL 3 TIMES DAILY
COMMUNITY
Start: 2025-04-22

## 2025-06-19 RX ORDER — VALACYCLOVIR HYDROCHLORIDE 500 MG/1
TABLET, FILM COATED ORAL
COMMUNITY
Start: 2025-05-03

## 2025-06-19 RX ORDER — AMMONIUM LACTATE 12 G/100G
LOTION TOPICAL NIGHTLY
COMMUNITY
Start: 2025-05-16

## 2025-06-19 NOTE — PROGRESS NOTES
Subjective:      Patient ID: Keli Gilbert is a 49 y.o. female.    Chief Complaint: follow up for PsA    PMH of PsA, mild OA/DJD multiple joints, fibromyalgia, chronic pain on methadone, HTN, hypothyroidism, anxiety, depression, hypertriglyceridemia, vitamin D deficiency, trochanteric bursitis, morbid obesity, hidradenitis suppurativa.     Rheum History:  - Psoriatic arthritis dx around 1997, had psoriasis since childhood  - Symptoms: psoriasis lesions of abdomen/elbow/shins, dactylitis in toes and fingers, MCP synovial hypertrophy, arthralgia  - Negative BIBI, RF/CCP  - Has had elevated inflammatory markers in the past  - Imaging has shown some erosive changes  - Was following with Dr. Richards from before 2012-3/2024  - Last visit in 3/2024: mostly well controlled (PsO and dactylitis resolved) but with some generalized arthralgia and 30min of generalized AM stiffness   - Received L trochanteric bursa CSI with Dr. Richards in 3/2024    Prior Treatments:  - SSZ (GI intolerance)  - Methotrexate PO and SQ (inefficacy)  - Apremilast/otezla (GI intolerance)  - Leflunomide (d/c'd in 7/2022 due inefficacy)  - Orencia (2017, incomplete response)  - Enbrel TNFi (8496-8076, d/c'd due to demyelinating lesions - L optic neuritis, required high dose steroids, imaging normalized after stopping enbrel)  - Stelara IL12/23i (lack of efficacy)  - Tremfya IL-23i (4/2021, stopped due to headache and inefficacy)  - Taltz IL-17i (4/2018-2/2019)  - Cosentyx IL-17i (initially had response, but then later lost efficacy; later back on it a couple times when other therapies failed, last stopped in 12/2021)  - Tofacitinib Ori (3/2019-6/2019, progressed with erosive lesion)    Current Treatments:  - Rinvoq (1/2022-current)  - Naproxen 500mg BID to QID prn - on xantac/H2 blocker daily    11/2024  -she is new to me. Used to follow with Dr. Richards for years. Saw Dr. Bro and Dr. Bledsoe in 8/24  -Doing well on Rinvoq. She has been  on it for a couple of years.   -Follows with a dermatologist, who has done local steroid injections at plaques if needed in the past. Has some L hip and knee area pain, worse with standing for long periods of time at work. Pt works 2 jobs at a bank and [employer] - so is quite active for these.   -She does endorse some chronic diffuse pain and AM stiffness (up to 1.5hrs) but this she feels is related to her fibromyalgia a lot as well. She is on methadone and follows with pain management. She feels that methadone helps very well.   -she also has HS. She has intermittent boils ar needed. Takes doxycycline as needed.     6/2025  -labs done in 11/2024 showed negative BIBI, SSA, SSB. ESR mildy elevated. CRP normal. Hepatitis b and C serologies negative. Quantiferon negative. CBC and CMP stable  -she is on Rinvoq 11 mg QD. She is compliant with medications. Has not trouble with refills.   -she follows with dermatology  -she is doing well. She has noted some issues with dropping things from her hands in the morning     Review of Systems   Constitutional:  Negative for activity change, appetite change, chills, fatigue, fever and unexpected weight change.   HENT:  Negative for congestion, dental problem, facial swelling, mouth sores, nosebleeds, sinus pain, sore throat and trouble swallowing.    Eyes:  Negative for discharge, redness and visual disturbance.   Respiratory:  Negative for cough, chest tightness and shortness of breath.    Cardiovascular:  Negative for chest pain and leg swelling.   Gastrointestinal:  Negative for abdominal pain, constipation, diarrhea, nausea and vomiting.   Genitourinary:  Negative for dysuria, frequency and urgency.   Musculoskeletal:  Negative for arthralgias, back pain, joint swelling and myalgias.   Skin:  Negative for color change, rash and wound.   Neurological:  Negative for dizziness, tremors, syncope, weakness, light-headedness and headaches.   Psychiatric/Behavioral:  Negative for  "sleep disturbance. The patient is not nervous/anxious.       Objective:   /68 (BP Location: Left arm, Patient Position: Sitting)   Pulse 92   Resp 19   Ht 5' 6" (1.676 m)   Wt 103.9 kg (229 lb 0.9 oz)   SpO2 99%   BMI 36.97 kg/m²   Physical Exam   Constitutional: She is oriented to person, place, and time. She appears well-developed and well-nourished. No distress.   HENT:   Head: Normocephalic.   Cardiovascular: Normal heart sounds.   Pulmonary/Chest: Effort normal.   Musculoskeletal:         General: No swelling or tenderness. Normal range of motion.      Cervical back: Normal range of motion and neck supple.      Comments: No acute synovitis in any of the small or large joints.    Neurological: She is alert and oriented to person, place, and time.   Skin: Skin is warm and dry.   Base of scalp above neck with some scarred areas from prior psoriatic plaques. Otherwise, no active psoriasis noted elsewhere.    Psychiatric: Her behavior is normal. Mood normal.   Vitals reviewed.     8/1/2024 11/11/2024   Tender (SMILEY-28) 12 / 28  0 / 28    Swollen (SMILEY-28) 0 / 28  0 / 28    Provider Global -- --   Patient Global -- --   ESR -- --   CRP -- --   SMILEY-28 (ESR) -- --   SMILEY-28 (CRP) -- --   CDAI Score -- --      Assessment:     1. Psoriatic arthritis    2. Fibromyalgia    3. Immunosuppression due to drug therapy    4. Hidradenitis suppurativa    5. NSAID long-term use        - Pt with long standing PsA since a young age  - Has previously tried and failed multiple therapies and drug classes due to active disease on other therapeutics  - Most recently, has been well controlled on Ori rinvoq for 2.5 years     Regarding therapeutic options, pt has tried and failed the following drug classes:  - csDMARDs (mtx, SSZ, leflunomide, apremilast)  - Abatacept  - TNFi - CANNOT USE any others in this class due to demyelinating disease hx 2/2 etanercept   - IL12/23i (ustekinumab) - lost efficacy  - IL23i (guselkumab) - lost " efficacy  - Il17i (ixekizumab, secukinumab) - had efficacy for some time with cosentyx but then later not as efficacious  - Ori (tofacitinib) - lost efficacy, currently doing well on rinvoq/upadacitinib    In the future, if needed, could consider:  - Brodalumab/Siliq (Il17i)  - Risankizumab/Skyrizi (Il23i, humanized IgG1 MAb)      Plan:     Problem List Items Addressed This Visit       Psoriatic arthritis - Primary    Relevant Orders    CBC Auto Differential    Comprehensive Metabolic Panel    Sedimentation rate    C-Reactive Protein    Fibromyalgia    Hidradenitis suppurativa     Other Visit Diagnoses         Immunosuppression due to drug therapy        Relevant Orders    CBC Auto Differential    Comprehensive Metabolic Panel    Sedimentation rate    C-Reactive Protein      NSAID long-term use              -Continue Rinvoq 11 mg QD.   -Due for labs now to monitor Rinvoq   -Pt can continue with naproxen, but we did discuss long term use and risks associated with NSAID use including GI and renal toxicity  no history of GI bleed or coronary artery disease. previous labs without features of CKD. clinical significance side effect of long-term NSAID use discussed in detail. recommended for alternative therapies for pain management.  -no live vaccines. vaccines per guidelines. immunosuppression/infectious precautions reinforced    -On methadone per pain management. Fibromyalgia is controlled     Follow up in about 3 months (around 9/19/2025). Or sooner PRN for disease management     Method of contact with patient concerns: [facility]    Disclaimer:  This note is prepared using voice recognition software and as such is likely to have errors and has not been proof read. Please contact me for questions.     Time spent: 30 minutes in face to face discussion concerning diagnosis, prognosis, review of lab and test results, benefits of treatment as well as management of disease, counseling of patient and  coordination of care between various health care providers.      Leta Tran M.D.  Rheumatology  [facility]

## 2025-07-09 ENCOUNTER — PATIENT MESSAGE (OUTPATIENT)
Dept: RHEUMATOLOGY | Facility: CLINIC | Age: 50
End: 2025-07-09
Payer: COMMERCIAL

## 2025-08-05 ENCOUNTER — LAB VISIT (OUTPATIENT)
Dept: LAB | Facility: HOSPITAL | Age: 50
End: 2025-08-05
Payer: COMMERCIAL

## 2025-08-05 DIAGNOSIS — D84.821 IMMUNOSUPPRESSION DUE TO DRUG THERAPY: ICD-10-CM

## 2025-08-05 DIAGNOSIS — L40.50 PSORIATIC ARTHRITIS: ICD-10-CM

## 2025-08-05 DIAGNOSIS — Z00.00 WELL WOMAN EXAM WITHOUT GYNECOLOGICAL EXAM: ICD-10-CM

## 2025-08-05 DIAGNOSIS — Z79.899 IMMUNOSUPPRESSION DUE TO DRUG THERAPY: ICD-10-CM

## 2025-08-05 LAB
ABSOLUTE EOSINOPHIL (OHS): 0.1 K/UL
ABSOLUTE MONOCYTE (OHS): 0.41 K/UL (ref 0.3–1)
ABSOLUTE NEUTROPHIL COUNT (OHS): 2.75 K/UL (ref 1.8–7.7)
ALBUMIN SERPL BCP-MCNC: 3.7 G/DL (ref 3.5–5.2)
ALP SERPL-CCNC: 77 UNIT/L (ref 40–150)
ALT SERPL W/O P-5'-P-CCNC: 19 UNIT/L (ref 0–55)
ANION GAP (OHS): 8 MMOL/L (ref 8–16)
AST SERPL-CCNC: 36 UNIT/L (ref 0–50)
BASOPHILS # BLD AUTO: 0.01 K/UL
BASOPHILS NFR BLD AUTO: 0.2 %
BILIRUB SERPL-MCNC: 0.2 MG/DL (ref 0.1–1)
BUN SERPL-MCNC: 19 MG/DL (ref 6–20)
CALCIUM SERPL-MCNC: 9.3 MG/DL (ref 8.7–10.5)
CHLORIDE SERPL-SCNC: 110 MMOL/L (ref 95–110)
CHOLEST SERPL-MCNC: 173 MG/DL (ref 120–199)
CHOLEST/HDLC SERPL: 4.3 {RATIO} (ref 2–5)
CO2 SERPL-SCNC: 23 MMOL/L (ref 23–29)
CREAT SERPL-MCNC: 0.9 MG/DL (ref 0.5–1.4)
CRP SERPL-MCNC: 3.6 MG/L
EAG (OHS): 120 MG/DL (ref 68–131)
ERYTHROCYTE [DISTWIDTH] IN BLOOD BY AUTOMATED COUNT: 15.8 % (ref 11.5–14.5)
ERYTHROCYTE [SEDIMENTATION RATE] IN BLOOD BY PHOTOMETRIC METHOD: 6 MM/HR
GFR SERPLBLD CREATININE-BSD FMLA CKD-EPI: >60 ML/MIN/1.73/M2
GLUCOSE SERPL-MCNC: 94 MG/DL (ref 70–110)
HBA1C MFR BLD: 5.8 % (ref 4–5.6)
HCT VFR BLD AUTO: 32.2 % (ref 37–48.5)
HDLC SERPL-MCNC: 40 MG/DL (ref 40–75)
HDLC SERPL: 23.1 % (ref 20–50)
HGB BLD-MCNC: 9.9 GM/DL (ref 12–16)
IMM GRANULOCYTES # BLD AUTO: 0.05 K/UL (ref 0–0.04)
IMM GRANULOCYTES NFR BLD AUTO: 1.1 % (ref 0–0.5)
LDLC SERPL CALC-MCNC: 100.4 MG/DL (ref 63–159)
LYMPHOCYTES # BLD AUTO: 1.44 K/UL (ref 1–4.8)
MCH RBC QN AUTO: 28.8 PG (ref 27–31)
MCHC RBC AUTO-ENTMCNC: 30.7 G/DL (ref 32–36)
MCV RBC AUTO: 94 FL (ref 82–98)
NONHDLC SERPL-MCNC: 133 MG/DL
NUCLEATED RBC (/100WBC) (OHS): 0 /100 WBC
PLATELET # BLD AUTO: 372 K/UL (ref 150–450)
PMV BLD AUTO: 8.6 FL (ref 9.2–12.9)
POTASSIUM SERPL-SCNC: 4.1 MMOL/L (ref 3.5–5.1)
PROT SERPL-MCNC: 6.8 GM/DL (ref 6–8.4)
RBC # BLD AUTO: 3.44 M/UL (ref 4–5.4)
RELATIVE EOSINOPHIL (OHS): 2.1 %
RELATIVE LYMPHOCYTE (OHS): 30.3 % (ref 18–48)
RELATIVE MONOCYTE (OHS): 8.6 % (ref 4–15)
RELATIVE NEUTROPHIL (OHS): 57.7 % (ref 38–73)
SODIUM SERPL-SCNC: 141 MMOL/L (ref 136–145)
TRIGL SERPL-MCNC: 163 MG/DL (ref 30–150)
WBC # BLD AUTO: 4.76 K/UL (ref 3.9–12.7)

## 2025-08-05 PROCEDURE — 85652 RBC SED RATE AUTOMATED: CPT

## 2025-08-05 PROCEDURE — 83036 HEMOGLOBIN GLYCOSYLATED A1C: CPT

## 2025-08-05 PROCEDURE — 80061 LIPID PANEL: CPT

## 2025-08-05 PROCEDURE — 85025 COMPLETE CBC W/AUTO DIFF WBC: CPT

## 2025-08-05 PROCEDURE — 36415 COLL VENOUS BLD VENIPUNCTURE: CPT | Mod: PO

## 2025-08-05 PROCEDURE — 86140 C-REACTIVE PROTEIN: CPT

## 2025-08-05 PROCEDURE — 80053 COMPREHEN METABOLIC PANEL: CPT

## 2025-08-12 ENCOUNTER — PATIENT MESSAGE (OUTPATIENT)
Dept: FAMILY MEDICINE | Facility: CLINIC | Age: 50
End: 2025-08-12
Payer: COMMERCIAL

## 2025-08-25 ENCOUNTER — PATIENT MESSAGE (OUTPATIENT)
Dept: FAMILY MEDICINE | Facility: CLINIC | Age: 50
End: 2025-08-25
Payer: COMMERCIAL

## 2025-08-25 DIAGNOSIS — R63.1 POLYDIPSIA: Primary | ICD-10-CM

## 2025-09-02 ENCOUNTER — LAB VISIT (OUTPATIENT)
Dept: LAB | Facility: HOSPITAL | Age: 50
End: 2025-09-02
Attending: STUDENT IN AN ORGANIZED HEALTH CARE EDUCATION/TRAINING PROGRAM
Payer: COMMERCIAL

## 2025-09-02 DIAGNOSIS — L40.50 PSORIATIC ARTHRITIS: ICD-10-CM

## 2025-09-02 DIAGNOSIS — R63.1 POLYDIPSIA: ICD-10-CM

## 2025-09-02 DIAGNOSIS — D84.821 IMMUNOSUPPRESSION DUE TO DRUG THERAPY: ICD-10-CM

## 2025-09-02 DIAGNOSIS — Z79.899 IMMUNOSUPPRESSION DUE TO DRUG THERAPY: ICD-10-CM

## 2025-09-02 LAB
ABSOLUTE EOSINOPHIL (OHS): 0.14 K/UL
ABSOLUTE MONOCYTE (OHS): 0.63 K/UL (ref 0.3–1)
ABSOLUTE NEUTROPHIL COUNT (OHS): 3.97 K/UL (ref 1.8–7.7)
ALBUMIN SERPL BCP-MCNC: 3.7 G/DL (ref 3.5–5.2)
ALP SERPL-CCNC: 76 UNIT/L (ref 40–150)
ALT SERPL W/O P-5'-P-CCNC: 24 UNIT/L (ref 0–55)
ANION GAP (OHS): 9 MMOL/L (ref 8–16)
AST SERPL-CCNC: 41 UNIT/L (ref 0–50)
BASOPHILS # BLD AUTO: 0.03 K/UL
BASOPHILS NFR BLD AUTO: 0.5 %
BILIRUB SERPL-MCNC: 0.3 MG/DL (ref 0.1–1)
BUN SERPL-MCNC: 25 MG/DL (ref 6–20)
CALCIUM SERPL-MCNC: 9.9 MG/DL (ref 8.7–10.5)
CHLORIDE SERPL-SCNC: 104 MMOL/L (ref 95–110)
CO2 SERPL-SCNC: 24 MMOL/L (ref 23–29)
CREAT SERPL-MCNC: 1.2 MG/DL (ref 0.5–1.4)
EAG (OHS): 123 MG/DL (ref 68–131)
ERYTHROCYTE [DISTWIDTH] IN BLOOD BY AUTOMATED COUNT: 14.9 % (ref 11.5–14.5)
GFR SERPLBLD CREATININE-BSD FMLA CKD-EPI: 55 ML/MIN/1.73/M2
GLUCOSE SERPL-MCNC: 84 MG/DL (ref 70–110)
HBA1C MFR BLD: 5.9 % (ref 4–5.6)
HCT VFR BLD AUTO: 30.1 % (ref 37–48.5)
HGB BLD-MCNC: 9.3 GM/DL (ref 12–16)
IMM GRANULOCYTES # BLD AUTO: 0.04 K/UL (ref 0–0.04)
IMM GRANULOCYTES NFR BLD AUTO: 0.6 % (ref 0–0.5)
LYMPHOCYTES # BLD AUTO: 1.45 K/UL (ref 1–4.8)
MCH RBC QN AUTO: 29 PG (ref 27–31)
MCHC RBC AUTO-ENTMCNC: 30.9 G/DL (ref 32–36)
MCV RBC AUTO: 94 FL (ref 82–98)
NUCLEATED RBC (/100WBC) (OHS): 0 /100 WBC
PLATELET # BLD AUTO: 369 K/UL (ref 150–450)
PMV BLD AUTO: 9.1 FL (ref 9.2–12.9)
POTASSIUM SERPL-SCNC: 4.5 MMOL/L (ref 3.5–5.1)
PROT SERPL-MCNC: 6.7 GM/DL (ref 6–8.4)
RBC # BLD AUTO: 3.21 M/UL (ref 4–5.4)
RELATIVE EOSINOPHIL (OHS): 2.2 %
RELATIVE LYMPHOCYTE (OHS): 23.2 % (ref 18–48)
RELATIVE MONOCYTE (OHS): 10.1 % (ref 4–15)
RELATIVE NEUTROPHIL (OHS): 63.4 % (ref 38–73)
SODIUM SERPL-SCNC: 137 MMOL/L (ref 136–145)
WBC # BLD AUTO: 6.26 K/UL (ref 3.9–12.7)

## 2025-09-02 PROCEDURE — 84075 ASSAY ALKALINE PHOSPHATASE: CPT

## 2025-09-02 PROCEDURE — 85025 COMPLETE CBC W/AUTO DIFF WBC: CPT

## 2025-09-02 PROCEDURE — 83036 HEMOGLOBIN GLYCOSYLATED A1C: CPT

## 2025-09-02 PROCEDURE — 36415 COLL VENOUS BLD VENIPUNCTURE: CPT | Mod: PO

## 2025-09-04 ENCOUNTER — TELEPHONE (OUTPATIENT)
Dept: RHEUMATOLOGY | Facility: CLINIC | Age: 50
End: 2025-09-04
Payer: COMMERCIAL

## 2025-09-05 ENCOUNTER — OFFICE VISIT (OUTPATIENT)
Dept: FAMILY MEDICINE | Facility: CLINIC | Age: 50
End: 2025-09-05
Payer: COMMERCIAL

## 2025-09-05 DIAGNOSIS — D64.9 ACUTE ANEMIA: ICD-10-CM

## 2025-09-05 DIAGNOSIS — R73.03 PREDIABETES: ICD-10-CM

## 2025-09-05 DIAGNOSIS — E03.9 HYPOTHYROIDISM, UNSPECIFIED TYPE: Primary | ICD-10-CM

## (undated) DEVICE — DRESSING ADH ISLAND 2.5 X 3

## (undated) DEVICE — DISSECTOR CURVED 5DCD

## (undated) DEVICE — NDL HYPO REG 25G X 1 1/2

## (undated) DEVICE — APPLICATOR CHLORAPREP ORN 26ML

## (undated) DEVICE — SEE L#120831

## (undated) DEVICE — TUBING INSUFFLATION 10

## (undated) DEVICE — APPLIER CLIP ENDO LIGAMAX 5MM

## (undated) DEVICE — SUT VICRYL PLUS 3-0 SH 18IN

## (undated) DEVICE — POSITIONER HEAD DONUT 9IN FOAM

## (undated) DEVICE — CANISTER SUCTION 2 LTR

## (undated) DEVICE — TROCAR ENDOPATH XCEL 5X100MM

## (undated) DEVICE — BLANKET UPPER BODY 78.7X29.9IN

## (undated) DEVICE — GLOVE BIOGEL 7.5

## (undated) DEVICE — KIT ANTIFOG

## (undated) DEVICE — SEE MEDLINE ITEM 157110

## (undated) DEVICE — TOWEL OR DISP STRL BLUE 4/PK

## (undated) DEVICE — PACK ENDOSCOPY GENERAL

## (undated) DEVICE — SHEARS HARMONIC 36CM HD 1000I

## (undated) DEVICE — Device

## (undated) DEVICE — COVER OVERHEAD SURG LT BLUE

## (undated) DEVICE — SCISSOR CURVED ENDOPATH 5MM

## (undated) DEVICE — ELECTRODE REM PLYHSV RETURN 9

## (undated) DEVICE — CLOSURE SKIN STERI STRIP 1/2X4

## (undated) DEVICE — SUT VICRYL PLUS 4-0 P3 18IN

## (undated) DEVICE — TROCAR ENDOPATH XCEL 11MM 10CM

## (undated) DEVICE — BLADE SURG CARBON STEEL SZ11

## (undated) DEVICE — BAG TISS RETRV MONARCH 10MM

## (undated) DEVICE — SYR 10CC LUER LOCK